# Patient Record
Sex: FEMALE | Race: BLACK OR AFRICAN AMERICAN | NOT HISPANIC OR LATINO | URBAN - METROPOLITAN AREA
[De-identification: names, ages, dates, MRNs, and addresses within clinical notes are randomized per-mention and may not be internally consistent; named-entity substitution may affect disease eponyms.]

---

## 2017-06-15 ENCOUNTER — OUTPATIENT (OUTPATIENT)
Dept: OUTPATIENT SERVICES | Facility: HOSPITAL | Age: 52
LOS: 1 days | Discharge: HOME | End: 2017-06-15

## 2017-06-28 DIAGNOSIS — K80.20 CALCULUS OF GALLBLADDER WITHOUT CHOLECYSTITIS WITHOUT OBSTRUCTION: ICD-10-CM

## 2017-11-27 ENCOUNTER — OUTPATIENT (OUTPATIENT)
Dept: OUTPATIENT SERVICES | Facility: HOSPITAL | Age: 52
LOS: 1 days | Discharge: HOME | End: 2017-11-27

## 2017-11-27 ENCOUNTER — APPOINTMENT (OUTPATIENT)
Dept: OBGYN | Facility: CLINIC | Age: 52
End: 2017-11-27

## 2017-11-27 VITALS
DIASTOLIC BLOOD PRESSURE: 60 MMHG | HEIGHT: 62 IN | SYSTOLIC BLOOD PRESSURE: 150 MMHG | WEIGHT: 164 LBS | BODY MASS INDEX: 30.18 KG/M2

## 2017-12-07 ENCOUNTER — OUTPATIENT (OUTPATIENT)
Dept: OUTPATIENT SERVICES | Facility: HOSPITAL | Age: 52
LOS: 1 days | Discharge: HOME | End: 2017-12-07

## 2017-12-07 DIAGNOSIS — Z12.31 ENCOUNTER FOR SCREENING MAMMOGRAM FOR MALIGNANT NEOPLASM OF BREAST: ICD-10-CM

## 2018-01-09 ENCOUNTER — OUTPATIENT (OUTPATIENT)
Dept: OUTPATIENT SERVICES | Facility: HOSPITAL | Age: 53
LOS: 1 days | Discharge: HOME | End: 2018-01-09

## 2018-01-09 DIAGNOSIS — I73.9 PERIPHERAL VASCULAR DISEASE, UNSPECIFIED: ICD-10-CM

## 2019-02-11 ENCOUNTER — OUTPATIENT (OUTPATIENT)
Dept: OUTPATIENT SERVICES | Facility: HOSPITAL | Age: 54
LOS: 1 days | Discharge: HOME | End: 2019-02-11

## 2019-02-11 DIAGNOSIS — R10.10 UPPER ABDOMINAL PAIN, UNSPECIFIED: ICD-10-CM

## 2019-12-05 ENCOUNTER — EMERGENCY (EMERGENCY)
Facility: HOSPITAL | Age: 54
LOS: 0 days | Discharge: HOME | End: 2019-12-05
Attending: EMERGENCY MEDICINE | Admitting: EMERGENCY MEDICINE
Payer: COMMERCIAL

## 2019-12-05 VITALS
OXYGEN SATURATION: 98 % | HEART RATE: 71 BPM | RESPIRATION RATE: 16 BRPM | SYSTOLIC BLOOD PRESSURE: 200 MMHG | DIASTOLIC BLOOD PRESSURE: 88 MMHG | TEMPERATURE: 98 F

## 2019-12-05 VITALS — DIASTOLIC BLOOD PRESSURE: 82 MMHG | SYSTOLIC BLOOD PRESSURE: 185 MMHG | HEART RATE: 69 BPM

## 2019-12-05 DIAGNOSIS — I10 ESSENTIAL (PRIMARY) HYPERTENSION: ICD-10-CM

## 2019-12-05 DIAGNOSIS — Z88.8 ALLERGY STATUS TO OTHER DRUGS, MEDICAMENTS AND BIOLOGICAL SUBSTANCES: ICD-10-CM

## 2019-12-05 PROCEDURE — 99284 EMERGENCY DEPT VISIT MOD MDM: CPT

## 2019-12-05 RX ORDER — METOCLOPRAMIDE HCL 10 MG
10 TABLET ORAL ONCE
Refills: 0 | Status: COMPLETED | OUTPATIENT
Start: 2019-12-05 | End: 2019-12-05

## 2019-12-05 RX ORDER — ACETAMINOPHEN 500 MG
975 TABLET ORAL ONCE
Refills: 0 | Status: COMPLETED | OUTPATIENT
Start: 2019-12-05 | End: 2019-12-05

## 2019-12-05 RX ADMIN — Medication 10 MILLIGRAM(S): at 11:48

## 2019-12-05 RX ADMIN — Medication 975 MILLIGRAM(S): at 11:48

## 2019-12-05 NOTE — ED ADULT NURSE NOTE - OBJECTIVE STATEMENT
Patient c/o headache, fatigue and high blood pressure. Patient states she normally has high blood pressure and takes blood pressure medications (enalapril and metoprolol). Patient recently started on metoprolol. On assessment no signs of distress noted. Denies n/v/f/c.

## 2019-12-05 NOTE — ED PROVIDER NOTE - NS ED ROS FT
Eyes:  No visual changes, eye pain or discharge.  ENMT:  No hearing changes, pain, discharge or infections. No neck pain or stiffness.  Cardiac:  No chest pain, SOB or edema. No chest pain with exertion.  Respiratory:  No cough or respiratory distress. No hemoptysis. No history of asthma or RAD.  GI:  No nausea, vomiting, diarrhea or abdominal pain.  :  No dysuria, frequency or burning.  MS:  No myalgia, muscle weakness, joint pain or back pain.  Neuro:  +headache. no weakness.  No LOC.  Skin:  No skin rash.   Endocrine: No history of thyroid disease or diabetes.

## 2019-12-05 NOTE — ED PROVIDER NOTE - ATTENDING CONTRIBUTION TO CARE
53 y/o female h/o HTN, DM, non-compliant with her medications secondary to side effects, now presents with elevated blood pressure today, sx are persistent, denies modifying factors, associated gradual onset of nonexertional frontal h/a which is now resolved, denies paresthesias, focal weakness, fevers, chills, neck stiffness, visual disturbances or pain, facial swelling, dental pain or other associated complaints at present.     No old chart available for review.  I have reviewed and agree with the nursing note, except as documented in my note.    VSS, awake, alert, non-toxic appearing, lying comfortably in stretcher, in NAD, no scalp tenderness or pulsatile vasculature, PERRL / EOMI, no conjunctival injection, ears clear, oropharynx clear and w/o signs dental space infection, no JVD or bruit, no skin rash or lesions, chest CTAB, no w/r/r, +S1/S2, RRR, no m/r/g, abdomen soft, NT, ND, +BS, no peripheral edema, AO x 3, clear speech.    Pt remained asymptomatic in ED, encouraged to take her medications and to f/u PMD to switch her antihypertensives if she is discouraged from taking them secondary to side effects. The patient was given detailed return precautions and advised to return to the emergency department if any new symptoms developed, symptoms worsened or for any concerns. The patient was offered the opportunity to ask questions and verbalized that they understand the diagnosis and discharge instructions.

## 2019-12-05 NOTE — ED PROVIDER NOTE - PATIENT PORTAL LINK FT
You can access the FollowMyHealth Patient Portal offered by [facility] by registering at the following website: [email or web address]. By joining [employer]’s FollowMyHealth portal, you will also be able to view your health information using other applications (apps) compatible with our system.

## 2019-12-05 NOTE — ED PROVIDER NOTE - OBJECTIVE STATEMENT
pt is a 54 yof w/ HTN, DM here for headache for 4 days, and high blood pressure. pt was evaluated at OT for shoulder pain, got BP because pt looked like she was in pain, and saw it was 185 systolic so they sent her here. pt denies dizziness, chest pain, sob, cough, urinary issues, blurry vision

## 2019-12-05 NOTE — ED PROVIDER NOTE - NSFOLLOWUPINSTRUCTIONS_ED_ALL_ED_FT
Hypertension    Hypertension, commonly called high blood pressure, is when the force of blood pumping through your arteries is too strong. Hypertension forces your heart to work harder to pump blood. Your arteries may become narrow or stiff. Having untreated or uncontrolled hypertension for a long period of time can cause heart attack, stroke, kidney disease, and other problems. If started on a medication, take exactly as prescribed by your health care professional. Maintain a healthy lifestyle and follow up with your primary care physician.    SEEK IMMEDIATE MEDICAL CARE IF YOU HAVE ANY OF THE FOLLOWING SYMPTOMS: severe headache, confusion, chest pain, abdominal pain, vomiting, or shortness of breath.    Headache    A headache is pain or discomfort felt around the head or neck area. The specific cause of a headache may not be found as there are many types including tension headaches, migraine headaches, and cluster headaches. Watch your condition for any changes. Things you can do to manage your pain include taking over the counter and prescription medications as instructed by your health care provider, lying down in a dark quiet room, limiting stress, getting regular sleep, and refraining from alcohol and tobacco products.    SEEK IMMEDIATE MEDICAL CARE IF YOU HAVE ANY OF THE FOLLOWING SYMPTOMS: fever, vomiting, stiff neck, loss of vision, problems with speech, muscle weakness, loss of balance, trouble walking, passing out, or confusion.

## 2020-01-14 PROBLEM — I10 ESSENTIAL (PRIMARY) HYPERTENSION: Chronic | Status: ACTIVE | Noted: 2019-12-05

## 2020-01-17 ENCOUNTER — OUTPATIENT (OUTPATIENT)
Dept: OUTPATIENT SERVICES | Facility: HOSPITAL | Age: 55
LOS: 1 days | Discharge: HOME | End: 2020-01-17
Payer: OTHER MISCELLANEOUS

## 2020-01-17 VITALS
TEMPERATURE: 97 F | WEIGHT: 168.65 LBS | SYSTOLIC BLOOD PRESSURE: 152 MMHG | HEIGHT: 63 IN | HEART RATE: 69 BPM | DIASTOLIC BLOOD PRESSURE: 69 MMHG | OXYGEN SATURATION: 96 % | RESPIRATION RATE: 18 BRPM

## 2020-01-17 DIAGNOSIS — Z98.890 OTHER SPECIFIED POSTPROCEDURAL STATES: Chronic | ICD-10-CM

## 2020-01-17 DIAGNOSIS — Z01.818 ENCOUNTER FOR OTHER PREPROCEDURAL EXAMINATION: ICD-10-CM

## 2020-01-17 DIAGNOSIS — M19.012 PRIMARY OSTEOARTHRITIS, LEFT SHOULDER: ICD-10-CM

## 2020-01-17 LAB
ALBUMIN SERPL ELPH-MCNC: 4.5 G/DL — SIGNIFICANT CHANGE UP (ref 3.5–5.2)
ALP SERPL-CCNC: 102 U/L — SIGNIFICANT CHANGE UP (ref 30–115)
ALT FLD-CCNC: 31 U/L — SIGNIFICANT CHANGE UP (ref 0–41)
ANION GAP SERPL CALC-SCNC: 13 MMOL/L — SIGNIFICANT CHANGE UP (ref 7–14)
APPEARANCE UR: CLEAR — SIGNIFICANT CHANGE UP
APTT BLD: 30.1 SEC — SIGNIFICANT CHANGE UP (ref 27–39.2)
AST SERPL-CCNC: 21 U/L — SIGNIFICANT CHANGE UP (ref 0–41)
BACTERIA # UR AUTO: NEGATIVE — SIGNIFICANT CHANGE UP
BASOPHILS # BLD AUTO: 0.02 K/UL — SIGNIFICANT CHANGE UP (ref 0–0.2)
BASOPHILS NFR BLD AUTO: 0.4 % — SIGNIFICANT CHANGE UP (ref 0–1)
BILIRUB SERPL-MCNC: <0.2 MG/DL — SIGNIFICANT CHANGE UP (ref 0.2–1.2)
BILIRUB UR-MCNC: NEGATIVE — SIGNIFICANT CHANGE UP
BUN SERPL-MCNC: 8 MG/DL — LOW (ref 10–20)
CALCIUM SERPL-MCNC: 9.6 MG/DL — SIGNIFICANT CHANGE UP (ref 8.5–10.1)
CHLORIDE SERPL-SCNC: 100 MMOL/L — SIGNIFICANT CHANGE UP (ref 98–110)
CO2 SERPL-SCNC: 27 MMOL/L — SIGNIFICANT CHANGE UP (ref 17–32)
COLOR SPEC: YELLOW — SIGNIFICANT CHANGE UP
CREAT SERPL-MCNC: 0.8 MG/DL — SIGNIFICANT CHANGE UP (ref 0.7–1.5)
DIFF PNL FLD: NEGATIVE — SIGNIFICANT CHANGE UP
EOSINOPHIL # BLD AUTO: 0.09 K/UL — SIGNIFICANT CHANGE UP (ref 0–0.7)
EOSINOPHIL NFR BLD AUTO: 1.8 % — SIGNIFICANT CHANGE UP (ref 0–8)
EPI CELLS # UR: 4 /HPF — SIGNIFICANT CHANGE UP (ref 0–5)
ESTIMATED AVERAGE GLUCOSE: 177 MG/DL — HIGH (ref 68–114)
GLUCOSE SERPL-MCNC: 204 MG/DL — HIGH (ref 70–99)
GLUCOSE UR QL: SIGNIFICANT CHANGE UP
HBA1C BLD-MCNC: 7.8 % — HIGH (ref 4–5.6)
HCT VFR BLD CALC: 38.6 % — SIGNIFICANT CHANGE UP (ref 37–47)
HGB BLD-MCNC: 12.5 G/DL — SIGNIFICANT CHANGE UP (ref 12–16)
HYALINE CASTS # UR AUTO: 40 /LPF — HIGH (ref 0–7)
IMM GRANULOCYTES NFR BLD AUTO: 0.2 % — SIGNIFICANT CHANGE UP (ref 0.1–0.3)
INR BLD: 0.94 RATIO — SIGNIFICANT CHANGE UP (ref 0.65–1.3)
KETONES UR-MCNC: SIGNIFICANT CHANGE UP
LEUKOCYTE ESTERASE UR-ACNC: NEGATIVE — SIGNIFICANT CHANGE UP
LYMPHOCYTES # BLD AUTO: 1.96 K/UL — SIGNIFICANT CHANGE UP (ref 1.2–3.4)
LYMPHOCYTES # BLD AUTO: 38.1 % — SIGNIFICANT CHANGE UP (ref 20.5–51.1)
MCHC RBC-ENTMCNC: 30 PG — SIGNIFICANT CHANGE UP (ref 27–31)
MCHC RBC-ENTMCNC: 32.4 G/DL — SIGNIFICANT CHANGE UP (ref 32–37)
MCV RBC AUTO: 92.6 FL — SIGNIFICANT CHANGE UP (ref 81–99)
MONOCYTES # BLD AUTO: 0.37 K/UL — SIGNIFICANT CHANGE UP (ref 0.1–0.6)
MONOCYTES NFR BLD AUTO: 7.2 % — SIGNIFICANT CHANGE UP (ref 1.7–9.3)
MRSA PCR RESULT.: NEGATIVE — SIGNIFICANT CHANGE UP
NEUTROPHILS # BLD AUTO: 2.69 K/UL — SIGNIFICANT CHANGE UP (ref 1.4–6.5)
NEUTROPHILS NFR BLD AUTO: 52.3 % — SIGNIFICANT CHANGE UP (ref 42.2–75.2)
NITRITE UR-MCNC: NEGATIVE — SIGNIFICANT CHANGE UP
NRBC # BLD: 0 /100 WBCS — SIGNIFICANT CHANGE UP (ref 0–0)
PH UR: 6 — SIGNIFICANT CHANGE UP (ref 5–8)
PLATELET # BLD AUTO: 322 K/UL — SIGNIFICANT CHANGE UP (ref 130–400)
POTASSIUM SERPL-MCNC: 4 MMOL/L — SIGNIFICANT CHANGE UP (ref 3.5–5)
POTASSIUM SERPL-SCNC: 4 MMOL/L — SIGNIFICANT CHANGE UP (ref 3.5–5)
PROT SERPL-MCNC: 7.1 G/DL — SIGNIFICANT CHANGE UP (ref 6–8)
PROT UR-MCNC: ABNORMAL
PROTHROM AB SERPL-ACNC: 10.8 SEC — SIGNIFICANT CHANGE UP (ref 9.95–12.87)
RBC # BLD: 4.17 M/UL — LOW (ref 4.2–5.4)
RBC # FLD: 13.8 % — SIGNIFICANT CHANGE UP (ref 11.5–14.5)
RBC CASTS # UR COMP ASSIST: 2 /HPF — SIGNIFICANT CHANGE UP (ref 0–4)
SODIUM SERPL-SCNC: 140 MMOL/L — SIGNIFICANT CHANGE UP (ref 135–146)
SP GR SPEC: 1.03 — HIGH (ref 1.01–1.02)
UROBILINOGEN FLD QL: ABNORMAL
WBC # BLD: 5.14 K/UL — SIGNIFICANT CHANGE UP (ref 4.8–10.8)
WBC # FLD AUTO: 5.14 K/UL — SIGNIFICANT CHANGE UP (ref 4.8–10.8)
WBC UR QL: 5 /HPF — SIGNIFICANT CHANGE UP (ref 0–5)

## 2020-01-17 PROCEDURE — 93010 ELECTROCARDIOGRAM REPORT: CPT

## 2020-01-17 PROCEDURE — 73030 X-RAY EXAM OF SHOULDER: CPT | Mod: 26,LT

## 2020-01-17 RX ORDER — PANTOPRAZOLE SODIUM 20 MG/1
1 TABLET, DELAYED RELEASE ORAL
Qty: 0 | Refills: 0 | DISCHARGE

## 2020-01-17 NOTE — H&P PST ADULT - NSICDXPASTSURGICALHX_GEN_ALL_CORE_FT
PAST SURGICAL HISTORY:  H/O arthroscopy of shoulder     H/O carpal tunnel repair     S/P trigger finger release

## 2020-01-17 NOTE — H&P PST ADULT - REASON FOR ADMISSION
patient presents for left shoulder replacement s/p injury. pt denies current cp,sob,palp,cough,dysuria, fever.   can walk 2 fos and several blocks without sob.  pt states this is complete med/surg history. history of asthma. last attack was months ago and asthma is controlled as per patient. denies use of inhaler in months.

## 2020-01-18 LAB
CULTURE RESULTS: NO GROWTH — SIGNIFICANT CHANGE UP
SPECIMEN SOURCE: SIGNIFICANT CHANGE UP

## 2020-01-31 ENCOUNTER — INPATIENT (INPATIENT)
Facility: HOSPITAL | Age: 55
LOS: 1 days | Discharge: HOME HEALTH | End: 2020-02-02
Attending: ORTHOPAEDIC SURGERY | Admitting: ORTHOPAEDIC SURGERY
Payer: OTHER MISCELLANEOUS

## 2020-01-31 ENCOUNTER — RESULT REVIEW (OUTPATIENT)
Age: 55
End: 2020-01-31

## 2020-01-31 VITALS
WEIGHT: 160.06 LBS | HEART RATE: 75 BPM | TEMPERATURE: 99 F | RESPIRATION RATE: 18 BRPM | DIASTOLIC BLOOD PRESSURE: 86 MMHG | SYSTOLIC BLOOD PRESSURE: 177 MMHG | HEIGHT: 63 IN

## 2020-01-31 DIAGNOSIS — Z98.890 OTHER SPECIFIED POSTPROCEDURAL STATES: Chronic | ICD-10-CM

## 2020-01-31 LAB
BLD GP AB SCN SERPL QL: SIGNIFICANT CHANGE UP
GLUCOSE BLDC GLUCOMTR-MCNC: 131 MG/DL — HIGH (ref 70–99)
GLUCOSE BLDC GLUCOMTR-MCNC: 167 MG/DL — HIGH (ref 70–99)
GLUCOSE BLDC GLUCOMTR-MCNC: 199 MG/DL — HIGH (ref 70–99)

## 2020-01-31 PROCEDURE — 88311 DECALCIFY TISSUE: CPT | Mod: 26

## 2020-01-31 PROCEDURE — 73030 X-RAY EXAM OF SHOULDER: CPT | Mod: 26,LT

## 2020-01-31 PROCEDURE — 88305 TISSUE EXAM BY PATHOLOGIST: CPT | Mod: 26

## 2020-01-31 RX ORDER — ATORVASTATIN CALCIUM 80 MG/1
40 TABLET, FILM COATED ORAL DAILY
Refills: 0 | Status: DISCONTINUED | OUTPATIENT
Start: 2020-01-31 | End: 2020-02-02

## 2020-01-31 RX ORDER — ALBUTEROL 90 UG/1
1 AEROSOL, METERED ORAL
Refills: 0 | Status: DISCONTINUED | OUTPATIENT
Start: 2020-01-31 | End: 2020-02-02

## 2020-01-31 RX ORDER — METFORMIN HYDROCHLORIDE 850 MG/1
1000 TABLET ORAL
Refills: 0 | Status: DISCONTINUED | OUTPATIENT
Start: 2020-02-01 | End: 2020-02-02

## 2020-01-31 RX ORDER — CEFAZOLIN SODIUM 1 G
1000 VIAL (EA) INJECTION EVERY 8 HOURS
Refills: 0 | Status: COMPLETED | OUTPATIENT
Start: 2020-01-31 | End: 2020-02-01

## 2020-01-31 RX ORDER — DOCUSATE SODIUM 100 MG
1 CAPSULE ORAL
Qty: 0 | Refills: 0 | DISCHARGE

## 2020-01-31 RX ORDER — ACETAMINOPHEN 500 MG
1000 TABLET ORAL ONCE
Refills: 0 | Status: COMPLETED | OUTPATIENT
Start: 2020-01-31 | End: 2020-01-31

## 2020-01-31 RX ORDER — METOPROLOL TARTRATE 50 MG
100 TABLET ORAL
Refills: 0 | Status: DISCONTINUED | OUTPATIENT
Start: 2020-01-31 | End: 2020-02-02

## 2020-01-31 RX ORDER — MORPHINE SULFATE 50 MG/1
4 CAPSULE, EXTENDED RELEASE ORAL
Refills: 0 | Status: DISCONTINUED | OUTPATIENT
Start: 2020-01-31 | End: 2020-02-01

## 2020-01-31 RX ORDER — HYDROMORPHONE HYDROCHLORIDE 2 MG/ML
0.5 INJECTION INTRAMUSCULAR; INTRAVENOUS; SUBCUTANEOUS
Refills: 0 | Status: DISCONTINUED | OUTPATIENT
Start: 2020-01-31 | End: 2020-01-31

## 2020-01-31 RX ORDER — ACETAMINOPHEN 500 MG
650 TABLET ORAL EVERY 6 HOURS
Refills: 0 | Status: DISCONTINUED | OUTPATIENT
Start: 2020-01-31 | End: 2020-02-02

## 2020-01-31 RX ORDER — SODIUM CHLORIDE 9 MG/ML
1000 INJECTION, SOLUTION INTRAVENOUS
Refills: 0 | Status: DISCONTINUED | OUTPATIENT
Start: 2020-01-31 | End: 2020-01-31

## 2020-01-31 RX ORDER — PIOGLITAZONE HYDROCHLORIDE 15 MG/1
30 TABLET ORAL DAILY
Refills: 0 | Status: DISCONTINUED | OUTPATIENT
Start: 2020-02-01 | End: 2020-02-02

## 2020-01-31 RX ORDER — OXYCODONE AND ACETAMINOPHEN 5; 325 MG/1; MG/1
1 TABLET ORAL EVERY 4 HOURS
Refills: 0 | Status: DISCONTINUED | OUTPATIENT
Start: 2020-01-31 | End: 2020-02-02

## 2020-01-31 RX ORDER — OXYCODONE AND ACETAMINOPHEN 5; 325 MG/1; MG/1
1 TABLET ORAL ONCE
Refills: 0 | Status: DISCONTINUED | OUTPATIENT
Start: 2020-01-31 | End: 2020-01-31

## 2020-01-31 RX ORDER — TRAMADOL HYDROCHLORIDE 50 MG/1
50 TABLET ORAL EVERY 4 HOURS
Refills: 0 | Status: DISCONTINUED | OUTPATIENT
Start: 2020-01-31 | End: 2020-02-02

## 2020-01-31 RX ORDER — CLOPIDOGREL BISULFATE 75 MG/1
75 TABLET, FILM COATED ORAL DAILY
Refills: 0 | Status: DISCONTINUED | OUTPATIENT
Start: 2020-02-01 | End: 2020-02-02

## 2020-01-31 RX ORDER — PANTOPRAZOLE SODIUM 20 MG/1
40 TABLET, DELAYED RELEASE ORAL
Refills: 0 | Status: DISCONTINUED | OUTPATIENT
Start: 2020-01-31 | End: 2020-02-02

## 2020-01-31 RX ADMIN — OXYCODONE AND ACETAMINOPHEN 1 TABLET(S): 5; 325 TABLET ORAL at 23:10

## 2020-01-31 RX ADMIN — MORPHINE SULFATE 4 MILLIGRAM(S): 50 CAPSULE, EXTENDED RELEASE ORAL at 19:50

## 2020-01-31 RX ADMIN — Medication 1000 MILLIGRAM(S): at 12:15

## 2020-01-31 RX ADMIN — TRAMADOL HYDROCHLORIDE 50 MILLIGRAM(S): 50 TABLET ORAL at 16:48

## 2020-01-31 RX ADMIN — Medication 5 MILLIGRAM(S): at 17:38

## 2020-01-31 RX ADMIN — HYDROMORPHONE HYDROCHLORIDE 0.5 MILLIGRAM(S): 2 INJECTION INTRAMUSCULAR; INTRAVENOUS; SUBCUTANEOUS at 12:56

## 2020-01-31 RX ADMIN — OXYCODONE AND ACETAMINOPHEN 1 TABLET(S): 5; 325 TABLET ORAL at 20:23

## 2020-01-31 RX ADMIN — MORPHINE SULFATE 4 MILLIGRAM(S): 50 CAPSULE, EXTENDED RELEASE ORAL at 21:26

## 2020-01-31 RX ADMIN — OXYCODONE AND ACETAMINOPHEN 1 TABLET(S): 5; 325 TABLET ORAL at 21:17

## 2020-01-31 RX ADMIN — HYDROMORPHONE HYDROCHLORIDE 0.5 MILLIGRAM(S): 2 INJECTION INTRAMUSCULAR; INTRAVENOUS; SUBCUTANEOUS at 13:26

## 2020-01-31 RX ADMIN — HYDROMORPHONE HYDROCHLORIDE 0.5 MILLIGRAM(S): 2 INJECTION INTRAMUSCULAR; INTRAVENOUS; SUBCUTANEOUS at 12:43

## 2020-01-31 RX ADMIN — HYDROMORPHONE HYDROCHLORIDE 0.5 MILLIGRAM(S): 2 INJECTION INTRAMUSCULAR; INTRAVENOUS; SUBCUTANEOUS at 14:45

## 2020-01-31 RX ADMIN — Medication 100 MILLIGRAM(S): at 21:19

## 2020-01-31 RX ADMIN — Medication 400 MILLIGRAM(S): at 12:11

## 2020-01-31 RX ADMIN — HYDROMORPHONE HYDROCHLORIDE 0.5 MILLIGRAM(S): 2 INJECTION INTRAMUSCULAR; INTRAVENOUS; SUBCUTANEOUS at 12:47

## 2020-01-31 RX ADMIN — MORPHINE SULFATE 4 MILLIGRAM(S): 50 CAPSULE, EXTENDED RELEASE ORAL at 21:45

## 2020-01-31 RX ADMIN — HYDROMORPHONE HYDROCHLORIDE 0.5 MILLIGRAM(S): 2 INJECTION INTRAMUSCULAR; INTRAVENOUS; SUBCUTANEOUS at 14:14

## 2020-01-31 RX ADMIN — HYDROMORPHONE HYDROCHLORIDE 0.5 MILLIGRAM(S): 2 INJECTION INTRAMUSCULAR; INTRAVENOUS; SUBCUTANEOUS at 12:20

## 2020-01-31 RX ADMIN — TRAMADOL HYDROCHLORIDE 50 MILLIGRAM(S): 50 TABLET ORAL at 16:18

## 2020-01-31 RX ADMIN — HYDROMORPHONE HYDROCHLORIDE 0.5 MILLIGRAM(S): 2 INJECTION INTRAMUSCULAR; INTRAVENOUS; SUBCUTANEOUS at 12:31

## 2020-01-31 RX ADMIN — Medication 100 MILLIGRAM(S): at 17:37

## 2020-01-31 RX ADMIN — OXYCODONE AND ACETAMINOPHEN 1 TABLET(S): 5; 325 TABLET ORAL at 22:37

## 2020-01-31 RX ADMIN — MORPHINE SULFATE 4 MILLIGRAM(S): 50 CAPSULE, EXTENDED RELEASE ORAL at 19:28

## 2020-01-31 NOTE — ASU DISCHARGE PLAN (ADULT/PEDIATRIC) - ASU DC SPECIAL INSTRUCTIONSFT
Follow-up in Dr. AYDE Ruiz's office as planned.  Keep Arm in sling, do not move arm.  Paper prescriptions for pain meds given, take as instructed.

## 2020-01-31 NOTE — ASU PATIENT PROFILE, ADULT - PMH
Arthritis    Asthma    Diabetes    GERD (gastroesophageal reflux disease)    High cholesterol    History of peripheral arterial disease    HTN (hypertension)    TRAVIS on CPAP

## 2020-01-31 NOTE — ASU DISCHARGE PLAN (ADULT/PEDIATRIC) - CARE PROVIDER_API CALL
Fuentes Ruiz (MD)  Orthopaedic Surgery; Sports Medicine  [address] [place] [address]  Phone: [phone]  Fax: [phone]  Follow Up Time:

## 2020-02-01 LAB
BLD GP AB SCN SERPL QL: SIGNIFICANT CHANGE UP
GLUCOSE BLDC GLUCOMTR-MCNC: 155 MG/DL — HIGH (ref 70–99)
GLUCOSE BLDC GLUCOMTR-MCNC: 177 MG/DL — HIGH (ref 70–99)
GLUCOSE BLDC GLUCOMTR-MCNC: 209 MG/DL — HIGH (ref 70–99)
GLUCOSE BLDC GLUCOMTR-MCNC: 213 MG/DL — HIGH (ref 70–99)
HCT VFR BLD CALC: 33.8 % — LOW (ref 37–47)
HGB BLD-MCNC: 11.2 G/DL — LOW (ref 12–16)
MCHC RBC-ENTMCNC: 30.4 PG — SIGNIFICANT CHANGE UP (ref 27–31)
MCHC RBC-ENTMCNC: 33.1 G/DL — SIGNIFICANT CHANGE UP (ref 32–37)
MCV RBC AUTO: 91.8 FL — SIGNIFICANT CHANGE UP (ref 81–99)
NRBC # BLD: 0 /100 WBCS — SIGNIFICANT CHANGE UP (ref 0–0)
PLATELET # BLD AUTO: 294 K/UL — SIGNIFICANT CHANGE UP (ref 130–400)
RBC # BLD: 3.68 M/UL — LOW (ref 4.2–5.4)
RBC # FLD: 13.8 % — SIGNIFICANT CHANGE UP (ref 11.5–14.5)
WBC # BLD: 10.94 K/UL — HIGH (ref 4.8–10.8)
WBC # FLD AUTO: 10.94 K/UL — HIGH (ref 4.8–10.8)

## 2020-02-01 RX ORDER — OXYCODONE HYDROCHLORIDE 5 MG/1
10 TABLET ORAL EVERY 4 HOURS
Refills: 0 | Status: DISCONTINUED | OUTPATIENT
Start: 2020-02-01 | End: 2020-02-02

## 2020-02-01 RX ORDER — DEXTROSE 50 % IN WATER 50 %
25 SYRINGE (ML) INTRAVENOUS ONCE
Refills: 0 | Status: DISCONTINUED | OUTPATIENT
Start: 2020-02-01 | End: 2020-02-02

## 2020-02-01 RX ORDER — INSULIN LISPRO 100/ML
VIAL (ML) SUBCUTANEOUS
Refills: 0 | Status: DISCONTINUED | OUTPATIENT
Start: 2020-02-01 | End: 2020-02-02

## 2020-02-01 RX ORDER — MORPHINE SULFATE 50 MG/1
4 CAPSULE, EXTENDED RELEASE ORAL ONCE
Refills: 0 | Status: DISCONTINUED | OUTPATIENT
Start: 2020-02-01 | End: 2020-02-01

## 2020-02-01 RX ORDER — DEXTROSE 50 % IN WATER 50 %
15 SYRINGE (ML) INTRAVENOUS ONCE
Refills: 0 | Status: DISCONTINUED | OUTPATIENT
Start: 2020-02-01 | End: 2020-02-02

## 2020-02-01 RX ORDER — SODIUM CHLORIDE 9 MG/ML
1000 INJECTION, SOLUTION INTRAVENOUS
Refills: 0 | Status: DISCONTINUED | OUTPATIENT
Start: 2020-02-01 | End: 2020-02-02

## 2020-02-01 RX ORDER — ENOXAPARIN SODIUM 100 MG/ML
40 INJECTION SUBCUTANEOUS AT BEDTIME
Refills: 0 | Status: DISCONTINUED | OUTPATIENT
Start: 2020-02-01 | End: 2020-02-02

## 2020-02-01 RX ORDER — GLUCAGON INJECTION, SOLUTION 0.5 MG/.1ML
1 INJECTION, SOLUTION SUBCUTANEOUS ONCE
Refills: 0 | Status: DISCONTINUED | OUTPATIENT
Start: 2020-02-01 | End: 2020-02-02

## 2020-02-01 RX ORDER — DEXTROSE 50 % IN WATER 50 %
12.5 SYRINGE (ML) INTRAVENOUS ONCE
Refills: 0 | Status: DISCONTINUED | OUTPATIENT
Start: 2020-02-01 | End: 2020-02-02

## 2020-02-01 RX ADMIN — TRAMADOL HYDROCHLORIDE 50 MILLIGRAM(S): 50 TABLET ORAL at 21:14

## 2020-02-01 RX ADMIN — TRAMADOL HYDROCHLORIDE 50 MILLIGRAM(S): 50 TABLET ORAL at 13:57

## 2020-02-01 RX ADMIN — OXYCODONE AND ACETAMINOPHEN 1 TABLET(S): 5; 325 TABLET ORAL at 23:53

## 2020-02-01 RX ADMIN — Medication 100 MILLIGRAM(S): at 07:19

## 2020-02-01 RX ADMIN — OXYCODONE AND ACETAMINOPHEN 1 TABLET(S): 5; 325 TABLET ORAL at 09:42

## 2020-02-01 RX ADMIN — OXYCODONE HYDROCHLORIDE 10 MILLIGRAM(S): 5 TABLET ORAL at 18:10

## 2020-02-01 RX ADMIN — Medication 5 MILLIGRAM(S): at 07:20

## 2020-02-01 RX ADMIN — Medication 100 MILLIGRAM(S): at 17:00

## 2020-02-01 RX ADMIN — PIOGLITAZONE HYDROCHLORIDE 30 MILLIGRAM(S): 15 TABLET ORAL at 11:56

## 2020-02-01 RX ADMIN — MORPHINE SULFATE 4 MILLIGRAM(S): 50 CAPSULE, EXTENDED RELEASE ORAL at 04:05

## 2020-02-01 RX ADMIN — MORPHINE SULFATE 4 MILLIGRAM(S): 50 CAPSULE, EXTENDED RELEASE ORAL at 04:20

## 2020-02-01 RX ADMIN — METFORMIN HYDROCHLORIDE 1000 MILLIGRAM(S): 850 TABLET ORAL at 17:00

## 2020-02-01 RX ADMIN — Medication 100 MILLIGRAM(S): at 13:48

## 2020-02-01 RX ADMIN — OXYCODONE HYDROCHLORIDE 10 MILLIGRAM(S): 5 TABLET ORAL at 17:41

## 2020-02-01 RX ADMIN — METFORMIN HYDROCHLORIDE 1000 MILLIGRAM(S): 850 TABLET ORAL at 07:20

## 2020-02-01 RX ADMIN — ATORVASTATIN CALCIUM 40 MILLIGRAM(S): 80 TABLET, FILM COATED ORAL at 11:55

## 2020-02-01 RX ADMIN — Medication 2: at 16:47

## 2020-02-01 RX ADMIN — TRAMADOL HYDROCHLORIDE 50 MILLIGRAM(S): 50 TABLET ORAL at 02:25

## 2020-02-01 RX ADMIN — ENOXAPARIN SODIUM 40 MILLIGRAM(S): 100 INJECTION SUBCUTANEOUS at 21:14

## 2020-02-01 RX ADMIN — Medication 100 MILLIGRAM(S): at 07:20

## 2020-02-01 RX ADMIN — CLOPIDOGREL BISULFATE 75 MILLIGRAM(S): 75 TABLET, FILM COATED ORAL at 11:55

## 2020-02-01 RX ADMIN — OXYCODONE AND ACETAMINOPHEN 1 TABLET(S): 5; 325 TABLET ORAL at 08:42

## 2020-02-01 RX ADMIN — PANTOPRAZOLE SODIUM 40 MILLIGRAM(S): 20 TABLET, DELAYED RELEASE ORAL at 07:20

## 2020-02-01 RX ADMIN — TRAMADOL HYDROCHLORIDE 50 MILLIGRAM(S): 50 TABLET ORAL at 02:55

## 2020-02-02 ENCOUNTER — TRANSCRIPTION ENCOUNTER (OUTPATIENT)
Age: 55
End: 2020-02-02

## 2020-02-02 VITALS
DIASTOLIC BLOOD PRESSURE: 68 MMHG | SYSTOLIC BLOOD PRESSURE: 153 MMHG | TEMPERATURE: 98 F | RESPIRATION RATE: 18 BRPM | HEART RATE: 80 BPM

## 2020-02-02 LAB
GLUCOSE BLDC GLUCOMTR-MCNC: 134 MG/DL — HIGH (ref 70–99)
GLUCOSE BLDC GLUCOMTR-MCNC: 171 MG/DL — HIGH (ref 70–99)

## 2020-02-02 RX ORDER — OXYCODONE HYDROCHLORIDE 5 MG/1
1 TABLET ORAL
Qty: 0 | Refills: 0 | DISCHARGE
Start: 2020-02-02

## 2020-02-02 RX ORDER — CLOPIDOGREL BISULFATE 75 MG/1
1 TABLET, FILM COATED ORAL
Qty: 0 | Refills: 0 | DISCHARGE
Start: 2020-02-02

## 2020-02-02 RX ORDER — METFORMIN HYDROCHLORIDE 850 MG/1
2 TABLET ORAL
Qty: 0 | Refills: 0 | DISCHARGE

## 2020-02-02 RX ORDER — METOPROLOL TARTRATE 50 MG
1 TABLET ORAL
Qty: 0 | Refills: 0 | DISCHARGE
Start: 2020-02-02

## 2020-02-02 RX ORDER — METFORMIN HYDROCHLORIDE 850 MG/1
1 TABLET ORAL
Qty: 0 | Refills: 0 | DISCHARGE
Start: 2020-02-02

## 2020-02-02 RX ORDER — TRAMADOL HYDROCHLORIDE 50 MG/1
1 TABLET ORAL
Qty: 0 | Refills: 0 | DISCHARGE
Start: 2020-02-02

## 2020-02-02 RX ORDER — ALBUTEROL 90 UG/1
2 AEROSOL, METERED ORAL
Qty: 0 | Refills: 0 | DISCHARGE

## 2020-02-02 RX ORDER — METOPROLOL TARTRATE 50 MG
1 TABLET ORAL
Qty: 0 | Refills: 0 | DISCHARGE

## 2020-02-02 RX ORDER — CLOPIDOGREL BISULFATE 75 MG/1
1 TABLET, FILM COATED ORAL
Qty: 0 | Refills: 0 | DISCHARGE

## 2020-02-02 RX ORDER — ATORVASTATIN CALCIUM 80 MG/1
1 TABLET, FILM COATED ORAL
Qty: 0 | Refills: 0 | DISCHARGE
Start: 2020-02-02

## 2020-02-02 RX ORDER — PIOGLITAZONE HYDROCHLORIDE 15 MG/1
1 TABLET ORAL
Qty: 0 | Refills: 0 | DISCHARGE
Start: 2020-02-02

## 2020-02-02 RX ORDER — ALBUTEROL 90 UG/1
1 AEROSOL, METERED ORAL
Qty: 0 | Refills: 0 | DISCHARGE
Start: 2020-02-02

## 2020-02-02 RX ORDER — ATORVASTATIN CALCIUM 80 MG/1
1 TABLET, FILM COATED ORAL
Qty: 0 | Refills: 0 | DISCHARGE

## 2020-02-02 RX ORDER — PIOGLITAZONE HYDROCHLORIDE 15 MG/1
1 TABLET ORAL
Qty: 0 | Refills: 0 | DISCHARGE

## 2020-02-02 RX ADMIN — PANTOPRAZOLE SODIUM 40 MILLIGRAM(S): 20 TABLET, DELAYED RELEASE ORAL at 05:12

## 2020-02-02 RX ADMIN — OXYCODONE AND ACETAMINOPHEN 1 TABLET(S): 5; 325 TABLET ORAL at 06:53

## 2020-02-02 RX ADMIN — METFORMIN HYDROCHLORIDE 1000 MILLIGRAM(S): 850 TABLET ORAL at 05:12

## 2020-02-02 RX ADMIN — OXYCODONE AND ACETAMINOPHEN 1 TABLET(S): 5; 325 TABLET ORAL at 09:55

## 2020-02-02 RX ADMIN — ATORVASTATIN CALCIUM 40 MILLIGRAM(S): 80 TABLET, FILM COATED ORAL at 11:40

## 2020-02-02 RX ADMIN — Medication 100 MILLIGRAM(S): at 05:12

## 2020-02-02 RX ADMIN — OXYCODONE AND ACETAMINOPHEN 1 TABLET(S): 5; 325 TABLET ORAL at 10:13

## 2020-02-02 RX ADMIN — Medication 5 MILLIGRAM(S): at 05:12

## 2020-02-02 RX ADMIN — CLOPIDOGREL BISULFATE 75 MILLIGRAM(S): 75 TABLET, FILM COATED ORAL at 11:40

## 2020-02-02 RX ADMIN — Medication 1: at 11:40

## 2020-02-02 RX ADMIN — OXYCODONE AND ACETAMINOPHEN 1 TABLET(S): 5; 325 TABLET ORAL at 05:47

## 2020-02-02 RX ADMIN — PIOGLITAZONE HYDROCHLORIDE 30 MILLIGRAM(S): 15 TABLET ORAL at 11:39

## 2020-02-02 NOTE — DISCHARGE NOTE PROVIDER - NSDCCPCAREPLAN_GEN_ALL_CORE_FT
PRINCIPAL DISCHARGE DIAGNOSIS  Diagnosis: Arthritis of left shoulder region  Assessment and Plan of Treatment:

## 2020-02-02 NOTE — PROGRESS NOTE ADULT - SUBJECTIVE AND OBJECTIVE BOX
Orthopedic Surgery Postop Check    55F POD0 s/p L TSA. Pt admitted for pain control. Denies n/t, n/v, sob/chp.    Vital Signs Last 24 Hrs  T(C): 37 (31 Jan 2020 14:45), Max: 37.2 (31 Jan 2020 06:38)  T(F): 98.6 (31 Jan 2020 14:45), Max: 99 (31 Jan 2020 06:38)  HR: 82 (31 Jan 2020 16:00) (75 - 89)  BP: 169/78 (31 Jan 2020 16:00) (161/73 - 194/87)  BP(mean): 118 (31 Jan 2020 12:15) (118 - 118)  RR: 21 (31 Jan 2020 16:00) (14 - 21)  SpO2: 97% (31 Jan 2020 16:00) (95% - 99%)    NAD, unlabored breating  LUE in sling  dressing c/d/i  ain/pin/u/r intact  SILT m/r/u  R palpable, cap ref wnl, digits wwp      -LUE WBAT, keep sling at all times  -Postop abx  -DVT ppx  -home meds  -D/c tomorrow  -Ortho following
Orthopaedics Progress Note    AMBER KU  [id number]    Patient is a 55y year old Female with left total shoulder arthroplasty  POD#2  Patient seen and examined bedside  Pain well controlled  No other complaints    acetaminophen   Tablet .. 650 milliGRAM(s) Oral every 6 hours PRN  ALBUTerol    90 MICROgram(s) HFA Inhaler 1 Puff(s) Inhalation four times a day PRN  atorvastatin Oral Tab/Cap - Peds 40 milliGRAM(s) Oral daily  clopidogrel Tablet 75 milliGRAM(s) Oral daily  dextrose 40% Gel 15 Gram(s) Oral once PRN  dextrose 5%. 1000 milliLiter(s) IV Continuous <Continuous>  dextrose 50% Injectable 12.5 Gram(s) IV Push once  dextrose 50% Injectable 25 Gram(s) IV Push once  dextrose 50% Injectable 25 Gram(s) IV Push once  enalapril 5 milliGRAM(s) Oral daily  enoxaparin Injectable 40 milliGRAM(s) SubCutaneous at bedtime  glucagon  Injectable 1 milliGRAM(s) IntraMuscular once PRN  insulin lispro (HumaLOG) corrective regimen sliding scale   SubCutaneous three times a day before meals  metFORMIN 1000 milliGRAM(s) Oral two times a day  metoprolol tartrate 100 milliGRAM(s) Oral two times a day  oxycodone    5 mG/acetaminophen 325 mG 1 Tablet(s) Oral every 4 hours PRN  oxyCODONE    IR 10 milliGRAM(s) Oral every 4 hours PRN  pantoprazole    Tablet 40 milliGRAM(s) Oral before breakfast  pioglitazone 30 milliGRAM(s) Oral daily  traMADol 50 milliGRAM(s) Oral every 4 hours PRN      T(C): 36.4 (02-02-20 @ 07:14), Max: 37.3 (02-02-20 @ 00:02)  HR: 80 (02-02-20 @ 07:14) (80 - 89)  BP: 153/68 (02-02-20 @ 07:14) (140/72 - 185/83)  RR: 18 (02-02-20 @ 07:14) (18 - 18)  SpO2: --    Physical Exam  NAD  Breathing comfortably on RA  Resting comfortably  LUE  Dressing c/d/i  Motor: AIN/PIN/Ulnar intact  Sensory: Ax/M/R/U intact  Vasc: hand WWP, 2+ radial pulse      Labs                        11.2   10.94 )-----------( 294      ( 01 Feb 2020 04:30 )             33.8     A/P: Patient is a 55y year old Female as above    Plan for home today  NWB on LUE  WBAT on BL LE, encourage ambulation  DVT ppx: SCD, LVX  Abx: completed ppx  Pain control  Home medications: resume  Trend labs
Orthopaedics Progress Note    AMBER KU  [id number]    Patient is a 55y year old Female with left total shoulder arthroplasty  POD#1  Patient seen and examined bedside  Pain present but more well controlled s/p morphine IV  No other complaints    acetaminophen   Tablet .. 650 milliGRAM(s) Oral every 6 hours PRN  ALBUTerol    90 MICROgram(s) HFA Inhaler 1 Puff(s) Inhalation four times a day PRN  atorvastatin Oral Tab/Cap - Peds 40 milliGRAM(s) Oral daily  ceFAZolin   IVPB 1000 milliGRAM(s) IV Intermittent every 8 hours  clopidogrel Tablet 75 milliGRAM(s) Oral daily  enalapril 5 milliGRAM(s) Oral daily  metFORMIN 1000 milliGRAM(s) Oral two times a day  metoprolol tartrate 100 milliGRAM(s) Oral two times a day  oxycodone    5 mG/acetaminophen 325 mG 1 Tablet(s) Oral every 4 hours PRN  pantoprazole    Tablet 40 milliGRAM(s) Oral before breakfast  pioglitazone 30 milliGRAM(s) Oral daily  traMADol 50 milliGRAM(s) Oral every 4 hours PRN      T(C): 37.4 (02-01-20 @ 05:30), Max: 37.9 (02-01-20 @ 04:45)  HR: 89 (02-01-20 @ 05:30) (79 - 95)  BP: 173/77 (02-01-20 @ 05:30) (145/71 - 194/87)  RR: 18 (02-01-20 @ 05:30) (14 - 21)  SpO2: 97% (02-01-20 @ 04:45) (92% - 99%)    Physical Exam  NAD  Breathing comfortably on RA  Resting comfortably  LUE  Sling in place  Dressing c/d/i  Motor: AIN/PIN/Ulnar intact  Sensory: Ax/M/R/U intact  Vasc: hand WWP, 2+ radial pulse      Labs                        11.2   10.94 )-----------( 294      ( 01 Feb 2020 04:30 )             33.8     A/P: Patient is a 55y year old Female as above    NWB on LUE  DVT ppx: SCD, PLVX  Abx: ancef  Pain control  Home medications: resume  Trend labs

## 2020-02-02 NOTE — DISCHARGE NOTE PROVIDER - NSDCMRMEDTOKEN_GEN_ALL_CORE_FT
albuterol 90 mcg/inh inhalation aerosol: 1 puff(s) inhaled 4 times a day, As needed, Bronchospasm  atorvastatin 40 mg oral tablet: 1 tab(s) orally once a day  clopidogrel 75 mg oral tablet: 1 tab(s) orally once a day  enalapril 5 mg oral tablet: 1 tab(s) orally once a day  metFORMIN 1000 mg oral tablet: 1 tab(s) orally 2 times a day  metoprolol tartrate 100 mg oral tablet: 1 tab(s) orally 2 times a day  omeprazole 40 mg oral delayed release capsule: 1 cap(s) orally once a day  oxyCODONE 10 mg oral tablet: 1 tab(s) orally every 4 hours, As needed, Severe Pain (7 - 10)  Percocet 5/325 oral tablet: 1 tab(s) orally once a day (at bedtime), As Needed  pioglitazone 30 mg oral tablet: 1 tab(s) orally once a day  traMADol 50 mg oral tablet: 1 tab(s) orally every 4 hours, As needed, Moderate Pain (4 - 6)

## 2020-02-02 NOTE — DISCHARGE NOTE NURSING/CASE MANAGEMENT/SOCIAL WORK - PATIENT PORTAL LINK FT
You can access the FollowMyHealth Patient Portal offered by [facility] by registering at the following website: [email or web address]. By joining [employer]’s FollowMyHealth portal, you will also be able to view your health information using other applications (apps) compatible with our system.

## 2020-02-02 NOTE — DISCHARGE NOTE PROVIDER - HOSPITAL COURSE
Presented for elective left total shoulder arthroplasty    Admitted post operatively for pain management    Pain well controlled    On POD#2 determined to be fit for discharge

## 2020-02-02 NOTE — DISCHARGE NOTE PROVIDER - CARE PROVIDER_API CALL
Fuentes Ruiz (MD)  Orthopaedic Surgery; Sports Medicine  [address] [place] [address]  Phone: [phone]  Fax: [phone]  Follow Up Time:

## 2020-02-06 DIAGNOSIS — M19.012 PRIMARY OSTEOARTHRITIS, LEFT SHOULDER: ICD-10-CM

## 2020-02-10 LAB — SURGICAL PATHOLOGY STUDY: SIGNIFICANT CHANGE UP

## 2020-02-27 ENCOUNTER — OUTPATIENT (OUTPATIENT)
Dept: OUTPATIENT SERVICES | Facility: HOSPITAL | Age: 55
LOS: 1 days | Discharge: HOME | End: 2020-02-27
Payer: COMMERCIAL

## 2020-02-27 DIAGNOSIS — R06.02 SHORTNESS OF BREATH: ICD-10-CM

## 2020-02-27 DIAGNOSIS — Z98.890 OTHER SPECIFIED POSTPROCEDURAL STATES: Chronic | ICD-10-CM

## 2020-02-27 PROBLEM — E11.9 TYPE 2 DIABETES MELLITUS WITHOUT COMPLICATIONS: Chronic | Status: ACTIVE | Noted: 2020-01-17

## 2020-02-27 PROBLEM — K21.9 GASTRO-ESOPHAGEAL REFLUX DISEASE WITHOUT ESOPHAGITIS: Chronic | Status: ACTIVE | Noted: 2020-01-17

## 2020-02-27 PROBLEM — Z86.79 PERSONAL HISTORY OF OTHER DISEASES OF THE CIRCULATORY SYSTEM: Chronic | Status: ACTIVE | Noted: 2020-01-17

## 2020-02-27 PROBLEM — G47.33 OBSTRUCTIVE SLEEP APNEA (ADULT) (PEDIATRIC): Chronic | Status: ACTIVE | Noted: 2020-01-17

## 2020-02-27 PROCEDURE — 71046 X-RAY EXAM CHEST 2 VIEWS: CPT | Mod: 26

## 2020-03-30 ENCOUNTER — OUTPATIENT (OUTPATIENT)
Dept: OUTPATIENT SERVICES | Facility: HOSPITAL | Age: 55
LOS: 1 days | Discharge: HOME | End: 2020-03-30

## 2020-03-30 DIAGNOSIS — Z98.890 OTHER SPECIFIED POSTPROCEDURAL STATES: Chronic | ICD-10-CM

## 2020-03-30 DIAGNOSIS — R07.9 CHEST PAIN, UNSPECIFIED: ICD-10-CM

## 2021-03-10 ENCOUNTER — OUTPATIENT (OUTPATIENT)
Dept: OUTPATIENT SERVICES | Facility: HOSPITAL | Age: 56
LOS: 1 days | Discharge: HOME | End: 2021-03-10
Payer: COMMERCIAL

## 2021-03-10 VITALS
HEIGHT: 62 IN | RESPIRATION RATE: 16 BRPM | WEIGHT: 164.91 LBS | SYSTOLIC BLOOD PRESSURE: 142 MMHG | OXYGEN SATURATION: 99 % | HEART RATE: 76 BPM | DIASTOLIC BLOOD PRESSURE: 78 MMHG | TEMPERATURE: 98 F

## 2021-03-10 DIAGNOSIS — Z98.890 OTHER SPECIFIED POSTPROCEDURAL STATES: Chronic | ICD-10-CM

## 2021-03-10 DIAGNOSIS — Z01.818 ENCOUNTER FOR OTHER PREPROCEDURAL EXAMINATION: ICD-10-CM

## 2021-03-10 DIAGNOSIS — M47.812 SPONDYLOSIS WITHOUT MYELOPATHY OR RADICULOPATHY, CERVICAL REGION: ICD-10-CM

## 2021-03-10 LAB
A1C WITH ESTIMATED AVERAGE GLUCOSE RESULT: 8 % — HIGH (ref 4–5.6)
ALBUMIN SERPL ELPH-MCNC: 4.3 G/DL — SIGNIFICANT CHANGE UP (ref 3.5–5.2)
ALP SERPL-CCNC: 107 U/L — SIGNIFICANT CHANGE UP (ref 30–115)
ALT FLD-CCNC: 29 U/L — SIGNIFICANT CHANGE UP (ref 0–41)
ANION GAP SERPL CALC-SCNC: 12 MMOL/L — SIGNIFICANT CHANGE UP (ref 7–14)
APPEARANCE UR: CLEAR — SIGNIFICANT CHANGE UP
APTT BLD: 33.3 SEC — SIGNIFICANT CHANGE UP (ref 27–39.2)
AST SERPL-CCNC: 22 U/L — SIGNIFICANT CHANGE UP (ref 0–41)
BACTERIA # UR AUTO: NEGATIVE — SIGNIFICANT CHANGE UP
BASOPHILS # BLD AUTO: 0.02 K/UL — SIGNIFICANT CHANGE UP (ref 0–0.2)
BASOPHILS NFR BLD AUTO: 0.4 % — SIGNIFICANT CHANGE UP (ref 0–1)
BILIRUB SERPL-MCNC: <0.2 MG/DL — SIGNIFICANT CHANGE UP (ref 0.2–1.2)
BILIRUB UR-MCNC: NEGATIVE — SIGNIFICANT CHANGE UP
BLD GP AB SCN SERPL QL: SIGNIFICANT CHANGE UP
BUN SERPL-MCNC: 10 MG/DL — SIGNIFICANT CHANGE UP (ref 10–20)
CALCIUM SERPL-MCNC: 9.1 MG/DL — SIGNIFICANT CHANGE UP (ref 8.5–10.1)
CHLORIDE SERPL-SCNC: 100 MMOL/L — SIGNIFICANT CHANGE UP (ref 98–110)
CO2 SERPL-SCNC: 25 MMOL/L — SIGNIFICANT CHANGE UP (ref 17–32)
COLOR SPEC: SIGNIFICANT CHANGE UP
CREAT SERPL-MCNC: 0.7 MG/DL — SIGNIFICANT CHANGE UP (ref 0.7–1.5)
DIFF PNL FLD: ABNORMAL
EOSINOPHIL # BLD AUTO: 0.17 K/UL — SIGNIFICANT CHANGE UP (ref 0–0.7)
EOSINOPHIL NFR BLD AUTO: 3.3 % — SIGNIFICANT CHANGE UP (ref 0–8)
EPI CELLS # UR: 1 /HPF — SIGNIFICANT CHANGE UP (ref 0–5)
ESTIMATED AVERAGE GLUCOSE: 183 MG/DL — HIGH (ref 68–114)
GLUCOSE SERPL-MCNC: 244 MG/DL — HIGH (ref 70–99)
GLUCOSE UR QL: ABNORMAL
HCT VFR BLD CALC: 36.4 % — LOW (ref 37–47)
HGB BLD-MCNC: 11.6 G/DL — LOW (ref 12–16)
HYALINE CASTS # UR AUTO: 3 /LPF — SIGNIFICANT CHANGE UP (ref 0–7)
IMM GRANULOCYTES NFR BLD AUTO: 0.2 % — SIGNIFICANT CHANGE UP (ref 0.1–0.3)
INR BLD: 1.02 RATIO — SIGNIFICANT CHANGE UP (ref 0.65–1.3)
KETONES UR-MCNC: NEGATIVE — SIGNIFICANT CHANGE UP
LEUKOCYTE ESTERASE UR-ACNC: NEGATIVE — SIGNIFICANT CHANGE UP
LYMPHOCYTES # BLD AUTO: 2.29 K/UL — SIGNIFICANT CHANGE UP (ref 1.2–3.4)
LYMPHOCYTES # BLD AUTO: 44.5 % — SIGNIFICANT CHANGE UP (ref 20.5–51.1)
MCHC RBC-ENTMCNC: 29.8 PG — SIGNIFICANT CHANGE UP (ref 27–31)
MCHC RBC-ENTMCNC: 31.9 G/DL — LOW (ref 32–37)
MCV RBC AUTO: 93.6 FL — SIGNIFICANT CHANGE UP (ref 81–99)
MONOCYTES # BLD AUTO: 0.36 K/UL — SIGNIFICANT CHANGE UP (ref 0.1–0.6)
MONOCYTES NFR BLD AUTO: 7 % — SIGNIFICANT CHANGE UP (ref 1.7–9.3)
NEUTROPHILS # BLD AUTO: 2.3 K/UL — SIGNIFICANT CHANGE UP (ref 1.4–6.5)
NEUTROPHILS NFR BLD AUTO: 44.6 % — SIGNIFICANT CHANGE UP (ref 42.2–75.2)
NITRITE UR-MCNC: NEGATIVE — SIGNIFICANT CHANGE UP
NRBC # BLD: 0 /100 WBCS — SIGNIFICANT CHANGE UP (ref 0–0)
PH UR: 6 — SIGNIFICANT CHANGE UP (ref 5–8)
PLATELET # BLD AUTO: 306 K/UL — SIGNIFICANT CHANGE UP (ref 130–400)
POTASSIUM SERPL-MCNC: 4.3 MMOL/L — SIGNIFICANT CHANGE UP (ref 3.5–5)
POTASSIUM SERPL-SCNC: 4.3 MMOL/L — SIGNIFICANT CHANGE UP (ref 3.5–5)
PROT SERPL-MCNC: 6.9 G/DL — SIGNIFICANT CHANGE UP (ref 6–8)
PROT UR-MCNC: NEGATIVE — SIGNIFICANT CHANGE UP
PROTHROM AB SERPL-ACNC: 11.7 SEC — SIGNIFICANT CHANGE UP (ref 9.95–12.87)
RBC # BLD: 3.89 M/UL — LOW (ref 4.2–5.4)
RBC # FLD: 12.5 % — SIGNIFICANT CHANGE UP (ref 11.5–14.5)
RBC CASTS # UR COMP ASSIST: 6 /HPF — HIGH (ref 0–4)
SODIUM SERPL-SCNC: 137 MMOL/L — SIGNIFICANT CHANGE UP (ref 135–146)
SP GR SPEC: 1.02 — SIGNIFICANT CHANGE UP (ref 1.01–1.03)
UROBILINOGEN FLD QL: SIGNIFICANT CHANGE UP
WBC # BLD: 5.15 K/UL — SIGNIFICANT CHANGE UP (ref 4.8–10.8)
WBC # FLD AUTO: 5.15 K/UL — SIGNIFICANT CHANGE UP (ref 4.8–10.8)
WBC UR QL: 0 /HPF — SIGNIFICANT CHANGE UP (ref 0–5)

## 2021-03-10 PROCEDURE — 93010 ELECTROCARDIOGRAM REPORT: CPT

## 2021-03-10 NOTE — H&P PST ADULT - NSICDXPASTMEDICALHX_GEN_ALL_CORE_FT
PAST MEDICAL HISTORY:  Arthritis OA    Asthma LAST EPISODE SEVERAL YRS    Diabetes     GERD (gastroesophageal reflux disease)     High cholesterol     History of peripheral arterial disease     HTN (hypertension)     TRAVIS on CPAP

## 2021-03-10 NOTE — H&P PST ADULT - REASON FOR ADMISSION
PT PRESENTS TO PAST WITH NO SOB, CP, PALPITATIONS, DYSURIA, UTI OR URI AT PRESENT.   PT ABLE TO WALK UP 2-3 FLIGHTS OF STEPS WITH NO SOB.  AS PER THE PT, THIS IS HIS/HER COMPLETE MEDICAL AND SURGICAL HX, INCLUDING MEDICATIONS PRESCRIBED AND OVER THE COUNTER  pt denies any covid s/s, -YES   tested positive in the past-3/2020 AND NEGATIVE IN 5/2020   pt advised self quarantine till day of procedure  denies travel outside the [place] in the past 30 days  Anesthesia Alert  NO--Difficult Airway  NO--History of neck surgery or radiation  NO--Limited ROM of neck  NO--History of Malignant hyperthermia  NO--Personal or family history of Pseudocholinesterase deficiency  NO--Prior Anesthesia Complication  NO--Latex Allergy  NO--Loose teeth  NO--History of Rheumatoid Arthritis  YES  --TRAVIS  NO--Other_____  PT SCHEDULED FOR CERVICAL 5- 7 ANTERIOR CERVICAL DISCECTOMY AND FUSION.     PT REPORTS- I HAVE NECK AND BACK PAIN FOR SEVERAL YEARS. RECENTLY IT HAS GOTTEN WORSE.

## 2021-03-10 NOTE — H&P PST ADULT - ADMIT DATE
Prolonged illness,  -I suspect he may have just had a couple episodes of influenza or other viral respiratory infection  -Anticipate he would improve significantly by early next week but he may cough off and on for a week or 2.  -Reassured patient his x-ray and blood work were normal  -We will give him a note for work missed this week due to illness.  
10-Mar-2021

## 2021-03-26 ENCOUNTER — OUTPATIENT (OUTPATIENT)
Dept: OUTPATIENT SERVICES | Facility: HOSPITAL | Age: 56
LOS: 1 days | Discharge: HOME | End: 2021-03-26

## 2021-03-26 DIAGNOSIS — Z98.890 OTHER SPECIFIED POSTPROCEDURAL STATES: Chronic | ICD-10-CM

## 2021-03-26 DIAGNOSIS — Z11.59 ENCOUNTER FOR SCREENING FOR OTHER VIRAL DISEASES: ICD-10-CM

## 2021-03-26 PROBLEM — M19.90 UNSPECIFIED OSTEOARTHRITIS, UNSPECIFIED SITE: Chronic | Status: ACTIVE | Noted: 2020-01-17

## 2021-03-26 PROBLEM — J45.909 UNSPECIFIED ASTHMA, UNCOMPLICATED: Chronic | Status: ACTIVE | Noted: 2020-01-17

## 2021-03-29 ENCOUNTER — INPATIENT (INPATIENT)
Facility: HOSPITAL | Age: 56
LOS: 8 days | Discharge: HOME | End: 2021-04-07
Attending: NEUROLOGICAL SURGERY | Admitting: NEUROLOGICAL SURGERY
Payer: COMMERCIAL

## 2021-03-29 ENCOUNTER — RESULT REVIEW (OUTPATIENT)
Age: 56
End: 2021-03-29

## 2021-03-29 VITALS
OXYGEN SATURATION: 98 % | TEMPERATURE: 98 F | RESPIRATION RATE: 18 BRPM | WEIGHT: 160.94 LBS | HEART RATE: 90 BPM | DIASTOLIC BLOOD PRESSURE: 88 MMHG | SYSTOLIC BLOOD PRESSURE: 176 MMHG | HEIGHT: 62 IN

## 2021-03-29 DIAGNOSIS — Z99.89 DEPENDENCE ON OTHER ENABLING MACHINES AND DEVICES: ICD-10-CM

## 2021-03-29 DIAGNOSIS — M50.122 CERVICAL DISC DISORDER AT C5-C6 LEVEL WITH RADICULOPATHY: ICD-10-CM

## 2021-03-29 DIAGNOSIS — G47.33 OBSTRUCTIVE SLEEP APNEA (ADULT) (PEDIATRIC): ICD-10-CM

## 2021-03-29 DIAGNOSIS — G56.00 CARPAL TUNNEL SYNDROME, UNSPECIFIED UPPER LIMB: ICD-10-CM

## 2021-03-29 DIAGNOSIS — F11.20 OPIOID DEPENDENCE, UNCOMPLICATED: ICD-10-CM

## 2021-03-29 DIAGNOSIS — J45.909 UNSPECIFIED ASTHMA, UNCOMPLICATED: ICD-10-CM

## 2021-03-29 DIAGNOSIS — Z98.890 OTHER SPECIFIED POSTPROCEDURAL STATES: Chronic | ICD-10-CM

## 2021-03-29 DIAGNOSIS — E78.00 PURE HYPERCHOLESTEROLEMIA, UNSPECIFIED: ICD-10-CM

## 2021-03-29 DIAGNOSIS — I10 ESSENTIAL (PRIMARY) HYPERTENSION: ICD-10-CM

## 2021-03-29 DIAGNOSIS — M19.90 UNSPECIFIED OSTEOARTHRITIS, UNSPECIFIED SITE: ICD-10-CM

## 2021-03-29 DIAGNOSIS — R13.10 DYSPHAGIA, UNSPECIFIED: ICD-10-CM

## 2021-03-29 DIAGNOSIS — K21.9 GASTRO-ESOPHAGEAL REFLUX DISEASE WITHOUT ESOPHAGITIS: ICD-10-CM

## 2021-03-29 DIAGNOSIS — E11.9 TYPE 2 DIABETES MELLITUS WITHOUT COMPLICATIONS: ICD-10-CM

## 2021-03-29 DIAGNOSIS — M47.22 OTHER SPONDYLOSIS WITH RADICULOPATHY, CERVICAL REGION: ICD-10-CM

## 2021-03-29 DIAGNOSIS — M48.02 SPINAL STENOSIS, CERVICAL REGION: ICD-10-CM

## 2021-03-29 DIAGNOSIS — I45.10 UNSPECIFIED RIGHT BUNDLE-BRANCH BLOCK: ICD-10-CM

## 2021-03-29 LAB
ANION GAP SERPL CALC-SCNC: 13 MMOL/L — SIGNIFICANT CHANGE UP (ref 7–14)
BUN SERPL-MCNC: 10 MG/DL — SIGNIFICANT CHANGE UP (ref 10–20)
CALCIUM SERPL-MCNC: 8.6 MG/DL — SIGNIFICANT CHANGE UP (ref 8.5–10.1)
CHLORIDE SERPL-SCNC: 102 MMOL/L — SIGNIFICANT CHANGE UP (ref 98–110)
CO2 SERPL-SCNC: 23 MMOL/L — SIGNIFICANT CHANGE UP (ref 17–32)
CREAT SERPL-MCNC: 0.7 MG/DL — SIGNIFICANT CHANGE UP (ref 0.7–1.5)
GLUCOSE BLDC GLUCOMTR-MCNC: 131 MG/DL — HIGH (ref 70–99)
GLUCOSE BLDC GLUCOMTR-MCNC: 199 MG/DL — HIGH (ref 70–99)
GLUCOSE BLDC GLUCOMTR-MCNC: 209 MG/DL — HIGH (ref 70–99)
GLUCOSE BLDC GLUCOMTR-MCNC: 214 MG/DL — HIGH (ref 70–99)
GLUCOSE BLDC GLUCOMTR-MCNC: 239 MG/DL — HIGH (ref 70–99)
GLUCOSE SERPL-MCNC: 240 MG/DL — HIGH (ref 70–99)
POTASSIUM SERPL-MCNC: 4.4 MMOL/L — SIGNIFICANT CHANGE UP (ref 3.5–5)
POTASSIUM SERPL-SCNC: 4.4 MMOL/L — SIGNIFICANT CHANGE UP (ref 3.5–5)
SODIUM SERPL-SCNC: 138 MMOL/L — SIGNIFICANT CHANGE UP (ref 135–146)
TROPONIN T SERPL-MCNC: <0.01 NG/ML — SIGNIFICANT CHANGE UP

## 2021-03-29 PROCEDURE — 93010 ELECTROCARDIOGRAM REPORT: CPT

## 2021-03-29 PROCEDURE — 88304 TISSUE EXAM BY PATHOLOGIST: CPT | Mod: 26

## 2021-03-29 PROCEDURE — 22551 ARTHRD ANT NTRBDY CERVICAL: CPT | Mod: AS

## 2021-03-29 PROCEDURE — 22853 INSJ BIOMECHANICAL DEVICE: CPT | Mod: AS

## 2021-03-29 PROCEDURE — 22552 ARTHRD ANT NTRBD CERVICAL EA: CPT | Mod: AS

## 2021-03-29 PROCEDURE — 88311 DECALCIFY TISSUE: CPT | Mod: 26

## 2021-03-29 RX ORDER — OMEPRAZOLE 10 MG/1
1 CAPSULE, DELAYED RELEASE ORAL
Qty: 0 | Refills: 0 | DISCHARGE

## 2021-03-29 RX ORDER — MEPERIDINE HYDROCHLORIDE 50 MG/ML
12.5 INJECTION INTRAMUSCULAR; INTRAVENOUS; SUBCUTANEOUS
Refills: 0 | Status: DISCONTINUED | OUTPATIENT
Start: 2021-03-29 | End: 2021-03-30

## 2021-03-29 RX ORDER — INSULIN LISPRO 100/ML
5 VIAL (ML) SUBCUTANEOUS
Refills: 0 | Status: DISCONTINUED | OUTPATIENT
Start: 2021-03-29 | End: 2021-04-07

## 2021-03-29 RX ORDER — METOPROLOL TARTRATE 50 MG
100 TABLET ORAL
Refills: 0 | Status: DISCONTINUED | OUTPATIENT
Start: 2021-03-29 | End: 2021-04-07

## 2021-03-29 RX ORDER — METHOCARBAMOL 500 MG/1
500 TABLET, FILM COATED ORAL
Refills: 0 | Status: DISCONTINUED | OUTPATIENT
Start: 2021-03-29 | End: 2021-04-07

## 2021-03-29 RX ORDER — OXYCODONE AND ACETAMINOPHEN 5; 325 MG/1; MG/1
1 TABLET ORAL EVERY 4 HOURS
Refills: 0 | Status: DISCONTINUED | OUTPATIENT
Start: 2021-03-29 | End: 2021-04-05

## 2021-03-29 RX ORDER — SENNA PLUS 8.6 MG/1
2 TABLET ORAL AT BEDTIME
Refills: 0 | Status: DISCONTINUED | OUTPATIENT
Start: 2021-03-29 | End: 2021-04-07

## 2021-03-29 RX ORDER — GLUCAGON INJECTION, SOLUTION 0.5 MG/.1ML
1 INJECTION, SOLUTION SUBCUTANEOUS ONCE
Refills: 0 | Status: DISCONTINUED | OUTPATIENT
Start: 2021-03-29 | End: 2021-04-07

## 2021-03-29 RX ORDER — MORPHINE SULFATE 50 MG/1
4 CAPSULE, EXTENDED RELEASE ORAL
Refills: 0 | Status: DISCONTINUED | OUTPATIENT
Start: 2021-03-29 | End: 2021-04-04

## 2021-03-29 RX ORDER — ALBUTEROL 90 UG/1
1 AEROSOL, METERED ORAL
Refills: 0 | Status: DISCONTINUED | OUTPATIENT
Start: 2021-03-29 | End: 2021-04-07

## 2021-03-29 RX ORDER — ONDANSETRON 8 MG/1
4 TABLET, FILM COATED ORAL EVERY 6 HOURS
Refills: 0 | Status: DISCONTINUED | OUTPATIENT
Start: 2021-03-29 | End: 2021-04-07

## 2021-03-29 RX ORDER — SODIUM CHLORIDE 9 MG/ML
1000 INJECTION, SOLUTION INTRAVENOUS
Refills: 0 | Status: DISCONTINUED | OUTPATIENT
Start: 2021-03-29 | End: 2021-04-07

## 2021-03-29 RX ORDER — OXYCODONE AND ACETAMINOPHEN 5; 325 MG/1; MG/1
2 TABLET ORAL EVERY 6 HOURS
Refills: 0 | Status: DISCONTINUED | OUTPATIENT
Start: 2021-03-29 | End: 2021-04-05

## 2021-03-29 RX ORDER — SODIUM CHLORIDE 9 MG/ML
1000 INJECTION, SOLUTION INTRAVENOUS
Refills: 0 | Status: DISCONTINUED | OUTPATIENT
Start: 2021-03-29 | End: 2021-03-30

## 2021-03-29 RX ORDER — SODIUM CHLORIDE 9 MG/ML
1000 INJECTION INTRAMUSCULAR; INTRAVENOUS; SUBCUTANEOUS
Refills: 0 | Status: DISCONTINUED | OUTPATIENT
Start: 2021-03-29 | End: 2021-04-07

## 2021-03-29 RX ORDER — DEXTROSE 50 % IN WATER 50 %
25 SYRINGE (ML) INTRAVENOUS ONCE
Refills: 0 | Status: DISCONTINUED | OUTPATIENT
Start: 2021-03-29 | End: 2021-04-07

## 2021-03-29 RX ORDER — DEXTROSE 50 % IN WATER 50 %
12.5 SYRINGE (ML) INTRAVENOUS ONCE
Refills: 0 | Status: DISCONTINUED | OUTPATIENT
Start: 2021-03-29 | End: 2021-04-07

## 2021-03-29 RX ORDER — HYDROMORPHONE HYDROCHLORIDE 2 MG/ML
0.5 INJECTION INTRAMUSCULAR; INTRAVENOUS; SUBCUTANEOUS
Refills: 0 | Status: DISCONTINUED | OUTPATIENT
Start: 2021-03-29 | End: 2021-03-29

## 2021-03-29 RX ORDER — LABETALOL HCL 100 MG
20 TABLET ORAL ONCE
Refills: 0 | Status: COMPLETED | OUTPATIENT
Start: 2021-03-29 | End: 2021-03-29

## 2021-03-29 RX ORDER — METFORMIN HYDROCHLORIDE 850 MG/1
1 TABLET ORAL
Qty: 0 | Refills: 0 | DISCHARGE

## 2021-03-29 RX ORDER — ONDANSETRON 8 MG/1
4 TABLET, FILM COATED ORAL
Refills: 0 | Status: DISCONTINUED | OUTPATIENT
Start: 2021-03-29 | End: 2021-03-30

## 2021-03-29 RX ORDER — ACETAMINOPHEN 500 MG
650 TABLET ORAL EVERY 6 HOURS
Refills: 0 | Status: DISCONTINUED | OUTPATIENT
Start: 2021-03-29 | End: 2021-04-07

## 2021-03-29 RX ORDER — GABAPENTIN 400 MG/1
300 CAPSULE ORAL THREE TIMES A DAY
Refills: 0 | Status: DISCONTINUED | OUTPATIENT
Start: 2021-03-29 | End: 2021-04-07

## 2021-03-29 RX ORDER — FAMOTIDINE 10 MG/ML
40 INJECTION INTRAVENOUS
Refills: 0 | Status: DISCONTINUED | OUTPATIENT
Start: 2021-03-29 | End: 2021-03-31

## 2021-03-29 RX ORDER — DEXAMETHASONE 0.5 MG/5ML
4 ELIXIR ORAL EVERY 6 HOURS
Refills: 0 | Status: DISCONTINUED | OUTPATIENT
Start: 2021-03-29 | End: 2021-03-30

## 2021-03-29 RX ORDER — ATORVASTATIN CALCIUM 80 MG/1
40 TABLET, FILM COATED ORAL AT BEDTIME
Refills: 0 | Status: DISCONTINUED | OUTPATIENT
Start: 2021-03-29 | End: 2021-04-07

## 2021-03-29 RX ORDER — CEFAZOLIN SODIUM 1 G
1000 VIAL (EA) INJECTION EVERY 8 HOURS
Refills: 0 | Status: COMPLETED | OUTPATIENT
Start: 2021-03-29 | End: 2021-03-30

## 2021-03-29 RX ORDER — INSULIN LISPRO 100/ML
VIAL (ML) SUBCUTANEOUS
Refills: 0 | Status: DISCONTINUED | OUTPATIENT
Start: 2021-03-29 | End: 2021-04-07

## 2021-03-29 RX ORDER — HYDROMORPHONE HYDROCHLORIDE 2 MG/ML
1 INJECTION INTRAMUSCULAR; INTRAVENOUS; SUBCUTANEOUS
Refills: 0 | Status: DISCONTINUED | OUTPATIENT
Start: 2021-03-29 | End: 2021-03-30

## 2021-03-29 RX ORDER — INSULIN GLARGINE 100 [IU]/ML
15 INJECTION, SOLUTION SUBCUTANEOUS AT BEDTIME
Refills: 0 | Status: DISCONTINUED | OUTPATIENT
Start: 2021-03-29 | End: 2021-04-07

## 2021-03-29 RX ORDER — DEXTROSE 50 % IN WATER 50 %
15 SYRINGE (ML) INTRAVENOUS ONCE
Refills: 0 | Status: DISCONTINUED | OUTPATIENT
Start: 2021-03-29 | End: 2021-04-07

## 2021-03-29 RX ADMIN — FAMOTIDINE 40 MILLIGRAM(S): 10 INJECTION INTRAVENOUS at 17:36

## 2021-03-29 RX ADMIN — Medication 100 MILLIGRAM(S): at 22:53

## 2021-03-29 RX ADMIN — METHOCARBAMOL 500 MILLIGRAM(S): 500 TABLET, FILM COATED ORAL at 17:36

## 2021-03-29 RX ADMIN — HYDROMORPHONE HYDROCHLORIDE 0.5 MILLIGRAM(S): 2 INJECTION INTRAMUSCULAR; INTRAVENOUS; SUBCUTANEOUS at 12:20

## 2021-03-29 RX ADMIN — SODIUM CHLORIDE 75 MILLILITER(S): 9 INJECTION INTRAMUSCULAR; INTRAVENOUS; SUBCUTANEOUS at 13:09

## 2021-03-29 RX ADMIN — HYDROMORPHONE HYDROCHLORIDE 0.5 MILLIGRAM(S): 2 INJECTION INTRAMUSCULAR; INTRAVENOUS; SUBCUTANEOUS at 14:27

## 2021-03-29 RX ADMIN — Medication 4 MILLIGRAM(S): at 17:11

## 2021-03-29 RX ADMIN — SODIUM CHLORIDE 75 MILLILITER(S): 9 INJECTION INTRAMUSCULAR; INTRAVENOUS; SUBCUTANEOUS at 20:00

## 2021-03-29 RX ADMIN — Medication 4 MILLIGRAM(S): at 23:07

## 2021-03-29 RX ADMIN — Medication 20 MILLIGRAM(S): at 12:11

## 2021-03-29 RX ADMIN — GABAPENTIN 300 MILLIGRAM(S): 400 CAPSULE ORAL at 16:04

## 2021-03-29 RX ADMIN — OXYCODONE AND ACETAMINOPHEN 2 TABLET(S): 5; 325 TABLET ORAL at 23:56

## 2021-03-29 RX ADMIN — SENNA PLUS 2 TABLET(S): 8.6 TABLET ORAL at 22:53

## 2021-03-29 RX ADMIN — INSULIN GLARGINE 15 UNIT(S): 100 INJECTION, SOLUTION SUBCUTANEOUS at 23:06

## 2021-03-29 RX ADMIN — GABAPENTIN 300 MILLIGRAM(S): 400 CAPSULE ORAL at 22:53

## 2021-03-29 RX ADMIN — Medication 4: at 16:14

## 2021-03-29 RX ADMIN — SODIUM CHLORIDE 125 MILLILITER(S): 9 INJECTION, SOLUTION INTRAVENOUS at 12:20

## 2021-03-29 RX ADMIN — Medication 5 UNIT(S): at 17:46

## 2021-03-29 RX ADMIN — HYDROMORPHONE HYDROCHLORIDE 0.5 MILLIGRAM(S): 2 INJECTION INTRAMUSCULAR; INTRAVENOUS; SUBCUTANEOUS at 17:05

## 2021-03-29 RX ADMIN — METHOCARBAMOL 500 MILLIGRAM(S): 500 TABLET, FILM COATED ORAL at 23:06

## 2021-03-29 RX ADMIN — HYDROMORPHONE HYDROCHLORIDE 1 MILLIGRAM(S): 2 INJECTION INTRAMUSCULAR; INTRAVENOUS; SUBCUTANEOUS at 19:20

## 2021-03-29 RX ADMIN — HYDROMORPHONE HYDROCHLORIDE 1 MILLIGRAM(S): 2 INJECTION INTRAMUSCULAR; INTRAVENOUS; SUBCUTANEOUS at 22:07

## 2021-03-29 RX ADMIN — HYDROMORPHONE HYDROCHLORIDE 0.5 MILLIGRAM(S): 2 INJECTION INTRAMUSCULAR; INTRAVENOUS; SUBCUTANEOUS at 12:41

## 2021-03-29 RX ADMIN — Medication 100 MILLIGRAM(S): at 17:36

## 2021-03-29 RX ADMIN — HYDROMORPHONE HYDROCHLORIDE 0.5 MILLIGRAM(S): 2 INJECTION INTRAMUSCULAR; INTRAVENOUS; SUBCUTANEOUS at 14:28

## 2021-03-29 RX ADMIN — ATORVASTATIN CALCIUM 40 MILLIGRAM(S): 80 TABLET, FILM COATED ORAL at 22:53

## 2021-03-29 RX ADMIN — HYDROMORPHONE HYDROCHLORIDE 1 MILLIGRAM(S): 2 INJECTION INTRAMUSCULAR; INTRAVENOUS; SUBCUTANEOUS at 22:37

## 2021-03-29 RX ADMIN — HYDROMORPHONE HYDROCHLORIDE 0.5 MILLIGRAM(S): 2 INJECTION INTRAMUSCULAR; INTRAVENOUS; SUBCUTANEOUS at 17:52

## 2021-03-29 RX ADMIN — HYDROMORPHONE HYDROCHLORIDE 1 MILLIGRAM(S): 2 INJECTION INTRAMUSCULAR; INTRAVENOUS; SUBCUTANEOUS at 13:24

## 2021-03-29 RX ADMIN — HYDROMORPHONE HYDROCHLORIDE 0.5 MILLIGRAM(S): 2 INJECTION INTRAMUSCULAR; INTRAVENOUS; SUBCUTANEOUS at 12:04

## 2021-03-29 RX ADMIN — Medication 100 MILLIGRAM(S): at 13:56

## 2021-03-29 NOTE — ASU PATIENT PROFILE, ADULT - PMH
Arthritis  OA  Asthma  LAST EPISODE SEVERAL YRS  Diabetes    GERD (gastroesophageal reflux disease)    High cholesterol    History of peripheral arterial disease    HTN (hypertension)    TRAVIS on CPAP

## 2021-03-29 NOTE — BRIEF OPERATIVE NOTE - NSICDXBRIEFPOSTOP_GEN_ALL_CORE_FT
POST-OP DIAGNOSIS:  Cervical spondylosis with radiculopathy 29-Mar-2021 12:04:55  Carmen Gu  Herniation of cervical intervertebral disc with radiculopathy 29-Mar-2021 12:04:39  Carmen Gu

## 2021-03-29 NOTE — PROGRESS NOTE ADULT - SUBJECTIVE AND OBJECTIVE BOX
NEUROSURGERY POST OP NOTE:    S/P C5-C7 ACDF    Patient awake, alert. Patient currently denies pain, weakness, numbness or tingling. C/o throat hoarseness but denies trouble swallowing.          Vital Signs Last 24 Hrs  T(C): 36.9 (29 Mar 2021 11:55), Max: 36.9 (29 Mar 2021 11:55)  T(F): 98.4 (29 Mar 2021 11:55), Max: 98.4 (29 Mar 2021 11:55)  HR: 103 (29 Mar 2021 15:21) (90 - 103)  BP: 140/70 (29 Mar 2021 15:21) (140/70 - 188/83)  BP(mean): --  RR: 18 (29 Mar 2021 15:21) (18 - 22)  SpO2: 96% (29 Mar 2021 15:21) (95% - 98%)      03-29-21 @ 07:01  -  03-29-21 @ 16:26  --------------------------------------------------------  IN: 350 mL / OUT: 365 mL / NET: -15 mL        acetaminophen   Tablet .. 650 milliGRAM(s) Oral every 6 hours PRN  ALBUTerol    90 MICROgram(s) HFA Inhaler 1 Puff(s) Inhalation four times a day PRN  atorvastatin 40 milliGRAM(s) Oral at bedtime  ceFAZolin   IVPB 1000 milliGRAM(s) IV Intermittent every 8 hours  dexAMETHasone  Injectable 4 milliGRAM(s) IV Push every 6 hours  dextrose 40% Gel 15 Gram(s) Oral once  dextrose 5%. 1000 milliLiter(s) IV Continuous <Continuous>  dextrose 5%. 1000 milliLiter(s) IV Continuous <Continuous>  dextrose 50% Injectable 25 Gram(s) IV Push once  dextrose 50% Injectable 12.5 Gram(s) IV Push once  dextrose 50% Injectable 25 Gram(s) IV Push once  enalapril 5 milliGRAM(s) Oral daily  famotidine    Tablet 40 milliGRAM(s) Oral two times a day  gabapentin 300 milliGRAM(s) Oral three times a day  glucagon  Injectable 1 milliGRAM(s) IntraMuscular once  HYDROmorphone  Injectable 0.5 milliGRAM(s) IV Push every 10 minutes PRN  HYDROmorphone  Injectable 1 milliGRAM(s) IV Push every 10 minutes PRN  insulin glargine Injectable (LANTUS) 15 Unit(s) SubCutaneous at bedtime  insulin lispro (ADMELOG) corrective regimen sliding scale   SubCutaneous three times a day before meals  insulin lispro Injectable (ADMELOG) 5 Unit(s) SubCutaneous before breakfast  insulin lispro Injectable (ADMELOG) 5 Unit(s) SubCutaneous before lunch  insulin lispro Injectable (ADMELOG) 5 Unit(s) SubCutaneous before dinner  lactated ringers. 1000 milliLiter(s) IV Continuous <Continuous>  meperidine     Injectable 12.5 milliGRAM(s) IV Push every 10 minutes PRN  methocarbamol 500 milliGRAM(s) Oral four times a day  metoprolol tartrate 100 milliGRAM(s) Oral two times a day  morphine  - Injectable 4 milliGRAM(s) IV Push every 3 hours PRN  ondansetron Injectable 4 milliGRAM(s) IV Push every 15 minutes PRN  ondansetron Injectable 4 milliGRAM(s) IV Push every 6 hours PRN  oxycodone    5 mG/acetaminophen 325 mG 1 Tablet(s) Oral every 4 hours PRN  oxycodone    5 mG/acetaminophen 325 mG 2 Tablet(s) Oral every 6 hours PRN  senna 2 Tablet(s) Oral at bedtime  sodium chloride 0.9%. 1000 milliLiter(s) IV Continuous <Continuous>      Exam:  AAOX3. Verbal function intact  tongue midline, facial motions symmetric  PERRLA, EOMI  Finger to Nose intact  Motor: MAEx4, 4+/5 power in b/l UE and 5/5 power in b/l LE  Sensation: intact to touch in all extremities  Incision C/D/I, dressing in place  no edema, erythema or drainage noted            Assessment:   55 y/o female POD#0 s/p C5-C7 ACDF.    Plan:  -advance diet as tolerated  -pain meds/muscle relaxants  - Ancef 1g q8 x24h  - decadron 4 q6h x4 doses  -quinn to be removed tomorrow am  - PPI  - restart home meds  - PT/OT  - Incentive spirometer 10x/hr

## 2021-03-29 NOTE — BRIEF OPERATIVE NOTE - NSICDXBRIEFPREOP_GEN_ALL_CORE_FT
PRE-OP DIAGNOSIS:  Cervical spondylosis with radiculopathy 29-Mar-2021 12:04:15  Carmen Gu  Herniation of cervical intervertebral disc 29-Mar-2021 12:03:46 C5-6, C6-7 Carmen Gu

## 2021-03-29 NOTE — ASU PATIENT PROFILE, ADULT - AS SC BRADEN FRICTION
(3) no apparent problem
Verbalized Understanding/Simple: Patient demonstrates quick and easy understanding

## 2021-03-29 NOTE — CONSULT NOTE ADULT - SUBJECTIVE AND OBJECTIVE BOX
HPI: 55 yo F admitted to [facility] for C5-7 ACDF for cervical stenosis. Prior to hospitalization she was ambulating independent. Post op rehab consulted for eval      PAST MEDICAL & SURGICAL HISTORY:  History of peripheral arterial disease    GERD (gastroesophageal reflux disease)    High cholesterol    Arthritis  OA    TRAVIS on CPAP    Asthma  LAST EPISODE SEVERAL YRS    Diabetes    HTN (hypertension)    H/O arthroscopy of shoulder    H/O carpal tunnel repair    S/P trigger finger release        Hospital Course:    TODAY'S SUBJECTIVE & REVIEW OF SYMPTOMS:     Constitutional WNL   Cardio WNL   Resp WNL   GI WNL  Heme WNL  Endo WNL  Skin WNL  MSK pain  Neuro WNL  Cognitive WNL  Psych WNL      MEDICATIONS  (STANDING):  atorvastatin 40 milliGRAM(s) Oral at bedtime  ceFAZolin   IVPB 1000 milliGRAM(s) IV Intermittent every 8 hours  dexAMETHasone  Injectable 4 milliGRAM(s) IV Push every 6 hours  dextrose 40% Gel 15 Gram(s) Oral once  dextrose 5%. 1000 milliLiter(s) (50 mL/Hr) IV Continuous <Continuous>  dextrose 5%. 1000 milliLiter(s) (100 mL/Hr) IV Continuous <Continuous>  dextrose 50% Injectable 25 Gram(s) IV Push once  dextrose 50% Injectable 12.5 Gram(s) IV Push once  dextrose 50% Injectable 25 Gram(s) IV Push once  enalapril 5 milliGRAM(s) Oral daily  famotidine    Tablet 40 milliGRAM(s) Oral two times a day  gabapentin 300 milliGRAM(s) Oral three times a day  glucagon  Injectable 1 milliGRAM(s) IntraMuscular once  insulin glargine Injectable (LANTUS) 15 Unit(s) SubCutaneous at bedtime  insulin lispro (ADMELOG) corrective regimen sliding scale   SubCutaneous three times a day before meals  insulin lispro Injectable (ADMELOG) 5 Unit(s) SubCutaneous before breakfast  insulin lispro Injectable (ADMELOG) 5 Unit(s) SubCutaneous before lunch  insulin lispro Injectable (ADMELOG) 5 Unit(s) SubCutaneous before dinner  lactated ringers. 1000 milliLiter(s) (125 mL/Hr) IV Continuous <Continuous>  methocarbamol 500 milliGRAM(s) Oral four times a day  metoprolol tartrate 100 milliGRAM(s) Oral two times a day  senna 2 Tablet(s) Oral at bedtime  sodium chloride 0.9%. 1000 milliLiter(s) (75 mL/Hr) IV Continuous <Continuous>    MEDICATIONS  (PRN):  acetaminophen   Tablet .. 650 milliGRAM(s) Oral every 6 hours PRN Temp greater or equal to 38C (100.4F), Mild Pain (1 - 3)  ALBUTerol    90 MICROgram(s) HFA Inhaler 1 Puff(s) Inhalation four times a day PRN Bronchospasm  HYDROmorphone  Injectable 0.5 milliGRAM(s) IV Push every 10 minutes PRN Moderate Pain (4 - 6)  HYDROmorphone  Injectable 1 milliGRAM(s) IV Push every 10 minutes PRN Severe Pain (7 - 10)  meperidine     Injectable 12.5 milliGRAM(s) IV Push every 10 minutes PRN Shivering  morphine  - Injectable 4 milliGRAM(s) IV Push every 3 hours PRN severe breakthrough pain  ondansetron Injectable 4 milliGRAM(s) IV Push every 15 minutes PRN Nausea and/or Vomiting  ondansetron Injectable 4 milliGRAM(s) IV Push every 6 hours PRN Nausea  oxycodone    5 mG/acetaminophen 325 mG 1 Tablet(s) Oral every 4 hours PRN Moderate Pain (4 - 6)  oxycodone    5 mG/acetaminophen 325 mG 2 Tablet(s) Oral every 6 hours PRN Severe Pain (7 - 10)      FAMILY HISTORY:  FH: aneurysm    FH: stroke    Family history of malignant neoplasm of esophagus        Allergies    ibuprofen (Urticaria)    Intolerances        SOCIAL HISTORY:    [  ] Etoh  [  ] Smoking  [  ] Substance abuse     Home Environment:  [  ] Home Alone  [ x ] Lives with Family  [  ] Home Health Aid    Dwelling:  [ x ] Apartment  [  ] Private House  [  ] Adult Home  [  ] Skilled Nursing Facility      [  ] Short Term  [  ] Long Term  [ x ] Stairs       Elevator [  ]    FUNCTIONAL STATUS PTA: (Check all that apply)  Ambulation: [ x  ]Independent    [  ] Dependent     [  ] Non-Ambulatory  Assistive Device: [  ] SA Cane  [  ]  Q Cane  [  ] Walker  [  ]  Wheelchair  ADL : [x  ] Independent  [  ]  Dependent       Vital Signs Last 24 Hrs  T(C): 36.9 (29 Mar 2021 11:55), Max: 36.9 (29 Mar 2021 11:55)  T(F): 98.4 (29 Mar 2021 11:55), Max: 98.4 (29 Mar 2021 11:55)  HR: 103 (29 Mar 2021 15:21) (90 - 103)  BP: 140/70 (29 Mar 2021 15:21) (140/70 - 188/83)  BP(mean): --  RR: 18 (29 Mar 2021 15:21) (18 - 22)  SpO2: 96% (29 Mar 2021 15:21) (95% - 98%)      PHYSICAL EXAM: Awake & Alert  GENERAL: NAD  HEAD:  Normocephalic  CHEST/LUNG: Clear   HEART: S1S2+  ABDOMEN: Soft, Nontender  EXTREMITIES:  no calf tenderness    NERVOUS SYSTEM:  Cranial Nerves 2-12 intact [  ] Abnormal  [  ]  ROM: WFL all extremities [  ]  Abnormal [  ]able to move all ext equally   Motor Strength: WFL all extremities  [  ]  Abnormal [  ]  Sensation: intact to light touch [x  ] Abnormal [  ]    FUNCTIONAL STATUS:  Bed Mobility: Independent [  ]  Supervision [  ]  Needs Assistance [ x ]  N/A [  ]  Transfers: Independent [  ]  Supervision [  ]  Needs Assistance [ x ]  N/A [  ]   Ambulation: Independent [  ]  Supervision [  ]  Needs Assistance [  ]  N/A [  ]  ADL: Independent [  ] Requires Assistance [  ] N/A [  ]      LABS:    03-29    138  |  102  |  10  ----------------------------<  240<H>  4.4   |  23  |  0.7    Ca    8.6      29 Mar 2021 13:14            RADIOLOGY & ADDITIONAL STUDIES:

## 2021-03-29 NOTE — CONSULT NOTE ADULT - ASSESSMENT
IMPRESSION: Rehab of cervical stenosis , s/p C5-7 ACDF    PRECAUTIONS: [  ] Cardiac  [  ] Respiratory  [  ] Seizures [  ] Contact Isolation  [  ] Droplet Isolation  [  ] Other    Weight Bearing Status:     RECOMMENDATION:    Out of Bed to Chair     DVT/Decubiti Prophylaxis    REHAB PLAN:     [ x  ] Bedside P/T 3-5 times a week   [   ]   Bedside O/T  2-3 times a week             [   ] No Rehab Therapy Indicated                   [   ]  Speech Therapy   Conditioning/ROM                                    ADL  Bed Mobility                                               Conditioning/ROM  Transfers                                                     Bed Mobility  Sitting /Standing Balance                         Transfers                                        Gait Training                                               Sitting/Standing Balance  Stair Training [   ]Applicable                    Home equipment Eval                                                                        Splinting  [   ] Only      GOALS:   ADL   [   ]   Independent                    Transfers  [ x  ] Independent                          Ambulation  [ x  ] Independent     [  x  ] With device                            [   ]  CG                                                         [   ]  CG                                                                  [   ] CG                            [    ] Min A                                                   [   ] Min A                                                              [   ] Min  A          DISCHARGE PLAN:   [   ]  Good candidate for Intensive Rehabilitation/Hospital based                                             Will tolerate 3hrs Intensive Rehab Daily                                       [ x   ]  Short Term Rehab in Skilled Nursing Facility                       vs                [  x  ]  Home with Outpatient or  services                                         [    ]  Possible Candidate for Intensive Hospital based Rehab

## 2021-03-29 NOTE — PRE-ANESTHESIA EVALUATION ADULT - NSANTHADDINFOFT_GEN_ALL_CORE
57 y/o woman evaluated for ACDF.  ASA 3.  Plan GA.  Plan, benefits, foreseeable risks, viable alternatives discussed with patient and all her pertinent questions answered and she understands and elects to proceed.

## 2021-03-29 NOTE — CHART NOTE - NSCHARTNOTEFT_GEN_A_CORE
Patient is a 56 year-old female s/p C5-C7 ACDF. While in PACU pt became tachycardic and EKG and troponins ordered. First troponin levels <0.01. EKG demonstrated RBBB and r/o ant infarct. Patient examined at this time with no chest pain.   Neurosurgery contacted again around 9pm after pt began complaining of chest pain. Pt was seen and examined at bedside in PACU. At this time, pt complaining of chest pain extending from surgery site down to mid chest. She denies radiation of chest pain into arms/jaw, paresthesias, dyspnea. On exam, pain reproducible with palpation to chest. Pt claims she sees cardiologist outpatient for history of HTN and stents in leg and has not had a stress test. Rpt EKG was ordered. HR at time of exam 103, /75. Cardiology fellow was consulted and recommended follow up with repeat troponin, echo cardiogram, 24hr telemetry, and f/u with outpatient cardiologist in hospital tomorrow.

## 2021-03-30 LAB
ANION GAP SERPL CALC-SCNC: 12 MMOL/L — SIGNIFICANT CHANGE UP (ref 7–14)
BASOPHILS # BLD AUTO: 0.01 K/UL — SIGNIFICANT CHANGE UP (ref 0–0.2)
BASOPHILS NFR BLD AUTO: 0.1 % — SIGNIFICANT CHANGE UP (ref 0–1)
BUN SERPL-MCNC: 11 MG/DL — SIGNIFICANT CHANGE UP (ref 10–20)
CALCIUM SERPL-MCNC: 8.8 MG/DL — SIGNIFICANT CHANGE UP (ref 8.5–10.1)
CHLORIDE SERPL-SCNC: 102 MMOL/L — SIGNIFICANT CHANGE UP (ref 98–110)
CO2 SERPL-SCNC: 25 MMOL/L — SIGNIFICANT CHANGE UP (ref 17–32)
COVID-19 SPIKE DOMAIN AB INTERP: POSITIVE
COVID-19 SPIKE DOMAIN ANTIBODY RESULT: >250 U/ML — HIGH
CREAT SERPL-MCNC: 0.7 MG/DL — SIGNIFICANT CHANGE UP (ref 0.7–1.5)
EOSINOPHIL # BLD AUTO: 0.01 K/UL — SIGNIFICANT CHANGE UP (ref 0–0.7)
EOSINOPHIL NFR BLD AUTO: 0.1 % — SIGNIFICANT CHANGE UP (ref 0–8)
GLUCOSE BLDC GLUCOMTR-MCNC: 210 MG/DL — HIGH (ref 70–99)
GLUCOSE BLDC GLUCOMTR-MCNC: 236 MG/DL — HIGH (ref 70–99)
GLUCOSE BLDC GLUCOMTR-MCNC: 262 MG/DL — HIGH (ref 70–99)
GLUCOSE BLDC GLUCOMTR-MCNC: 269 MG/DL — HIGH (ref 70–99)
GLUCOSE SERPL-MCNC: 237 MG/DL — HIGH (ref 70–99)
HCT VFR BLD CALC: 36.7 % — LOW (ref 37–47)
HGB BLD-MCNC: 11.8 G/DL — LOW (ref 12–16)
IMM GRANULOCYTES NFR BLD AUTO: 0.3 % — SIGNIFICANT CHANGE UP (ref 0.1–0.3)
LYMPHOCYTES # BLD AUTO: 1.42 K/UL — SIGNIFICANT CHANGE UP (ref 1.2–3.4)
LYMPHOCYTES # BLD AUTO: 13.3 % — LOW (ref 20.5–51.1)
MCHC RBC-ENTMCNC: 29.7 PG — SIGNIFICANT CHANGE UP (ref 27–31)
MCHC RBC-ENTMCNC: 32.2 G/DL — SIGNIFICANT CHANGE UP (ref 32–37)
MCV RBC AUTO: 92.4 FL — SIGNIFICANT CHANGE UP (ref 81–99)
MONOCYTES # BLD AUTO: 0.62 K/UL — HIGH (ref 0.1–0.6)
MONOCYTES NFR BLD AUTO: 5.8 % — SIGNIFICANT CHANGE UP (ref 1.7–9.3)
NEUTROPHILS # BLD AUTO: 8.56 K/UL — HIGH (ref 1.4–6.5)
NEUTROPHILS NFR BLD AUTO: 80.4 % — HIGH (ref 42.2–75.2)
NRBC # BLD: 0 /100 WBCS — SIGNIFICANT CHANGE UP (ref 0–0)
PLATELET # BLD AUTO: 321 K/UL — SIGNIFICANT CHANGE UP (ref 130–400)
POTASSIUM SERPL-MCNC: 4.3 MMOL/L — SIGNIFICANT CHANGE UP (ref 3.5–5)
POTASSIUM SERPL-SCNC: 4.3 MMOL/L — SIGNIFICANT CHANGE UP (ref 3.5–5)
RBC # BLD: 3.97 M/UL — LOW (ref 4.2–5.4)
RBC # FLD: 12.4 % — SIGNIFICANT CHANGE UP (ref 11.5–14.5)
SARS-COV-2 IGG+IGM SERPL QL IA: >250 U/ML — HIGH
SARS-COV-2 IGG+IGM SERPL QL IA: POSITIVE
SODIUM SERPL-SCNC: 139 MMOL/L — SIGNIFICANT CHANGE UP (ref 135–146)
SURGICAL PATHOLOGY STUDY: SIGNIFICANT CHANGE UP
WBC # BLD: 10.65 K/UL — SIGNIFICANT CHANGE UP (ref 4.8–10.8)
WBC # FLD AUTO: 10.65 K/UL — SIGNIFICANT CHANGE UP (ref 4.8–10.8)

## 2021-03-30 PROCEDURE — 93306 TTE W/DOPPLER COMPLETE: CPT | Mod: 26

## 2021-03-30 RX ORDER — HEPARIN SODIUM 5000 [USP'U]/ML
5000 INJECTION INTRAVENOUS; SUBCUTANEOUS EVERY 8 HOURS
Refills: 0 | Status: DISCONTINUED | OUTPATIENT
Start: 2021-03-30 | End: 2021-04-07

## 2021-03-30 RX ADMIN — HEPARIN SODIUM 5000 UNIT(S): 5000 INJECTION INTRAVENOUS; SUBCUTANEOUS at 21:15

## 2021-03-30 RX ADMIN — GABAPENTIN 300 MILLIGRAM(S): 400 CAPSULE ORAL at 13:03

## 2021-03-30 RX ADMIN — Medication 6: at 17:09

## 2021-03-30 RX ADMIN — MORPHINE SULFATE 4 MILLIGRAM(S): 50 CAPSULE, EXTENDED RELEASE ORAL at 17:07

## 2021-03-30 RX ADMIN — Medication 4 MILLIGRAM(S): at 06:02

## 2021-03-30 RX ADMIN — Medication 5 MILLIGRAM(S): at 06:04

## 2021-03-30 RX ADMIN — MORPHINE SULFATE 4 MILLIGRAM(S): 50 CAPSULE, EXTENDED RELEASE ORAL at 22:21

## 2021-03-30 RX ADMIN — GABAPENTIN 300 MILLIGRAM(S): 400 CAPSULE ORAL at 06:03

## 2021-03-30 RX ADMIN — OXYCODONE AND ACETAMINOPHEN 2 TABLET(S): 5; 325 TABLET ORAL at 18:54

## 2021-03-30 RX ADMIN — Medication 100 MILLIGRAM(S): at 06:04

## 2021-03-30 RX ADMIN — METHOCARBAMOL 500 MILLIGRAM(S): 500 TABLET, FILM COATED ORAL at 06:03

## 2021-03-30 RX ADMIN — Medication 100 MILLIGRAM(S): at 17:14

## 2021-03-30 RX ADMIN — Medication 5 UNIT(S): at 12:48

## 2021-03-30 RX ADMIN — OXYCODONE AND ACETAMINOPHEN 2 TABLET(S): 5; 325 TABLET ORAL at 13:15

## 2021-03-30 RX ADMIN — HEPARIN SODIUM 5000 UNIT(S): 5000 INJECTION INTRAVENOUS; SUBCUTANEOUS at 13:03

## 2021-03-30 RX ADMIN — METHOCARBAMOL 500 MILLIGRAM(S): 500 TABLET, FILM COATED ORAL at 23:40

## 2021-03-30 RX ADMIN — INSULIN GLARGINE 15 UNIT(S): 100 INJECTION, SOLUTION SUBCUTANEOUS at 21:20

## 2021-03-30 RX ADMIN — Medication 100 MILLIGRAM(S): at 06:02

## 2021-03-30 RX ADMIN — MORPHINE SULFATE 4 MILLIGRAM(S): 50 CAPSULE, EXTENDED RELEASE ORAL at 02:01

## 2021-03-30 RX ADMIN — SENNA PLUS 2 TABLET(S): 8.6 TABLET ORAL at 21:15

## 2021-03-30 RX ADMIN — OXYCODONE AND ACETAMINOPHEN 2 TABLET(S): 5; 325 TABLET ORAL at 12:45

## 2021-03-30 RX ADMIN — MORPHINE SULFATE 4 MILLIGRAM(S): 50 CAPSULE, EXTENDED RELEASE ORAL at 10:20

## 2021-03-30 RX ADMIN — METHOCARBAMOL 500 MILLIGRAM(S): 500 TABLET, FILM COATED ORAL at 17:09

## 2021-03-30 RX ADMIN — Medication 5 UNIT(S): at 17:09

## 2021-03-30 RX ADMIN — FAMOTIDINE 40 MILLIGRAM(S): 10 INJECTION INTRAVENOUS at 06:03

## 2021-03-30 RX ADMIN — GABAPENTIN 300 MILLIGRAM(S): 400 CAPSULE ORAL at 21:15

## 2021-03-30 RX ADMIN — ATORVASTATIN CALCIUM 40 MILLIGRAM(S): 80 TABLET, FILM COATED ORAL at 21:15

## 2021-03-30 RX ADMIN — MORPHINE SULFATE 4 MILLIGRAM(S): 50 CAPSULE, EXTENDED RELEASE ORAL at 21:15

## 2021-03-30 RX ADMIN — Medication 4: at 12:48

## 2021-03-30 RX ADMIN — MORPHINE SULFATE 4 MILLIGRAM(S): 50 CAPSULE, EXTENDED RELEASE ORAL at 17:37

## 2021-03-30 RX ADMIN — Medication 5 UNIT(S): at 08:41

## 2021-03-30 RX ADMIN — FAMOTIDINE 40 MILLIGRAM(S): 10 INJECTION INTRAVENOUS at 17:09

## 2021-03-30 RX ADMIN — Medication 6: at 08:41

## 2021-03-30 RX ADMIN — METHOCARBAMOL 500 MILLIGRAM(S): 500 TABLET, FILM COATED ORAL at 12:47

## 2021-03-30 RX ADMIN — MORPHINE SULFATE 4 MILLIGRAM(S): 50 CAPSULE, EXTENDED RELEASE ORAL at 09:50

## 2021-03-30 RX ADMIN — OXYCODONE AND ACETAMINOPHEN 2 TABLET(S): 5; 325 TABLET ORAL at 06:02

## 2021-03-30 NOTE — PHYSICAL THERAPY INITIAL EVALUATION ADULT - GENERAL OBSERVATIONS, REHAB EVAL
PT mariel [id number]. Chart reviewed and case discussed with STEPHAN Hawkins.Pt encountered semi-reclined in bed. In NAD. However c/o 10/10 pain anterior neck. RN made aware. + C collar, + quinn, + B sequentials, + IV disconnected by RN for ambulation, + O2 via n/c.

## 2021-03-30 NOTE — PROGRESS NOTE ADULT - SUBJECTIVE AND OBJECTIVE BOX
S/P C5/6 C6/7 ACDF  POD #1     -Pt seen and examined while sitting in bedside chair     PAST MEDICAL & SURGICAL HISTORY:  HTN (hypertension)    Diabetes    Asthma  LAST EPISODE SEVERAL YRS    TRAVIS on CPAP    Arthritis  OA    High cholesterol    GERD (gastroesophageal reflux disease)    History of peripheral arterial disease    S/P trigger finger release    H/O carpal tunnel repair    H/O arthroscopy of shoulder    FAMILY HISTORY:  Family history of malignant neoplasm of esophagus    FH: stroke    FH: aneurysm    Allergies    ibuprofen (Urticaria)    Intolerances    REVIEW OF SYSTEMS : All systems negative other than those listed in HPI  General:	-  Skin/Breast: -  Ophthalmologic: -   ENMT: -  Respiratory and Thorax: -  Cardiovascular: -	  Gastrointestinal: -  Genitourinary:-  Musculoskeletal:	-  Neurological:	-  Psychiatric:	-  Hematology/Lymphatics:	-  Endocrine:-  Allergic/Immunologic:	-    MEDICATIONS  (STANDING):  atorvastatin 40 milliGRAM(s) Oral at bedtime  dexAMETHasone  Injectable 4 milliGRAM(s) IV Push every 6 hours  dextrose 40% Gel 15 Gram(s) Oral once  dextrose 5%. 1000 milliLiter(s) (50 mL/Hr) IV Continuous <Continuous>  dextrose 5%. 1000 milliLiter(s) (100 mL/Hr) IV Continuous <Continuous>  dextrose 50% Injectable 25 Gram(s) IV Push once  dextrose 50% Injectable 12.5 Gram(s) IV Push once  dextrose 50% Injectable 25 Gram(s) IV Push once  enalapril 5 milliGRAM(s) Oral daily  famotidine    Tablet 40 milliGRAM(s) Oral two times a day  gabapentin 300 milliGRAM(s) Oral three times a day  glucagon  Injectable 1 milliGRAM(s) IntraMuscular once  heparin   Injectable 5000 Unit(s) SubCutaneous every 8 hours  insulin glargine Injectable (LANTUS) 15 Unit(s) SubCutaneous at bedtime  insulin lispro (ADMELOG) corrective regimen sliding scale   SubCutaneous three times a day before meals  insulin lispro Injectable (ADMELOG) 5 Unit(s) SubCutaneous before breakfast  insulin lispro Injectable (ADMELOG) 5 Unit(s) SubCutaneous before lunch  insulin lispro Injectable (ADMELOG) 5 Unit(s) SubCutaneous before dinner  methocarbamol 500 milliGRAM(s) Oral four times a day  metoprolol tartrate 100 milliGRAM(s) Oral two times a day  senna 2 Tablet(s) Oral at bedtime  sodium chloride 0.9%. 1000 milliLiter(s) (75 mL/Hr) IV Continuous <Continuous>    MEDICATIONS  (PRN):  acetaminophen   Tablet .. 650 milliGRAM(s) Oral every 6 hours PRN Temp greater or equal to 38C (100.4F), Mild Pain (1 - 3)  ALBUTerol    90 MICROgram(s) HFA Inhaler 1 Puff(s) Inhalation four times a day PRN Bronchospasm  morphine  - Injectable 4 milliGRAM(s) IV Push every 3 hours PRN severe breakthrough pain  ondansetron Injectable 4 milliGRAM(s) IV Push every 6 hours PRN Nausea  oxycodone    5 mG/acetaminophen 325 mG 1 Tablet(s) Oral every 4 hours PRN Moderate Pain (4 - 6)  oxycodone    5 mG/acetaminophen 325 mG 2 Tablet(s) Oral every 6 hours PRN Severe Pain (7 - 10)    Anticoagulation:  heparin   Injectable 5000 Unit(s) SubCutaneous every 8 hours    OTHER:  ALBUTerol    90 MICROgram(s) HFA Inhaler 1 Puff(s) Inhalation four times a day PRN  atorvastatin 40 milliGRAM(s) Oral at bedtime  dexAMETHasone  Injectable 4 milliGRAM(s) IV Push every 6 hours  dextrose 40% Gel 15 Gram(s) Oral once  dextrose 50% Injectable 25 Gram(s) IV Push once  dextrose 50% Injectable 12.5 Gram(s) IV Push once  dextrose 50% Injectable 25 Gram(s) IV Push once  enalapril 5 milliGRAM(s) Oral daily  famotidine    Tablet 40 milliGRAM(s) Oral two times a day  glucagon  Injectable 1 milliGRAM(s) IntraMuscular once  insulin glargine Injectable (LANTUS) 15 Unit(s) SubCutaneous at bedtime  insulin lispro (ADMELOG) corrective regimen sliding scale   SubCutaneous three times a day before meals  insulin lispro Injectable (ADMELOG) 5 Unit(s) SubCutaneous before breakfast  insulin lispro Injectable (ADMELOG) 5 Unit(s) SubCutaneous before lunch  insulin lispro Injectable (ADMELOG) 5 Unit(s) SubCutaneous before dinner  metoprolol tartrate 100 milliGRAM(s) Oral two times a day  senna 2 Tablet(s) Oral at bedtime    Vital Signs Last 24 Hrs  T(C): 36.9 (30 Mar 2021 04:35), Max: 36.9 (29 Mar 2021 11:55)  T(F): 98.4 (30 Mar 2021 04:35), Max: 98.4 (29 Mar 2021 11:55)  HR: 94 (30 Mar 2021 04:35) (94 - 103)  BP: 166/79 (30 Mar 2021 04:35) (140/70 - 188/83)  BP(mean): --  RR: 18 (30 Mar 2021 08:39) (16 - 25)  SpO2: 95% (30 Mar 2021 08:39) (90% - 98%)    Physical Exam :  General : Pt sitting in bedside chair wearing soft collar   A&O x 3   Tongue midline  Speech clear and appropriate  Pt speaking in full sentences   Follows all commands   Occular :   PERRLA, EOMI  Motor :   MAEx4 b/l  strength 5/5 shoulder abduction / adduction  Shoulder shrug intact   weak hand  b/l   weak b/l hand dexterity   Sensory :  Intact bilaterally     Hoffmans : negative     Incision : dressing clean / dry / intact changed at time of visit, slight skin bruising no hematoma formation     LABS:                        11.8   10.65 )-----------( 321      ( 30 Mar 2021 06:25 )             36.7     03-30    139  |  102  |  11  ----------------------------<  237<H>  4.3   |  25  |  0.7    Ca    8.8      30 Mar 2021 06:25    Assessment / Plan:   overnight events : While in PACU post op pt was tachy ECG showed RBBB r/o anty infarct w reproducible chest pain. Cardio fellow consulted and recc'd rpt trops, echo, 24hr tele and f/u with outpt cardiologist inpt in AM.      - Pt sitting in bedside chair wearing soft collar, pt ambulating independently. Weak hand dexterity b/l. Pt reports intermittent paresthesias to b/l anterior forearms but decreased compared to pre surgery. Pt denies any chest pain at this time.   - Pending in patient f/u with cardiologist   - TTE / rpt trops ordered ; 1st trop neg   - D/C Teresa today  - [facility] Ordered   - OT for UE dexterity / skill training   - Continue PT  - Currently on 3L NC 02 sat 95   - Will discuss with attending

## 2021-03-31 LAB
GLUCOSE BLDC GLUCOMTR-MCNC: 127 MG/DL — HIGH (ref 70–99)
GLUCOSE BLDC GLUCOMTR-MCNC: 132 MG/DL — HIGH (ref 70–99)
GLUCOSE BLDC GLUCOMTR-MCNC: 151 MG/DL — HIGH (ref 70–99)
GLUCOSE BLDC GLUCOMTR-MCNC: 185 MG/DL — HIGH (ref 70–99)

## 2021-03-31 RX ORDER — PANTOPRAZOLE SODIUM 20 MG/1
40 TABLET, DELAYED RELEASE ORAL
Refills: 0 | Status: DISCONTINUED | OUTPATIENT
Start: 2021-03-31 | End: 2021-04-07

## 2021-03-31 RX ORDER — BENZOCAINE 10 %
1 GEL (GRAM) MUCOUS MEMBRANE EVERY 6 HOURS
Refills: 0 | Status: DISCONTINUED | OUTPATIENT
Start: 2021-03-31 | End: 2021-04-07

## 2021-03-31 RX ORDER — BENZOCAINE AND MENTHOL 5; 1 G/100ML; G/100ML
1 LIQUID ORAL EVERY 4 HOURS
Refills: 0 | Status: DISCONTINUED | OUTPATIENT
Start: 2021-03-31 | End: 2021-03-31

## 2021-03-31 RX ORDER — SODIUM CHLORIDE 0.65 %
1 AEROSOL, SPRAY (ML) NASAL
Refills: 0 | Status: DISCONTINUED | OUTPATIENT
Start: 2021-03-31 | End: 2021-04-07

## 2021-03-31 RX ORDER — ACETAMINOPHEN 500 MG
325 TABLET ORAL EVERY 4 HOURS
Refills: 0 | Status: DISCONTINUED | OUTPATIENT
Start: 2021-03-31 | End: 2021-03-31

## 2021-03-31 RX ORDER — ACETAMINOPHEN 500 MG
325 TABLET ORAL ONCE
Refills: 0 | Status: COMPLETED | OUTPATIENT
Start: 2021-03-31 | End: 2021-03-31

## 2021-03-31 RX ADMIN — HEPARIN SODIUM 5000 UNIT(S): 5000 INJECTION INTRAVENOUS; SUBCUTANEOUS at 15:37

## 2021-03-31 RX ADMIN — Medication 5 UNIT(S): at 17:34

## 2021-03-31 RX ADMIN — Medication 5 UNIT(S): at 12:11

## 2021-03-31 RX ADMIN — MORPHINE SULFATE 4 MILLIGRAM(S): 50 CAPSULE, EXTENDED RELEASE ORAL at 18:44

## 2021-03-31 RX ADMIN — Medication 5 UNIT(S): at 08:41

## 2021-03-31 RX ADMIN — HEPARIN SODIUM 5000 UNIT(S): 5000 INJECTION INTRAVENOUS; SUBCUTANEOUS at 04:56

## 2021-03-31 RX ADMIN — METHOCARBAMOL 500 MILLIGRAM(S): 500 TABLET, FILM COATED ORAL at 17:34

## 2021-03-31 RX ADMIN — OXYCODONE AND ACETAMINOPHEN 2 TABLET(S): 5; 325 TABLET ORAL at 03:07

## 2021-03-31 RX ADMIN — OXYCODONE AND ACETAMINOPHEN 2 TABLET(S): 5; 325 TABLET ORAL at 08:44

## 2021-03-31 RX ADMIN — OXYCODONE AND ACETAMINOPHEN 2 TABLET(S): 5; 325 TABLET ORAL at 03:06

## 2021-03-31 RX ADMIN — OXYCODONE AND ACETAMINOPHEN 2 TABLET(S): 5; 325 TABLET ORAL at 21:55

## 2021-03-31 RX ADMIN — GABAPENTIN 300 MILLIGRAM(S): 400 CAPSULE ORAL at 04:56

## 2021-03-31 RX ADMIN — Medication 2: at 12:11

## 2021-03-31 RX ADMIN — OXYCODONE AND ACETAMINOPHEN 2 TABLET(S): 5; 325 TABLET ORAL at 22:25

## 2021-03-31 RX ADMIN — HEPARIN SODIUM 5000 UNIT(S): 5000 INJECTION INTRAVENOUS; SUBCUTANEOUS at 21:57

## 2021-03-31 RX ADMIN — SENNA PLUS 2 TABLET(S): 8.6 TABLET ORAL at 21:55

## 2021-03-31 RX ADMIN — METHOCARBAMOL 500 MILLIGRAM(S): 500 TABLET, FILM COATED ORAL at 04:56

## 2021-03-31 RX ADMIN — ATORVASTATIN CALCIUM 40 MILLIGRAM(S): 80 TABLET, FILM COATED ORAL at 21:55

## 2021-03-31 RX ADMIN — MORPHINE SULFATE 4 MILLIGRAM(S): 50 CAPSULE, EXTENDED RELEASE ORAL at 15:33

## 2021-03-31 RX ADMIN — INSULIN GLARGINE 15 UNIT(S): 100 INJECTION, SOLUTION SUBCUTANEOUS at 21:56

## 2021-03-31 RX ADMIN — MORPHINE SULFATE 4 MILLIGRAM(S): 50 CAPSULE, EXTENDED RELEASE ORAL at 15:37

## 2021-03-31 RX ADMIN — FAMOTIDINE 40 MILLIGRAM(S): 10 INJECTION INTRAVENOUS at 04:56

## 2021-03-31 RX ADMIN — Medication 1 SPRAY(S): at 17:34

## 2021-03-31 RX ADMIN — Medication 325 MILLIGRAM(S): at 04:56

## 2021-03-31 RX ADMIN — MORPHINE SULFATE 4 MILLIGRAM(S): 50 CAPSULE, EXTENDED RELEASE ORAL at 06:50

## 2021-03-31 RX ADMIN — Medication 100 MILLIGRAM(S): at 17:34

## 2021-03-31 RX ADMIN — OXYCODONE AND ACETAMINOPHEN 2 TABLET(S): 5; 325 TABLET ORAL at 08:45

## 2021-03-31 RX ADMIN — GABAPENTIN 300 MILLIGRAM(S): 400 CAPSULE ORAL at 21:55

## 2021-03-31 RX ADMIN — METHOCARBAMOL 500 MILLIGRAM(S): 500 TABLET, FILM COATED ORAL at 11:51

## 2021-03-31 RX ADMIN — Medication 2: at 08:42

## 2021-03-31 RX ADMIN — MORPHINE SULFATE 4 MILLIGRAM(S): 50 CAPSULE, EXTENDED RELEASE ORAL at 19:14

## 2021-03-31 RX ADMIN — Medication 5 MILLIGRAM(S): at 04:56

## 2021-03-31 RX ADMIN — Medication 100 MILLIGRAM(S): at 04:55

## 2021-03-31 RX ADMIN — FAMOTIDINE 40 MILLIGRAM(S): 10 INJECTION INTRAVENOUS at 17:34

## 2021-03-31 RX ADMIN — GABAPENTIN 300 MILLIGRAM(S): 400 CAPSULE ORAL at 15:37

## 2021-03-31 NOTE — PROGRESS NOTE ADULT - SUBJECTIVE AND OBJECTIVE BOX
Subjective: 56yFemale with a pmhx of M47.812/ 88184/ 49229/ 22853X2/ 57594/ 64262    ^M47.812/ 67837/ 62138/ 22853X2/ 99227/ 63111    Family history of malignant neoplasm of esophagus    FH: stroke    FH: aneurysm    Handoff    MEWS Score    HTN (hypertension)    Diabetes    Asthma    TRAVIS on CPAP    Arthritis    High cholesterol    GERD (gastroesophageal reflux disease)    History of peripheral arterial disease    Herniation of cervical intervertebral disc with radiculopathy    Cervical spondylosis with radiculopathy    Herniation of cervical intervertebral disc    Cervical spondylosis with radiculopathy    Anterior cervical discectomy with fusion    S/P trigger finger release    H/O carpal tunnel repair    H/O arthroscopy of shoulder    SysAdmin_[id number]      POD#2 s/p  C5-C6 C6-C7 ACDF  Pt seen and examined while sitting in bed. Pt c/o persistent sore throat, difficulty swallowing exacerbated by post-nasal drip. Denies steve pain at site of surgery.   C/o chest pain overnight, resolved this morning.           Allergies    ibuprofen (Urticaria)    Intolerances        Vital Signs Last 24 Hrs  T(C): 37.2 (31 Mar 2021 04:45), Max: 37.2 (31 Mar 2021 04:45)  T(F): 98.9 (31 Mar 2021 04:45), Max: 98.9 (31 Mar 2021 04:45)  HR: 78 (31 Mar 2021 06:46) (78 - 91)  BP: 151/70 (31 Mar 2021 06:46) (133/61 - 175/72)  BP(mean): --  RR: 18 (31 Mar 2021 04:45) (18 - 18)  SpO2: --      acetaminophen   Tablet .. 650 milliGRAM(s) Oral every 6 hours PRN  ALBUTerol    90 MICROgram(s) HFA Inhaler 1 Puff(s) Inhalation four times a day PRN  atorvastatin 40 milliGRAM(s) Oral at bedtime  dextrose 40% Gel 15 Gram(s) Oral once  dextrose 5%. 1000 milliLiter(s) IV Continuous <Continuous>  dextrose 5%. 1000 milliLiter(s) IV Continuous <Continuous>  dextrose 50% Injectable 25 Gram(s) IV Push once  dextrose 50% Injectable 12.5 Gram(s) IV Push once  dextrose 50% Injectable 25 Gram(s) IV Push once  enalapril 5 milliGRAM(s) Oral daily  famotidine    Tablet 40 milliGRAM(s) Oral two times a day  gabapentin 300 milliGRAM(s) Oral three times a day  glucagon  Injectable 1 milliGRAM(s) IntraMuscular once  heparin   Injectable 5000 Unit(s) SubCutaneous every 8 hours  insulin glargine Injectable (LANTUS) 15 Unit(s) SubCutaneous at bedtime  insulin lispro (ADMELOG) corrective regimen sliding scale   SubCutaneous three times a day before meals  insulin lispro Injectable (ADMELOG) 5 Unit(s) SubCutaneous before breakfast  insulin lispro Injectable (ADMELOG) 5 Unit(s) SubCutaneous before lunch  insulin lispro Injectable (ADMELOG) 5 Unit(s) SubCutaneous before dinner  methocarbamol 500 milliGRAM(s) Oral four times a day  metoprolol tartrate 100 milliGRAM(s) Oral two times a day  morphine  - Injectable 4 milliGRAM(s) IV Push every 3 hours PRN  ondansetron Injectable 4 milliGRAM(s) IV Push every 6 hours PRN  oxycodone    5 mG/acetaminophen 325 mG 1 Tablet(s) Oral every 4 hours PRN  oxycodone    5 mG/acetaminophen 325 mG 2 Tablet(s) Oral every 6 hours PRN  senna 2 Tablet(s) Oral at bedtime  sodium chloride 0.9%. 1000 milliLiter(s) IV Continuous <Continuous>        03-30-21 @ 07:01  -  03-31-21 @ 07:00  --------------------------------------------------------  IN: 2280 mL / OUT: 1575 mL / NET: 705 mL        REVIEW OF SYSTEMS    [x ] A ten-point review of systems was otherwise negative except as noted.  [ ] Due to altered mental status/intubation, subjective information were not able to be obtained from the patient. History was obtained, to the extent possible, from review of the chart and collateral sources of information.      Exam:  AAOX3.Speech clear and appropriate  Pt speaking in full sentences   tongue midline, facial motions symmetric  PERRLA, EOMI  Finger to Nose intact  Motor: MAEx4, 5/5 power in b/l UE and LE w/ exception of b/l hand  4/5   decreased b/l hand dexterity  Sensation: intact to touch in all extremities  Incision C/D/I, no edema, erythema or drainage      CBC Full  -  ( 30 Mar 2021 06:25 )  WBC Count : 10.65 K/uL  RBC Count : 3.97 M/uL  Hemoglobin : 11.8 g/dL  Hematocrit : 36.7 %  Platelet Count - Automated : 321 K/uL  Mean Cell Volume : 92.4 fL  Mean Cell Hemoglobin : 29.7 pg  Mean Cell Hemoglobin Concentration : 32.2 g/dL  Auto Neutrophil # : 8.56 K/uL  Auto Lymphocyte # : 1.42 K/uL  Auto Monocyte # : 0.62 K/uL  Auto Eosinophil # : 0.01 K/uL  Auto Basophil # : 0.01 K/uL  Auto Neutrophil % : 80.4 %  Auto Lymphocyte % : 13.3 %  Auto Monocyte % : 5.8 %  Auto Eosinophil % : 0.1 %  Auto Basophil % : 0.1 %    03-30    139  |  102  |  11  ----------------------------<  237<H>  4.3   |  25  |  0.7    Ca    8.8      30 Mar 2021 06:25            Imaging:  n/a   < from: TTE Echo Complete w/o Contrast w/ Doppler (03.30.21 @ 09:19) >    Summary:   1. Normal global left ventricular systolic function.   2. LV Ejection Fraction by Osborn's Method with a biplane EF of 63 %.   3. Increased LV wall thickness.   4. Normal left ventricular internal cavity size.   5. Spectral Doppler shows impaired relaxation pattern of left ventricular myocardial filling (Grade I diastolic dysfunction).   6. Normal left atrial size.   7. Normal right atrial size.   8. There is no evidence of pericardial effusion.  9. Mild thickening of the anterior mitral valve leaflet.  10. No evidence of mitral valve regurgitation.  11. Mild tricuspid regurgitation.  12. Mild aortic regurgitation.      < end of copied text >      Assessment/Plan:   57 y/o female POD#2 s/p C5-C7 ACDF.      - Pending inpatient f/u with cardiologist   - rpt trops ordered ; 1st trop neg   - OT for UE dexterity / skill training   - Continue PT  - f/u labs    - Will discuss with attending

## 2021-03-31 NOTE — CONSULT NOTE ADULT - SUBJECTIVE AND OBJECTIVE BOX
Date of Admission:    CHIEF COMPLAINT:    HISTORY OF PRESENT ILLNESS: 56yFemale with PMH below presented to the hospital for C-spine surgery.  She had post-op chest pain diffuse in nature and neck pain.    PAST MEDICAL & SURGICAL HISTORY:  HTN (hypertension)    Diabetes    Asthma  LAST EPISODE SEVERAL YRS    TRAVIS on CPAP    Arthritis  OA    High cholesterol    GERD (gastroesophageal reflux disease)    History of peripheral arterial disease    S/P trigger finger release    H/O carpal tunnel repair    H/O arthroscopy of shoulder      HEALTH ISSUES - PROBLEM Dx:        FAMILY HISTORY:  Family history of malignant neoplasm of esophagus    FH: stroke    FH: aneurysm      Allergies    ibuprofen (Urticaria)    Intolerances    	  Home Medications:  albuterol 90 mcg/inh inhalation aerosol: 1 puff(s) inhaled 4 times a day, As needed, Bronchospasm (29 Mar 2021 06:33)  atorvastatin 40 mg oral tablet: 1 tab(s) orally once a day (29 Mar 2021 06:33)  clopidogrel 75 mg oral tablet: 1 tab(s) orally once a day (29 Mar 2021 06:33)  enalapril 5 mg oral tablet: 1 tab(s) orally once a day (29 Mar 2021 06:33)  famotidine 40 mg oral tablet: 1 tab(s) orally once a day (at bedtime) (29 Mar 2021 06:33)  gabapentin 300 mg oral tablet: 300 milligram(s) orally once a day (29 Mar 2021 06:33)  metFORMIN 1000 mg oral tablet: 1 tab(s) orally 2 times a day (29 Mar 2021 06:33)  methocarbamol 500 mg oral tablet: 2 tab(s) orally 4 times a day (29 Mar 2021 06:33)  metoprolol tartrate 100 mg oral tablet: 1 tab(s) orally 2 times a day (29 Mar 2021 06:33)  oxyCODONE 10 mg oral tablet: 1 tab(s) orally every 4 hours, As needed, Severe Pain (7 - 10) (29 Mar 2021 06:33)  pioglitazone 30 mg oral tablet: 1 tab(s) orally once a day (29 Mar 2021 06:33)  traMADol 50 mg oral tablet: 1 tab(s) orally every 4 hours, As needed, Moderate Pain (4 - 6) (29 Mar 2021 06:33)  Xultophy 100 units-3.6 mg/mL subcutaneous solution: subcutaneous once a day (29 Mar 2021 06:33)    MEDICATIONS  (STANDING):  atorvastatin 40 milliGRAM(s) Oral at bedtime  benzocaine 20% Spray 1 Spray(s) Topical every 6 hours  dextrose 40% Gel 15 Gram(s) Oral once  dextrose 5%. 1000 milliLiter(s) (50 mL/Hr) IV Continuous <Continuous>  dextrose 5%. 1000 milliLiter(s) (100 mL/Hr) IV Continuous <Continuous>  dextrose 50% Injectable 25 Gram(s) IV Push once  dextrose 50% Injectable 12.5 Gram(s) IV Push once  dextrose 50% Injectable 25 Gram(s) IV Push once  enalapril 5 milliGRAM(s) Oral daily  gabapentin 300 milliGRAM(s) Oral three times a day  glucagon  Injectable 1 milliGRAM(s) IntraMuscular once  heparin   Injectable 5000 Unit(s) SubCutaneous every 8 hours  insulin glargine Injectable (LANTUS) 15 Unit(s) SubCutaneous at bedtime  insulin lispro (ADMELOG) corrective regimen sliding scale   SubCutaneous three times a day before meals  insulin lispro Injectable (ADMELOG) 5 Unit(s) SubCutaneous before breakfast  insulin lispro Injectable (ADMELOG) 5 Unit(s) SubCutaneous before lunch  insulin lispro Injectable (ADMELOG) 5 Unit(s) SubCutaneous before dinner  methocarbamol 500 milliGRAM(s) Oral four times a day  metoprolol tartrate 100 milliGRAM(s) Oral two times a day  pantoprazole   Suspension 40 milliGRAM(s) Oral two times a day  senna 2 Tablet(s) Oral at bedtime  sodium chloride 0.9%. 1000 milliLiter(s) (75 mL/Hr) IV Continuous <Continuous>    MEDICATIONS  (PRN):  acetaminophen   Tablet .. 650 milliGRAM(s) Oral every 6 hours PRN Temp greater or equal to 38C (100.4F), Mild Pain (1 - 3)  ALBUTerol    90 MICROgram(s) HFA Inhaler 1 Puff(s) Inhalation four times a day PRN Bronchospasm  morphine  - Injectable 4 milliGRAM(s) IV Push every 3 hours PRN severe breakthrough pain  ondansetron Injectable 4 milliGRAM(s) IV Push every 6 hours PRN Nausea  oxycodone    5 mG/acetaminophen 325 mG 1 Tablet(s) Oral every 4 hours PRN Moderate Pain (4 - 6)  oxycodone    5 mG/acetaminophen 325 mG 2 Tablet(s) Oral every 6 hours PRN Severe Pain (7 - 10)  sodium chloride 0.65% Nasal 1 Spray(s) Both Nostrils two times a day PRN Nasal Congestion        REVIEW OF SYSTEMS:  CONSTITUTIONAL: No fever, weight loss, or fatigue  CARDIOLOGY:  Patient has chest pain and neck pain ,denies shortness of breath or syncopal episodes.   RESPIRATORY: denies shortness of breath, wheezing.   NEUROLOGICAL: NO weakness, no focal deficits to report.   GI: no BRBPR, no Vomiting, no diarrhea.      PHYSICAL EXAM:  T(C): 37.6 (03-31-21 @ 13:55), Max: 37.6 (03-31-21 @ 13:55)  HR: 86 (03-31-21 @ 13:55) (78 - 86)  BP: 166/79 (03-31-21 @ 13:55) (151/70 - 175/72)  RR: 18 (03-31-21 @ 13:55) (18 - 18)  SpO2: --  Wt(kg): --  I&O's Summary    30 Mar 2021 07:01  -  31 Mar 2021 07:00  --------------------------------------------------------  IN: 2280 mL / OUT: 1575 mL / NET: 705 mL      Daily     Daily     General Appearance: Normal	  Cardiovascular: Normal S1 S2, No JVD, No murmurs, No edema  Respiratory: Lungs clear to auscultation	  Psychiatry: A & O x 3, Mood & affect appropriate  Gastrointestinal:  Soft, Non-tender  Skin: No rashes, No ecchymoses, No cyanosis	  Neurologic: Non-focal  Extremities: Normal range of motion, No clubbing, cyanosis or edema      LABS:	 	                          11.8   10.65 )-----------( 321      ( 30 Mar 2021 06:25 )             36.7     03-30    139  |  102  |  11  ----------------------------<  237<H>  4.3   |  25  |  0.7    Ca    8.8      30 Mar 2021 06:25          ASSESSMENT/PLAN: 	    1) CAD risk factors stable, chest pain is likely due to musculoskeletal.      She had a normal myocardial perfusion imaging stress test in 2018    2) HTN mildly elevated.      Restart her home medications.    3)Hyperlipidemia     Continue statin treatment.    4)Type2 BM     Continue her present treatment.

## 2021-04-01 LAB
CK MB CFR SERPL CALC: <1 NG/ML — SIGNIFICANT CHANGE UP (ref 0.6–6.3)
GLUCOSE BLDC GLUCOMTR-MCNC: 143 MG/DL — HIGH (ref 70–99)
GLUCOSE BLDC GLUCOMTR-MCNC: 159 MG/DL — HIGH (ref 70–99)
GLUCOSE BLDC GLUCOMTR-MCNC: 224 MG/DL — HIGH (ref 70–99)
TROPONIN T SERPL-MCNC: <0.01 NG/ML — SIGNIFICANT CHANGE UP

## 2021-04-01 RX ADMIN — Medication 1 SPRAY(S): at 00:09

## 2021-04-01 RX ADMIN — METHOCARBAMOL 500 MILLIGRAM(S): 500 TABLET, FILM COATED ORAL at 00:09

## 2021-04-01 RX ADMIN — MORPHINE SULFATE 4 MILLIGRAM(S): 50 CAPSULE, EXTENDED RELEASE ORAL at 16:05

## 2021-04-01 RX ADMIN — GABAPENTIN 300 MILLIGRAM(S): 400 CAPSULE ORAL at 13:00

## 2021-04-01 RX ADMIN — HEPARIN SODIUM 5000 UNIT(S): 5000 INJECTION INTRAVENOUS; SUBCUTANEOUS at 21:24

## 2021-04-01 RX ADMIN — OXYCODONE AND ACETAMINOPHEN 1 TABLET(S): 5; 325 TABLET ORAL at 09:38

## 2021-04-01 RX ADMIN — METHOCARBAMOL 500 MILLIGRAM(S): 500 TABLET, FILM COATED ORAL at 23:21

## 2021-04-01 RX ADMIN — Medication 5 UNIT(S): at 17:03

## 2021-04-01 RX ADMIN — GABAPENTIN 300 MILLIGRAM(S): 400 CAPSULE ORAL at 06:20

## 2021-04-01 RX ADMIN — OXYCODONE AND ACETAMINOPHEN 1 TABLET(S): 5; 325 TABLET ORAL at 10:51

## 2021-04-01 RX ADMIN — SENNA PLUS 2 TABLET(S): 8.6 TABLET ORAL at 21:23

## 2021-04-01 RX ADMIN — Medication 1 SPRAY(S): at 23:27

## 2021-04-01 RX ADMIN — Medication 2: at 08:00

## 2021-04-01 RX ADMIN — MORPHINE SULFATE 4 MILLIGRAM(S): 50 CAPSULE, EXTENDED RELEASE ORAL at 20:54

## 2021-04-01 RX ADMIN — OXYCODONE AND ACETAMINOPHEN 1 TABLET(S): 5; 325 TABLET ORAL at 04:15

## 2021-04-01 RX ADMIN — INSULIN GLARGINE 15 UNIT(S): 100 INJECTION, SOLUTION SUBCUTANEOUS at 21:23

## 2021-04-01 RX ADMIN — MORPHINE SULFATE 4 MILLIGRAM(S): 50 CAPSULE, EXTENDED RELEASE ORAL at 07:47

## 2021-04-01 RX ADMIN — Medication 1 SPRAY(S): at 11:44

## 2021-04-01 RX ADMIN — Medication 100 MILLIGRAM(S): at 06:19

## 2021-04-01 RX ADMIN — MORPHINE SULFATE 4 MILLIGRAM(S): 50 CAPSULE, EXTENDED RELEASE ORAL at 20:24

## 2021-04-01 RX ADMIN — Medication 2: at 11:42

## 2021-04-01 RX ADMIN — Medication 1 SPRAY(S): at 06:20

## 2021-04-01 RX ADMIN — Medication 5 UNIT(S): at 08:00

## 2021-04-01 RX ADMIN — ATORVASTATIN CALCIUM 40 MILLIGRAM(S): 80 TABLET, FILM COATED ORAL at 21:23

## 2021-04-01 RX ADMIN — Medication 5 MILLIGRAM(S): at 06:20

## 2021-04-01 RX ADMIN — OXYCODONE AND ACETAMINOPHEN 1 TABLET(S): 5; 325 TABLET ORAL at 16:07

## 2021-04-01 RX ADMIN — Medication 4: at 17:03

## 2021-04-01 RX ADMIN — OXYCODONE AND ACETAMINOPHEN 1 TABLET(S): 5; 325 TABLET ORAL at 04:45

## 2021-04-01 RX ADMIN — Medication 5 UNIT(S): at 11:42

## 2021-04-01 RX ADMIN — METHOCARBAMOL 500 MILLIGRAM(S): 500 TABLET, FILM COATED ORAL at 11:44

## 2021-04-01 RX ADMIN — PANTOPRAZOLE SODIUM 40 MILLIGRAM(S): 20 TABLET, DELAYED RELEASE ORAL at 17:05

## 2021-04-01 RX ADMIN — PANTOPRAZOLE SODIUM 40 MILLIGRAM(S): 20 TABLET, DELAYED RELEASE ORAL at 06:20

## 2021-04-01 RX ADMIN — METHOCARBAMOL 500 MILLIGRAM(S): 500 TABLET, FILM COATED ORAL at 06:20

## 2021-04-01 RX ADMIN — MORPHINE SULFATE 4 MILLIGRAM(S): 50 CAPSULE, EXTENDED RELEASE ORAL at 13:05

## 2021-04-01 RX ADMIN — HEPARIN SODIUM 5000 UNIT(S): 5000 INJECTION INTRAVENOUS; SUBCUTANEOUS at 06:21

## 2021-04-01 RX ADMIN — Medication 100 MILLIGRAM(S): at 17:04

## 2021-04-01 RX ADMIN — MORPHINE SULFATE 4 MILLIGRAM(S): 50 CAPSULE, EXTENDED RELEASE ORAL at 23:21

## 2021-04-01 RX ADMIN — GABAPENTIN 300 MILLIGRAM(S): 400 CAPSULE ORAL at 21:23

## 2021-04-01 RX ADMIN — HEPARIN SODIUM 5000 UNIT(S): 5000 INJECTION INTRAVENOUS; SUBCUTANEOUS at 13:00

## 2021-04-01 RX ADMIN — Medication 1 SPRAY(S): at 17:04

## 2021-04-01 NOTE — SWALLOW BEDSIDE ASSESSMENT ADULT - SWALLOW EVAL: DIAGNOSIS
+toleration of puree, soft solid, nectar-thick, and thin liquids with min overts (throat clear) and no other s/s of penetration/aspiration. Odynophagia with thin liquids. +overt s/s of penetration/aspiration w/ thin liquids +toleration of puree, soft solid and nectar-thick w/o overt s/s of penetration/aspiration. c/o Odynophagia

## 2021-04-01 NOTE — SWALLOW BEDSIDE ASSESSMENT ADULT - NS SPL SWALLOW CLINIC TRIAL FT
+Min overt post oral intake; throat clear and odynophagia + overt s/s of penetration/aspiration w/ thin liquids

## 2021-04-01 NOTE — SWALLOW BEDSIDE ASSESSMENT ADULT - COMMENTS
Pt received awake, alert on RA in NAD. +min dysphonia. Pt complains of difficulty swallowing when eating and drinking, and "feeling like I am choking and cough the food back up". +Min overts with oral intake; throat clear +Min overts with oral intake; throat clear w/ nectar, puree and soft solids

## 2021-04-01 NOTE — PROGRESS NOTE ADULT - SUBJECTIVE AND OBJECTIVE BOX
Subjective: I still have problems swallowing    T(C): 36.7 (04-01-21 @ 04:58), Max: 37.7 (03-31-21 @ 20:35)  HR: 89 (04-01-21 @ 04:58) (86 - 89)  BP: 175/80 (04-01-21 @ 04:58) (145/68 - 175/80)  RR: 18 (04-01-21 @ 04:58) (17 - 18)  SpO2: --  Wt(kg): --    Exam: awake, alert, NAD, slowly feeling better, seen by S+S, recommend dysphagia 2 diet, MORALES with good strength, improvement noted, wound clean dry, no swelling      04-01    142  |  104  |  9<L>  ----------------------------<  126<H>  3.8   |  26  |  0.6<L>    Ca    8.7      01 Apr 2021 06:08  Phos  3.3     04-01  Mg     1.7     04-01    TPro  6.3  /  Alb  3.9  /  TBili  0.3  /  DBili  x   /  AST  38  /  ALT  31  /  AlkPhos  95  04-01      Imaging:     Assessment/Plan: stable, slowly improving neck pain and dysphagia. Plan PT, Rehab, SNF, diet changed.

## 2021-04-01 NOTE — PROGRESS NOTE ADULT - TIME BILLING
Majority of encounter was utilized reviewing interim events and formulation of recommendations/plan.
Time spent discussing patient, reviewing pertinent results , and formulation of plan.
Time spent was discussing patient care, and examining patient.

## 2021-04-01 NOTE — SWALLOW BEDSIDE ASSESSMENT ADULT - PHARYNGEAL PHASE
Odynophagia/Throat clear post oral intake Throat clear post oral intake/Within functional limits Odynophagia, productive cough, expectoration of mucus laced w/ water trials/Wet vocal quality post oral intake/Throat clear post oral intake

## 2021-04-02 LAB
GLUCOSE BLDC GLUCOMTR-MCNC: 107 MG/DL — HIGH (ref 70–99)
GLUCOSE BLDC GLUCOMTR-MCNC: 170 MG/DL — HIGH (ref 70–99)
GLUCOSE BLDC GLUCOMTR-MCNC: 184 MG/DL — HIGH (ref 70–99)
GLUCOSE BLDC GLUCOMTR-MCNC: 257 MG/DL — HIGH (ref 70–99)

## 2021-04-02 RX ADMIN — HEPARIN SODIUM 5000 UNIT(S): 5000 INJECTION INTRAVENOUS; SUBCUTANEOUS at 14:03

## 2021-04-02 RX ADMIN — Medication 5 UNIT(S): at 11:34

## 2021-04-02 RX ADMIN — Medication 5 UNIT(S): at 08:21

## 2021-04-02 RX ADMIN — MORPHINE SULFATE 4 MILLIGRAM(S): 50 CAPSULE, EXTENDED RELEASE ORAL at 05:56

## 2021-04-02 RX ADMIN — HEPARIN SODIUM 5000 UNIT(S): 5000 INJECTION INTRAVENOUS; SUBCUTANEOUS at 21:39

## 2021-04-02 RX ADMIN — Medication 2: at 08:22

## 2021-04-02 RX ADMIN — MORPHINE SULFATE 4 MILLIGRAM(S): 50 CAPSULE, EXTENDED RELEASE ORAL at 07:21

## 2021-04-02 RX ADMIN — Medication 1 SPRAY(S): at 23:26

## 2021-04-02 RX ADMIN — PANTOPRAZOLE SODIUM 40 MILLIGRAM(S): 20 TABLET, DELAYED RELEASE ORAL at 17:50

## 2021-04-02 RX ADMIN — Medication 1 SPRAY(S): at 11:35

## 2021-04-02 RX ADMIN — METHOCARBAMOL 500 MILLIGRAM(S): 500 TABLET, FILM COATED ORAL at 17:50

## 2021-04-02 RX ADMIN — HEPARIN SODIUM 5000 UNIT(S): 5000 INJECTION INTRAVENOUS; SUBCUTANEOUS at 05:26

## 2021-04-02 RX ADMIN — GABAPENTIN 300 MILLIGRAM(S): 400 CAPSULE ORAL at 21:39

## 2021-04-02 RX ADMIN — MORPHINE SULFATE 4 MILLIGRAM(S): 50 CAPSULE, EXTENDED RELEASE ORAL at 18:06

## 2021-04-02 RX ADMIN — METHOCARBAMOL 500 MILLIGRAM(S): 500 TABLET, FILM COATED ORAL at 11:35

## 2021-04-02 RX ADMIN — SENNA PLUS 2 TABLET(S): 8.6 TABLET ORAL at 21:39

## 2021-04-02 RX ADMIN — METHOCARBAMOL 500 MILLIGRAM(S): 500 TABLET, FILM COATED ORAL at 23:26

## 2021-04-02 RX ADMIN — MORPHINE SULFATE 4 MILLIGRAM(S): 50 CAPSULE, EXTENDED RELEASE ORAL at 16:38

## 2021-04-02 RX ADMIN — Medication 1 SPRAY(S): at 17:50

## 2021-04-02 RX ADMIN — Medication 5 MILLIGRAM(S): at 05:26

## 2021-04-02 RX ADMIN — GABAPENTIN 300 MILLIGRAM(S): 400 CAPSULE ORAL at 05:26

## 2021-04-02 RX ADMIN — OXYCODONE AND ACETAMINOPHEN 2 TABLET(S): 5; 325 TABLET ORAL at 03:42

## 2021-04-02 RX ADMIN — ATORVASTATIN CALCIUM 40 MILLIGRAM(S): 80 TABLET, FILM COATED ORAL at 21:39

## 2021-04-02 RX ADMIN — PANTOPRAZOLE SODIUM 40 MILLIGRAM(S): 20 TABLET, DELAYED RELEASE ORAL at 05:27

## 2021-04-02 RX ADMIN — MORPHINE SULFATE 4 MILLIGRAM(S): 50 CAPSULE, EXTENDED RELEASE ORAL at 21:50

## 2021-04-02 RX ADMIN — Medication 5 UNIT(S): at 16:38

## 2021-04-02 RX ADMIN — INSULIN GLARGINE 15 UNIT(S): 100 INJECTION, SOLUTION SUBCUTANEOUS at 21:38

## 2021-04-02 RX ADMIN — GABAPENTIN 300 MILLIGRAM(S): 400 CAPSULE ORAL at 14:03

## 2021-04-02 RX ADMIN — OXYCODONE AND ACETAMINOPHEN 1 TABLET(S): 5; 325 TABLET ORAL at 09:22

## 2021-04-02 RX ADMIN — Medication 6: at 11:34

## 2021-04-02 RX ADMIN — METHOCARBAMOL 500 MILLIGRAM(S): 500 TABLET, FILM COATED ORAL at 05:26

## 2021-04-02 RX ADMIN — OXYCODONE AND ACETAMINOPHEN 2 TABLET(S): 5; 325 TABLET ORAL at 18:02

## 2021-04-02 RX ADMIN — Medication 100 MILLIGRAM(S): at 05:26

## 2021-04-02 RX ADMIN — OXYCODONE AND ACETAMINOPHEN 1 TABLET(S): 5; 325 TABLET ORAL at 08:52

## 2021-04-02 NOTE — SWALLOW BEDSIDE ASSESSMENT ADULT - SLP GENERAL OBSERVATIONS
Pt received in bed awake and alert on room air, c/o neck/shoulder pain, +reported toleration of breakfast today

## 2021-04-02 NOTE — PROGRESS NOTE ADULT - SUBJECTIVE AND OBJECTIVE BOX
Subjective: 56yFemale with a pmhx of M47.812/ 04458/ 91829/ 22853X2/ 32257/ 78355    ^M47.812/ 31308/ 31943/ 22853X2/ 78267/ 80868    Family history of malignant neoplasm of esophagus    FH: stroke    FH: aneurysm    Handoff    MEWS Score    HTN (hypertension)    Diabetes    Asthma    TRAVIS on CPAP    Arthritis    High cholesterol    GERD (gastroesophageal reflux disease)    History of peripheral arterial disease    Herniation of cervical intervertebral disc with radiculopathy    Cervical spondylosis with radiculopathy    Herniation of cervical intervertebral disc    Cervical spondylosis with radiculopathy    Anterior cervical discectomy with fusion    S/P trigger finger release    H/O carpal tunnel repair    H/O arthroscopy of shoulder    [id number]      Patient is a 56 year-old female POD #4 s/p C5-C7 ACDF. Patient was seen and examined at bedside on 4C. No acute over night events. She is sitting upright and following all commands. Patient admits to some continuous difficulty swallowing but admits to improvement in pain and denies numbness, tingling, weakness, headache at this time.      Allergies    ibuprofen (Urticaria)    Intolerances        Vital Signs Last 24 Hrs  T(C): 37.1 (02 Apr 2021 08:58), Max: 37.3 (01 Apr 2021 20:15)  T(F): 98.8 (02 Apr 2021 08:58), Max: 99.2 (01 Apr 2021 20:15)  HR: 85 (02 Apr 2021 08:58) (82 - 86)  BP: 129/58 (02 Apr 2021 08:58) (129/58 - 159/72)  BP(mean): --  RR: 18 (02 Apr 2021 08:58) (18 - 19)  SpO2: 99% (01 Apr 2021 20:15) (99% - 99%)      acetaminophen   Tablet .. 650 milliGRAM(s) Oral every 6 hours PRN  ALBUTerol    90 MICROgram(s) HFA Inhaler 1 Puff(s) Inhalation four times a day PRN  atorvastatin 40 milliGRAM(s) Oral at bedtime  benzocaine 20% Spray 1 Spray(s) Topical every 6 hours  dextrose 40% Gel 15 Gram(s) Oral once  dextrose 5%. 1000 milliLiter(s) IV Continuous <Continuous>  dextrose 5%. 1000 milliLiter(s) IV Continuous <Continuous>  dextrose 50% Injectable 25 Gram(s) IV Push once  dextrose 50% Injectable 12.5 Gram(s) IV Push once  dextrose 50% Injectable 25 Gram(s) IV Push once  enalapril 5 milliGRAM(s) Oral daily  gabapentin 300 milliGRAM(s) Oral three times a day  glucagon  Injectable 1 milliGRAM(s) IntraMuscular once  heparin   Injectable 5000 Unit(s) SubCutaneous every 8 hours  insulin glargine Injectable (LANTUS) 15 Unit(s) SubCutaneous at bedtime  insulin lispro (ADMELOG) corrective regimen sliding scale   SubCutaneous three times a day before meals  insulin lispro Injectable (ADMELOG) 5 Unit(s) SubCutaneous before breakfast  insulin lispro Injectable (ADMELOG) 5 Unit(s) SubCutaneous before lunch  insulin lispro Injectable (ADMELOG) 5 Unit(s) SubCutaneous before dinner  methocarbamol 500 milliGRAM(s) Oral four times a day  metoprolol tartrate 100 milliGRAM(s) Oral two times a day  morphine  - Injectable 4 milliGRAM(s) IV Push every 3 hours PRN  ondansetron Injectable 4 milliGRAM(s) IV Push every 6 hours PRN  oxycodone    5 mG/acetaminophen 325 mG 1 Tablet(s) Oral every 4 hours PRN  oxycodone    5 mG/acetaminophen 325 mG 2 Tablet(s) Oral every 6 hours PRN  pantoprazole   Suspension 40 milliGRAM(s) Oral two times a day  senna 2 Tablet(s) Oral at bedtime  sodium chloride 0.65% Nasal 1 Spray(s) Both Nostrils two times a day PRN  sodium chloride 0.9%. 1000 milliLiter(s) IV Continuous <Continuous>        04-01-21 @ 07:01  -  04-02-21 @ 07:00  --------------------------------------------------------  IN: 0 mL / OUT: 320 mL / NET: -320 mL        REVIEW OF SYSTEMS    [X] A ten-point review of systems was otherwise negative except as noted.  [ ] Due to altered mental status/intubation, subjective information were not able to be obtained from the patient. History was obtained, to the extent possible, from review of the chart and collateral sources of information.      Physical Exam:  General: Sitting upright, following all commands, NAD  AAOX3. Verbal function intact  Facial motions symmetric  EOMI  Motor: MAEx4, good bulk and tone  5/5 power in b/l UE's and LE's  Yeung's: Negative  Sensation: intact to touch in all extremities  Wound: Incision clean, dry, and intact without drainage          04-01    142  |  104  |  9<L>  ----------------------------<  126<H>  3.8   |  26  |  0.6<L>    Ca    8.7      01 Apr 2021 06:08  Phos  3.3     04-01  Mg     1.7     04-01    TPro  6.3  /  Alb  3.9  /  TBili  0.3  /  DBili  x   /  AST  38  /  ALT  31  /  AlkPhos  95  04-01            Assessment/Plan:   56 year-old female POD #4 s/p C5-C7 ACDF.   -Pain Management  -Soft collar  -PT/Rehab  -DVT ppx, SQH  -Speech and swallow eval f/u  -Patient going to SNF upon discharge, F/U with SW  -discuss with attending

## 2021-04-02 NOTE — SWALLOW BEDSIDE ASSESSMENT ADULT - SWALLOW EVAL: DIAGNOSIS
+improved toleration of thin liquids, however pt prefers to continue nectar thick liquids at this time, +toleration of nectar and mech soft solids w/o overt s/s of penetration/aspiration

## 2021-04-03 LAB
GLUCOSE BLDC GLUCOMTR-MCNC: 132 MG/DL — HIGH (ref 70–99)
GLUCOSE BLDC GLUCOMTR-MCNC: 182 MG/DL — HIGH (ref 70–99)
GLUCOSE BLDC GLUCOMTR-MCNC: 238 MG/DL — HIGH (ref 70–99)
GLUCOSE BLDC GLUCOMTR-MCNC: 300 MG/DL — HIGH (ref 70–99)

## 2021-04-03 RX ADMIN — MORPHINE SULFATE 4 MILLIGRAM(S): 50 CAPSULE, EXTENDED RELEASE ORAL at 05:30

## 2021-04-03 RX ADMIN — Medication 5 MILLIGRAM(S): at 05:08

## 2021-04-03 RX ADMIN — OXYCODONE AND ACETAMINOPHEN 2 TABLET(S): 5; 325 TABLET ORAL at 01:00

## 2021-04-03 RX ADMIN — OXYCODONE AND ACETAMINOPHEN 1 TABLET(S): 5; 325 TABLET ORAL at 12:36

## 2021-04-03 RX ADMIN — Medication 5 UNIT(S): at 12:27

## 2021-04-03 RX ADMIN — GABAPENTIN 300 MILLIGRAM(S): 400 CAPSULE ORAL at 21:14

## 2021-04-03 RX ADMIN — Medication 2: at 08:01

## 2021-04-03 RX ADMIN — HEPARIN SODIUM 5000 UNIT(S): 5000 INJECTION INTRAVENOUS; SUBCUTANEOUS at 15:46

## 2021-04-03 RX ADMIN — GABAPENTIN 300 MILLIGRAM(S): 400 CAPSULE ORAL at 05:08

## 2021-04-03 RX ADMIN — OXYCODONE AND ACETAMINOPHEN 2 TABLET(S): 5; 325 TABLET ORAL at 08:02

## 2021-04-03 RX ADMIN — METHOCARBAMOL 500 MILLIGRAM(S): 500 TABLET, FILM COATED ORAL at 23:14

## 2021-04-03 RX ADMIN — OXYCODONE AND ACETAMINOPHEN 2 TABLET(S): 5; 325 TABLET ORAL at 15:42

## 2021-04-03 RX ADMIN — METHOCARBAMOL 500 MILLIGRAM(S): 500 TABLET, FILM COATED ORAL at 05:08

## 2021-04-03 RX ADMIN — OXYCODONE AND ACETAMINOPHEN 1 TABLET(S): 5; 325 TABLET ORAL at 18:21

## 2021-04-03 RX ADMIN — ATORVASTATIN CALCIUM 40 MILLIGRAM(S): 80 TABLET, FILM COATED ORAL at 21:14

## 2021-04-03 RX ADMIN — Medication 1 SPRAY(S): at 05:07

## 2021-04-03 RX ADMIN — Medication 100 MILLIGRAM(S): at 05:08

## 2021-04-03 RX ADMIN — Medication 100 MILLIGRAM(S): at 17:16

## 2021-04-03 RX ADMIN — METHOCARBAMOL 500 MILLIGRAM(S): 500 TABLET, FILM COATED ORAL at 17:16

## 2021-04-03 RX ADMIN — PANTOPRAZOLE SODIUM 40 MILLIGRAM(S): 20 TABLET, DELAYED RELEASE ORAL at 05:08

## 2021-04-03 RX ADMIN — METHOCARBAMOL 500 MILLIGRAM(S): 500 TABLET, FILM COATED ORAL at 12:29

## 2021-04-03 RX ADMIN — Medication 5 UNIT(S): at 08:01

## 2021-04-03 RX ADMIN — HEPARIN SODIUM 5000 UNIT(S): 5000 INJECTION INTRAVENOUS; SUBCUTANEOUS at 21:14

## 2021-04-03 RX ADMIN — Medication 6: at 12:27

## 2021-04-03 RX ADMIN — MORPHINE SULFATE 4 MILLIGRAM(S): 50 CAPSULE, EXTENDED RELEASE ORAL at 22:00

## 2021-04-03 RX ADMIN — PANTOPRAZOLE SODIUM 40 MILLIGRAM(S): 20 TABLET, DELAYED RELEASE ORAL at 17:16

## 2021-04-03 RX ADMIN — OXYCODONE AND ACETAMINOPHEN 2 TABLET(S): 5; 325 TABLET ORAL at 23:14

## 2021-04-03 RX ADMIN — MORPHINE SULFATE 4 MILLIGRAM(S): 50 CAPSULE, EXTENDED RELEASE ORAL at 21:16

## 2021-04-03 RX ADMIN — Medication 5 UNIT(S): at 17:14

## 2021-04-03 RX ADMIN — HEPARIN SODIUM 5000 UNIT(S): 5000 INJECTION INTRAVENOUS; SUBCUTANEOUS at 05:07

## 2021-04-03 RX ADMIN — GABAPENTIN 300 MILLIGRAM(S): 400 CAPSULE ORAL at 15:45

## 2021-04-03 RX ADMIN — SENNA PLUS 2 TABLET(S): 8.6 TABLET ORAL at 21:14

## 2021-04-03 RX ADMIN — MORPHINE SULFATE 4 MILLIGRAM(S): 50 CAPSULE, EXTENDED RELEASE ORAL at 05:06

## 2021-04-03 RX ADMIN — INSULIN GLARGINE 15 UNIT(S): 100 INJECTION, SOLUTION SUBCUTANEOUS at 21:14

## 2021-04-03 RX ADMIN — OXYCODONE AND ACETAMINOPHEN 2 TABLET(S): 5; 325 TABLET ORAL at 00:27

## 2021-04-03 NOTE — PROGRESS NOTE ADULT - SUBJECTIVE AND OBJECTIVE BOX
HPI:      PAST MEDICAL & SURGICAL HISTORY:  HTN (hypertension)    Diabetes    Asthma  LAST EPISODE SEVERAL YRS    TRAVIS on CPAP    Arthritis  OA    High cholesterol    GERD (gastroesophageal reflux disease)    History of peripheral arterial disease    S/P trigger finger release    H/O carpal tunnel repair    H/O arthroscopy of shoulder        FAMILY HISTORY:  Family history of malignant neoplasm of esophagus    FH: stroke    FH: aneurysm        Allergies    ibuprofen (Urticaria)    Intolerances        REVIEW OF SYSTEMS : All systems negative other than those listed in HPI  General:	-  Skin/Breast: -  Ophthalmologic: -   ENMT: -  Respiratory and Thorax: -  Cardiovascular: -	  Gastrointestinal: -  Genitourinary:-  Musculoskeletal:	-  Neurological:	-  Psychiatric:	-  Hematology/Lymphatics:	-  Endocrine:-  Allergic/Immunologic:	-    MEDICATIONS  (STANDING):  atorvastatin 40 milliGRAM(s) Oral at bedtime  benzocaine 20% Spray 1 Spray(s) Topical every 6 hours  dextrose 40% Gel 15 Gram(s) Oral once  dextrose 5%. 1000 milliLiter(s) (50 mL/Hr) IV Continuous <Continuous>  dextrose 5%. 1000 milliLiter(s) (100 mL/Hr) IV Continuous <Continuous>  dextrose 50% Injectable 25 Gram(s) IV Push once  dextrose 50% Injectable 12.5 Gram(s) IV Push once  dextrose 50% Injectable 25 Gram(s) IV Push once  enalapril 5 milliGRAM(s) Oral daily  gabapentin 300 milliGRAM(s) Oral three times a day  glucagon  Injectable 1 milliGRAM(s) IntraMuscular once  heparin   Injectable 5000 Unit(s) SubCutaneous every 8 hours  insulin glargine Injectable (LANTUS) 15 Unit(s) SubCutaneous at bedtime  insulin lispro (ADMELOG) corrective regimen sliding scale   SubCutaneous three times a day before meals  insulin lispro Injectable (ADMELOG) 5 Unit(s) SubCutaneous before dinner  insulin lispro Injectable (ADMELOG) 5 Unit(s) SubCutaneous before breakfast  insulin lispro Injectable (ADMELOG) 5 Unit(s) SubCutaneous before lunch  methocarbamol 500 milliGRAM(s) Oral four times a day  metoprolol tartrate 100 milliGRAM(s) Oral two times a day  pantoprazole   Suspension 40 milliGRAM(s) Oral two times a day  senna 2 Tablet(s) Oral at bedtime  sodium chloride 0.9%. 1000 milliLiter(s) (75 mL/Hr) IV Continuous <Continuous>    MEDICATIONS  (PRN):  acetaminophen   Tablet .. 650 milliGRAM(s) Oral every 6 hours PRN Temp greater or equal to 38C (100.4F), Mild Pain (1 - 3)  ALBUTerol    90 MICROgram(s) HFA Inhaler 1 Puff(s) Inhalation four times a day PRN Bronchospasm  morphine  - Injectable 4 milliGRAM(s) IV Push every 3 hours PRN severe breakthrough pain  ondansetron Injectable 4 milliGRAM(s) IV Push every 6 hours PRN Nausea  oxycodone    5 mG/acetaminophen 325 mG 2 Tablet(s) Oral every 6 hours PRN Severe Pain (7 - 10)  oxycodone    5 mG/acetaminophen 325 mG 1 Tablet(s) Oral every 4 hours PRN Moderate Pain (4 - 6)  sodium chloride 0.65% Nasal 1 Spray(s) Both Nostrils two times a day PRN Nasal Congestion      Antibiotics:      Anticoagulation:  heparin   Injectable 5000 Unit(s) SubCutaneous every 8 hours      OTHER:  ALBUTerol    90 MICROgram(s) HFA Inhaler 1 Puff(s) Inhalation four times a day PRN  atorvastatin 40 milliGRAM(s) Oral at bedtime  benzocaine 20% Spray 1 Spray(s) Topical every 6 hours  dextrose 40% Gel 15 Gram(s) Oral once  dextrose 50% Injectable 25 Gram(s) IV Push once  dextrose 50% Injectable 12.5 Gram(s) IV Push once  dextrose 50% Injectable 25 Gram(s) IV Push once  enalapril 5 milliGRAM(s) Oral daily  glucagon  Injectable 1 milliGRAM(s) IntraMuscular once  insulin glargine Injectable (LANTUS) 15 Unit(s) SubCutaneous at bedtime  insulin lispro (ADMELOG) corrective regimen sliding scale   SubCutaneous three times a day before meals  insulin lispro Injectable (ADMELOG) 5 Unit(s) SubCutaneous before dinner  insulin lispro Injectable (ADMELOG) 5 Unit(s) SubCutaneous before breakfast  insulin lispro Injectable (ADMELOG) 5 Unit(s) SubCutaneous before lunch  metoprolol tartrate 100 milliGRAM(s) Oral two times a day  pantoprazole   Suspension 40 milliGRAM(s) Oral two times a day  senna 2 Tablet(s) Oral at bedtime  sodium chloride 0.65% Nasal 1 Spray(s) Both Nostrils two times a day PRN      IVF:      Vital Signs Last 24 Hrs  T(C): 36.3 (03 Apr 2021 04:59), Max: 37.4 (02 Apr 2021 17:31)  T(F): 97.3 (03 Apr 2021 04:59), Max: 99.3 (02 Apr 2021 17:31)  HR: 85 (03 Apr 2021 06:00) (82 - 99)  BP: 168/72 (03 Apr 2021 06:00) (135/59 - 168/72)  BP(mean): --  RR: 18 (02 Apr 2021 21:29) (18 - 18)  SpO2: --    Physical Exam :  General :   A&O x 3   Tongue midline  Speech clear and appropriate, no slur   Pt speaking in full sentences   Follows all commands   Occular :   PERRLA, EOMI  Motor :   MAEx4 b/l  strength 5/5 to b/l UE and LE  5/5 shoulder abduction / adduction bilaterally   5/5 b/l hand    Sensory :  Intact bilaterally to UE and LE     Assessment / Plan: Pt in good spirits wearing soft collar for comfort, pt states swallowing has improved and has had +BM.   - Pt seen by S&S diet advanced   - F/u  SNF authorization  - Will need rpt Covid swab prior to d/c   - Continue PT   - Will discuss with attending

## 2021-04-04 LAB
GLUCOSE BLDC GLUCOMTR-MCNC: 114 MG/DL — HIGH (ref 70–99)
GLUCOSE BLDC GLUCOMTR-MCNC: 214 MG/DL — HIGH (ref 70–99)
GLUCOSE BLDC GLUCOMTR-MCNC: 255 MG/DL — HIGH (ref 70–99)
GLUCOSE BLDC GLUCOMTR-MCNC: 273 MG/DL — HIGH (ref 70–99)
HCT VFR BLD CALC: 34.7 % — LOW (ref 37–47)
HGB BLD-MCNC: 11.6 G/DL — LOW (ref 12–16)
MCHC RBC-ENTMCNC: 30.2 PG — SIGNIFICANT CHANGE UP (ref 27–31)
MCHC RBC-ENTMCNC: 33.4 G/DL — SIGNIFICANT CHANGE UP (ref 32–37)
MCV RBC AUTO: 90.4 FL — SIGNIFICANT CHANGE UP (ref 81–99)
NRBC # BLD: 0 /100 WBCS — SIGNIFICANT CHANGE UP (ref 0–0)
PLATELET # BLD AUTO: 356 K/UL — SIGNIFICANT CHANGE UP (ref 130–400)
RBC # BLD: 3.84 M/UL — LOW (ref 4.2–5.4)
RBC # FLD: 12 % — SIGNIFICANT CHANGE UP (ref 11.5–14.5)
SARS-COV-2 RNA SPEC QL NAA+PROBE: SIGNIFICANT CHANGE UP
WBC # BLD: 6.78 K/UL — SIGNIFICANT CHANGE UP (ref 4.8–10.8)
WBC # FLD AUTO: 6.78 K/UL — SIGNIFICANT CHANGE UP (ref 4.8–10.8)

## 2021-04-04 RX ADMIN — Medication 6: at 11:49

## 2021-04-04 RX ADMIN — HEPARIN SODIUM 5000 UNIT(S): 5000 INJECTION INTRAVENOUS; SUBCUTANEOUS at 15:19

## 2021-04-04 RX ADMIN — OXYCODONE AND ACETAMINOPHEN 1 TABLET(S): 5; 325 TABLET ORAL at 09:00

## 2021-04-04 RX ADMIN — OXYCODONE AND ACETAMINOPHEN 2 TABLET(S): 5; 325 TABLET ORAL at 22:00

## 2021-04-04 RX ADMIN — MORPHINE SULFATE 4 MILLIGRAM(S): 50 CAPSULE, EXTENDED RELEASE ORAL at 15:21

## 2021-04-04 RX ADMIN — OXYCODONE AND ACETAMINOPHEN 2 TABLET(S): 5; 325 TABLET ORAL at 21:22

## 2021-04-04 RX ADMIN — Medication 100 MILLIGRAM(S): at 05:20

## 2021-04-04 RX ADMIN — INSULIN GLARGINE 15 UNIT(S): 100 INJECTION, SOLUTION SUBCUTANEOUS at 21:21

## 2021-04-04 RX ADMIN — GABAPENTIN 300 MILLIGRAM(S): 400 CAPSULE ORAL at 05:20

## 2021-04-04 RX ADMIN — OXYCODONE AND ACETAMINOPHEN 2 TABLET(S): 5; 325 TABLET ORAL at 11:42

## 2021-04-04 RX ADMIN — HEPARIN SODIUM 5000 UNIT(S): 5000 INJECTION INTRAVENOUS; SUBCUTANEOUS at 21:21

## 2021-04-04 RX ADMIN — Medication 5 MILLIGRAM(S): at 05:20

## 2021-04-04 RX ADMIN — METHOCARBAMOL 500 MILLIGRAM(S): 500 TABLET, FILM COATED ORAL at 17:03

## 2021-04-04 RX ADMIN — OXYCODONE AND ACETAMINOPHEN 2 TABLET(S): 5; 325 TABLET ORAL at 06:00

## 2021-04-04 RX ADMIN — OXYCODONE AND ACETAMINOPHEN 1 TABLET(S): 5; 325 TABLET ORAL at 08:26

## 2021-04-04 RX ADMIN — Medication 4: at 08:29

## 2021-04-04 RX ADMIN — PANTOPRAZOLE SODIUM 40 MILLIGRAM(S): 20 TABLET, DELAYED RELEASE ORAL at 05:20

## 2021-04-04 RX ADMIN — Medication 100 MILLIGRAM(S): at 17:03

## 2021-04-04 RX ADMIN — METHOCARBAMOL 500 MILLIGRAM(S): 500 TABLET, FILM COATED ORAL at 11:49

## 2021-04-04 RX ADMIN — OXYCODONE AND ACETAMINOPHEN 2 TABLET(S): 5; 325 TABLET ORAL at 05:22

## 2021-04-04 RX ADMIN — OXYCODONE AND ACETAMINOPHEN 1 TABLET(S): 5; 325 TABLET ORAL at 03:30

## 2021-04-04 RX ADMIN — Medication 5 UNIT(S): at 08:28

## 2021-04-04 RX ADMIN — Medication 5 UNIT(S): at 11:48

## 2021-04-04 RX ADMIN — OXYCODONE AND ACETAMINOPHEN 2 TABLET(S): 5; 325 TABLET ORAL at 11:59

## 2021-04-04 RX ADMIN — ATORVASTATIN CALCIUM 40 MILLIGRAM(S): 80 TABLET, FILM COATED ORAL at 21:21

## 2021-04-04 RX ADMIN — OXYCODONE AND ACETAMINOPHEN 2 TABLET(S): 5; 325 TABLET ORAL at 00:00

## 2021-04-04 RX ADMIN — OXYCODONE AND ACETAMINOPHEN 1 TABLET(S): 5; 325 TABLET ORAL at 04:00

## 2021-04-04 RX ADMIN — PANTOPRAZOLE SODIUM 40 MILLIGRAM(S): 20 TABLET, DELAYED RELEASE ORAL at 17:03

## 2021-04-04 RX ADMIN — Medication 1 SPRAY(S): at 17:03

## 2021-04-04 RX ADMIN — GABAPENTIN 300 MILLIGRAM(S): 400 CAPSULE ORAL at 15:20

## 2021-04-04 RX ADMIN — OXYCODONE AND ACETAMINOPHEN 1 TABLET(S): 5; 325 TABLET ORAL at 17:05

## 2021-04-04 RX ADMIN — Medication 5 UNIT(S): at 17:02

## 2021-04-04 RX ADMIN — METHOCARBAMOL 500 MILLIGRAM(S): 500 TABLET, FILM COATED ORAL at 23:07

## 2021-04-04 RX ADMIN — HEPARIN SODIUM 5000 UNIT(S): 5000 INJECTION INTRAVENOUS; SUBCUTANEOUS at 05:20

## 2021-04-04 RX ADMIN — GABAPENTIN 300 MILLIGRAM(S): 400 CAPSULE ORAL at 21:21

## 2021-04-04 RX ADMIN — METHOCARBAMOL 500 MILLIGRAM(S): 500 TABLET, FILM COATED ORAL at 05:20

## 2021-04-04 NOTE — PROGRESS NOTE ADULT - SUBJECTIVE AND OBJECTIVE BOX
S/p:  C5/6 C6/7 ACDF   POD #6  -pt seen and examined at bedside     PAST MEDICAL & SURGICAL HISTORY:  HTN (hypertension)    Diabetes    Asthma  LAST EPISODE SEVERAL YRS    TRAVIS on CPAP    Arthritis  OA    High cholesterol    GERD (gastroesophageal reflux disease)    History of peripheral arterial disease    S/P trigger finger release    H/O carpal tunnel repair    H/O arthroscopy of shoulder    FAMILY HISTORY:  Family history of malignant neoplasm of esophagus    FH: stroke    FH: aneurysm    Allergies :   ibuprofen (Urticaria)    REVIEW OF SYSTEMS : All systems negative other than those listed in HPI  General:	-  Skin/Breast: -  Ophthalmologic: -   ENMT: -  Respiratory and Thorax: -  Cardiovascular: -	  Gastrointestinal: -  Genitourinary:-  Musculoskeletal:	-  Neurological:	-  Psychiatric:	-  Hematology/Lymphatics:	-  Endocrine:-  Allergic/Immunologic:	-    MEDICATIONS  (STANDING):  atorvastatin 40 milliGRAM(s) Oral at bedtime  benzocaine 20% Spray 1 Spray(s) Topical every 6 hours  dextrose 40% Gel 15 Gram(s) Oral once  dextrose 5%. 1000 milliLiter(s) (50 mL/Hr) IV Continuous <Continuous>  dextrose 5%. 1000 milliLiter(s) (100 mL/Hr) IV Continuous <Continuous>  dextrose 50% Injectable 25 Gram(s) IV Push once  dextrose 50% Injectable 12.5 Gram(s) IV Push once  dextrose 50% Injectable 25 Gram(s) IV Push once  enalapril 5 milliGRAM(s) Oral daily  gabapentin 300 milliGRAM(s) Oral three times a day  glucagon  Injectable 1 milliGRAM(s) IntraMuscular once  heparin   Injectable 5000 Unit(s) SubCutaneous every 8 hours  insulin glargine Injectable (LANTUS) 15 Unit(s) SubCutaneous at bedtime  insulin lispro (ADMELOG) corrective regimen sliding scale   SubCutaneous three times a day before meals  insulin lispro Injectable (ADMELOG) 5 Unit(s) SubCutaneous before breakfast  insulin lispro Injectable (ADMELOG) 5 Unit(s) SubCutaneous before lunch  insulin lispro Injectable (ADMELOG) 5 Unit(s) SubCutaneous before dinner  methocarbamol 500 milliGRAM(s) Oral four times a day  metoprolol tartrate 100 milliGRAM(s) Oral two times a day  pantoprazole   Suspension 40 milliGRAM(s) Oral two times a day  senna 2 Tablet(s) Oral at bedtime  sodium chloride 0.9%. 1000 milliLiter(s) (75 mL/Hr) IV Continuous <Continuous>    MEDICATIONS  (PRN):  acetaminophen   Tablet .. 650 milliGRAM(s) Oral every 6 hours PRN Temp greater or equal to 38C (100.4F), Mild Pain (1 - 3)  ALBUTerol    90 MICROgram(s) HFA Inhaler 1 Puff(s) Inhalation four times a day PRN Bronchospasm  morphine  - Injectable 4 milliGRAM(s) IV Push every 3 hours PRN severe breakthrough pain  ondansetron Injectable 4 milliGRAM(s) IV Push every 6 hours PRN Nausea  oxycodone    5 mG/acetaminophen 325 mG 1 Tablet(s) Oral every 4 hours PRN Moderate Pain (4 - 6)  oxycodone    5 mG/acetaminophen 325 mG 2 Tablet(s) Oral every 6 hours PRN Severe Pain (7 - 10)  sodium chloride 0.65% Nasal 1 Spray(s) Both Nostrils two times a day PRN Nasal Congestion    Anticoagulation:  heparin   Injectable 5000 Unit(s) SubCutaneous every 8 hours    OTHER:  ALBUTerol    90 MICROgram(s) HFA Inhaler 1 Puff(s) Inhalation four times a day PRN  atorvastatin 40 milliGRAM(s) Oral at bedtime  benzocaine 20% Spray 1 Spray(s) Topical every 6 hours  dextrose 40% Gel 15 Gram(s) Oral once  dextrose 50% Injectable 25 Gram(s) IV Push once  dextrose 50% Injectable 12.5 Gram(s) IV Push once  dextrose 50% Injectable 25 Gram(s) IV Push once  enalapril 5 milliGRAM(s) Oral daily  glucagon  Injectable 1 milliGRAM(s) IntraMuscular once  insulin glargine Injectable (LANTUS) 15 Unit(s) SubCutaneous at bedtime  insulin lispro (ADMELOG) corrective regimen sliding scale   SubCutaneous three times a day before meals  insulin lispro Injectable (ADMELOG) 5 Unit(s) SubCutaneous before breakfast  insulin lispro Injectable (ADMELOG) 5 Unit(s) SubCutaneous before lunch  insulin lispro Injectable (ADMELOG) 5 Unit(s) SubCutaneous before dinner  metoprolol tartrate 100 milliGRAM(s) Oral two times a day  pantoprazole   Suspension 40 milliGRAM(s) Oral two times a day  senna 2 Tablet(s) Oral at bedtime  sodium chloride 0.65% Nasal 1 Spray(s) Both Nostrils two times a day PRN    Vital Signs Last 24 Hrs  T(C): 36.8 (04 Apr 2021 05:20), Max: 37.2 (03 Apr 2021 21:00)  T(F): 98.2 (04 Apr 2021 05:20), Max: 98.9 (03 Apr 2021 21:00)  HR: 66 (04 Apr 2021 06:30) (66 - 86)  BP: 148/65 (04 Apr 2021 06:30) (148/65 - 166/79)  BP(mean): --  RR: 18 (03 Apr 2021 21:00) (18 - 20)  SpO2: --    Physical Exam :  General :   A&O x 3   Tongue midline  Facial features symmetric, No droop  Speech clear and appropriate, no slur   Follows all commands   Occular :   PERRLA, EOMI  Motor :   MAEx4 b/l  strength 5/5  Shoulder shrug intact   Full ROM of neck   No spinal point tenderness   Withdraws / Extends to painful stimuli  Sensory :  Intact bilaterally     Assessment / Plan:   D/C planning, has authorization and bed at [facility]   Will send covid swab today   D/C when covid swab resulted   Discussed with attending    S/p:  C5/6 C6/7 ACDF   POD #6  -pt seen and examined at bedside     PAST MEDICAL & SURGICAL HISTORY:  HTN (hypertension)    Diabetes    Asthma  LAST EPISODE SEVERAL YRS    TRAVIS on CPAP    Arthritis  OA    High cholesterol    GERD (gastroesophageal reflux disease)    History of peripheral arterial disease    S/P trigger finger release    H/O carpal tunnel repair    H/O arthroscopy of shoulder    FAMILY HISTORY:  Family history of malignant neoplasm of esophagus    FH: stroke    FH: aneurysm    Allergies :   ibuprofen (Urticaria)    REVIEW OF SYSTEMS : All systems negative other than those listed in HPI  General:	-  Skin/Breast: -  Ophthalmologic: -   ENMT: -  Respiratory and Thorax: -  Cardiovascular: -	  Gastrointestinal: -  Genitourinary:-  Musculoskeletal:	-  Neurological:	-  Psychiatric:	-  Hematology/Lymphatics:	-  Endocrine:-  Allergic/Immunologic:	-    MEDICATIONS  (STANDING):  atorvastatin 40 milliGRAM(s) Oral at bedtime  benzocaine 20% Spray 1 Spray(s) Topical every 6 hours  dextrose 40% Gel 15 Gram(s) Oral once  dextrose 5%. 1000 milliLiter(s) (50 mL/Hr) IV Continuous <Continuous>  dextrose 5%. 1000 milliLiter(s) (100 mL/Hr) IV Continuous <Continuous>  dextrose 50% Injectable 25 Gram(s) IV Push once  dextrose 50% Injectable 12.5 Gram(s) IV Push once  dextrose 50% Injectable 25 Gram(s) IV Push once  enalapril 5 milliGRAM(s) Oral daily  gabapentin 300 milliGRAM(s) Oral three times a day  glucagon  Injectable 1 milliGRAM(s) IntraMuscular once  heparin   Injectable 5000 Unit(s) SubCutaneous every 8 hours  insulin glargine Injectable (LANTUS) 15 Unit(s) SubCutaneous at bedtime  insulin lispro (ADMELOG) corrective regimen sliding scale   SubCutaneous three times a day before meals  insulin lispro Injectable (ADMELOG) 5 Unit(s) SubCutaneous before breakfast  insulin lispro Injectable (ADMELOG) 5 Unit(s) SubCutaneous before lunch  insulin lispro Injectable (ADMELOG) 5 Unit(s) SubCutaneous before dinner  methocarbamol 500 milliGRAM(s) Oral four times a day  metoprolol tartrate 100 milliGRAM(s) Oral two times a day  pantoprazole   Suspension 40 milliGRAM(s) Oral two times a day  senna 2 Tablet(s) Oral at bedtime  sodium chloride 0.9%. 1000 milliLiter(s) (75 mL/Hr) IV Continuous <Continuous>    MEDICATIONS  (PRN):  acetaminophen   Tablet .. 650 milliGRAM(s) Oral every 6 hours PRN Temp greater or equal to 38C (100.4F), Mild Pain (1 - 3)  ALBUTerol    90 MICROgram(s) HFA Inhaler 1 Puff(s) Inhalation four times a day PRN Bronchospasm  morphine  - Injectable 4 milliGRAM(s) IV Push every 3 hours PRN severe breakthrough pain  ondansetron Injectable 4 milliGRAM(s) IV Push every 6 hours PRN Nausea  oxycodone    5 mG/acetaminophen 325 mG 1 Tablet(s) Oral every 4 hours PRN Moderate Pain (4 - 6)  oxycodone    5 mG/acetaminophen 325 mG 2 Tablet(s) Oral every 6 hours PRN Severe Pain (7 - 10)  sodium chloride 0.65% Nasal 1 Spray(s) Both Nostrils two times a day PRN Nasal Congestion    Anticoagulation:  heparin   Injectable 5000 Unit(s) SubCutaneous every 8 hours    OTHER:  ALBUTerol    90 MICROgram(s) HFA Inhaler 1 Puff(s) Inhalation four times a day PRN  atorvastatin 40 milliGRAM(s) Oral at bedtime  benzocaine 20% Spray 1 Spray(s) Topical every 6 hours  dextrose 40% Gel 15 Gram(s) Oral once  dextrose 50% Injectable 25 Gram(s) IV Push once  dextrose 50% Injectable 12.5 Gram(s) IV Push once  dextrose 50% Injectable 25 Gram(s) IV Push once  enalapril 5 milliGRAM(s) Oral daily  glucagon  Injectable 1 milliGRAM(s) IntraMuscular once  insulin glargine Injectable (LANTUS) 15 Unit(s) SubCutaneous at bedtime  insulin lispro (ADMELOG) corrective regimen sliding scale   SubCutaneous three times a day before meals  insulin lispro Injectable (ADMELOG) 5 Unit(s) SubCutaneous before breakfast  insulin lispro Injectable (ADMELOG) 5 Unit(s) SubCutaneous before lunch  insulin lispro Injectable (ADMELOG) 5 Unit(s) SubCutaneous before dinner  metoprolol tartrate 100 milliGRAM(s) Oral two times a day  pantoprazole   Suspension 40 milliGRAM(s) Oral two times a day  senna 2 Tablet(s) Oral at bedtime  sodium chloride 0.65% Nasal 1 Spray(s) Both Nostrils two times a day PRN    Vital Signs Last 24 Hrs  T(C): 36.8 (04 Apr 2021 05:20), Max: 37.2 (03 Apr 2021 21:00)  T(F): 98.2 (04 Apr 2021 05:20), Max: 98.9 (03 Apr 2021 21:00)  HR: 66 (04 Apr 2021 06:30) (66 - 86)  BP: 148/65 (04 Apr 2021 06:30) (148/65 - 166/79)  BP(mean): --  RR: 18 (03 Apr 2021 21:00) (18 - 20)  SpO2: --    Physical Exam :  General :   A&O x 3   Tongue midline  Facial features symmetric, No droop  Speech clear and appropriate, no slur   Follows all commands   Occular :   PERRLA, EOMI  Motor :   MAEx4 b/l  strength 5/5 to b/l UE and LE  strong shoulder abduction / adduction   Hand  occasionally weak d/t carpal tunnel   Sensory :  Intact to b/l UE and LE    Assessment / Plan:   D/C planning, has authorization and bed at [facility]   Covid swab received by lab, results pending   D/C when covid swab resulted   Soft collar for comfort   Discussed with attending

## 2021-04-05 LAB
GLUCOSE BLDC GLUCOMTR-MCNC: 205 MG/DL — HIGH (ref 70–99)
GLUCOSE BLDC GLUCOMTR-MCNC: 268 MG/DL — HIGH (ref 70–99)
GLUCOSE BLDC GLUCOMTR-MCNC: 290 MG/DL — HIGH (ref 70–99)

## 2021-04-05 RX ADMIN — GABAPENTIN 300 MILLIGRAM(S): 400 CAPSULE ORAL at 21:53

## 2021-04-05 RX ADMIN — HEPARIN SODIUM 5000 UNIT(S): 5000 INJECTION INTRAVENOUS; SUBCUTANEOUS at 13:24

## 2021-04-05 RX ADMIN — PANTOPRAZOLE SODIUM 40 MILLIGRAM(S): 20 TABLET, DELAYED RELEASE ORAL at 05:04

## 2021-04-05 RX ADMIN — Medication 4: at 17:06

## 2021-04-05 RX ADMIN — OXYCODONE AND ACETAMINOPHEN 2 TABLET(S): 5; 325 TABLET ORAL at 22:56

## 2021-04-05 RX ADMIN — OXYCODONE AND ACETAMINOPHEN 2 TABLET(S): 5; 325 TABLET ORAL at 15:12

## 2021-04-05 RX ADMIN — METHOCARBAMOL 500 MILLIGRAM(S): 500 TABLET, FILM COATED ORAL at 17:31

## 2021-04-05 RX ADMIN — HEPARIN SODIUM 5000 UNIT(S): 5000 INJECTION INTRAVENOUS; SUBCUTANEOUS at 21:53

## 2021-04-05 RX ADMIN — OXYCODONE AND ACETAMINOPHEN 1 TABLET(S): 5; 325 TABLET ORAL at 17:48

## 2021-04-05 RX ADMIN — METHOCARBAMOL 500 MILLIGRAM(S): 500 TABLET, FILM COATED ORAL at 05:04

## 2021-04-05 RX ADMIN — Medication 5 UNIT(S): at 12:05

## 2021-04-05 RX ADMIN — OXYCODONE AND ACETAMINOPHEN 2 TABLET(S): 5; 325 TABLET ORAL at 05:05

## 2021-04-05 RX ADMIN — Medication 100 MILLIGRAM(S): at 05:04

## 2021-04-05 RX ADMIN — OXYCODONE AND ACETAMINOPHEN 1 TABLET(S): 5; 325 TABLET ORAL at 17:27

## 2021-04-05 RX ADMIN — Medication 5 UNIT(S): at 08:03

## 2021-04-05 RX ADMIN — PANTOPRAZOLE SODIUM 40 MILLIGRAM(S): 20 TABLET, DELAYED RELEASE ORAL at 17:31

## 2021-04-05 RX ADMIN — METHOCARBAMOL 500 MILLIGRAM(S): 500 TABLET, FILM COATED ORAL at 12:09

## 2021-04-05 RX ADMIN — OXYCODONE AND ACETAMINOPHEN 1 TABLET(S): 5; 325 TABLET ORAL at 02:00

## 2021-04-05 RX ADMIN — Medication 2: at 08:03

## 2021-04-05 RX ADMIN — Medication 100 MILLIGRAM(S): at 17:55

## 2021-04-05 RX ADMIN — Medication 5 UNIT(S): at 17:05

## 2021-04-05 RX ADMIN — ATORVASTATIN CALCIUM 40 MILLIGRAM(S): 80 TABLET, FILM COATED ORAL at 21:53

## 2021-04-05 RX ADMIN — INSULIN GLARGINE 15 UNIT(S): 100 INJECTION, SOLUTION SUBCUTANEOUS at 21:55

## 2021-04-05 RX ADMIN — HEPARIN SODIUM 5000 UNIT(S): 5000 INJECTION INTRAVENOUS; SUBCUTANEOUS at 05:04

## 2021-04-05 RX ADMIN — GABAPENTIN 300 MILLIGRAM(S): 400 CAPSULE ORAL at 13:25

## 2021-04-05 RX ADMIN — Medication 5 MILLIGRAM(S): at 05:04

## 2021-04-05 RX ADMIN — Medication 6: at 12:05

## 2021-04-05 RX ADMIN — OXYCODONE AND ACETAMINOPHEN 1 TABLET(S): 5; 325 TABLET ORAL at 01:29

## 2021-04-05 RX ADMIN — GABAPENTIN 300 MILLIGRAM(S): 400 CAPSULE ORAL at 05:04

## 2021-04-05 RX ADMIN — OXYCODONE AND ACETAMINOPHEN 2 TABLET(S): 5; 325 TABLET ORAL at 13:30

## 2021-04-05 RX ADMIN — SENNA PLUS 2 TABLET(S): 8.6 TABLET ORAL at 21:53

## 2021-04-05 RX ADMIN — OXYCODONE AND ACETAMINOPHEN 2 TABLET(S): 5; 325 TABLET ORAL at 05:45

## 2021-04-05 NOTE — SWALLOW BEDSIDE ASSESSMENT ADULT - SWALLOW EVAL: RECOMMENDED DIET
regular consistency diet w/ thin liquids
dys 2 w/ nectar, upgrade to thins as tolerated
Dysphagia II diet w/ nectar-thick liquids.

## 2021-04-05 NOTE — DIETITIAN INITIAL EVALUATION ADULT. - DIET TYPE
DASH/TLC (sodium and cholesterol restricted diet)/dysphagia 2, mechanical soft, nectar consistency fld/consistent carbohydrate (evening snack)

## 2021-04-05 NOTE — DIETITIAN INITIAL EVALUATION ADULT. - FEEDING SKILL
Pt states that she is tolerating current diet consistency/texture well, and has been consuming most of her meals w/o any issues./independent

## 2021-04-05 NOTE — SWALLOW BEDSIDE ASSESSMENT ADULT - SLP PERTINENT HISTORY OF CURRENT PROBLEM
55 y/o with PMHx of asthma, diabetes, GERD, HTN, and carpal tunnel repair admitted for C 5-7 anterior cervical discectomy and fusion (3/29). Post-op chest pain, sore throat, post nasal drip, and difficulty swallowing.
57 y/o with PMHx of asthma, diabetes, GERD, HTN, and carpal tunnel repair admitted for C 5-7 anterior cervical discectomy and fusion (3/29). Post-op chest pain, sore throat, post nasal drip, and difficulty swallowing.
55 y/o with PMHx of asthma, diabetes, GERD, HTN, and carpal tunnel repair admitted for C 5-7 anterior cervical discectomy and fusion (3/29). Post-op chest pain, sore throat, post nasal drip, and difficulty swallowing.

## 2021-04-05 NOTE — SWALLOW BEDSIDE ASSESSMENT ADULT - SWALLOW EVAL: DIAGNOSIS
+improved toleration of thin, puree and soft solids w/o overt s/s of penetration/aspiration, denied globus sensation

## 2021-04-05 NOTE — DIETITIAN INITIAL EVALUATION ADULT. - OTHER CALCULATIONS
Dosing wt: 161#/73kg-->Calories: 7100-8879 kcal/day (MSJ x 1.2-1.3 AF); Protein: 73-88 gm/day (1-1.2 gm/kg CBW); Fluids: 1 ml/kcal

## 2021-04-05 NOTE — CDI QUERY NOTE - NSCDIOTHERTXTBX_GEN_ALL_CORE_HH
DOCUMENTATION CLARIFICATION FORM     Encounter #: [id number]                                                   Patient’s Name: Keeley Ash  Medical Record #: [id number]                                         Admit Date: 3-  CDI Specialist/: Ariel                                         Contact #: [phone]    Dear Dr. Gu,                        Date: 4-5-2021     The Physician’s or Provider’s documentation of the patient’s presentation, evaluation and  medical management, as identified below, may support a diagnosis that is not documented in the medical record.  In order to accurately capture all diagnoses to the greatest degree of specificity reflecting the patient’s actual severity of illness, the documentation in this patient’s medical record requires additional clarification.  Please include more specific documentation, either known or suspected, of a corresponding diagnosis associated with the clinical information described below in your Progress Note and/or Discharge Summary.    •	3-10 H&P states, “SCHEDULED FOR CERVICAL 5- 7 ANTERIOR CERVICAL DISCECTOMY AND FUSION.     PT REPORTS- I HAVE NECK AND BACK PAIN FOR SEVERAL YEARS. RECENTLY IT HAS GOTTEN WORSE.”.    •	H&P:  Chronic Pain:  · Chronic Pain	yes  · Acceptable Comfort Level	5  · Pain Rating at Rest	4  · Pain Rating with Activity	8  · Pain Body Location	back  · Description of Pain (frequency/quality)	soreness  · Factors that Aggravate Pain	activity  · Factors that Relieve Pain	relaxation; rest    •	Home medications continued: traMADol 50 mg oral tablet 1 tab(s) orally every 4 hours, As needed, Moderate Pain (4 - 6), oxyCODONE 10 mg oral tablet 1 tab(s) orally every 4 hours, As needed, Severe Pain (7 - 10) Percocet 5/325 oral tablet 1 tab(s) orally once a day (at bedtime), As Needed    Based on your clinical judgment, can you provide a diagnosis that represents the above clinical indicators?  ( ) Opioid dependence for chronic pain management  ( ) Other please specify____________________  ( ) Unable to clinically determine        Documentation clarification is required for compliance, accuracy in coding and billing, and reporting severity of illness, quality data   and risk of mortality.  --------------------------------------------------------------------------------------------------------------------------------------------  DO NOT REMOVE THIS RECORD WITHOUT FIRST NOTIFYING THE CDI SPECIALIST  This form is NOT a part of the permanent Medical Record.

## 2021-04-05 NOTE — DIETITIAN INITIAL EVALUATION ADULT. - REASON FOR ADMISSION
POD# 7 s/p C5-6, C6-7 anterior cervical discectomy and fusion, with post op dysphagia. Covid neg, dispo-SNF.

## 2021-04-05 NOTE — PROGRESS NOTE ADULT - SUBJECTIVE AND OBJECTIVE BOX
Hospital Course:   POD# 7 s/p C5-6, C6-7 ACDF with post op dysphagia      Vital Signs Last 24 Hrs  T(C): 36.1 (05 Apr 2021 04:59), Max: 37.2 (04 Apr 2021 21:00)  T(F): 97 (05 Apr 2021 04:59), Max: 98.9 (04 Apr 2021 21:00)  HR: 62 (05 Apr 2021 06:30) (62 - 78)  BP: 129/67 (05 Apr 2021 06:30) (129/67 - 180/71)  BP(mean): --  RR: 18 (05 Apr 2021 04:59) (18 - 18)  SpO2: --    I&O's Detail    04 Apr 2021 07:01  -  05 Apr 2021 07:00  --------------------------------------------------------  IN:  Total IN: 0 mL    OUT:    Voided (mL): 300 mL  Total OUT: 300 mL    Total NET: -300 mL        I&O's Summary    04 Apr 2021 07:01  -  05 Apr 2021 07:00  --------------------------------------------------------  IN: 0 mL / OUT: 300 mL / NET: -300 mL        PHYSICAL EXAM:  Neurological: improving swallowing and pain, less edema of neck, MORALES well with good strength and coordination, voice improving, stronger with less hoarseness.      Incision/Wound: less swelling, steri strips intact      LABS:                        11.6   6.78  )-----------( 356      ( 04 Apr 2021 12:26 )             34.7       CAPILLARY BLOOD GLUCOSE      POCT Blood Glucose.: 186 mg/dL (05 Apr 2021 07:37)  POCT Blood Glucose.: 273 mg/dL (04 Apr 2021 20:55)  POCT Blood Glucose.: 114 mg/dL (04 Apr 2021 16:49)      Drug Levels: [] N/A    CSF Analysis: [] N/A      Allergies    ibuprofen (Urticaria)    Intolerances      MEDICATIONS:  Antibiotics:    Neuro:  acetaminophen   Tablet .. 650 milliGRAM(s) Oral every 6 hours PRN  gabapentin 300 milliGRAM(s) Oral three times a day  methocarbamol 500 milliGRAM(s) Oral four times a day  morphine  - Injectable 4 milliGRAM(s) IV Push every 3 hours PRN  ondansetron Injectable 4 milliGRAM(s) IV Push every 6 hours PRN  oxycodone    5 mG/acetaminophen 325 mG 1 Tablet(s) Oral every 4 hours PRN  oxycodone    5 mG/acetaminophen 325 mG 2 Tablet(s) Oral every 6 hours PRN    Anticoagulation:  heparin   Injectable 5000 Unit(s) SubCutaneous every 8 hours    OTHER:  ALBUTerol    90 MICROgram(s) HFA Inhaler 1 Puff(s) Inhalation four times a day PRN  atorvastatin 40 milliGRAM(s) Oral at bedtime  benzocaine 20% Spray 1 Spray(s) Topical every 6 hours  dextrose 40% Gel 15 Gram(s) Oral once  dextrose 50% Injectable 25 Gram(s) IV Push once  dextrose 50% Injectable 12.5 Gram(s) IV Push once  dextrose 50% Injectable 25 Gram(s) IV Push once  enalapril 5 milliGRAM(s) Oral daily  glucagon  Injectable 1 milliGRAM(s) IntraMuscular once  insulin glargine Injectable (LANTUS) 15 Unit(s) SubCutaneous at bedtime  insulin lispro (ADMELOG) corrective regimen sliding scale   SubCutaneous three times a day before meals  insulin lispro Injectable (ADMELOG) 5 Unit(s) SubCutaneous before breakfast  insulin lispro Injectable (ADMELOG) 5 Unit(s) SubCutaneous before lunch  insulin lispro Injectable (ADMELOG) 5 Unit(s) SubCutaneous before dinner  metoprolol tartrate 100 milliGRAM(s) Oral two times a day  pantoprazole   Suspension 40 milliGRAM(s) Oral two times a day  senna 2 Tablet(s) Oral at bedtime  sodium chloride 0.65% Nasal 1 Spray(s) Both Nostrils two times a day PRN    IVF:  dextrose 5%. 1000 milliLiter(s) IV Continuous <Continuous>  dextrose 5%. 1000 milliLiter(s) IV Continuous <Continuous>  sodium chloride 0.9%. 1000 milliLiter(s) IV Continuous <Continuous>    CULTURES:    RADIOLOGY & ADDITIONAL TESTS:      ASSESSMENT:  56y Female s/p C6-7, C5-6 ACDF with post op edema    M47.812/ 83427/ 45062/ 22853X2/ 74484/ 57211    ^M47.812/ 82932/ 48649/ 22853X2/ 22845/ 20930    Family history of malignant neoplasm of esophagus    FH: stroke    FH: aneurysm    Handoff    MEWS Score    HTN (hypertension)    Diabetes    Asthma    TRAVIS on CPAP    Arthritis    High cholesterol    GERD (gastroesophageal reflux disease)    History of peripheral arterial disease    Herniation of cervical intervertebral disc with radiculopathy    Cervical spondylosis with radiculopathy    Herniation of cervical intervertebral disc    Cervical spondylosis with radiculopathy    Anterior cervical discectomy with fusion    S/P trigger finger release    H/O carpal tunnel repair    H/O arthroscopy of shoulder    [id number]        PLAN: continue present management, covid neg, dispo-SNF

## 2021-04-05 NOTE — DIETITIAN INITIAL EVALUATION ADULT. - ORAL INTAKE PTA/DIET HISTORY
Pt reports great appetite/po intake, typically consumes 3 meals daily w/ occasional snacks and denies use of oral nutrition supplements. No cultural/[place] restrictions noted and has NKFA. UBW ~160#, denies any recent wt changes.

## 2021-04-05 NOTE — DIETITIAN INITIAL EVALUATION ADULT. - ADD RECOMMEND
Continue current diet order consistent w/ SLP recs; add CHO Consistent/HS + DASH/TLC diet modifiers--all recs discussed with LIP ([id number])

## 2021-04-06 LAB
GLUCOSE BLDC GLUCOMTR-MCNC: 173 MG/DL — HIGH (ref 70–99)
GLUCOSE BLDC GLUCOMTR-MCNC: 204 MG/DL — HIGH (ref 70–99)
GLUCOSE BLDC GLUCOMTR-MCNC: 231 MG/DL — HIGH (ref 70–99)
GLUCOSE BLDC GLUCOMTR-MCNC: 236 MG/DL — HIGH (ref 70–99)

## 2021-04-06 RX ORDER — OXYCODONE AND ACETAMINOPHEN 5; 325 MG/1; MG/1
1 TABLET ORAL EVERY 4 HOURS
Refills: 0 | Status: DISCONTINUED | OUTPATIENT
Start: 2021-04-06 | End: 2021-04-07

## 2021-04-06 RX ADMIN — Medication 5 UNIT(S): at 08:12

## 2021-04-06 RX ADMIN — HEPARIN SODIUM 5000 UNIT(S): 5000 INJECTION INTRAVENOUS; SUBCUTANEOUS at 21:49

## 2021-04-06 RX ADMIN — ATORVASTATIN CALCIUM 40 MILLIGRAM(S): 80 TABLET, FILM COATED ORAL at 21:50

## 2021-04-06 RX ADMIN — OXYCODONE AND ACETAMINOPHEN 1 TABLET(S): 5; 325 TABLET ORAL at 22:30

## 2021-04-06 RX ADMIN — GABAPENTIN 300 MILLIGRAM(S): 400 CAPSULE ORAL at 21:50

## 2021-04-06 RX ADMIN — OXYCODONE AND ACETAMINOPHEN 1 TABLET(S): 5; 325 TABLET ORAL at 21:48

## 2021-04-06 RX ADMIN — Medication 4: at 08:12

## 2021-04-06 RX ADMIN — Medication 650 MILLIGRAM(S): at 05:51

## 2021-04-06 RX ADMIN — Medication 100 MILLIGRAM(S): at 17:30

## 2021-04-06 RX ADMIN — INSULIN GLARGINE 15 UNIT(S): 100 INJECTION, SOLUTION SUBCUTANEOUS at 21:49

## 2021-04-06 RX ADMIN — METHOCARBAMOL 500 MILLIGRAM(S): 500 TABLET, FILM COATED ORAL at 00:21

## 2021-04-06 RX ADMIN — HEPARIN SODIUM 5000 UNIT(S): 5000 INJECTION INTRAVENOUS; SUBCUTANEOUS at 05:32

## 2021-04-06 RX ADMIN — GABAPENTIN 300 MILLIGRAM(S): 400 CAPSULE ORAL at 13:33

## 2021-04-06 RX ADMIN — GABAPENTIN 300 MILLIGRAM(S): 400 CAPSULE ORAL at 05:32

## 2021-04-06 RX ADMIN — PANTOPRAZOLE SODIUM 40 MILLIGRAM(S): 20 TABLET, DELAYED RELEASE ORAL at 05:32

## 2021-04-06 RX ADMIN — Medication 650 MILLIGRAM(S): at 13:37

## 2021-04-06 RX ADMIN — Medication 100 MILLIGRAM(S): at 05:32

## 2021-04-06 RX ADMIN — METHOCARBAMOL 500 MILLIGRAM(S): 500 TABLET, FILM COATED ORAL at 11:32

## 2021-04-06 RX ADMIN — Medication 5 UNIT(S): at 16:54

## 2021-04-06 RX ADMIN — METHOCARBAMOL 500 MILLIGRAM(S): 500 TABLET, FILM COATED ORAL at 05:32

## 2021-04-06 RX ADMIN — Medication 2: at 11:31

## 2021-04-06 RX ADMIN — Medication 5 UNIT(S): at 11:31

## 2021-04-06 RX ADMIN — Medication 5 MILLIGRAM(S): at 05:32

## 2021-04-06 RX ADMIN — HEPARIN SODIUM 5000 UNIT(S): 5000 INJECTION INTRAVENOUS; SUBCUTANEOUS at 13:34

## 2021-04-06 RX ADMIN — METHOCARBAMOL 500 MILLIGRAM(S): 500 TABLET, FILM COATED ORAL at 17:31

## 2021-04-06 RX ADMIN — Medication 650 MILLIGRAM(S): at 16:51

## 2021-04-06 RX ADMIN — SENNA PLUS 2 TABLET(S): 8.6 TABLET ORAL at 21:50

## 2021-04-06 RX ADMIN — Medication 4: at 16:54

## 2021-04-06 RX ADMIN — METHOCARBAMOL 500 MILLIGRAM(S): 500 TABLET, FILM COATED ORAL at 23:48

## 2021-04-07 ENCOUNTER — TRANSCRIPTION ENCOUNTER (OUTPATIENT)
Age: 56
End: 2021-04-07

## 2021-04-07 VITALS
SYSTOLIC BLOOD PRESSURE: 133 MMHG | DIASTOLIC BLOOD PRESSURE: 58 MMHG | HEART RATE: 60 BPM | TEMPERATURE: 96 F | RESPIRATION RATE: 18 BRPM

## 2021-04-07 LAB
GLUCOSE BLDC GLUCOMTR-MCNC: 176 MG/DL — HIGH (ref 70–99)
GLUCOSE BLDC GLUCOMTR-MCNC: 240 MG/DL — HIGH (ref 70–99)
GLUCOSE BLDC GLUCOMTR-MCNC: 299 MG/DL — HIGH (ref 70–99)

## 2021-04-07 RX ADMIN — Medication 5 UNIT(S): at 12:36

## 2021-04-07 RX ADMIN — METHOCARBAMOL 500 MILLIGRAM(S): 500 TABLET, FILM COATED ORAL at 17:05

## 2021-04-07 RX ADMIN — HEPARIN SODIUM 5000 UNIT(S): 5000 INJECTION INTRAVENOUS; SUBCUTANEOUS at 13:11

## 2021-04-07 RX ADMIN — Medication 100 MILLIGRAM(S): at 17:05

## 2021-04-07 RX ADMIN — Medication 100 MILLIGRAM(S): at 05:46

## 2021-04-07 RX ADMIN — HEPARIN SODIUM 5000 UNIT(S): 5000 INJECTION INTRAVENOUS; SUBCUTANEOUS at 05:46

## 2021-04-07 RX ADMIN — OXYCODONE AND ACETAMINOPHEN 1 TABLET(S): 5; 325 TABLET ORAL at 05:47

## 2021-04-07 RX ADMIN — Medication 5 UNIT(S): at 17:04

## 2021-04-07 RX ADMIN — Medication 2: at 08:10

## 2021-04-07 RX ADMIN — OXYCODONE AND ACETAMINOPHEN 1 TABLET(S): 5; 325 TABLET ORAL at 06:30

## 2021-04-07 RX ADMIN — GABAPENTIN 300 MILLIGRAM(S): 400 CAPSULE ORAL at 13:11

## 2021-04-07 RX ADMIN — Medication 6: at 12:37

## 2021-04-07 RX ADMIN — GABAPENTIN 300 MILLIGRAM(S): 400 CAPSULE ORAL at 05:46

## 2021-04-07 RX ADMIN — OXYCODONE AND ACETAMINOPHEN 1 TABLET(S): 5; 325 TABLET ORAL at 10:37

## 2021-04-07 RX ADMIN — METHOCARBAMOL 500 MILLIGRAM(S): 500 TABLET, FILM COATED ORAL at 05:46

## 2021-04-07 RX ADMIN — OXYCODONE AND ACETAMINOPHEN 1 TABLET(S): 5; 325 TABLET ORAL at 10:07

## 2021-04-07 RX ADMIN — PANTOPRAZOLE SODIUM 40 MILLIGRAM(S): 20 TABLET, DELAYED RELEASE ORAL at 17:05

## 2021-04-07 RX ADMIN — Medication 4: at 17:04

## 2021-04-07 RX ADMIN — Medication 5 MILLIGRAM(S): at 05:46

## 2021-04-07 RX ADMIN — PANTOPRAZOLE SODIUM 40 MILLIGRAM(S): 20 TABLET, DELAYED RELEASE ORAL at 05:47

## 2021-04-07 RX ADMIN — Medication 5 UNIT(S): at 08:10

## 2021-04-07 RX ADMIN — METHOCARBAMOL 500 MILLIGRAM(S): 500 TABLET, FILM COATED ORAL at 12:36

## 2021-04-07 NOTE — DISCHARGE NOTE PROVIDER - NSDCFUADDINST_GEN_ALL_CORE_FT
- Upon discharge,  please call to schedule a follow up with Dr. Gu in 1-2 weeks.  - Upon discharge, please call to make a follow up appointment with your primary care provider to discuss your recent hospitalization/operation.  - Keep dressings dry for 48 hours after which you may remove dressing and cleanse with soap and water in the shower (no scrubbing). Leave the steri strips (white tape) on your incisions, they will fall off on their own. Run water and soap over incision sites and pat dry (no scrubbing). No submerging your incision sites in water (i.e. no swimming or baths) for 2-3 weeks and avoid exposing the area to jets/streams of water.   - You can resume your normal activities as tolerated, but avoid heavy (>15lb.) lifting and strenuous exercise for 4-6 weeks.    - You were prescribed percocet (oxycodone-acetaminophen) for pain, take these only as needed.  Your pain should subside over the next few days.  While taking narcotic pain medications, you should not drive or operate heavy machinery. If taking with other medications containing acetaminophen (Tylenol), reduce your dose of percocet so that you  do not exceed 3000mg of acetaminophen per day.  ***You were prescribed narcotic pain medications, take these only as needed.  Your pain should subside over the next few days.  While taking narcotic pain medications, you should not drive or operate heavy machinery.   - Narcotic pain medicine tends to cause constipation, you have been prescribed colace 3 times a day to help prevent that. Hold the colace if you start to have loose stools.  - If you experience fevers, chills, increasing abdominal pain, nausea, vomiting, inability to pass stool or gas, bleeding, or any other acute symptoms, please call your doctor and report to the emergency room immediately for further management.

## 2021-04-07 NOTE — PROGRESS NOTE ADULT - SUBJECTIVE AND OBJECTIVE BOX
S/P :   PAST MEDICAL & SURGICAL HISTORY:  HTN (hypertension)    Diabetes    Asthma  LAST EPISODE SEVERAL YRS    TRAVIS on CPAP    Arthritis  OA    High cholesterol    GERD (gastroesophageal reflux disease)    History of peripheral arterial disease    S/P trigger finger release    H/O carpal tunnel repair    H/O arthroscopy of shoulder    FAMILY HISTORY:  Family history of malignant neoplasm of esophagus    FH: stroke    FH: aneurysm    Allergies :   ibuprofen (Urticaria)    REVIEW OF SYSTEMS : All systems negative other than those listed in HPI  General:	-  Skin/Breast: -  Ophthalmologic: -   ENMT: -  Respiratory and Thorax: -  Cardiovascular: -	  Gastrointestinal: -  Genitourinary:-  Musculoskeletal:	-  Neurological:	-  Psychiatric:	-  Hematology/Lymphatics:	-  Endocrine:-  Allergic/Immunologic:	-    MEDICATIONS  (STANDING):  atorvastatin 40 milliGRAM(s) Oral at bedtime  benzocaine 20% Spray 1 Spray(s) Topical every 6 hours  dextrose 40% Gel 15 Gram(s) Oral once  dextrose 5%. 1000 milliLiter(s) (50 mL/Hr) IV Continuous <Continuous>  dextrose 5%. 1000 milliLiter(s) (100 mL/Hr) IV Continuous <Continuous>  dextrose 50% Injectable 25 Gram(s) IV Push once  dextrose 50% Injectable 12.5 Gram(s) IV Push once  dextrose 50% Injectable 25 Gram(s) IV Push once  enalapril 5 milliGRAM(s) Oral daily  gabapentin 300 milliGRAM(s) Oral three times a day  glucagon  Injectable 1 milliGRAM(s) IntraMuscular once  heparin   Injectable 5000 Unit(s) SubCutaneous every 8 hours  insulin glargine Injectable (LANTUS) 15 Unit(s) SubCutaneous at bedtime  insulin lispro (ADMELOG) corrective regimen sliding scale   SubCutaneous three times a day before meals  insulin lispro Injectable (ADMELOG) 5 Unit(s) SubCutaneous before breakfast  insulin lispro Injectable (ADMELOG) 5 Unit(s) SubCutaneous before lunch  insulin lispro Injectable (ADMELOG) 5 Unit(s) SubCutaneous before dinner  methocarbamol 500 milliGRAM(s) Oral four times a day  metoprolol tartrate 100 milliGRAM(s) Oral two times a day  pantoprazole   Suspension 40 milliGRAM(s) Oral two times a day  senna 2 Tablet(s) Oral at bedtime  sodium chloride 0.9%. 1000 milliLiter(s) (75 mL/Hr) IV Continuous <Continuous>    MEDICATIONS  (PRN):  acetaminophen   Tablet .. 650 milliGRAM(s) Oral every 6 hours PRN Temp greater or equal to 38C (100.4F), Mild Pain (1 - 3)  ALBUTerol    90 MICROgram(s) HFA Inhaler 1 Puff(s) Inhalation four times a day PRN Bronchospasm  ondansetron Injectable 4 milliGRAM(s) IV Push every 6 hours PRN Nausea  oxycodone    5 mG/acetaminophen 325 mG 1 Tablet(s) Oral every 4 hours PRN Moderate Pain (4 - 6)  sodium chloride 0.65% Nasal 1 Spray(s) Both Nostrils two times a day PRN Nasal Congestion      Antibiotics:      Anticoagulation:  heparin   Injectable 5000 Unit(s) SubCutaneous every 8 hours      OTHER:  ALBUTerol    90 MICROgram(s) HFA Inhaler 1 Puff(s) Inhalation four times a day PRN  atorvastatin 40 milliGRAM(s) Oral at bedtime  benzocaine 20% Spray 1 Spray(s) Topical every 6 hours  dextrose 40% Gel 15 Gram(s) Oral once  dextrose 50% Injectable 25 Gram(s) IV Push once  dextrose 50% Injectable 12.5 Gram(s) IV Push once  dextrose 50% Injectable 25 Gram(s) IV Push once  enalapril 5 milliGRAM(s) Oral daily  glucagon  Injectable 1 milliGRAM(s) IntraMuscular once  insulin glargine Injectable (LANTUS) 15 Unit(s) SubCutaneous at bedtime  insulin lispro (ADMELOG) corrective regimen sliding scale   SubCutaneous three times a day before meals  insulin lispro Injectable (ADMELOG) 5 Unit(s) SubCutaneous before breakfast  insulin lispro Injectable (ADMELOG) 5 Unit(s) SubCutaneous before lunch  insulin lispro Injectable (ADMELOG) 5 Unit(s) SubCutaneous before dinner  metoprolol tartrate 100 milliGRAM(s) Oral two times a day  pantoprazole   Suspension 40 milliGRAM(s) Oral two times a day  senna 2 Tablet(s) Oral at bedtime  sodium chloride 0.65% Nasal 1 Spray(s) Both Nostrils two times a day PRN    Vital Signs Last 24 Hrs  T(C): 36.3 (07 Apr 2021 05:49), Max: 36.5 (06 Apr 2021 21:03)  T(F): 97.4 (07 Apr 2021 05:49), Max: 97.7 (06 Apr 2021 21:03)  HR: 62 (07 Apr 2021 07:00) (61 - 63)  BP: 162/64 (07 Apr 2021 07:00) (160/70 - 188/74)  BP(mean): --  RR: 18 (07 Apr 2021 05:49) (18 - 18)  SpO2: --    Physical Exam :  General :   A&O x  Tongue midline  Facial features symmetric, No droop  Speech clear and appropriate, no slur   Pt speaking in full sentences   Follows all commands   Occular :   PERRLA, no aniscoria or nystagmus  EOMI, no deviation or gaze preference   VA intact no diplopia   Motor :   MAEx4 b/l  strength 5/5  Shoulder shrug intact   Full ROM of neck   No spinal point tenderness   Withdraws / Extends to painful stimuli  Sensory :  Intact bilaterally   Cerbellar :  LATONIA intact  Finger to nose intact   Pronator Drift :   Hoffmans :     RADIOLOGY & ADDITIONAL STUDIES:    Assessment / Plan:    S/P : C5/^ C6/& ACDF     - Pt seen and examine noelle bedside, pt states she feels improvement in pain and strength post surgery.   PAST MEDICAL & SURGICAL HISTORY:  HTN (hypertension)    Diabetes    Asthma  LAST EPISODE SEVERAL YRS    TRAVIS on CPAP    Arthritis  OA    High cholesterol    GERD (gastroesophageal reflux disease)    History of peripheral arterial disease    S/P trigger finger release    H/O carpal tunnel repair    H/O arthroscopy of shoulder    FAMILY HISTORY:  Family history of malignant neoplasm of esophagus    FH: stroke    FH: aneurysm    Allergies :   ibuprofen (Urticaria)    REVIEW OF SYSTEMS : All systems negative other than those listed in HPI  General:	-  Skin/Breast: -  Ophthalmologic: -   ENMT: -  Respiratory and Thorax: -  Cardiovascular: -	  Gastrointestinal: -  Genitourinary:-  Musculoskeletal:	-  Neurological:	-  Psychiatric:	-  Hematology/Lymphatics:	-  Endocrine:-  Allergic/Immunologic:	-    MEDICATIONS  (STANDING):  atorvastatin 40 milliGRAM(s) Oral at bedtime  benzocaine 20% Spray 1 Spray(s) Topical every 6 hours  dextrose 40% Gel 15 Gram(s) Oral once  dextrose 5%. 1000 milliLiter(s) (50 mL/Hr) IV Continuous <Continuous>  dextrose 5%. 1000 milliLiter(s) (100 mL/Hr) IV Continuous <Continuous>  dextrose 50% Injectable 25 Gram(s) IV Push once  dextrose 50% Injectable 12.5 Gram(s) IV Push once  dextrose 50% Injectable 25 Gram(s) IV Push once  enalapril 5 milliGRAM(s) Oral daily  gabapentin 300 milliGRAM(s) Oral three times a day  glucagon  Injectable 1 milliGRAM(s) IntraMuscular once  heparin   Injectable 5000 Unit(s) SubCutaneous every 8 hours  insulin glargine Injectable (LANTUS) 15 Unit(s) SubCutaneous at bedtime  insulin lispro (ADMELOG) corrective regimen sliding scale   SubCutaneous three times a day before meals  insulin lispro Injectable (ADMELOG) 5 Unit(s) SubCutaneous before breakfast  insulin lispro Injectable (ADMELOG) 5 Unit(s) SubCutaneous before lunch  insulin lispro Injectable (ADMELOG) 5 Unit(s) SubCutaneous before dinner  methocarbamol 500 milliGRAM(s) Oral four times a day  metoprolol tartrate 100 milliGRAM(s) Oral two times a day  pantoprazole   Suspension 40 milliGRAM(s) Oral two times a day  senna 2 Tablet(s) Oral at bedtime  sodium chloride 0.9%. 1000 milliLiter(s) (75 mL/Hr) IV Continuous <Continuous>    MEDICATIONS  (PRN):  acetaminophen   Tablet .. 650 milliGRAM(s) Oral every 6 hours PRN Temp greater or equal to 38C (100.4F), Mild Pain (1 - 3)  ALBUTerol    90 MICROgram(s) HFA Inhaler 1 Puff(s) Inhalation four times a day PRN Bronchospasm  ondansetron Injectable 4 milliGRAM(s) IV Push every 6 hours PRN Nausea  oxycodone    5 mG/acetaminophen 325 mG 1 Tablet(s) Oral every 4 hours PRN Moderate Pain (4 - 6)  sodium chloride 0.65% Nasal 1 Spray(s) Both Nostrils two times a day PRN Nasal Congestion      Antibiotics:      Anticoagulation:  heparin   Injectable 5000 Unit(s) SubCutaneous every 8 hours      OTHER:  ALBUTerol    90 MICROgram(s) HFA Inhaler 1 Puff(s) Inhalation four times a day PRN  atorvastatin 40 milliGRAM(s) Oral at bedtime  benzocaine 20% Spray 1 Spray(s) Topical every 6 hours  dextrose 40% Gel 15 Gram(s) Oral once  dextrose 50% Injectable 25 Gram(s) IV Push once  dextrose 50% Injectable 12.5 Gram(s) IV Push once  dextrose 50% Injectable 25 Gram(s) IV Push once  enalapril 5 milliGRAM(s) Oral daily  glucagon  Injectable 1 milliGRAM(s) IntraMuscular once  insulin glargine Injectable (LANTUS) 15 Unit(s) SubCutaneous at bedtime  insulin lispro (ADMELOG) corrective regimen sliding scale   SubCutaneous three times a day before meals  insulin lispro Injectable (ADMELOG) 5 Unit(s) SubCutaneous before breakfast  insulin lispro Injectable (ADMELOG) 5 Unit(s) SubCutaneous before lunch  insulin lispro Injectable (ADMELOG) 5 Unit(s) SubCutaneous before dinner  metoprolol tartrate 100 milliGRAM(s) Oral two times a day  pantoprazole   Suspension 40 milliGRAM(s) Oral two times a day  senna 2 Tablet(s) Oral at bedtime  sodium chloride 0.65% Nasal 1 Spray(s) Both Nostrils two times a day PRN    Vital Signs Last 24 Hrs  T(C): 36.3 (07 Apr 2021 05:49), Max: 36.5 (06 Apr 2021 21:03)  T(F): 97.4 (07 Apr 2021 05:49), Max: 97.7 (06 Apr 2021 21:03)  HR: 62 (07 Apr 2021 07:00) (61 - 63)  BP: 162/64 (07 Apr 2021 07:00) (160/70 - 188/74)  BP(mean): --  RR: 18 (07 Apr 2021 05:49) (18 - 18)  SpO2: --    Physical Exam :  General :   A&O x 3   Speech clear and appropriate, no slur or hoarseness   Follows all commands   Motor :   MAEx4 b/l  strength 5/5 to UE and LE both proximally and distally   Shoulder shrug intact   Sensory :  Intact bilaterally to UE and LE  Gait : Pt transfers from bed to standing independently, ambulates well with rolling walker     Assessment / Plan:  Patient states she feels improvement in both pain and strength post surgery and is ready for discharge today.   - Waiting for case management for confirmation of bed at SNF, if no bed by noon, pt would like d/c home w home care and RW  - Soft collar for comfort   - Pt to f/u out patient with Dr. Gu 1-2 weeks after discharge   - Discussed with attending

## 2021-04-07 NOTE — DISCHARGE NOTE PROVIDER - NSDCMRMEDTOKEN_GEN_ALL_CORE_FT
albuterol 90 mcg/inh inhalation aerosol: 1 puff(s) inhaled 4 times a day, As needed, Bronchospasm  atorvastatin 40 mg oral tablet: 1 tab(s) orally once a day  clopidogrel 75 mg oral tablet: 1 tab(s) orally once a day  enalapril 5 mg oral tablet: 1 tab(s) orally once a day  famotidine 40 mg oral tablet: 1 tab(s) orally once a day (at bedtime)  gabapentin 300 mg oral tablet: 300 milligram(s) orally once a day  metFORMIN 1000 mg oral tablet: 1 tab(s) orally 2 times a day  methocarbamol 500 mg oral tablet: 2 tab(s) orally 4 times a day  metoprolol tartrate 100 mg oral tablet: 1 tab(s) orally 2 times a day  oxyCODONE 10 mg oral tablet: 1 tab(s) orally every 4 hours, As needed, Severe Pain (7 - 10)  pioglitazone 30 mg oral tablet: 1 tab(s) orally once a day  traMADol 50 mg oral tablet: 1 tab(s) orally every 4 hours, As needed, Moderate Pain (4 - 6)  Xultophy 100 units-3.6 mg/mL subcutaneous solution: subcutaneous once a day

## 2021-04-07 NOTE — PROVIDER CONTACT NOTE (OTHER) - ACTION/TREATMENT ORDERED:
reassess after administration of medication
patient medicated for pain and will reassess blood pressure when pain subsides.
Admin percocet as ordered and recheck BP in one hour
0600 blood pressure medications and pain medications administered and will reassess in 1 hour

## 2021-04-07 NOTE — DISCHARGE NOTE PROVIDER - NSDCCPTREATMENT_GEN_ALL_CORE_FT
PRINCIPAL PROCEDURE  Procedure: Anterior cervical discectomy with fusion  Findings and Treatment: C5-6, C6-7

## 2021-04-07 NOTE — PROVIDER CONTACT NOTE (OTHER) - SITUATION
patient's blood pressure elevated at 188/74
RN notified Md of pts /86;  however, Pt reporting pain at this time. Md stated to admin percocet as ordered and to recheck value in on hour.
patient hypertensive this morning. blood pressure is 180/71, heart rate is 64
patient's blood pressure elevated at 177/73, heart rate 63

## 2021-04-07 NOTE — PROVIDER CONTACT NOTE (OTHER) - RECOMMENDATIONS
Admin percocet as ordered and recheck BP in one hour
VERA Purdy made aware
VERA montgomery
administer 0600 dose of Enalapril and Metoprolol

## 2021-04-07 NOTE — PROGRESS NOTE ADULT - NSICDXPILOT_GEN_ALL_CORE
[place]

## 2021-04-07 NOTE — DISCHARGE NOTE NURSING/CASE MANAGEMENT/SOCIAL WORK - PATIENT PORTAL LINK FT
You can access the FollowMyHealth Patient Portal offered by [facility] by registering at the following website: [email or web address]. By joining [facility]’s FollowMyHealth portal, you will also be able to view your health information using other applications (apps) compatible with our system.

## 2021-04-07 NOTE — PROGRESS NOTE ADULT - ATTENDING COMMENTS
Discussed postop care and exam with PA.
Patient seen and examined.  Mild dysphagia, but able to tolerate PO without choking.      Neurological exam stable.     Continue with PT, will monitor chest pain symptoms.  Awaiting patient's primary cardiology input.
The patient seen and examined.  The patient is sitting, conversing with her son over telephone.  Patient has been tolerating dysphagia diet.  She reports that her swallowing is slowly improving.    On examination patient has stable neurological examination. Her voice Appears to be stronger than days prior.    Patient has been deemed appropriate for possible skilled nursing facility discharge.    Plan we'll discharge over the weekend/early next week.  Continue with physical therapy, immobilization.  Patient was encouraged to use incentive spirometry.
Agree with above.     Plan to discharge.
The patient was seen and examined.  Patient's/decision is significantly improved.  Incision is clean and dry.  Patient demonstrates stable neurological examination.    Likely discharge to skilled nursing facility tomorrow.
Agree with above plan.
Patient was seen and examined at bedside.  Patient is awake, alert, and conversing with family over telephone.    She has been tolerating diet, and reports that her dysphagia is significantly improving.  She still has, expected, posterior neck pain with bilateral shoulder pain likely muscular in nature.    Patient encouraged to continue with incentive spirometry.  Patient has been approved for skilled nursing facility.  We will plan her discharge early next week.
Patient's dysphasia is slowly improving.  Continue to manage pain, continue with physical therapy, and hopefully discharge to skilled nursing facility.
The patient was seen and examined.  Patient's incision/Steri-Strips are intact.    Patient continues to have intermittent bilateral shoulder pain, which is likely muscular in nature.    Patient states appears increasingly stronger, even compared to yesterday.  Patient is tolerating oral diet.    Awaiting the Results of patient's nasal swab for COVID, in order to plan for discharge to skilled nursing facility.
Agree with above plan.    Cardiac enzymes negative.     Continue to monitor for chest pain, PT and mobilize.

## 2021-04-07 NOTE — PROGRESS NOTE ADULT - PROVIDER SPECIALTY LIST ADULT
Neurosurgery
Anesthesia
Neurosurgery

## 2021-04-07 NOTE — DISCHARGE NOTE PROVIDER - NSDCCPCAREPLAN_GEN_ALL_CORE_FT
PRINCIPAL DISCHARGE DIAGNOSIS  Diagnosis: Cervical spondylosis  Assessment and Plan of Treatment:

## 2021-04-07 NOTE — DISCHARGE NOTE PROVIDER - HOSPITAL COURSE
Patient arrived to the hospital on March 29 2021 for a scheduled Cervical 5/6 and 6/7 ACDF with Dr. Gu. She tolerated the procedure well without any complications and was extubated in the OR. She was then brought to the PACU for post operative monitoring. Once cleared per anesthesia protocol, she was downgraded to the regular medicine floor. Once there, her vitals were monitored and remained stable, her blood work was checked routinely and she was seen and evaluated by physical therapy who cleared her for discharge home with home PT on 4.7.21. A script was written for the patient to receive a rolling walker and was delivered to the hospital prior to discharge. The patient was notified to follow up out patient with Dr. Gu as well as any other medical doctors she sees.

## 2021-04-07 NOTE — DISCHARGE NOTE PROVIDER - CARE PROVIDER_API CALL
Carmen Gu)  [facility]  [address] [place] [address]  Phone: [phone]  Fax: [phone]  Follow Up Time:

## 2021-06-21 NOTE — ASU PATIENT PROFILE, ADULT - FALL HARM RISK CONCLUSION
Office Visit - New Patient   Alexa Craig   28 y.o.  female    Date of visit: 11/21/2018  Physician: Guicho Kilgore MD     Assessment and Plan   1. Idiopathic hypersomnia  Diagnosed by multiple sleep latency test in 2016.  Controlled on a low-dose of Adderall which will be continued.  Controlled substance agreement was filled out.    2. Anxiety  Well-controlled with Effexor, continue    3. Anti-TPO antibodies present  - Thyroid Cascade    4. Iron deficiency  - HM2(CBC w/o Differential)  - Ferritin    Immunizations are up-to-date.  She is up-to-date on her Pap smear in the last on 4/3/2018.    Return in about 6 months (around 5/21/2019) for recheck.     Chief Complaint   Chief Complaint   Patient presents with     [facility]        Patient Profile   Social History     Social History Narrative    Lives with her Boy parmar.  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          Past Medical History   Patient Active Problem List   Diagnosis     Idiopathic hypersomnia       Past Surgical History  She has a past surgical history that includes Toe Surgery (Right).     History of Present Illness   This 28 y.o. old woman comes in to [facility].  Her medical history was reviewed, electronic medical record is updated to reflect in his note.  Overall she is quite healthy.  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  She has a history of idiopathic hypersomnia, diagnosed by multiple sleep latency test in 2016.  She has been on low-dose Adderall which is quite effective for her.  She does have anxiety which is well controlled with Effexor.  History of elevated TPO antibody with normal thyroid hormone levels.  She is not on thyroid replacement therapy.  She is also had a low ferritin in the past with a normal hemoglobin.  No history of heavy menstrual bleeding.  She is not vegetarian.  She has no GI symptoms to suggest celiac disease.    Review of Systems: A comprehensive review of systems was negative except as noted.  "    Medications and Allergies   Current Outpatient Medications   Medication Sig Dispense Refill     dextroamphetamine-amphetamine (ADDERALL) 5 mg Tab tablet Adderall 5 mg tablet   2 tablets in am and 1 tablet in pm       drospirenone-ethinyl estradiol (AIME) 3-0.03 mg per tablet Take 1 tablet by mouth daily.       venlafaxine (EFFEXOR-XR) 150 MG 24 hr capsule Take 1 capsule by mouth daily.  0     No current facility-administered medications for this visit.      No Known Allergies     Family and Social History   Family History   Problem Relation Age of Onset     Osteoporosis Mother      Hyperlipidemia Father      No Medical Problems Sister      No Medical Problems Sister      No Medical Problems Sister         Social History     Tobacco Use     Smoking status: Never Smoker     Smokeless tobacco: Never Used   Substance Use Topics     Alcohol use: Yes     Frequency: 2-4 times a month     Drinks per session: 3 or 4     Drug use: No        Physical Exam   General Appearance:   No acute distress    /68 (Patient Site: Left Arm, Patient Position: Sitting, Cuff Size: Adult Regular)   Pulse 87   Ht 5' 7\" (1.702 m)   Wt 128 lb (58.1 kg)   SpO2 98%   BMI 20.05 kg/m      EYES: Eyelids, conjunctiva, and sclera were normal. Pupils were normal. Cornea, iris, and lens were normal bilaterally.  HEAD, EARS, NOSE, MOUTH, AND THROAT: Head and face were normal. Hearing was normal to voice and the ears were normal to external exam. Nose appearance was normal and there was no discharge. Oropharynx was normal.  NECK: Neck appearance was normal. There were no neck masses and the thyroid was not enlarged.  RESPIRATORY: Breathing pattern was normal and the chest moved symmetrically.  Percussion/auscultatory percussion was normal.  Lung sounds were normal and there were no abnormal sounds.  CARDIOVASCULAR: Heart rate and rhythm were normal.  S1 and S2 were normal and there were no extra sounds or murmurs. Peripheral pulses in arms " and legs were normal.  Jugular venous pressure was normal.  There was no peripheral edema.  MUSCULOSKELETAL: Skeletal configuration was normal and muscle mass was normal for age. Joint appearance was overall normal.  LYMPHATIC: There were no enlarged nodes.  SKIN/HAIR/NAILS: Skin color was normal.  There were no skin lesions.  Hair and nails were normal.  NEUROLOGIC: The patient was alert and oriented to person, place, time, and circumstance. Speech was normal. Cranial nerves were normal. Motor strength was normal for age. The patient was normally coordinated.  PSYCHIATRIC:  Mood and affect were normal and the patient had normal recent and remote memory. The patient's judgment and insight were normal.       Additional Information   Review and/or order of clinical lab tests: Reviewed  Review and/or order of radiology tests:  Review and/or order of medicine tests: Reviewed  Discussion of test results with performing physician:  Decision to obtain old records and/or obtain history from someone other than the patient: Records requested  Review and summarization of old records and/or obtaining history from someone other than the patient and.or discussion of case with another health care provider:  Independent visualization of image, tracing or specimen itself:    Time:      Guicho Kilgore MD  Internal Medicine  Contact me at [phone]   Universal Safety Interventions

## 2021-06-25 ENCOUNTER — EMERGENCY (EMERGENCY)
Facility: HOSPITAL | Age: 56
LOS: 0 days | Discharge: HOME | End: 2021-06-25
Attending: EMERGENCY MEDICINE | Admitting: EMERGENCY MEDICINE
Payer: COMMERCIAL

## 2021-06-25 VITALS
WEIGHT: 160.06 LBS | HEIGHT: 62 IN | HEART RATE: 90 BPM | DIASTOLIC BLOOD PRESSURE: 86 MMHG | TEMPERATURE: 97 F | RESPIRATION RATE: 18 BRPM | SYSTOLIC BLOOD PRESSURE: 167 MMHG | OXYGEN SATURATION: 99 %

## 2021-06-25 DIAGNOSIS — H57.89 OTHER SPECIFIED DISORDERS OF EYE AND ADNEXA: ICD-10-CM

## 2021-06-25 DIAGNOSIS — Z98.890 OTHER SPECIFIED POSTPROCEDURAL STATES: Chronic | ICD-10-CM

## 2021-06-25 DIAGNOSIS — E78.00 PURE HYPERCHOLESTEROLEMIA, UNSPECIFIED: ICD-10-CM

## 2021-06-25 DIAGNOSIS — Z88.8 ALLERGY STATUS TO OTHER DRUGS, MEDICAMENTS AND BIOLOGICAL SUBSTANCES STATUS: ICD-10-CM

## 2021-06-25 DIAGNOSIS — E78.5 HYPERLIPIDEMIA, UNSPECIFIED: ICD-10-CM

## 2021-06-25 DIAGNOSIS — I73.9 PERIPHERAL VASCULAR DISEASE, UNSPECIFIED: ICD-10-CM

## 2021-06-25 DIAGNOSIS — Z99.89 DEPENDENCE ON OTHER ENABLING MACHINES AND DEVICES: ICD-10-CM

## 2021-06-25 DIAGNOSIS — G47.33 OBSTRUCTIVE SLEEP APNEA (ADULT) (PEDIATRIC): ICD-10-CM

## 2021-06-25 DIAGNOSIS — Z79.84 LONG TERM (CURRENT) USE OF ORAL HYPOGLYCEMIC DRUGS: ICD-10-CM

## 2021-06-25 DIAGNOSIS — L03.213 PERIORBITAL CELLULITIS: ICD-10-CM

## 2021-06-25 DIAGNOSIS — H57.12 OCULAR PAIN, LEFT EYE: ICD-10-CM

## 2021-06-25 DIAGNOSIS — K21.9 GASTRO-ESOPHAGEAL REFLUX DISEASE WITHOUT ESOPHAGITIS: ICD-10-CM

## 2021-06-25 DIAGNOSIS — Z79.899 OTHER LONG TERM (CURRENT) DRUG THERAPY: ICD-10-CM

## 2021-06-25 DIAGNOSIS — I10 ESSENTIAL (PRIMARY) HYPERTENSION: ICD-10-CM

## 2021-06-25 DIAGNOSIS — M19.90 UNSPECIFIED OSTEOARTHRITIS, UNSPECIFIED SITE: ICD-10-CM

## 2021-06-25 DIAGNOSIS — E11.9 TYPE 2 DIABETES MELLITUS WITHOUT COMPLICATIONS: ICD-10-CM

## 2021-06-25 LAB
ALBUMIN SERPL ELPH-MCNC: 4.8 G/DL — SIGNIFICANT CHANGE UP (ref 3.5–5.2)
ALP SERPL-CCNC: 109 U/L — SIGNIFICANT CHANGE UP (ref 30–115)
ALT FLD-CCNC: 43 U/L — HIGH (ref 0–41)
ANION GAP SERPL CALC-SCNC: 11 MMOL/L — SIGNIFICANT CHANGE UP (ref 7–14)
AST SERPL-CCNC: 25 U/L — SIGNIFICANT CHANGE UP (ref 0–41)
BASOPHILS # BLD AUTO: 0.05 K/UL — SIGNIFICANT CHANGE UP (ref 0–0.2)
BASOPHILS NFR BLD AUTO: 0.9 % — SIGNIFICANT CHANGE UP (ref 0–1)
BILIRUB SERPL-MCNC: <0.2 MG/DL — SIGNIFICANT CHANGE UP (ref 0.2–1.2)
BUN SERPL-MCNC: 13 MG/DL — SIGNIFICANT CHANGE UP (ref 10–20)
CALCIUM SERPL-MCNC: 9.6 MG/DL — SIGNIFICANT CHANGE UP (ref 8.5–10.1)
CHLORIDE SERPL-SCNC: 100 MMOL/L — SIGNIFICANT CHANGE UP (ref 98–110)
CO2 SERPL-SCNC: 27 MMOL/L — SIGNIFICANT CHANGE UP (ref 17–32)
CREAT SERPL-MCNC: 0.7 MG/DL — SIGNIFICANT CHANGE UP (ref 0.7–1.5)
EOSINOPHIL # BLD AUTO: 0.24 K/UL — SIGNIFICANT CHANGE UP (ref 0–0.7)
EOSINOPHIL NFR BLD AUTO: 4.4 % — SIGNIFICANT CHANGE UP (ref 0–8)
GIANT PLATELETS BLD QL SMEAR: PRESENT — SIGNIFICANT CHANGE UP
GLUCOSE SERPL-MCNC: 380 MG/DL — HIGH (ref 70–99)
HCT VFR BLD CALC: 37.7 % — SIGNIFICANT CHANGE UP (ref 37–47)
HGB BLD-MCNC: 12.5 G/DL — SIGNIFICANT CHANGE UP (ref 12–16)
LYMPHOCYTES # BLD AUTO: 1.4 K/UL — SIGNIFICANT CHANGE UP (ref 1.2–3.4)
LYMPHOCYTES # BLD AUTO: 25.6 % — SIGNIFICANT CHANGE UP (ref 20.5–51.1)
MANUAL SMEAR VERIFICATION: SIGNIFICANT CHANGE UP
MCHC RBC-ENTMCNC: 29.5 PG — SIGNIFICANT CHANGE UP (ref 27–31)
MCHC RBC-ENTMCNC: 33.2 G/DL — SIGNIFICANT CHANGE UP (ref 32–37)
MCV RBC AUTO: 88.9 FL — SIGNIFICANT CHANGE UP (ref 81–99)
MONOCYTES # BLD AUTO: 0.39 K/UL — SIGNIFICANT CHANGE UP (ref 0.1–0.6)
MONOCYTES NFR BLD AUTO: 7.1 % — SIGNIFICANT CHANGE UP (ref 1.7–9.3)
NEUTROPHILS # BLD AUTO: 3.28 K/UL — SIGNIFICANT CHANGE UP (ref 1.4–6.5)
NEUTROPHILS NFR BLD AUTO: 60.2 % — SIGNIFICANT CHANGE UP (ref 42.2–75.2)
NEUTS HYPERSEG # BLD: PRESENT — SIGNIFICANT CHANGE UP
PLAT MORPH BLD: NORMAL — SIGNIFICANT CHANGE UP
PLATELET # BLD AUTO: 318 K/UL — SIGNIFICANT CHANGE UP (ref 130–400)
POLYCHROMASIA BLD QL SMEAR: SLIGHT — SIGNIFICANT CHANGE UP
POTASSIUM SERPL-MCNC: 4.5 MMOL/L — SIGNIFICANT CHANGE UP (ref 3.5–5)
POTASSIUM SERPL-SCNC: 4.5 MMOL/L — SIGNIFICANT CHANGE UP (ref 3.5–5)
PROMYELOCYTES # FLD: 0.9 % — HIGH (ref 0–0)
PROT SERPL-MCNC: 7 G/DL — SIGNIFICANT CHANGE UP (ref 6–8)
RBC # BLD: 4.24 M/UL — SIGNIFICANT CHANGE UP (ref 4.2–5.4)
RBC # FLD: 12.4 % — SIGNIFICANT CHANGE UP (ref 11.5–14.5)
RBC BLD AUTO: NORMAL — SIGNIFICANT CHANGE UP
SODIUM SERPL-SCNC: 138 MMOL/L — SIGNIFICANT CHANGE UP (ref 135–146)
VARIANT LYMPHS # BLD: 0.9 % — SIGNIFICANT CHANGE UP (ref 0–5)
WBC # BLD: 5.45 K/UL — SIGNIFICANT CHANGE UP (ref 4.8–10.8)
WBC # FLD AUTO: 5.45 K/UL — SIGNIFICANT CHANGE UP (ref 4.8–10.8)

## 2021-06-25 PROCEDURE — 70481 CT ORBIT/EAR/FOSSA W/DYE: CPT | Mod: 26,MA

## 2021-06-25 PROCEDURE — 99285 EMERGENCY DEPT VISIT HI MDM: CPT

## 2021-06-25 RX ORDER — SODIUM CHLORIDE 9 MG/ML
1000 INJECTION, SOLUTION INTRAVENOUS ONCE
Refills: 0 | Status: COMPLETED | OUTPATIENT
Start: 2021-06-25 | End: 2021-06-25

## 2021-06-25 RX ORDER — ACETAMINOPHEN 500 MG
650 TABLET ORAL ONCE
Refills: 0 | Status: COMPLETED | OUTPATIENT
Start: 2021-06-25 | End: 2021-06-25

## 2021-06-25 RX ORDER — MORPHINE SULFATE 50 MG/1
4 CAPSULE, EXTENDED RELEASE ORAL ONCE
Refills: 0 | Status: DISCONTINUED | OUTPATIENT
Start: 2021-06-25 | End: 2021-06-25

## 2021-06-25 RX ORDER — METOCLOPRAMIDE HCL 10 MG
10 TABLET ORAL ONCE
Refills: 0 | Status: COMPLETED | OUTPATIENT
Start: 2021-06-25 | End: 2021-06-25

## 2021-06-25 RX ORDER — AMPICILLIN SODIUM AND SULBACTAM SODIUM 250; 125 MG/ML; MG/ML
3 INJECTION, POWDER, FOR SUSPENSION INTRAMUSCULAR; INTRAVENOUS ONCE
Refills: 0 | Status: COMPLETED | OUTPATIENT
Start: 2021-06-25 | End: 2021-06-25

## 2021-06-25 RX ADMIN — Medication 10 MILLIGRAM(S): at 10:56

## 2021-06-25 RX ADMIN — MORPHINE SULFATE 4 MILLIGRAM(S): 50 CAPSULE, EXTENDED RELEASE ORAL at 10:55

## 2021-06-25 RX ADMIN — MORPHINE SULFATE 4 MILLIGRAM(S): 50 CAPSULE, EXTENDED RELEASE ORAL at 09:30

## 2021-06-25 RX ADMIN — AMPICILLIN SODIUM AND SULBACTAM SODIUM 200 GRAM(S): 250; 125 INJECTION, POWDER, FOR SUSPENSION INTRAMUSCULAR; INTRAVENOUS at 10:25

## 2021-06-25 RX ADMIN — SODIUM CHLORIDE 1000 MILLILITER(S): 9 INJECTION, SOLUTION INTRAVENOUS at 09:00

## 2021-06-25 NOTE — ED PROVIDER NOTE - NS ED ROS FT
Eyes:  + left periorbital redness, swelling, pain. No visual changes or discharge  ENMT:  No hearing changes, pain, no sore throat or runny nose, no difficulty swallowing  Cardiac:  No chest pain, SOB or edema. No chest pain with exertion.  Respiratory:  No cough or respiratory distress. No hemoptysis.  GI:  No nausea, vomiting, diarrhea or abdominal pain.  :  No dysuria, frequency or burning.  MS:  No myalgia, muscle weakness, joint pain or back pain.  Neuro:  No headache or weakness.  No LOC.  Skin:  + redness, swelling, pain to left periorbital region and lower left forehead   Endocrine: No history of thyroid disease

## 2021-06-25 NOTE — ED ADULT NURSE NOTE - OBJECTIVE STATEMENT
Pt presented with c/o R eye pain. Pt states eye felt like there was something in it, states she kept rubbing her eye. Pt states that pain started to occur 2d ago. Denies changes in vision/headache.

## 2021-06-25 NOTE — ED PROVIDER NOTE - PHYSICAL EXAMINATION
CONSTITUTIONAL: Well-developed; well-nourished; in no acute distress.   SKIN: + erythematous, indurated skin to the left orbit, worst in superior aspect, with extension to left mid-forehead with severe tenderness to palpation. No fluctuance palpable, no purulent drainage/tract, no laceration/abrasion.  HEAD: Normocephalic; atraumatic.  EYES: + pain on extraocular movements. PERRL, EOMI, visual acuity 20/20 bilaterally, no proptosis  ENT: No oropharynx or tonsillar erythema/purulence/abscess. No auricular or mastoid TTP, edema, or erythema. No nasal discharge; airway clear.  NECK: Supple; non tender.  CARD: S1, S2 normal; no murmurs, gallops, or rubs. Regular rate and rhythm.   RESP: No wheezes, rales or rhonchi.  ABD: soft ntnd  EXT: Normal ROM.  No clubbing, cyanosis or edema.   LYMPH: No acute cervical adenopathy.  NEURO: Alert, oriented, grossly unremarkable  PSYCH: Cooperative, appropriate.

## 2021-06-25 NOTE — ED PROVIDER NOTE - NSFOLLOWUPINSTRUCTIONS_ED_ALL_ED_FT
Preseptal Cellulitis    Preseptal cellulitis—also called periorbital cellulitis—is an infection that can affect your eyelid and the soft tissues or skin that surround your eye. The infection may also affect the structures that produce and drain your tears. It does not affect your eye itself.     CAUSES  This condition may be caused by:    Bacterial infection.  Long-term (chronic) sinus infections.  An object (foreign body) that is stuck behind the eye.  An injury that:  Goes through the eyelid tissues.  Causes an infection, such as an insect sting.  Fracture of the bone around the eye.  Infections that have spread from the eyelid or other structures around the eye.  Bite wounds.  Inflammation or infection of the lining membranes of the brain (meningitis).  An infection in the blood (septicemia).  Dental infection (abscess).  Viral infection. This is rare.    RISK FACTORS  Risk factors for preseptal cellulitis include:    Participating in activities that increase your risk of trauma to the face or head, such as boxing or high-speed activities.  Having a weakened defense system (immune system).  Medical conditions, such as nasal polyps, that increase your risk for frequent or recurrent sinus infections.  Not receiving regular dental care.    SYMPTOMS  Symptoms of this condition usually come on suddenly. Symptoms may include:    Red, hot, and swollen eyelids.  Fever.  Difficulty opening your eye.  Eye pain.    DIAGNOSIS  This condition may be diagnosed by an eye exam. You may also have tests, such as:    Blood tests.  CT scan.  MRI.  Spinal tap (lumbar puncture). This is a procedure that involves removing and examining a small amount of the fluid that surrounds the brain and spinal cord. This checks for meningitis.    TREATMENT  Treatment for this condition will include antibiotic medicines. These may be given by mouth (orally), through an IV, or as a shot. Your health care provider may also recommend nasal decongestants to reduce swelling.    HOME CARE INSTRUCTIONS  Take your antibiotic medicine as directed by your health care provider. Finish all of it even if you start to feel better.  Take medicines only as directed by your health care provider.  Drink enough fluid to keep your urine clear or pale yellow.  Do not use any tobacco products, including cigarettes, chewing tobacco, or electronic cigarettes. If you need help quitting, ask your health care provider.  Keep all follow-up visits as directed by your health care provider. These include any visits with an eye specialist (ophthalmologist) or dentist.    SEEK MEDICAL CARE IF:  You have a fever.  Your eyelids become more red, warm, or swollen.  You have new symptoms.  Your symptoms do not get better with treatment.    SEEK IMMEDIATE MEDICAL CARE IF:  You develop double vision, or your vision becomes blurred or worsens in any way.  You have trouble moving your eyes.  Your eye looks like it is sticking out or bulging out (proptosis).  You develop a severe headache, severe neck pain, or neck stiffness.  You develop repeated vomiting.    ADDITIONAL NOTES AND INSTRUCTIONS    Please follow up with your Primary MD in 24-48 hr.  Seek immediate medical care for any new/worsening signs or symptoms.

## 2021-06-25 NOTE — ED PROVIDER NOTE - CLINICAL SUMMARY MEDICAL DECISION MAKING FREE TEXT BOX
CT shows no orbital cellulitis, (+) dacrocystitis.  No abscess.  IV abx given in ED.  D/C with PO Abx and f/u with Ophtho.  Strict return instructions discussed.

## 2021-06-25 NOTE — ED PROVIDER NOTE - PROGRESS NOTE DETAILS
TD: Pt reassessed, states her eye pain is worse than on arrival despite receiving tylenol and morphine 4mg 1h ago. Pt still receiving IV abx. Will give additional morphine + reglan, reassess. Pt informed of results of CT and plan for discharge including oral abx as outpt. TD: Abx finished running, pt feeling better with resolution of headache. Discussed discharge with oral abx, discussed return precautions, pt indicates understanding and agreement with plan, ready for dc.

## 2021-06-25 NOTE — ED PROVIDER NOTE - PATIENT PORTAL LINK FT
You can access the FollowMyHealth Patient Portal offered by [facility] by registering at the following website: [email or web address]. By joining [facility]’s FollowMyHealth portal, you will also be able to view your health information using other applications (apps) compatible with our system.

## 2021-06-25 NOTE — ED ADULT TRIAGE NOTE - IDEAL BODY WEIGHT(KG)
Date of Service: 05/24/2021    SUBJECTIVE:  Sharon has had a Ruiz cyst aspirated.  She has had knee pain, which is going on for some time.  She has also been on steroids for Medrol Dosepak.    Her MRI is reviewed.  This does show what looks like a tear of her medial meniscus, best seen on T2 coronal images.  She does have a Baker cyst.    PHYSICAL EXAMINATION:  She does have medial joint line tenderness.    ASSESSMENT:  A 60-year-old female that has had knee pain that has been going on since at least a couple months.  She has been on steroids.  She has had an aspiration of the Baker cyst and now has a meniscus tear.    RECOMMENDATION:  We are going to go ahead and do an arthroscopic meniscectomy and she has had this done on the other side with good results.  We did go over risks including infection, blood clots, risks from anesthesia and persistent pain.  We are going to plan on doing it in June.  She is seeing Dr. Mercedes Cao in June before this, which will count as her preoperative physical.      Dictated By: Chet Madison MD  Signing Provider: Chet Madison MD    OD/msc ([id number])  DD: 05/24/2021 13:17:42 TD: 05/25/2021 04:29:46    Copy Sent To:     cc: Mercedes Cao MD   50

## 2021-06-25 NOTE — ED PROVIDER NOTE - OBJECTIVE STATEMENT
Pt is a 56F with a pmhx of HTN, HLD, DM2, and TRAVIS presenting with left eye swelling and pain. Pt states her symptoms began 3 days ago, gradually worsening in severity, initially localized to upper eyelid now spreading to mid forehead Pt is a 56F with a pmhx of HTN, HLD, DM2, and TRAVIS presenting with left eye swelling and pain. Pt states her symptoms began 3 days ago with mild pain, redness, and swelling which have gradually worsened in severity since that time. Initially her symptoms were localized to the left upper eyelid but the affected area subsequently spread outward to the level of mid forehead and laterally to her temple. No blurry or double vision, fevers/chills/myalgias, ear pain or decreased hearing, sore throat/difficulty swallowing, cough/SOB, n/v/d, or stiff neck.

## 2021-06-25 NOTE — ED PROVIDER NOTE - ATTENDING CONTRIBUTION TO CARE
55 y/o female with hx of HTN, NIDDM, TRAVIS, dyslipidemia presents to ED with left eye swelling and pain with EOM x 3 days.  No fever or chills.  No blurry or double vision.  No HA, numbness or tingling.  PE:  agree with above.  A/P:  Preseptal vs. Orbital Cellulitis.  Labs, Meds and Imaging.

## 2021-06-28 ENCOUNTER — INPATIENT (INPATIENT)
Facility: HOSPITAL | Age: 56
LOS: 7 days | Discharge: HOME | End: 2021-07-06
Attending: HOSPITALIST | Admitting: HOSPITALIST
Payer: COMMERCIAL

## 2021-06-28 VITALS
HEIGHT: 62 IN | RESPIRATION RATE: 18 BRPM | TEMPERATURE: 97 F | DIASTOLIC BLOOD PRESSURE: 87 MMHG | OXYGEN SATURATION: 99 % | HEART RATE: 84 BPM | SYSTOLIC BLOOD PRESSURE: 211 MMHG

## 2021-06-28 DIAGNOSIS — Z98.890 OTHER SPECIFIED POSTPROCEDURAL STATES: Chronic | ICD-10-CM

## 2021-06-28 LAB
ALBUMIN SERPL ELPH-MCNC: 4.3 G/DL — SIGNIFICANT CHANGE UP (ref 3.5–5.2)
ALP SERPL-CCNC: 62 U/L — SIGNIFICANT CHANGE UP (ref 30–115)
ALT FLD-CCNC: 49 U/L — HIGH (ref 0–41)
ANION GAP SERPL CALC-SCNC: 16 MMOL/L — HIGH (ref 7–14)
AST SERPL-CCNC: 47 U/L — HIGH (ref 0–41)
BASOPHILS # BLD AUTO: 0.03 K/UL — SIGNIFICANT CHANGE UP (ref 0–0.2)
BASOPHILS NFR BLD AUTO: 0.2 % — SIGNIFICANT CHANGE UP (ref 0–1)
BILIRUB SERPL-MCNC: 0.4 MG/DL — SIGNIFICANT CHANGE UP (ref 0.2–1.2)
BUN SERPL-MCNC: 11 MG/DL — SIGNIFICANT CHANGE UP (ref 10–20)
CALCIUM SERPL-MCNC: 9.7 MG/DL — SIGNIFICANT CHANGE UP (ref 8.5–10.1)
CHLORIDE SERPL-SCNC: 91 MMOL/L — LOW (ref 98–110)
CO2 SERPL-SCNC: 24 MMOL/L — SIGNIFICANT CHANGE UP (ref 17–32)
CREAT SERPL-MCNC: 1 MG/DL — SIGNIFICANT CHANGE UP (ref 0.7–1.5)
EOSINOPHIL # BLD AUTO: 0.11 K/UL — SIGNIFICANT CHANGE UP (ref 0–0.7)
EOSINOPHIL NFR BLD AUTO: 0.7 % — SIGNIFICANT CHANGE UP (ref 0–8)
GLUCOSE SERPL-MCNC: 82 MG/DL — SIGNIFICANT CHANGE UP (ref 70–99)
HCT VFR BLD CALC: 45.8 % — SIGNIFICANT CHANGE UP (ref 37–47)
HGB BLD-MCNC: 15.5 G/DL — SIGNIFICANT CHANGE UP (ref 12–16)
IMM GRANULOCYTES NFR BLD AUTO: 1 % — HIGH (ref 0.1–0.3)
LACTATE SERPL-SCNC: 1.4 MMOL/L — SIGNIFICANT CHANGE UP (ref 0.7–2)
LYMPHOCYTES # BLD AUTO: 12.3 % — LOW (ref 20.5–51.1)
LYMPHOCYTES # BLD AUTO: 2.03 K/UL — SIGNIFICANT CHANGE UP (ref 1.2–3.4)
MCHC RBC-ENTMCNC: 31.4 PG — HIGH (ref 27–31)
MCHC RBC-ENTMCNC: 33.8 G/DL — SIGNIFICANT CHANGE UP (ref 32–37)
MCV RBC AUTO: 92.9 FL — SIGNIFICANT CHANGE UP (ref 81–99)
MONOCYTES # BLD AUTO: 1.12 K/UL — HIGH (ref 0.1–0.6)
MONOCYTES NFR BLD AUTO: 6.8 % — SIGNIFICANT CHANGE UP (ref 1.7–9.3)
NEUTROPHILS # BLD AUTO: 13.06 K/UL — HIGH (ref 1.4–6.5)
NEUTROPHILS NFR BLD AUTO: 79 % — HIGH (ref 42.2–75.2)
NRBC # BLD: 0 /100 WBCS — SIGNIFICANT CHANGE UP (ref 0–0)
PLATELET # BLD AUTO: 279 K/UL — SIGNIFICANT CHANGE UP (ref 130–400)
POTASSIUM SERPL-MCNC: 4.8 MMOL/L — SIGNIFICANT CHANGE UP (ref 3.5–5)
POTASSIUM SERPL-SCNC: 4.8 MMOL/L — SIGNIFICANT CHANGE UP (ref 3.5–5)
PROT SERPL-MCNC: 7.7 G/DL — SIGNIFICANT CHANGE UP (ref 6–8)
RBC # BLD: 4.93 M/UL — SIGNIFICANT CHANGE UP (ref 4.2–5.4)
RBC # FLD: 13.2 % — SIGNIFICANT CHANGE UP (ref 11.5–14.5)
SARS-COV-2 RNA SPEC QL NAA+PROBE: SIGNIFICANT CHANGE UP
SODIUM SERPL-SCNC: 131 MMOL/L — LOW (ref 135–146)
WBC # BLD: 16.51 K/UL — HIGH (ref 4.8–10.8)
WBC # FLD AUTO: 16.51 K/UL — HIGH (ref 4.8–10.8)

## 2021-06-28 PROCEDURE — 70481 CT ORBIT/EAR/FOSSA W/DYE: CPT | Mod: 26,MA

## 2021-06-28 PROCEDURE — 99285 EMERGENCY DEPT VISIT HI MDM: CPT

## 2021-06-28 RX ORDER — AMPICILLIN SODIUM AND SULBACTAM SODIUM 250; 125 MG/ML; MG/ML
3 INJECTION, POWDER, FOR SUSPENSION INTRAMUSCULAR; INTRAVENOUS ONCE
Refills: 0 | Status: COMPLETED | OUTPATIENT
Start: 2021-06-28 | End: 2021-06-28

## 2021-06-28 RX ORDER — MORPHINE SULFATE 50 MG/1
4 CAPSULE, EXTENDED RELEASE ORAL ONCE
Refills: 0 | Status: DISCONTINUED | OUTPATIENT
Start: 2021-06-28 | End: 2021-06-28

## 2021-06-28 RX ORDER — METRONIDAZOLE 500 MG
500 TABLET ORAL ONCE
Refills: 0 | Status: COMPLETED | OUTPATIENT
Start: 2021-06-28 | End: 2021-06-28

## 2021-06-28 RX ORDER — HYDROMORPHONE HYDROCHLORIDE 2 MG/ML
0.5 INJECTION INTRAMUSCULAR; INTRAVENOUS; SUBCUTANEOUS ONCE
Refills: 0 | Status: DISCONTINUED | OUTPATIENT
Start: 2021-06-28 | End: 2021-06-28

## 2021-06-28 RX ORDER — CEFTRIAXONE 500 MG/1
1000 INJECTION, POWDER, FOR SOLUTION INTRAMUSCULAR; INTRAVENOUS ONCE
Refills: 0 | Status: COMPLETED | OUTPATIENT
Start: 2021-06-28 | End: 2021-06-28

## 2021-06-28 RX ORDER — MORPHINE SULFATE 50 MG/1
6 CAPSULE, EXTENDED RELEASE ORAL ONCE
Refills: 0 | Status: DISCONTINUED | OUTPATIENT
Start: 2021-06-28 | End: 2021-06-28

## 2021-06-28 RX ORDER — VANCOMYCIN HCL 1 G
1250 VIAL (EA) INTRAVENOUS ONCE
Refills: 0 | Status: COMPLETED | OUTPATIENT
Start: 2021-06-28 | End: 2021-06-28

## 2021-06-28 RX ADMIN — MORPHINE SULFATE 4 MILLIGRAM(S): 50 CAPSULE, EXTENDED RELEASE ORAL at 20:21

## 2021-06-28 RX ADMIN — CEFTRIAXONE 100 MILLIGRAM(S): 500 INJECTION, POWDER, FOR SOLUTION INTRAMUSCULAR; INTRAVENOUS at 20:05

## 2021-06-28 RX ADMIN — MORPHINE SULFATE 4 MILLIGRAM(S): 50 CAPSULE, EXTENDED RELEASE ORAL at 14:31

## 2021-06-28 RX ADMIN — Medication 166.67 MILLIGRAM(S): at 16:13

## 2021-06-28 RX ADMIN — MORPHINE SULFATE 4 MILLIGRAM(S): 50 CAPSULE, EXTENDED RELEASE ORAL at 13:48

## 2021-06-28 RX ADMIN — HYDROMORPHONE HYDROCHLORIDE 0.5 MILLIGRAM(S): 2 INJECTION INTRAMUSCULAR; INTRAVENOUS; SUBCUTANEOUS at 20:36

## 2021-06-28 RX ADMIN — Medication 100 MILLIGRAM(S): at 21:18

## 2021-06-28 RX ADMIN — AMPICILLIN SODIUM AND SULBACTAM SODIUM 200 GRAM(S): 250; 125 INJECTION, POWDER, FOR SUSPENSION INTRAMUSCULAR; INTRAVENOUS at 13:54

## 2021-06-28 RX ADMIN — HYDROMORPHONE HYDROCHLORIDE 0.5 MILLIGRAM(S): 2 INJECTION INTRAMUSCULAR; INTRAVENOUS; SUBCUTANEOUS at 15:51

## 2021-06-28 RX ADMIN — HYDROMORPHONE HYDROCHLORIDE 0.5 MILLIGRAM(S): 2 INJECTION INTRAMUSCULAR; INTRAVENOUS; SUBCUTANEOUS at 20:21

## 2021-06-28 NOTE — ED PROVIDER NOTE - OBJECTIVE STATEMENT
56y F pmh arthritis, asthma, DM, GERD, HLD, PAD, HTN, TRAVIS presents for eval L eye cellulitis. Pt has severe sharp L sided face and eye pain x1wk, aggravated with eye movement, no relieving factors. Pt was seen x4 days ago given unasyn, (-) CT, sent home on Augmentin and optho fu. Pt was seen by Opthalmology Dr. Villalobos today with worsening symptoms. Denies fever, ha, cp, sob, weakness, numbness, dysuria, hematuria, n/v/d/c

## 2021-06-28 NOTE — ED PROVIDER NOTE - NS ED ROS FT
Constitutional: (-) fever  Eyes/ENT: (+) L eye pain, (+) blurry vision, (-) epistaxis  Cardiovascular: (-) chest pain, (-) syncope  Respiratory: (-) cough, (-) shortness of breath  Gastrointestinal: (-) vomiting, (-) diarrhea  : (-) dysuria, (-) hematuria  Musculoskeletal: (-) neck pain, (-) back pain, (-) joint pain  Integumentary: (+) redness, (+) swelling  Neurological: (-) headache, (-) altered mental status  Allergic/Immunologic: (-) pruritus

## 2021-06-28 NOTE — ED PROVIDER NOTE - PHYSICAL EXAMINATION
CONST: Pt appears uncomfortable  EYES: Pain with L eye EOM. L orbits erythematous and edematous. L eye chemosis   ENT: No nasal discharge. Oropharynx normal appearing, no erythema or exudates. No abscess or swelling. Uvula midline.   NECK: Non-tender, no meningeal signs. normal ROM. supple   CARD: S1 S2; No jvd  RESP: Equal BS B/L, No wheezes, rhonchi or rales. No distress  GI: Soft, non-tender, non-distended. normal BS  MS: Normal ROM in all extremities. pulses 2 +. no calf tenderness or swelling  SKIN: Warm, dry, no acute rashes. Good turgor  NEURO: A&Ox3, No focal deficits. Strength 5/5 with no sensory deficits. Steady gait.

## 2021-06-28 NOTE — ED PROVIDER NOTE - ATTENDING CONTRIBUTION TO CARE
Pt sent in by ophtamology.  pt with orbital cellulitis.  pt with history of DM.  pt was seen here a few days ago and given abx and dc home after ct scan with oral abx.  pt followed up with opthamology the following day.  however, the pain and symptoms were getting worse and her vision in the left eye was getting worse.  Pt followed up with opthalmology today and was sent to the ED for iv abx and imaging.    awake, alert.  neck supple.  Left eye has significant periorbital edema.  pt unable to open eye.  Pt is able to move the eyes but has pain with movement.  pt had a full exam and sonogram done by Opthamologist prior to arrival.  p: ct, labs, iv abx, contact ophtamology, admission

## 2021-06-28 NOTE — ED ADULT TRIAGE NOTE - CHIEF COMPLAINT QUOTE
pt sent for left periorbital cellulitis, initially on IV antibiotics with improvement now having worsening pain radiating to back of head

## 2021-06-28 NOTE — ED PROVIDER NOTE - CLINICAL SUMMARY MEDICAL DECISION MAKING FREE TEXT BOX
Pt sent in by opthalmology.  pt with orbital cellulitis.  pt with history of DM.  pt was seen here a few days ago and given abx and dc home after ct scan with oral abx for pre-septal cellulitis.  pt followed up with opthalmology the following day.  however, the pain and symptoms were getting worse and her vision in the left eye was getting worse.  Pt followed up with opthalmology today and was sent to the ED for iv abx and imaging.  Opthamology was concerned for orbital cellulitis and wanted iv abx, admission, close opthamology follow up.     Left eye has significant periorbital edema.  pt unable to open eye.  Pt is able to move the eyes but has pain with movement.  pt had a exam and sonogram done by Onomatologist prior to arrival.  DALILA Stahl called the opthamologist who sent the patient in to discuss the case.  Labs with elevated WBC.  pt given broad spectrum iv abx to cover for orbital cellulitis.  Opthamology was consulted.  CT done.  Pt was admitted to medicine.  I discussed the case with opthamology resident on call who then came to the hospital to evaluate the patient.

## 2021-06-28 NOTE — CONSULT NOTE ADULT - ASSESSMENT
1. Left eyelid swelling s/p failed outpt treatment of preseptal cellulitis   - differential dx includes infectious vs inflammatory dacryoadenitis vs IOI  - CT with subtle asymmetric enhancement of the left lacrimal gland questionable for mild left dacryoadenitis   - WBC 16.5, afebrile  - VA 20/40-2, no obvious apd, CP 11/12, EOM -2 adduction likely 2/2 chemosis   - s/p zosyn, vanc, metronidazole, unasyn in ED, admitted to medicine  - agree with broad spectrum abx   - given acute dacryoadenitis is most commonly inflammatory would send work up including: ACE, lysozyme, IgG4 panel, C anca, P anca, quant gold, T3, T4, TSH, TSI, AUDREY, SSA/SSB, SPEP and consider steroids after abx course     2. Elevated IOP OS  - IOP OS 36  - given 4 rounds of brimonidine and timolol IOP down to 24  - would continue IOP lowering drops: latanoprost qhs, combigan bid, azopt tid left eye     Please re-consult ophthalmology 6/29/21    Roxanne Ruiz, PGY2

## 2021-06-28 NOTE — CONSULT NOTE ADULT - SUBJECTIVE AND OBJECTIVE BOX
HPI: 57 yo F pmh DM, HTN, HLD, asthma poh ref error sent to ED by outside ophthalmologist for concern for orbital cellulitis. Pt states 1.5 weeks ago she felt itching around left eye. 3 days prior she presented to ED and was diagnosed with preseptal cellulitis, treated with augmentin, followed by outside ophthalmologist Dr. Villalobos 2 days prior. Yesterday pt noted worsening swelling around eyes and blurry vision, seen by Dr. Villalobos today who sent to ED for imaging. Pt states mildly blurry vision and pain of left eye, extreme tenderness to palpation. Endorses chronic floaters, denies flashes or loss of vision.     NVAcc 20/30-2 OD, 20/50-2 ph 20/40-2  T: 18/36  P: constricted, min reactive ou, no obvious APD    CP: 10/12 od, 11/12 os    EOM: full od, -2 adduction os     PLE  L/L: wnl od, edema KANDIS no masses palpated, extreme tenderness to palpation os   C/S: injection inf od, chemosis nasal, inf, temp os  K: clear ou   I: round ou   AC: formed ou   L: poor view ou    DFE deferred 2/2 elevated IOP os, no SLE to determine angle     CT Orbits 6/28/21  Findings:  Orbits:  There is a questionable subtle enhancement of the left preorbital soft tissue and the left lacrimal gland.    Globes: Normal.  The lenses are normally located.  Retrobulbar fat: Normal.  Extraocular muscles: Normal.  Optic nerve sheath complexes: Normal.    Skin and subcutaneous soft tissues: Minimal left preorbital soft tissue enhancement without swelling.    Osseous structures: No fracture, dislocation or destructive lesion.    Paranasal sinuses: Clear.  Mastoid air cells: Clear.    Partially visualized intracranial structures: Normal.      Impression:    Subtle asymmetric enhancement of the left lacrimal gland and left preorbital soft tissue questionable for mild left dacryoadenitis. No drainable fluid collection.

## 2021-06-29 LAB
A1C WITH ESTIMATED AVERAGE GLUCOSE RESULT: 10.5 % — HIGH (ref 4–5.6)
ALBUMIN SERPL ELPH-MCNC: 4.4 G/DL — SIGNIFICANT CHANGE UP (ref 3.5–5.2)
ALP SERPL-CCNC: 106 U/L — SIGNIFICANT CHANGE UP (ref 30–115)
ALT FLD-CCNC: 33 U/L — SIGNIFICANT CHANGE UP (ref 0–41)
ANION GAP SERPL CALC-SCNC: 12 MMOL/L — SIGNIFICANT CHANGE UP (ref 7–14)
AST SERPL-CCNC: 24 U/L — SIGNIFICANT CHANGE UP (ref 0–41)
BASOPHILS # BLD AUTO: 0.02 K/UL — SIGNIFICANT CHANGE UP (ref 0–0.2)
BASOPHILS NFR BLD AUTO: 0.3 % — SIGNIFICANT CHANGE UP (ref 0–1)
BILIRUB SERPL-MCNC: 0.2 MG/DL — SIGNIFICANT CHANGE UP (ref 0.2–1.2)
BUN SERPL-MCNC: 10 MG/DL — SIGNIFICANT CHANGE UP (ref 10–20)
CALCIUM SERPL-MCNC: 9.1 MG/DL — SIGNIFICANT CHANGE UP (ref 8.5–10.1)
CHLORIDE SERPL-SCNC: 96 MMOL/L — LOW (ref 98–110)
CO2 SERPL-SCNC: 27 MMOL/L — SIGNIFICANT CHANGE UP (ref 17–32)
COVID-19 SPIKE DOMAIN AB INTERP: POSITIVE
COVID-19 SPIKE DOMAIN ANTIBODY RESULT: >250 U/ML — HIGH
CREAT SERPL-MCNC: 0.6 MG/DL — LOW (ref 0.7–1.5)
DSDNA AB FLD-ACNC: <0.2 AI — SIGNIFICANT CHANGE UP
ENA SS-A AB FLD IA-ACNC: <0.2 AI — SIGNIFICANT CHANGE UP
EOSINOPHIL # BLD AUTO: 0.18 K/UL — SIGNIFICANT CHANGE UP (ref 0–0.7)
EOSINOPHIL NFR BLD AUTO: 2.8 % — SIGNIFICANT CHANGE UP (ref 0–8)
ESTIMATED AVERAGE GLUCOSE: 255 MG/DL — HIGH (ref 68–114)
GLUCOSE BLDC GLUCOMTR-MCNC: 216 MG/DL — HIGH (ref 70–99)
GLUCOSE BLDC GLUCOMTR-MCNC: 306 MG/DL — HIGH (ref 70–99)
GLUCOSE BLDC GLUCOMTR-MCNC: 359 MG/DL — HIGH (ref 70–99)
GLUCOSE BLDC GLUCOMTR-MCNC: 369 MG/DL — HIGH (ref 70–99)
GLUCOSE BLDC GLUCOMTR-MCNC: 409 MG/DL — HIGH (ref 70–99)
GLUCOSE BLDC GLUCOMTR-MCNC: 410 MG/DL — HIGH (ref 70–99)
GLUCOSE SERPL-MCNC: 324 MG/DL — HIGH (ref 70–99)
HCT VFR BLD CALC: 36.2 % — LOW (ref 37–47)
HCV AB S/CO SERPL IA: 0.04 COI — SIGNIFICANT CHANGE UP
HCV AB SERPL-IMP: SIGNIFICANT CHANGE UP
HGB BLD-MCNC: 12.1 G/DL — SIGNIFICANT CHANGE UP (ref 12–16)
IMM GRANULOCYTES NFR BLD AUTO: 0.2 % — SIGNIFICANT CHANGE UP (ref 0.1–0.3)
LYMPHOCYTES # BLD AUTO: 1.61 K/UL — SIGNIFICANT CHANGE UP (ref 1.2–3.4)
LYMPHOCYTES # BLD AUTO: 25.4 % — SIGNIFICANT CHANGE UP (ref 20.5–51.1)
MCHC RBC-ENTMCNC: 29.8 PG — SIGNIFICANT CHANGE UP (ref 27–31)
MCHC RBC-ENTMCNC: 33.4 G/DL — SIGNIFICANT CHANGE UP (ref 32–37)
MCV RBC AUTO: 89.2 FL — SIGNIFICANT CHANGE UP (ref 81–99)
MONOCYTES # BLD AUTO: 0.42 K/UL — SIGNIFICANT CHANGE UP (ref 0.1–0.6)
MONOCYTES NFR BLD AUTO: 6.6 % — SIGNIFICANT CHANGE UP (ref 1.7–9.3)
NEUTROPHILS # BLD AUTO: 4.11 K/UL — SIGNIFICANT CHANGE UP (ref 1.4–6.5)
NEUTROPHILS NFR BLD AUTO: 64.7 % — SIGNIFICANT CHANGE UP (ref 42.2–75.2)
NRBC # BLD: 0 /100 WBCS — SIGNIFICANT CHANGE UP (ref 0–0)
PLATELET # BLD AUTO: 308 K/UL — SIGNIFICANT CHANGE UP (ref 130–400)
POTASSIUM SERPL-MCNC: 4.4 MMOL/L — SIGNIFICANT CHANGE UP (ref 3.5–5)
POTASSIUM SERPL-SCNC: 4.4 MMOL/L — SIGNIFICANT CHANGE UP (ref 3.5–5)
PROT SERPL-MCNC: 6.6 G/DL — SIGNIFICANT CHANGE UP (ref 6–8)
RBC # BLD: 4.06 M/UL — LOW (ref 4.2–5.4)
RBC # FLD: 12.5 % — SIGNIFICANT CHANGE UP (ref 11.5–14.5)
SARS-COV-2 IGG+IGM SERPL QL IA: >250 U/ML — HIGH
SARS-COV-2 IGG+IGM SERPL QL IA: POSITIVE
SODIUM SERPL-SCNC: 135 MMOL/L — SIGNIFICANT CHANGE UP (ref 135–146)
T3 SERPL-MCNC: 75 NG/DL — LOW (ref 80–200)
T4 AB SER-ACNC: 5.7 UG/DL — SIGNIFICANT CHANGE UP (ref 4.6–12)
TSH SERPL-MCNC: 0.93 UIU/ML — SIGNIFICANT CHANGE UP (ref 0.27–4.2)
WBC # BLD: 6.35 K/UL — SIGNIFICANT CHANGE UP (ref 4.8–10.8)
WBC # FLD AUTO: 6.35 K/UL — SIGNIFICANT CHANGE UP (ref 4.8–10.8)

## 2021-06-29 PROCEDURE — 99223 1ST HOSP IP/OBS HIGH 75: CPT

## 2021-06-29 PROCEDURE — 99253 IP/OBS CNSLTJ NEW/EST LOW 45: CPT

## 2021-06-29 RX ORDER — CYCLOBENZAPRINE HYDROCHLORIDE 10 MG/1
10 TABLET, FILM COATED ORAL THREE TIMES A DAY
Refills: 0 | Status: DISCONTINUED | OUTPATIENT
Start: 2021-06-29 | End: 2021-07-06

## 2021-06-29 RX ORDER — MORPHINE SULFATE 50 MG/1
2 CAPSULE, EXTENDED RELEASE ORAL EVERY 6 HOURS
Refills: 0 | Status: DISCONTINUED | OUTPATIENT
Start: 2021-06-29 | End: 2021-07-06

## 2021-06-29 RX ORDER — INSULIN LISPRO 100/ML
10 VIAL (ML) SUBCUTANEOUS ONCE
Refills: 0 | Status: COMPLETED | OUTPATIENT
Start: 2021-06-29 | End: 2021-06-29

## 2021-06-29 RX ORDER — CEFEPIME 1 G/1
2000 INJECTION, POWDER, FOR SOLUTION INTRAMUSCULAR; INTRAVENOUS EVERY 12 HOURS
Refills: 0 | Status: DISCONTINUED | OUTPATIENT
Start: 2021-06-29 | End: 2021-06-29

## 2021-06-29 RX ORDER — PANTOPRAZOLE SODIUM 20 MG/1
40 TABLET, DELAYED RELEASE ORAL
Refills: 0 | Status: DISCONTINUED | OUTPATIENT
Start: 2021-06-29 | End: 2021-07-05

## 2021-06-29 RX ORDER — DORZOLAMIDE HYDROCHLORIDE 20 MG/ML
1 SOLUTION/ DROPS OPHTHALMIC THREE TIMES A DAY
Refills: 0 | Status: DISCONTINUED | OUTPATIENT
Start: 2021-06-29 | End: 2021-07-06

## 2021-06-29 RX ORDER — INSULIN LISPRO 100/ML
VIAL (ML) SUBCUTANEOUS
Refills: 0 | Status: DISCONTINUED | OUTPATIENT
Start: 2021-06-29 | End: 2021-07-06

## 2021-06-29 RX ORDER — METOPROLOL TARTRATE 50 MG
100 TABLET ORAL
Refills: 0 | Status: DISCONTINUED | OUTPATIENT
Start: 2021-06-29 | End: 2021-07-06

## 2021-06-29 RX ORDER — METHOCARBAMOL 500 MG/1
2 TABLET, FILM COATED ORAL
Qty: 0 | Refills: 0 | DISCHARGE

## 2021-06-29 RX ORDER — TIMOLOL 0.5 %
1 DROPS OPHTHALMIC (EYE)
Refills: 0 | Status: DISCONTINUED | OUTPATIENT
Start: 2021-06-29 | End: 2021-07-06

## 2021-06-29 RX ORDER — METHOCARBAMOL 500 MG/1
500 TABLET, FILM COATED ORAL EVERY 8 HOURS
Refills: 0 | Status: DISCONTINUED | OUTPATIENT
Start: 2021-06-29 | End: 2021-07-06

## 2021-06-29 RX ORDER — MORPHINE SULFATE 50 MG/1
2 CAPSULE, EXTENDED RELEASE ORAL ONCE
Refills: 0 | Status: DISCONTINUED | OUTPATIENT
Start: 2021-06-29 | End: 2021-06-29

## 2021-06-29 RX ORDER — GABAPENTIN 400 MG/1
300 CAPSULE ORAL
Refills: 0 | Status: DISCONTINUED | OUTPATIENT
Start: 2021-06-29 | End: 2021-07-06

## 2021-06-29 RX ORDER — VANCOMYCIN HCL 1 G
1000 VIAL (EA) INTRAVENOUS EVERY 12 HOURS
Refills: 0 | Status: DISCONTINUED | OUTPATIENT
Start: 2021-06-29 | End: 2021-06-30

## 2021-06-29 RX ORDER — CHLORHEXIDINE GLUCONATE 213 G/1000ML
1 SOLUTION TOPICAL
Refills: 0 | Status: DISCONTINUED | OUTPATIENT
Start: 2021-06-29 | End: 2021-07-06

## 2021-06-29 RX ORDER — GABAPENTIN 400 MG/1
300 CAPSULE ORAL
Qty: 0 | Refills: 0 | DISCHARGE

## 2021-06-29 RX ORDER — BRIMONIDINE TARTRATE 2 MG/MG
1 SOLUTION/ DROPS OPHTHALMIC
Refills: 0 | Status: DISCONTINUED | OUTPATIENT
Start: 2021-06-29 | End: 2021-07-06

## 2021-06-29 RX ORDER — FAMOTIDINE 10 MG/ML
40 INJECTION INTRAVENOUS DAILY
Refills: 0 | Status: DISCONTINUED | OUTPATIENT
Start: 2021-06-29 | End: 2021-07-06

## 2021-06-29 RX ORDER — CLOPIDOGREL BISULFATE 75 MG/1
75 TABLET, FILM COATED ORAL DAILY
Refills: 0 | Status: DISCONTINUED | OUTPATIENT
Start: 2021-06-29 | End: 2021-07-06

## 2021-06-29 RX ORDER — INSULIN LISPRO 100/ML
9 VIAL (ML) SUBCUTANEOUS ONCE
Refills: 0 | Status: DISCONTINUED | OUTPATIENT
Start: 2021-06-29 | End: 2021-06-29

## 2021-06-29 RX ORDER — OXYCODONE AND ACETAMINOPHEN 5; 325 MG/1; MG/1
1 TABLET ORAL EVERY 6 HOURS
Refills: 0 | Status: DISCONTINUED | OUTPATIENT
Start: 2021-06-29 | End: 2021-07-06

## 2021-06-29 RX ORDER — CEFTRIAXONE 500 MG/1
2000 INJECTION, POWDER, FOR SOLUTION INTRAMUSCULAR; INTRAVENOUS EVERY 24 HOURS
Refills: 0 | Status: DISCONTINUED | OUTPATIENT
Start: 2021-06-29 | End: 2021-06-30

## 2021-06-29 RX ORDER — LATANOPROST 0.05 MG/ML
1 SOLUTION/ DROPS OPHTHALMIC; TOPICAL AT BEDTIME
Refills: 0 | Status: DISCONTINUED | OUTPATIENT
Start: 2021-06-29 | End: 2021-07-06

## 2021-06-29 RX ORDER — ENOXAPARIN SODIUM 100 MG/ML
40 INJECTION SUBCUTANEOUS AT BEDTIME
Refills: 0 | Status: DISCONTINUED | OUTPATIENT
Start: 2021-06-29 | End: 2021-07-06

## 2021-06-29 RX ORDER — ATORVASTATIN CALCIUM 80 MG/1
40 TABLET, FILM COATED ORAL AT BEDTIME
Refills: 0 | Status: DISCONTINUED | OUTPATIENT
Start: 2021-06-29 | End: 2021-07-06

## 2021-06-29 RX ADMIN — ENOXAPARIN SODIUM 40 MILLIGRAM(S): 100 INJECTION SUBCUTANEOUS at 21:54

## 2021-06-29 RX ADMIN — CEFEPIME 100 MILLIGRAM(S): 1 INJECTION, POWDER, FOR SOLUTION INTRAMUSCULAR; INTRAVENOUS at 05:39

## 2021-06-29 RX ADMIN — DORZOLAMIDE HYDROCHLORIDE 1 DROP(S): 20 SOLUTION/ DROPS OPHTHALMIC at 21:55

## 2021-06-29 RX ADMIN — MORPHINE SULFATE 2 MILLIGRAM(S): 50 CAPSULE, EXTENDED RELEASE ORAL at 17:04

## 2021-06-29 RX ADMIN — Medication 250 MILLIGRAM(S): at 17:58

## 2021-06-29 RX ADMIN — MORPHINE SULFATE 2 MILLIGRAM(S): 50 CAPSULE, EXTENDED RELEASE ORAL at 22:33

## 2021-06-29 RX ADMIN — MORPHINE SULFATE 2 MILLIGRAM(S): 50 CAPSULE, EXTENDED RELEASE ORAL at 08:44

## 2021-06-29 RX ADMIN — HYDROMORPHONE HYDROCHLORIDE 0.5 MILLIGRAM(S): 2 INJECTION INTRAMUSCULAR; INTRAVENOUS; SUBCUTANEOUS at 02:45

## 2021-06-29 RX ADMIN — PANTOPRAZOLE SODIUM 40 MILLIGRAM(S): 20 TABLET, DELAYED RELEASE ORAL at 05:40

## 2021-06-29 RX ADMIN — MORPHINE SULFATE 2 MILLIGRAM(S): 50 CAPSULE, EXTENDED RELEASE ORAL at 02:45

## 2021-06-29 RX ADMIN — OXYCODONE AND ACETAMINOPHEN 1 TABLET(S): 5; 325 TABLET ORAL at 05:38

## 2021-06-29 RX ADMIN — MORPHINE SULFATE 2 MILLIGRAM(S): 50 CAPSULE, EXTENDED RELEASE ORAL at 01:59

## 2021-06-29 RX ADMIN — LATANOPROST 1 DROP(S): 0.05 SOLUTION/ DROPS OPHTHALMIC; TOPICAL at 22:25

## 2021-06-29 RX ADMIN — GABAPENTIN 300 MILLIGRAM(S): 400 CAPSULE ORAL at 17:56

## 2021-06-29 RX ADMIN — Medication 1 DROP(S): at 05:40

## 2021-06-29 RX ADMIN — Medication 250 MILLIGRAM(S): at 05:39

## 2021-06-29 RX ADMIN — DORZOLAMIDE HYDROCHLORIDE 1 DROP(S): 20 SOLUTION/ DROPS OPHTHALMIC at 05:39

## 2021-06-29 RX ADMIN — Medication 1 DROP(S): at 17:57

## 2021-06-29 RX ADMIN — METHOCARBAMOL 500 MILLIGRAM(S): 500 TABLET, FILM COATED ORAL at 05:41

## 2021-06-29 RX ADMIN — ATORVASTATIN CALCIUM 40 MILLIGRAM(S): 80 TABLET, FILM COATED ORAL at 21:54

## 2021-06-29 RX ADMIN — CEFTRIAXONE 100 MILLIGRAM(S): 500 INJECTION, POWDER, FOR SOLUTION INTRAMUSCULAR; INTRAVENOUS at 21:53

## 2021-06-29 RX ADMIN — OXYCODONE AND ACETAMINOPHEN 1 TABLET(S): 5; 325 TABLET ORAL at 12:09

## 2021-06-29 RX ADMIN — Medication 10: at 08:42

## 2021-06-29 RX ADMIN — OXYCODONE AND ACETAMINOPHEN 1 TABLET(S): 5; 325 TABLET ORAL at 12:40

## 2021-06-29 RX ADMIN — Medication 100 MILLIGRAM(S): at 17:56

## 2021-06-29 RX ADMIN — Medication 5 MILLIGRAM(S): at 05:41

## 2021-06-29 RX ADMIN — GABAPENTIN 300 MILLIGRAM(S): 400 CAPSULE ORAL at 05:40

## 2021-06-29 RX ADMIN — Medication 100 MILLIGRAM(S): at 05:44

## 2021-06-29 RX ADMIN — METHOCARBAMOL 500 MILLIGRAM(S): 500 TABLET, FILM COATED ORAL at 16:15

## 2021-06-29 RX ADMIN — MORPHINE SULFATE 2 MILLIGRAM(S): 50 CAPSULE, EXTENDED RELEASE ORAL at 09:25

## 2021-06-29 RX ADMIN — BRIMONIDINE TARTRATE 1 DROP(S): 2 SOLUTION/ DROPS OPHTHALMIC at 17:56

## 2021-06-29 RX ADMIN — OXYCODONE AND ACETAMINOPHEN 1 TABLET(S): 5; 325 TABLET ORAL at 18:20

## 2021-06-29 RX ADMIN — Medication 4: at 18:00

## 2021-06-29 RX ADMIN — MORPHINE SULFATE 2 MILLIGRAM(S): 50 CAPSULE, EXTENDED RELEASE ORAL at 16:14

## 2021-06-29 RX ADMIN — FAMOTIDINE 40 MILLIGRAM(S): 10 INJECTION INTRAVENOUS at 12:10

## 2021-06-29 RX ADMIN — Medication 10 UNIT(S): at 21:53

## 2021-06-29 RX ADMIN — Medication 8: at 12:09

## 2021-06-29 RX ADMIN — Medication 60 MILLIGRAM(S): at 18:01

## 2021-06-29 RX ADMIN — METHOCARBAMOL 500 MILLIGRAM(S): 500 TABLET, FILM COATED ORAL at 21:54

## 2021-06-29 RX ADMIN — CLOPIDOGREL BISULFATE 75 MILLIGRAM(S): 75 TABLET, FILM COATED ORAL at 12:10

## 2021-06-29 NOTE — H&P ADULT - NSHPLABSRESULTS_GEN_ALL_CORE
LABS:                          15.5   16.51 )-----------( 279      ( 28 Jun 2021 13:30 )             45.8     06-28    131<L>  |  91<L>  |  11  ----------------------------<  82  4.8   |  24  |  1.0    Ca    9.7      28 Jun 2021 13:30    TPro  7.7  /  Alb  4.3  /  TBili  0.4  /  DBili  x   /  AST  47<H>  /  ALT  49<H>  /  AlkPhos  62  06-28    CT Orbit w/ IV Cont (06.28.21 @ 18:40)     IMPRESSION:    No significant change from recent CT orbit 6/25/2021.    Again seen is asymmetric enhancement of the left lacrimal gland and left preorbital soft tissues, compatible with dacryoadenitis. No evidence of postseptal enhancement or soft tissue infiltration.

## 2021-06-29 NOTE — H&P ADULT - ATTENDING COMMENTS
55 yo F with Mhx of asthma, DM2, GERD, DLD, PAD, HTN, TRAVIS, OA presents with left eye pain for 1 week. Pain was gradually getting worse associated with lacrimation (clear discharge) and eye swelling. Pt came to ED where CT Orbit revealed mild L dacryoadenitis, pt was discharged on Augmentin with no improvement of pain, but worsening and pt went to see ophthalmologist who recommended to come back to ED.     CONSTITUTIONAL: No weakness, fevers or chills  EYES/ENT: see HPI  NECK: No pain or stiffness  RESPIRATORY: No cough, wheezing, hemoptysis; No shortness of breath  CARDIOVASCULAR: No chest pain or palpitations  GASTROINTESTINAL: No abdominal or epigastric pain. No nausea, vomiting, or hematemesis; No diarrhea or constipation. No melena or hematochezia.  GENITOURINARY: No dysuria, frequency or hematuria  NEUROLOGICAL: No numbness or weakness  SKIN: No itching, rashes     T(C): 36.2  HR: 67  BP: 163/77  RR: 19  SpO2: --    Physical exam:  NAD, not toxic looking  HEENT: swollen left eye, clear discharge from left eye, mild erythema of sclera  NECK: supple, no JVD  RESP: CTA b/l, no crackles, rhonchi  CVS: S1S2, RRR  GI: abdomen soft NT, ND  Extremities: LE no edema  NEURO: AOx3, no focal deficit  H/L: no enlarged LN noted     labs reviewed    < from: CT Orbit w/ IV Cont (06.28.21 @ 18:40) >    FINDINGS:    The globes are normal in size and CT characteristics.    There is enhancement of the left lacrimal gland and left preseptal soft tissues.    The extraocular muscles are not enlarged. The optic nerves are bilaterally symmetric. The optic chiasm is unremarkable.    The frontal sinuses are hypoplastic.    There is pneumatization of the left lateral turbinate consistent with a left suly bullosa.    IMPRESSION:    No significant change from recent CT orbit 6/25/2021.    Again seen is asymmetric enhancement of the left lacrimal gland and left preseptal soft tissues, compatible with dacryoadenitis (infectious versus inflammatory.    < end of copied text >    EKG: Sinus tachy, 1st degree AV block, RBBB    A/P: # Acute dacryoadenitis, possible orbital cellulitis   - ophthalomologist eval appreciated and followed  - follow up work up requested by ophthalmologist  - ID eval  - no signs of fungal/Mucor infection    # DMII - home meds on hold, can resume Pioglitazone, insulin lispro sliding scale, check HbA1C    # HTN - cont home meds Enalapril, Metoprolol    # Hx of PAD s/p bilateral LE stenting - cont Plavix, Atorvastatin, currently no complaints    # GERD - cont PPI, Famotidine     # Hx of Asthma, intermittent, no active issues, Albuterol PRN     #Hx of TRAVIS BiPAP at night     DVT ppx     #Progress Note Handoff  Pending clinical improvement, ophthalmology follow up

## 2021-06-29 NOTE — CONSULT NOTE ADULT - ASSESSMENT
ASSESSMENT  56y F admitted with ORBITAL CELLULITIS      HTN (hypertension)    Diabetes    Asthma    TRAVIS on CPAP    Arthritis    High cholesterol    GERD (gastroesophageal reflux disease)    History of peripheral arterial disease    IMPRESSION  57 yo F with PMhx of asthma, DM, GERD, DLD, PAD, HTN, TRAVIS, OA presents with left eye pain since 10 days prior to presentation it started with itchiness and watery discharge of the left eye, she presented to the ED the first time on 05/25 for swelling where CT was done and showed dacryoadenitis -> she was given Unasyn in ED and discharged on Augmentin 1 g PO BID. swelling increased until it became shut the next day -> she sought medical advice (an ophthalmologist who recommended going to the ED where she presented this time where CT orbit was repeated and didn't show a change from the previous one.  patient's clinical status that is worsening with symptoms of spreading to the right eye (patient unable to fully open her right eye -> started yesterday) and evidence of ophtalmoplegia on physical exam are in ocntradiction to the imaging findings that are not showing evidence of orbital cellulitis    Creatinine, Serum: 0.6 (06-29-21 @ 05:34)    Weight (kg): 72.6 (06-28-21 @ 15:58)    RECOMMENDATIONS    #orbital cellulitis   -escalation of antibiotherapy was done (on vancomycin and cefepime both started on 06/29)  -white count decreased from 16K to 6K) ASSESSMENT  56y F admitted with PMhx of asthma, DM, GERD, DLD, PAD, HTN, TRAVIS, OA presents with left eye pain since 10 days prior to presentation, admitted for ORBITAL CELLULITIS. it started with itchiness and watery discharge of the left eye, she presented to the ED the first time on 05/25 for swelling where CT was done and showed dacryoadenitis -> she was given Unasyn in ED and discharged on Augmentin 1 g PO BID. swelling increased until it became shut the next day -> she sought medical advice (an ophthalmologist who recommended going to the ED where she presented this time where CT orbit was repeated and didn't show a change from the previous one.      IMPRESSION  whether a diagnosis of orbital cellulitis is being considered (enforced with patient's physical exam but contradicted with imaging findings) or dacryoadenitis (based on the CT findings), the antibiotherapy should be tailored to cover for gram positive bacteria. an inflammatory source is possible (as suggested by opthalmology team) but an additional underlying infectious process is most likely present      RECOMMENDATIONS    #dacryoadenitis  -follow up with opthalmology for an obstructive source  -white count decreased (from 16K to 6K)  -no need for pseudomonas coverage with cefepime  -keep vancomycin but add ceftriaxone (for better streptoccocal coverage)     ASSESSMENT  56y F admitted with PMhx of asthma, DM, GERD, DLD, PAD, HTN, TRAVIS, OA presents with left eye pain since 10 days prior to presentation, admitted for ORBITAL CELLULITIS. it started with itchiness and watery discharge of the left eye, she presented to the ED the first time on 05/25 for swelling where CT was done and showed dacryoadenitis -> she was given Unasyn in ED and discharged on Augmentin 1 g PO BID. swelling increased until it became shut the next day -> she sought medical advice (an ophthalmologist who recommended going to the ED where she presented this time where CT orbit was repeated and didn't show a change from the previous one.      IMPRESSION  Dacroadenitis with associated left orbital cellulitis ( unlikely but will cover for it )      RECOMMENDATIONS  Vancomycin 1 gm iv q12h  Rocephin 2 gm iv q24h

## 2021-06-29 NOTE — CONSULT NOTE ADULT - ASSESSMENT
Assessment    1. Orbital Cellulitis/ Dacryoadenitis left side     2. Type 2 DM x 15 yrs on Insulin    Plan  Continue with ID rec's.  Based on clinical appearance CT findings choroidal folds on OCT  and presence of thickened choroid on B scan the likely diagnosis at this time is an inflammatory dacryoadenitis and would recommend addition of  IV steroids.  Of importance is the shallowing of the anterior chamber and elevated intraocular pressure, the thickened choroid has a role in shallowing the AC and deturgessing the choroid is needed to reduce the risk of peripheral anterior synechiae scarring the drainage channels in the angle of the OS     Spoke with Dr. Kiran.     IV steroids to be started today ( IV dose of methylprednisolone equivalent to 80mg of po prednisone once daily, started Methylprednisolone 60mg IV once daily)   b. Keep current eye drops  c. Will re-examine the patient tomorrow  d. For any issues or concerns call [phone] or Call Ophthalmology service     Pt examined with  and case d/w Dr. Robert Ruiz MD    Assessment    1. Orbital Cellulitis/ Dacryoadenitis left side     2. Type 2 DM x 15 yrs on Insulin    Plan  Continue with ID rec's.  Based on clinical appearance CT findings choroidal folds on OCT  and presence of thickened choroid on B scan the likely diagnosis at this time is an inflammatory dacryoadenitis and would recommend addition of  IV steroids.  Of importance is the shallowing of the anterior chamber and elevated intraocular pressure, the thickened choroid has a role in shallowing the AC and deturgescence of the choroid is needed to reduce the risk of peripheral anterior synechiae scarring the drainage channels in the angle of the OS.       Spoke with Dr. Kiran.     IV steroids to be started today ( IV dose of methylprednisolone equivalent to 80mg of po prednisone once daily, started Methylprednisolone 60mg IV once daily)   b. Keep current eye drops  c. Will re-examine the patient tomorrow  d. For any issues or concerns call [phone] or Call Ophthalmology service     Pt examined with  and case d/w Dr. Robert Ruiz MD

## 2021-06-29 NOTE — H&P ADULT - NSHPPHYSICALEXAM_GEN_ALL_CORE
VITALS:   T(C): 36.2 (06-28-21 @ 16:39), Max: 36.2 (06-28-21 @ 16:39)  HR: 87 (06-28-21 @ 16:39) (84 - 87)  BP: 190/90 (06-28-21 @ 16:40) (190/90 - 226/100)  RR: 18 (06-28-21 @ 16:39) (18 - 18)  SpO2: 95% (06-28-21 @ 16:39) (95% - 99%)    GENERAL: Mild distress, appears uncomfortable  HEAD:  Atraumatic, normocephalic  EYES: L eyelid swollen, slightly erythematous, tearful, unable to open L eye  ENT: Moist mucous membranes  NECK: Neck collar in place  HEART: Regular rate and rhythm, no murmurs, rubs, or gallops  LUNGS: Unlabored respirations; clear to auscultation bilaterally, no crackles, wheezing, or rhonchi  ABDOMEN: Soft, nontender, nondistended, +BS  EXTREMITIES: 2+ peripheral pulses bilaterally. No clubbing, cyanosis, or edema  NERVOUS SYSTEM:  A&Ox3, no focal deficits   SKIN: No rashes or lesions

## 2021-06-29 NOTE — PHARMACOTHERAPY INTERVENTION NOTE - COMMENTS
Patient One-Liner  55 yo F with PMhx of asthma, DM, GERD, DLD, PAD, HTN, TRAVIS, OA presents with left eye pain.    Allergies: ibuprofen (Urticaria)      Vancomycin Day #2  Indication: Orbital cellulitis  Goal trough: 10-15 mcg/mL  ID: Yes  Other antibiotics:  cefepime   IVPB 2000 milliGRAM(s) IV Intermittent every 12 hours      Labs: --   CrCl (mL/minute) = 83    BUN (mg/dL) =   Blood Urea Nitrogen, Serum: 10 mg/dL (06-29-21 @ 05:34)  Blood Urea Nitrogen, Serum: 11 mg/dL (06-28-21 @ 13:30)      SCr (mg/dL) =   Creatinine, Serum: 0.6 mg/dL (06-29-21 @ 05:34)  Creatinine, Serum: 1.0 mg/dL (06-28-21 @ 13:30)      WBC =   WBC Count: 6.35 K/uL (06-29-21 @ 05:34)  WBC Count: 16.51 K/uL (06-28-21 @ 13:30)      Tmax (ºF) =   T(C): 36.3 (06-29-21 @ 07:48), Max: 36.4 (06-29-21 @ 04:37)  T(F): 97.3 (06-29-21 @ 07:48), Max: 97.5 (06-29-21 @ 04:37)      Micro:   MRSA PCR = Not obtained    Culture: BCX pending    PK:  Dosing weight (kg) = 72.6  Ke = 0.10  t ½ = 7-10  Vd = 50.82    Current Regimen: 1000 mg q12h  Estimated peak: 23  Estimated trough: 8    A/P:    Patient is being treated with vancomycin for left orbital cellulitis. ID has been consult and pending recommendations. Should ID recommend to continue vancomycin, troughs should be obtained 30 mins prior to the 4th dose to achieve steady state concentrations.    VANC DOSE/SCHEDULE/TROUGH  Date          Time              Dose  06/28----->16:13--------->1250 mg  06/29----->05:39--------->1000 mg		      	  Recommendations:  1. Follow ID recommendations  2. Should vancomycin be continued, obtain trough 30 mins prior to 4th dose (scheduled 06/30 at 06:00)  3. If trough <10, consider increasing dose by 250 mg  4. Monitor BUN/SCr      		  		  Please call [facility] [phone] for any questions

## 2021-06-29 NOTE — CHART NOTE - NSCHARTNOTEFT_GEN_A_CORE
Saw and examined the patient today  Very pleasant patient  AAO*3  Reports that her pain is still persistent along with photophobia. Pain upon eye movement also present. No improvement yet.   Differential diagnosis Orbital cellulitis vs Dacroadenitis  ID recommendations"   a. Vancomycin 1g IV q12hrs, ordered Vancomycin trough level in the a.m.  b. Switch cefepime to ceftriaxone    Opthalmology team called me now and recommended:   a. IV steroids to be started today ( IV dose of methylprednisolone equivalent to 80mg of po prednisone)   b. Keep current eye drops  c. Will re-examine the patient tomorrow Saw and examined the patient today  Very pleasant patient  AAO*3  Reports that her pain is still persistent along with photophobia. Pain upon eye movement also present. No improvement yet.   Differential diagnosis Orbital cellulitis vs Dacroadenitis  ID recommendations"   a. Vancomycin 1g IV q12hrs, ordered Vancomycin trough level in the a.m.  b. Switch cefepime to ceftriaxone    Opthalmology team called me now and recommended:   a. IV steroids to be started today ( IV dose of methylprednisolone equivalent to 80mg of po prednisone once daily, started Methylprednisolone 60mg IV once daily)   b. Keep current eye drops  c. Will re-examine the patient tomorrow  d. For any issues or concerns call [phone] or Call Opthalmology service ( Dr. Robert Ruiz)

## 2021-06-29 NOTE — H&P ADULT - ASSESSMENT
55 yo F with PMhx of asthma, DM, GERD, DLD, PAD, HTN, TRAVIS, OA presents with left eye pain.    #L eye swelling, pain  #Elevated IOP  - CT Orbit revealed L sided dacryoadenitis  - Seen by Optho - etiology infections vs inflammatory dacryoadenitis vs idiopathic inflammatory syndrome  - S/p broad spectrum ABx in ED  - Will c/w Cefepime, Vanco, ID c/s  - C/w Latanoprost, Combigan, Azopt  - C/w pain control PRN  - Will obtain inflammatory work up - ACE, lysozyme, IgG4, C-ANCA, P-ANCA, quantiferon, TFTs, TSI, AUDREY, SSA/SSB, SPEP 55 yo F with PMHx of asthma, DM, GERD, DLD, PAD s/p stents to LE, HTN, TRAVIS, OA presents with left eye pain.    #L eye swelling, pain  #Elevated IOP  - CT Orbit revealed L sided dacryoadenitis  - Seen by Optho - etiology infections vs inflammatory dacryoadenitis vs idiopathic inflammatory syndrome  - S/p broad spectrum ABx in ED  - Will c/w Cefepime, Vanco, ID c/s  - C/w Latanoprost, Combigan, Azopt  - C/w pain control PRN  - Will obtain inflammatory work up - ACE, lysozyme, IgG4, C-ANCA, P-ANCA, quantiferon, TFTs, TSI, AUDREY, SSA/SSB, SPEP    #Hx of cervical discectomy with fusion in March  - Cervical neck collar in place  - C/w Cyclobenzaprine, Methocarbamol, Percocet    #DMII  - Hold home Metformin, Pioglitazone  - Initiate SS, monitor FS and adjust as needed    #HTN  - C/w Enalapril, Metoprolol    #Hx of PAD s/p bilateral LE stenting  - C/w Plavix, Atorvastatin    #GERD  - C/w PPI, Famotidine     #Hx of Asthma - stable  #Hx of TRAVIS    DVT ppx: Lovenox  GI ppx: PPI  Diet: DASH, CC  Activity: AAT  Full Code   Dispo: Acute

## 2021-06-29 NOTE — H&P ADULT - HISTORY OF PRESENT ILLNESS
57 yo F with PMhx of asthma, DM, GERD, DLD, PAD, HTN, TRAVIS, OA presents with left eye pain. Pt had developed pain of left sided face and eye about 1 week ago. She presented to the ED several days ago, CT Orbit revealed mild L dacryoadenitis. Pt was given Unasyn in ED and sent home on Augmentin. Her left eye pain and swelling continued to worsen, especially with eye movement so she saw her Opthalmologist and was recommended to come to ED.   In the ED, T 97, /87, HR 84, RR 18, SpO2 99% on RA. Lab work revealed WBC ct 16. Pt was given Metronidazole, Unasyn, Ceftriaxone and Vancomycin. CT Orbits again revealed L sided dacryoadenitis. Seen by Optho, recommend continuing with IV Abx. 55 yo F with PMhx of asthma, DM, GERD, DLD, PAD, HTN, TRAVIS, OA presents with left eye pain. Pt had developed itchiness of left eye, which then gradually progressed to pain of left sided face and eye about 1 week ago. She presented to the ED several days ago, CT Orbit revealed mild L dacryoadenitis. Pt was given Unasyn in ED and sent home on Augmentin. Her left eye pain and swelling continued to worsen, especially with eye movement, associated with photophobia and "little black dots" in her vision, so she saw her Opthalmologist and was recommended to come to ED.   In the ED, T 97, /87, HR 84, RR 18, SpO2 99% on RA. Lab work revealed WBC ct 16. Pt was given Metronidazole, Unasyn, Ceftriaxone and Vancomycin. CT Orbits again revealed L sided dacryoadenitis. Seen by Optho, recommend continuing with IV Abx.

## 2021-06-29 NOTE — CONSULT NOTE ADULT - SUBJECTIVE AND OBJECTIVE BOX
KO KUINE  MRN-[id number]  56y Female        Patient is a 56y old  Female who presents with a chief complaint of L orbital cellulitis (29 Jun 2021 10:56)    HEALTH ISSUES - R/O PROBLEM Dx:    HPI:  57 yo F with PMhx of asthma, DM, GERD, DLD, PAD, HTN, TRAVIS, OA presents with left eye pain. Pt had developed itchiness of left eye, which then gradually progressed to pain of left sided face and eye about 1 week ago. She presented to the ED several days ago, CT Orbit revealed mild L dacryoadenitis. Pt was given Unasyn in ED and sent home on Augmentin. Her left eye pain and swelling continued to worsen, especially with eye movement, associated with photophobia and "little black dots" in her vision, so she saw her Opthalmologist and was recommended to come to ED.   In the ED, T 97, /87, HR 84, RR 18, SpO2 99% on RA. Lab work revealed WBC ct 16. Pt was given Metronidazole, Unasyn, Ceftriaxone and Vancomycin. CT Orbits again revealed L sided dacryoadenitis. Seen by Optho, recommend continuing with IV Abx. (29 Jun 2021 01:57)    PAST MEDICAL & SURGICAL HISTORY:  HTN (hypertension)    Diabetes    Asthma  LAST EPISODE SEVERAL YRS    TRAVIS on CPAP    Arthritis  OA    High cholesterol    GERD (gastroesophageal reflux disease)    History of peripheral arterial disease    S/P trigger finger release    H/O carpal tunnel repair    H/O arthroscopy of shoulder      MEDICATIONS  (STANDING):  atorvastatin 40 milliGRAM(s) Oral at bedtime  brimonidine 0.2% Ophthalmic Solution 1 Drop(s) Left EYE two times a day  cefepime   IVPB 2000 milliGRAM(s) IV Intermittent every 12 hours  chlorhexidine 4% Liquid 1 Application(s) Topical <User Schedule>  clopidogrel Tablet 75 milliGRAM(s) Oral daily  dorzolamide 2% Ophthalmic Solution 1 Drop(s) Left EYE three times a day  enalapril 5 milliGRAM(s) Oral daily  enoxaparin Injectable 40 milliGRAM(s) SubCutaneous at bedtime  famotidine    Tablet 40 milliGRAM(s) Oral daily  gabapentin 300 milliGRAM(s) Oral two times a day  insulin lispro (ADMELOG) corrective regimen sliding scale   SubCutaneous three times a day before meals  latanoprost 0.005% Ophthalmic Solution 1 Drop(s) Left EYE at bedtime  methocarbamol 500 milliGRAM(s) Oral every 8 hours  methylPREDNISolone sodium succinate Injectable 60 milliGRAM(s) IV Push daily  metoprolol tartrate 100 milliGRAM(s) Oral two times a day  pantoprazole    Tablet 40 milliGRAM(s) Oral before breakfast  timolol 0.25% Solution 1 Drop(s) Left EYE two times a day  vancomycin  IVPB 1000 milliGRAM(s) IV Intermittent every 12 hours    MEDICATIONS  (PRN):  cyclobenzaprine 10 milliGRAM(s) Oral three times a day PRN Muscle Spasm  morphine  - Injectable 2 milliGRAM(s) IV Push every 6 hours PRN Severe Pain (7 - 10)  oxycodone    5 mG/acetaminophen 325 mG 1 Tablet(s) Oral every 6 hours PRN Moderate Pain (4 - 6)    Allergies    ibuprofen (Urticaria)    Intolerances      HEALTH ISSUES - PROBLEM Dx:          SUZANNA: 6/28/21  POHx:  FOHx: Non-contributory    Extended HPI: see above. Pt c/o 1.5  week onset of headache type pain over the left eye with swelling and redness that progressed over that time. pt initially present to ED on 6/25/21 and after CT orbit  was dx with preseptal cellulitis and discharged on oral antibiotics. She saw outside ophthamologist on 6/25/21 and again on 6/28/21. Upon progression of pt signs, pt was referred back to the ED on 6/28/21. Pt had repeat CT of orbit which again showed asymetric enhancement of the left lacrimal gland and preseptal sodt tissue, compatible with a dacryoadenitis. (no interval cahnge). Pt denies dipoplia, is afebrile does endorse pain on eye movement and some blur OS  ID evaluated pt and dx orbital cellulitis. Pt on IV vancomycin and cepefine. To be changed to IV Ceftriame as per Dr. Kiran.  Pt examened by     Ocular Medications: Latanoprost qhs OS  Dorzolamise 3x/day OS  Brimonidine 0.2% 2x/day OS  Timolol 0.5% 2x/day OS        EXTERNAL:  pt left  [facility] at on floor    VISUAL ACUITY:       RIGHT EYE: sc: 20/70- PH - 20/60-                        LEFT EYE: sc: 20/40-2 - PH - 20/30-2 (lid held)        NEURO TESTING  EXTRAOCULAR MOVEMENTS: full OD; decreased infraduction   PUPILS: PERRL; no APD  VFc: FTFC OD; slight  decrease IT OS    ANTERIOR SEGMENT EVALUATION: :    LIDS/ADENEXA: clear OD; + periorbital swelling OS with erythema KANDIS  CONJUNCTIVA/SCLERA: clear OD; + significant conjunctival  chemosis OS; + prominent lacrimal glands OS > OD with increased vascularity of the left lacrimal gland  CORNEA: clear OU  ANTERIOR CHAMBER: shallow OS  IRIS/PUPIL: no NVI OU  LENS/VITREOUS: NS OU    INTRAOCULAR PRESSURE:   OD: 11 mmHg  OS: 21 mmHg, on maximal topical ocular therapy for elevated IOP  @ 2:00pm    POSTERIOR SEGMENT EVALUATION  DFE: deferred to  shallow AC OS     OPTIC NERVE: deferred  MACULA:deferred  A/V: deferred  FUNDUS/PERIPHERY: deferred    SUPPLEMENTAL TESTING:  OCT" + choroidal folds OS. flat macula OD  B scan OS thickened chorioid OS.  OD - slightly greater than normal thickness to the choroid             KO UKINE  MRN-[id number]  56y Female        Patient is a 56y old  Female who presents with a chief complaint of L orbital cellulitis (29 Jun 2021 10:56)    HEALTH ISSUES - R/O PROBLEM Dx:    HPI:  57 yo F with PMhx of asthma, DM, GERD, DLD, PAD, HTN, TRAVIS, OA presents with left eye pain. Pt had developed itchiness of left eye, which then gradually progressed to pain of left sided face and eye about 1 week ago. She presented to the ED several days ago, CT Orbit revealed mild L dacryoadenitis. Pt was given Unasyn in ED and sent home on Augmentin. Her left eye pain and swelling continued to worsen, especially with eye movement, associated with photophobia and "little black dots" in her vision, so she saw her Opthalmologist and was recommended to come to ED.   In the ED, T 97, /87, HR 84, RR 18, SpO2 99% on RA. Lab work revealed WBC ct 16. Pt was given Metronidazole, Unasyn, Ceftriaxone and Vancomycin. CT Orbits again revealed L sided dacryoadenitis. Seen by Optho, recommend continuing with IV Abx. (29 Jun 2021 01:57)    PAST MEDICAL & SURGICAL HISTORY:  HTN (hypertension)    Diabetes    Asthma  LAST EPISODE SEVERAL YRS    TRAVIS on CPAP    Arthritis  OA    High cholesterol    GERD (gastroesophageal reflux disease)    History of peripheral arterial disease    S/P trigger finger release    H/O carpal tunnel repair    H/O arthroscopy of shoulder      MEDICATIONS  (STANDING):  atorvastatin 40 milliGRAM(s) Oral at bedtime  brimonidine 0.2% Ophthalmic Solution 1 Drop(s) Left EYE two times a day  cefepime   IVPB 2000 milliGRAM(s) IV Intermittent every 12 hours  chlorhexidine 4% Liquid 1 Application(s) Topical <User Schedule>  clopidogrel Tablet 75 milliGRAM(s) Oral daily  dorzolamide 2% Ophthalmic Solution 1 Drop(s) Left EYE three times a day  enalapril 5 milliGRAM(s) Oral daily  enoxaparin Injectable 40 milliGRAM(s) SubCutaneous at bedtime  famotidine    Tablet 40 milliGRAM(s) Oral daily  gabapentin 300 milliGRAM(s) Oral two times a day  insulin lispro (ADMELOG) corrective regimen sliding scale   SubCutaneous three times a day before meals  latanoprost 0.005% Ophthalmic Solution 1 Drop(s) Left EYE at bedtime  methocarbamol 500 milliGRAM(s) Oral every 8 hours  methylPREDNISolone sodium succinate Injectable 60 milliGRAM(s) IV Push daily  metoprolol tartrate 100 milliGRAM(s) Oral two times a day  pantoprazole    Tablet 40 milliGRAM(s) Oral before breakfast  timolol 0.25% Solution 1 Drop(s) Left EYE two times a day  vancomycin  IVPB 1000 milliGRAM(s) IV Intermittent every 12 hours    MEDICATIONS  (PRN):  cyclobenzaprine 10 milliGRAM(s) Oral three times a day PRN Muscle Spasm  morphine  - Injectable 2 milliGRAM(s) IV Push every 6 hours PRN Severe Pain (7 - 10)  oxycodone    5 mG/acetaminophen 325 mG 1 Tablet(s) Oral every 6 hours PRN Moderate Pain (4 - 6)    Allergies    ibuprofen (Urticaria)    Intolerances      HEALTH ISSUES - PROBLEM Dx:          SUZANNA: 6/28/21  POHx:  FOHx: Non-contributory    Extended HPI: see above. Pt c/o 1.5  week onset of headache type pain over the left eye with swelling and redness that progressed over that time. pt initially present to ED on 6/25/21 and after CT orbit  was dx with preseptal cellulitis and discharged on oral antibiotics. She saw outside ophthamologist on 6/25/21 and again on 6/28/21. Upon progression of pt signs, pt was referred back to the ED on 6/28/21. Pt had repeat CT of orbit which again showed asymetric enhancement of the left lacrimal gland and preseptal sodt tissue, compatible with a dacryoadenitis. (no interval cahnge). Pt denies dipoplia, is afebrile does endorse pain on eye movement and some blur OS  ID evaluated pt and dx orbital cellulitis. Pt on IV vancomycin and cepefine. To be changed to IV Ceftriame as per Dr. Kiran.  Pt examined by myself along with Dr. Robert Ruiz MD    Ocular Medications: Latanoprost qhs OS  Dorzolamise 3x/day OS  Brimonidine 0.2% 2x/day OS  Timolol 0.5% 2x/day OS        EXTERNAL:  pt left  [facility] at on floor    VISUAL ACUITY:       RIGHT EYE: sc: 20/70- PH - 20/60-                        LEFT EYE: sc: 20/40-2 - PH - 20/30-2 (lid held)        NEURO TESTING  EXTRAOCULAR MOVEMENTS: full OD; decreased infraduction   PUPILS: PERRL; no APD  VFc: FTFC OD; slight  decrease IT OS    ANTERIOR SEGMENT EVALUATION: :    LIDS/ADENEXA: clear OD; + periorbital swelling OS with erythema KANDIS  CONJUNCTIVA/SCLERA: clear OD; + significant conjunctival  chemosis OS; + prominent lacrimal glands OS > OD with increased vascularity of the left lacrimal gland  CORNEA: clear OU  ANTERIOR CHAMBER: shallow OS  IRIS/PUPIL: no NVI OU  LENS/VITREOUS: NS OU    INTRAOCULAR PRESSURE:   OD: 11 mmHg  OS: 21 mmHg, on maximal topical ocular therapy for elevated IOP  @ 2:00pm    POSTERIOR SEGMENT EVALUATION  DFE: deferred to  shallow AC OS     OPTIC NERVE: deferred  MACULA:deferred  A/V: deferred  FUNDUS/PERIPHERY: deferred    SUPPLEMENTAL TESTING:  OCT" + choroidal folds OS. flat macula OD  B scan OS thickened chorioid OS.  OD - slightly greater than normal thickness to the choroid             KO KUINE  MRN-[id number]  56y Female        Patient is a 56y old  Female who presents with a chief complaint of L orbital cellulitis (29 Jun 2021 10:56)    HEALTH ISSUES - R/O PROBLEM Dx:    HPI:  57 yo F with PMhx of asthma, DM, GERD, DLD, PAD, HTN, TRAVIS, OA presents with left eye pain. Pt had developed itchiness of left eye, which then gradually progressed to pain of left sided face and eye about 1 week ago. She presented to the ED several days ago, CT Orbit revealed mild L dacryoadenitis. Pt was given Unasyn in ED and sent home on Augmentin. Her left eye pain and swelling continued to worsen, especially with eye movement, associated with photophobia and "little black dots" in her vision, so she saw her Opthalmologist and was recommended to come to ED.   In the ED, T 97, /87, HR 84, RR 18, SpO2 99% on RA. Lab work revealed WBC ct 16. Pt was given Metronidazole, Unasyn, Ceftriaxone and Vancomycin. CT Orbits again revealed L sided dacryoadenitis. Seen by Optho, recommend continuing with IV Abx. (29 Jun 2021 01:57)    PAST MEDICAL & SURGICAL HISTORY:  HTN (hypertension)    Diabetes    Asthma  LAST EPISODE SEVERAL YRS    TRAVIS on CPAP    Arthritis  OA    High cholesterol    GERD (gastroesophageal reflux disease)    History of peripheral arterial disease    S/P trigger finger release    H/O carpal tunnel repair    H/O arthroscopy of shoulder      MEDICATIONS  (STANDING):  atorvastatin 40 milliGRAM(s) Oral at bedtime  brimonidine 0.2% Ophthalmic Solution 1 Drop(s) Left EYE two times a day  cefepime   IVPB 2000 milliGRAM(s) IV Intermittent every 12 hours  chlorhexidine 4% Liquid 1 Application(s) Topical <User Schedule>  clopidogrel Tablet 75 milliGRAM(s) Oral daily  dorzolamide 2% Ophthalmic Solution 1 Drop(s) Left EYE three times a day  enalapril 5 milliGRAM(s) Oral daily  enoxaparin Injectable 40 milliGRAM(s) SubCutaneous at bedtime  famotidine    Tablet 40 milliGRAM(s) Oral daily  gabapentin 300 milliGRAM(s) Oral two times a day  insulin lispro (ADMELOG) corrective regimen sliding scale   SubCutaneous three times a day before meals  latanoprost 0.005% Ophthalmic Solution 1 Drop(s) Left EYE at bedtime  methocarbamol 500 milliGRAM(s) Oral every 8 hours  methylPREDNISolone sodium succinate Injectable 60 milliGRAM(s) IV Push daily  metoprolol tartrate 100 milliGRAM(s) Oral two times a day  pantoprazole    Tablet 40 milliGRAM(s) Oral before breakfast  timolol 0.25% Solution 1 Drop(s) Left EYE two times a day  vancomycin  IVPB 1000 milliGRAM(s) IV Intermittent every 12 hours    MEDICATIONS  (PRN):  cyclobenzaprine 10 milliGRAM(s) Oral three times a day PRN Muscle Spasm  morphine  - Injectable 2 milliGRAM(s) IV Push every 6 hours PRN Severe Pain (7 - 10)  oxycodone    5 mG/acetaminophen 325 mG 1 Tablet(s) Oral every 6 hours PRN Moderate Pain (4 - 6)    Allergies    ibuprofen (Urticaria)    Intolerances      HEALTH ISSUES - PROBLEM Dx:          SUZANNA: 6/28/21  POHx:  FOHx: Non-contributory    Extended HPI: see above. Pt c/o 1.5  week onset of headache type pain over the left eye with swelling and redness that progressed over that time. pt initially present to ED on 6/25/21 and after CT orbit  was dx with preseptal cellulitis and discharged on oral antibiotics. She saw outside ophthamologist on 6/25/21 and again on 6/28/21. Upon progression of pt signs, pt was referred back to the ED on 6/28/21. Pt had repeat CT of orbit which again showed asymetric enhancement of the left lacrimal gland and preseptal sodt tissue, compatible with a dacryoadenitis. (no interval cahnge). Pt denies dipoplia, is afebrile does endorse pain on eye movement and some blur OS  ID evaluated pt and dx orbital cellulitis. Pt on IV vancomycin and cepefine. To be changed to IV Ceftriame as per Dr. Kiran.  Pt examined by myself along with Dr. Robert Ruiz MD    Ocular Medications: Latanoprost qhs OS  Dorzolamise 3x/day OS  Brimonidine 0.2% 2x/day OS  Timolol 0.5% 2x/day OS        EXTERNAL:  pt left  [facility] at on floor    VISUAL ACUITY:       RIGHT EYE: sc: 20/70- PH - 20/60-                        LEFT EYE: sc: 20/40-2 - PH - 20/30-2 (lid held)    Autorefraction  OD: machine unable to get endpoint  OS + 1.25-2.00 x 50    NEURO TESTING  EXTRAOCULAR MOVEMENTS: full OD; decreased infraduction   PUPILS: PERRL; no APD  VFc: FTFC OD; slight  decrease IT OS    ANTERIOR SEGMENT EVALUATION: :    LIDS/ADENEXA: clear OD; + periorbital swelling OS with erythema KANDIS  CONJUNCTIVA/SCLERA: clear OD; + significant conjunctival  chemosis OS; + prominent lacrimal glands OS > OD with increased vascularity of the left lacrimal gland  CORNEA: clear OU  ANTERIOR CHAMBER: shallow OS  IRIS/PUPIL: no NVI OU  LENS/VITREOUS: NS OU    INTRAOCULAR PRESSURE:   OD: 11 mmHg  OS: 21 mmHg, on maximal topical ocular therapy for elevated IOP  @ 2:00pm    POSTERIOR SEGMENT EVALUATION  DFE: deferred to  shallow AC OS     OPTIC NERVE: deferred  MACULA:deferred  A/V: deferred  FUNDUS/PERIPHERY: deferred    SUPPLEMENTAL TESTING:  OCT" + choroidal folds OS. flat macula OD  B scan OS thickened chorioid OS.  OD - slightly greater than normal thickness to the choroid

## 2021-06-29 NOTE — CONSULT NOTE ADULT - SUBJECTIVE AND OBJECTIVE BOX
AMBER KU  56y, Female  Allergy: ibuprofen (Urticaria)      CHIEF COMPLAINT:   L orbital cellulitis (29 Jun 2021 01:57)      LOS  1d    HPI  HPI:  57 yo F with PMhx of asthma, DM, GERD, DLD, PAD, HTN, TRAVIS, OA presents with left eye pain. Pt had developed itchiness of left eye, which then gradually progressed to pain of left sided face and eye about 1 week ago. She presented to the ED several days ago, CT Orbit revealed mild L dacryoadenitis. Pt was given Unasyn in ED and sent home on Augmentin. Her left eye pain and swelling continued to worsen, especially with eye movement, associated with photophobia and "little black dots" in her vision, so she saw her Opthalmologist and was recommended to come to ED.   In the ED, T 97, /87, HR 84, RR 18, SpO2 99% on RA. Lab work revealed WBC ct 16. Pt was given Metronidazole, Unasyn, Ceftriaxone and Vancomycin. CT Orbits again revealed L sided dacryoadenitis. Seen by Optho, recommend continuing with IV Abx. (29 Jun 2021 01:57)      INFECTIOUS DISEASE HISTORY:  ID consulted for orbital cellulitis vs dacryoadnitis    PMH  PAST MEDICAL & SURGICAL HISTORY:  HTN (hypertension)    Diabetes    Asthma  LAST EPISODE SEVERAL YRS    TRAVIS on CPAP    Arthritis  OA    High cholesterol    GERD (gastroesophageal reflux disease)    History of peripheral arterial disease    S/P trigger finger release    H/O carpal tunnel repair    H/O arthroscopy of shoulder        FAMILY HISTORY  Family history of malignant neoplasm of esophagus    FH: stroke    FH: aneurysm        SOCIAL HISTORY  Social History:  Lives at home.  Denies alcohol, tobacco, illicit drug use. (29 Jun 2021 01:57)        ROS  ***    VITALS:  T(F): 97.3, Max: 97.5 (06-29-21 @ 04:37)  HR: 72  BP: 138/69  RR: 17Vital Signs Last 24 Hrs  T(C): 36.3 (29 Jun 2021 07:48), Max: 36.4 (29 Jun 2021 04:37)  T(F): 97.3 (29 Jun 2021 07:48), Max: 97.5 (29 Jun 2021 04:37)  HR: 72 (29 Jun 2021 07:48) (72 - 89)  BP: 138/69 (29 Jun 2021 07:48) (138/69 - 226/100)  BP(mean): --  RR: 17 (29 Jun 2021 07:48) (16 - 18)  SpO2: 95% (28 Jun 2021 16:39) (95% - 99%)    PHYSICAL EXAM:    GENERAL: alert and oriented, in moderate distress  HEAD:  left eye swelling, difficulty to open bilateral eyes  EYES: pain upon palpation of left eye and during object pursuit + photophobia no pain upon palpation of right eye  CHEST/LUNG: Clear to auscultation bilaterally;  HEART: Regular rate and rhythm;  ABDOMEN: BSx4; Soft, nontender, nondistended      TESTS & MEASUREMENTS:                        12.1   6.35  )-----------( 308      ( 29 Jun 2021 05:34 )             36.2     06-29    135  |  96<L>  |  10  ----------------------------<  324<H>  4.4   |  27  |  0.6<L>    Ca    9.1      29 Jun 2021 05:34    TPro  6.6  /  Alb  4.4  /  TBili  0.2  /  DBili  x   /  AST  24  /  ALT  33  /  AlkPhos  106  06-29    eGFR if Non African American: 102 mL/min/1.73M2 (06-29-21 @ 05:34)  eGFR if African American: 118 mL/min/1.73M2 (06-29-21 @ 05:34)  eGFR if Non African American: 63 mL/min/1.73M2 (06-28-21 @ 13:30)  eGFR if : 73 mL/min/1.73M2 (06-28-21 @ 13:30)    LIVER FUNCTIONS - ( 29 Jun 2021 05:34 )  Alb: 4.4 g/dL / Pro: 6.6 g/dL / ALK PHOS: 106 U/L / ALT: 33 U/L / AST: 24 U/L / GGT: x                 Lactate, Blood: 1.4 mmol/L (06-28-21 @ 13:30)      INFECTIOUS DISEASES TESTING  COVID-19 PCR: NotDetec (06-28-21 @ 13:30)  COVID-19 PCR: NotDetec (04-04-21 @ 04:30)      MEDICATIONS  atorvastatin 40 Oral at bedtime  brimonidine 0.2% Ophthalmic Solution 1 Left EYE two times a day  cefepime   IVPB 2000 IV Intermittent every 12 hours  chlorhexidine 4% Liquid 1 Topical <User Schedule>  clopidogrel Tablet 75 Oral daily  dorzolamide 2% Ophthalmic Solution 1 Left EYE three times a day  enalapril 5 Oral daily  enoxaparin Injectable 40 SubCutaneous at bedtime  famotidine    Tablet 40 Oral daily  gabapentin 300 Oral two times a day  insulin lispro (ADMELOG) corrective regimen sliding scale  SubCutaneous three times a day before meals  latanoprost 0.005% Ophthalmic Solution 1 Left EYE at bedtime  methocarbamol 500 Oral every 8 hours  metoprolol tartrate 100 Oral two times a day  pantoprazole    Tablet 40 Oral before breakfast  timolol 0.25% Solution 1 Left EYE two times a day  vancomycin  IVPB 1000 IV Intermittent every 12 hours        ANTIBIOTICS:  cefepime   IVPB 2000 milliGRAM(s) IV Intermittent every 12 hours  vancomycin  IVPB 1000 milliGRAM(s) IV Intermittent every 12 hours      ALLERGIES:  ibuprofen (Urticaria)

## 2021-06-30 LAB
ACE SERPL-CCNC: 36 U/L — SIGNIFICANT CHANGE UP (ref 14–82)
ALBUMIN SERPL ELPH-MCNC: 4.5 G/DL — SIGNIFICANT CHANGE UP (ref 3.5–5.2)
ALP SERPL-CCNC: 111 U/L — SIGNIFICANT CHANGE UP (ref 30–115)
ALT FLD-CCNC: 49 U/L — HIGH (ref 0–41)
ANA TITR SER: NEGATIVE — SIGNIFICANT CHANGE UP
ANION GAP SERPL CALC-SCNC: 12 MMOL/L — SIGNIFICANT CHANGE UP (ref 7–14)
ANION GAP SERPL CALC-SCNC: 15 MMOL/L — HIGH (ref 7–14)
AST SERPL-CCNC: 49 U/L — HIGH (ref 0–41)
AUTO DIFF PNL BLD: NEGATIVE — SIGNIFICANT CHANGE UP
BILIRUB SERPL-MCNC: <0.2 MG/DL — SIGNIFICANT CHANGE UP (ref 0.2–1.2)
BUN SERPL-MCNC: 15 MG/DL — SIGNIFICANT CHANGE UP (ref 10–20)
BUN SERPL-MCNC: 16 MG/DL — SIGNIFICANT CHANGE UP (ref 10–20)
C-ANCA SER-ACNC: NEGATIVE — SIGNIFICANT CHANGE UP
CALCIUM SERPL-MCNC: 9.5 MG/DL — SIGNIFICANT CHANGE UP (ref 8.5–10.1)
CALCIUM SERPL-MCNC: 9.7 MG/DL — SIGNIFICANT CHANGE UP (ref 8.5–10.1)
CHLORIDE SERPL-SCNC: 97 MMOL/L — LOW (ref 98–110)
CHLORIDE SERPL-SCNC: 99 MMOL/L — SIGNIFICANT CHANGE UP (ref 98–110)
CO2 SERPL-SCNC: 23 MMOL/L — SIGNIFICANT CHANGE UP (ref 17–32)
CO2 SERPL-SCNC: 26 MMOL/L — SIGNIFICANT CHANGE UP (ref 17–32)
CREAT SERPL-MCNC: 0.8 MG/DL — SIGNIFICANT CHANGE UP (ref 0.7–1.5)
CREAT SERPL-MCNC: 0.8 MG/DL — SIGNIFICANT CHANGE UP (ref 0.7–1.5)
GAMMA INTERFERON BACKGROUND BLD IA-ACNC: 0.01 IU/ML — SIGNIFICANT CHANGE UP
GLUCOSE BLDC GLUCOMTR-MCNC: 245 MG/DL — HIGH (ref 70–99)
GLUCOSE BLDC GLUCOMTR-MCNC: 273 MG/DL — HIGH (ref 70–99)
GLUCOSE BLDC GLUCOMTR-MCNC: 316 MG/DL — HIGH (ref 70–99)
GLUCOSE BLDC GLUCOMTR-MCNC: 376 MG/DL — HIGH (ref 70–99)
GLUCOSE BLDC GLUCOMTR-MCNC: 394 MG/DL — HIGH (ref 70–99)
GLUCOSE BLDC GLUCOMTR-MCNC: 423 MG/DL — HIGH (ref 70–99)
GLUCOSE BLDC GLUCOMTR-MCNC: 428 MG/DL — HIGH (ref 70–99)
GLUCOSE SERPL-MCNC: 289 MG/DL — HIGH (ref 70–99)
GLUCOSE SERPL-MCNC: 424 MG/DL — HIGH (ref 70–99)
HCT VFR BLD CALC: 40 % — SIGNIFICANT CHANGE UP (ref 37–47)
HGB BLD-MCNC: 13.3 G/DL — SIGNIFICANT CHANGE UP (ref 12–16)
M TB IFN-G BLD-IMP: NEGATIVE — SIGNIFICANT CHANGE UP
M TB IFN-G CD4+ BCKGRND COR BLD-ACNC: 0.04 IU/ML — SIGNIFICANT CHANGE UP
M TB IFN-G CD4+CD8+ BCKGRND COR BLD-ACNC: 0.02 IU/ML — SIGNIFICANT CHANGE UP
MAGNESIUM SERPL-MCNC: 2 MG/DL — SIGNIFICANT CHANGE UP (ref 1.8–2.4)
MCHC RBC-ENTMCNC: 29.5 PG — SIGNIFICANT CHANGE UP (ref 27–31)
MCHC RBC-ENTMCNC: 33.3 G/DL — SIGNIFICANT CHANGE UP (ref 32–37)
MCV RBC AUTO: 88.7 FL — SIGNIFICANT CHANGE UP (ref 81–99)
MPO AB + PR3 PNL SER: SIGNIFICANT CHANGE UP
NRBC # BLD: 0 /100 WBCS — SIGNIFICANT CHANGE UP (ref 0–0)
P-ANCA SER-ACNC: NEGATIVE — SIGNIFICANT CHANGE UP
PLATELET # BLD AUTO: 355 K/UL — SIGNIFICANT CHANGE UP (ref 130–400)
POTASSIUM SERPL-MCNC: 4.5 MMOL/L — SIGNIFICANT CHANGE UP (ref 3.5–5)
POTASSIUM SERPL-MCNC: 4.5 MMOL/L — SIGNIFICANT CHANGE UP (ref 3.5–5)
POTASSIUM SERPL-SCNC: 4.5 MMOL/L — SIGNIFICANT CHANGE UP (ref 3.5–5)
POTASSIUM SERPL-SCNC: 4.5 MMOL/L — SIGNIFICANT CHANGE UP (ref 3.5–5)
PROT SERPL-MCNC: 6.5 G/DL — SIGNIFICANT CHANGE UP (ref 6–8.3)
PROT SERPL-MCNC: 6.5 G/DL — SIGNIFICANT CHANGE UP (ref 6–8.3)
PROT SERPL-MCNC: 7 G/DL — SIGNIFICANT CHANGE UP (ref 6–8)
QUANT TB PLUS MITOGEN MINUS NIL: 8.55 IU/ML — SIGNIFICANT CHANGE UP
RBC # BLD: 4.51 M/UL — SIGNIFICANT CHANGE UP (ref 4.2–5.4)
RBC # FLD: 12.3 % — SIGNIFICANT CHANGE UP (ref 11.5–14.5)
SODIUM SERPL-SCNC: 135 MMOL/L — SIGNIFICANT CHANGE UP (ref 135–146)
SODIUM SERPL-SCNC: 137 MMOL/L — SIGNIFICANT CHANGE UP (ref 135–146)
VANCOMYCIN TROUGH SERPL-MCNC: 6.2 UG/ML — SIGNIFICANT CHANGE UP (ref 5–10)
WBC # BLD: 7.36 K/UL — SIGNIFICANT CHANGE UP (ref 4.8–10.8)
WBC # FLD AUTO: 7.36 K/UL — SIGNIFICANT CHANGE UP (ref 4.8–10.8)

## 2021-06-30 PROCEDURE — 99232 SBSQ HOSP IP/OBS MODERATE 35: CPT

## 2021-06-30 RX ORDER — INSULIN LISPRO 100/ML
4 VIAL (ML) SUBCUTANEOUS ONCE
Refills: 0 | Status: COMPLETED | OUTPATIENT
Start: 2021-06-30 | End: 2021-06-30

## 2021-06-30 RX ORDER — INSULIN GLARGINE 100 [IU]/ML
20 INJECTION, SOLUTION SUBCUTANEOUS AT BEDTIME
Refills: 0 | Status: DISCONTINUED | OUTPATIENT
Start: 2021-06-30 | End: 2021-07-02

## 2021-06-30 RX ORDER — INSULIN GLARGINE 100 [IU]/ML
15 INJECTION, SOLUTION SUBCUTANEOUS AT BEDTIME
Refills: 0 | Status: DISCONTINUED | OUTPATIENT
Start: 2021-06-30 | End: 2021-06-30

## 2021-06-30 RX ORDER — INSULIN LISPRO 100/ML
5 VIAL (ML) SUBCUTANEOUS
Refills: 0 | Status: DISCONTINUED | OUTPATIENT
Start: 2021-06-30 | End: 2021-07-01

## 2021-06-30 RX ORDER — VANCOMYCIN HCL 1 G
1500 VIAL (EA) INTRAVENOUS EVERY 12 HOURS
Refills: 0 | Status: DISCONTINUED | OUTPATIENT
Start: 2021-06-30 | End: 2021-06-30

## 2021-06-30 RX ORDER — INSULIN LISPRO 100/ML
8 VIAL (ML) SUBCUTANEOUS ONCE
Refills: 0 | Status: COMPLETED | OUTPATIENT
Start: 2021-06-30 | End: 2021-06-30

## 2021-06-30 RX ORDER — SODIUM CHLORIDE 9 MG/ML
1000 INJECTION INTRAMUSCULAR; INTRAVENOUS; SUBCUTANEOUS
Refills: 0 | Status: DISCONTINUED | OUTPATIENT
Start: 2021-06-30 | End: 2021-07-02

## 2021-06-30 RX ORDER — INSULIN GLARGINE 100 [IU]/ML
15 INJECTION, SOLUTION SUBCUTANEOUS ONCE
Refills: 0 | Status: COMPLETED | OUTPATIENT
Start: 2021-06-30 | End: 2021-06-30

## 2021-06-30 RX ORDER — INSULIN HUMAN 100 [IU]/ML
8 INJECTION, SOLUTION SUBCUTANEOUS ONCE
Refills: 0 | Status: DISCONTINUED | OUTPATIENT
Start: 2021-06-30 | End: 2021-06-30

## 2021-06-30 RX ADMIN — GABAPENTIN 300 MILLIGRAM(S): 400 CAPSULE ORAL at 05:14

## 2021-06-30 RX ADMIN — Medication 60 MILLIGRAM(S): at 05:15

## 2021-06-30 RX ADMIN — INSULIN GLARGINE 20 UNIT(S): 100 INJECTION, SOLUTION SUBCUTANEOUS at 21:13

## 2021-06-30 RX ADMIN — DORZOLAMIDE HYDROCHLORIDE 1 DROP(S): 20 SOLUTION/ DROPS OPHTHALMIC at 13:57

## 2021-06-30 RX ADMIN — LATANOPROST 1 DROP(S): 0.05 SOLUTION/ DROPS OPHTHALMIC; TOPICAL at 21:15

## 2021-06-30 RX ADMIN — METHOCARBAMOL 500 MILLIGRAM(S): 500 TABLET, FILM COATED ORAL at 21:14

## 2021-06-30 RX ADMIN — Medication 250 MILLIGRAM(S): at 06:36

## 2021-06-30 RX ADMIN — CLOPIDOGREL BISULFATE 75 MILLIGRAM(S): 75 TABLET, FILM COATED ORAL at 13:56

## 2021-06-30 RX ADMIN — Medication 1 DROP(S): at 17:12

## 2021-06-30 RX ADMIN — Medication 8: at 17:12

## 2021-06-30 RX ADMIN — ATORVASTATIN CALCIUM 40 MILLIGRAM(S): 80 TABLET, FILM COATED ORAL at 21:12

## 2021-06-30 RX ADMIN — DORZOLAMIDE HYDROCHLORIDE 1 DROP(S): 20 SOLUTION/ DROPS OPHTHALMIC at 21:14

## 2021-06-30 RX ADMIN — Medication 5 UNIT(S): at 17:12

## 2021-06-30 RX ADMIN — Medication 1 DROP(S): at 05:14

## 2021-06-30 RX ADMIN — ENOXAPARIN SODIUM 40 MILLIGRAM(S): 100 INJECTION SUBCUTANEOUS at 21:14

## 2021-06-30 RX ADMIN — SODIUM CHLORIDE 75 MILLILITER(S): 9 INJECTION INTRAMUSCULAR; INTRAVENOUS; SUBCUTANEOUS at 01:51

## 2021-06-30 RX ADMIN — Medication 5 MILLIGRAM(S): at 05:14

## 2021-06-30 RX ADMIN — Medication 4 UNIT(S): at 05:22

## 2021-06-30 RX ADMIN — PANTOPRAZOLE SODIUM 40 MILLIGRAM(S): 20 TABLET, DELAYED RELEASE ORAL at 05:14

## 2021-06-30 RX ADMIN — Medication 100 MILLIGRAM(S): at 17:12

## 2021-06-30 RX ADMIN — Medication 8 UNIT(S): at 01:46

## 2021-06-30 RX ADMIN — OXYCODONE AND ACETAMINOPHEN 1 TABLET(S): 5; 325 TABLET ORAL at 20:49

## 2021-06-30 RX ADMIN — Medication 12: at 14:00

## 2021-06-30 RX ADMIN — Medication 110 MILLIGRAM(S): at 17:12

## 2021-06-30 RX ADMIN — Medication 10: at 07:46

## 2021-06-30 RX ADMIN — OXYCODONE AND ACETAMINOPHEN 1 TABLET(S): 5; 325 TABLET ORAL at 13:57

## 2021-06-30 RX ADMIN — Medication 100 MILLIGRAM(S): at 05:14

## 2021-06-30 RX ADMIN — FAMOTIDINE 40 MILLIGRAM(S): 10 INJECTION INTRAVENOUS at 13:56

## 2021-06-30 RX ADMIN — OXYCODONE AND ACETAMINOPHEN 1 TABLET(S): 5; 325 TABLET ORAL at 02:43

## 2021-06-30 RX ADMIN — GABAPENTIN 300 MILLIGRAM(S): 400 CAPSULE ORAL at 17:12

## 2021-06-30 RX ADMIN — BRIMONIDINE TARTRATE 1 DROP(S): 2 SOLUTION/ DROPS OPHTHALMIC at 17:12

## 2021-06-30 RX ADMIN — METHOCARBAMOL 500 MILLIGRAM(S): 500 TABLET, FILM COATED ORAL at 05:14

## 2021-06-30 RX ADMIN — BRIMONIDINE TARTRATE 1 DROP(S): 2 SOLUTION/ DROPS OPHTHALMIC at 05:14

## 2021-06-30 RX ADMIN — SODIUM CHLORIDE 75 MILLILITER(S): 9 INJECTION INTRAMUSCULAR; INTRAVENOUS; SUBCUTANEOUS at 18:32

## 2021-06-30 RX ADMIN — Medication 1 TABLET(S): at 17:24

## 2021-06-30 RX ADMIN — INSULIN GLARGINE 15 UNIT(S): 100 INJECTION, SOLUTION SUBCUTANEOUS at 02:08

## 2021-06-30 RX ADMIN — METHOCARBAMOL 500 MILLIGRAM(S): 500 TABLET, FILM COATED ORAL at 13:56

## 2021-06-30 RX ADMIN — DORZOLAMIDE HYDROCHLORIDE 1 DROP(S): 20 SOLUTION/ DROPS OPHTHALMIC at 05:15

## 2021-06-30 RX ADMIN — Medication 5 UNIT(S): at 14:00

## 2021-06-30 RX ADMIN — Medication 5 UNIT(S): at 07:46

## 2021-06-30 NOTE — CHART NOTE - NSCHARTNOTEFT_GEN_A_CORE
57 yo F with PMhx of asthma, DM, GERD, DLD, PAD, HTN, RTAVIS, OA presents with left eye pain. Pt had developed itchiness of left eye, which then gradually progressed to pain of left sided face and eye about 1 week ago. She presented to the ED several days ago, CT Orbit revealed mild L dacryoadenitis. Pt was given Unasyn in ED and sent home on Augmentin. Her left eye pain and swelling continued to worsen, especially with eye movement, associated with photophobia and "little black dots" in her vision, so she saw her Opthalmologist and was recommended to come to ED.     Pt is on solumedrol 60 mg Iv daily. Her blood sugars readings ranged from 273-423. She initially received 10 units of lispro while her blood sugar reading was 369, Her subsequent readings were 410 and 423. Pt then received 15 units of lantis and 8 units of lispro and her blood sugars decreased to 273. The patient then received 4 units of lispro at 5:20 am.    -Follow up serum beta Hydroxybutyrate, BMP, UA and blood sugars.  -Get endocrinology consult

## 2021-06-30 NOTE — PROGRESS NOTE ADULT - ENMT COMMENTS
L periorbital area with almost compete resolution of the edema. Erythema resolved. No pain on palpation. No purulence from lacrimal duct

## 2021-06-30 NOTE — PROGRESS NOTE ADULT - ASSESSMENT
HPI:  55 yo F with PMhx of asthma, DM, GERD, DLD, PAD, HTN, TRAVIS, OA presents with left eye pain. Pt had developed itchiness of left eye, which then gradually progressed to pain of left sided face and eye about 1 week ago. She presented to the ED several days ago, CT Orbit revealed mild L dacryoadenitis. Pt was given Unasyn in ED and sent home on Augmentin. Her left eye pain and swelling continued to worsen, especially with eye movement, associated with photophobia and "little black dots" in her vision, so she saw her Opthalmologist and was recommended to come to ED.   In the ED, T 97, /87, HR 84, RR 18, SpO2 99% on RA. Lab work revealed WBC ct 16. Pt was given Metronidazole, Unasyn, Ceftriaxone and Vancomycin. CT Orbits again revealed L sided dacryoadenitis.     #Left eye pain / photophobia secondary to l dacryoadenitis / orbital cellulitis   Po Augmentin 875 mg q12h  Doxycycline 100 mg q12  IV steroid for 3 day course (today is day 2 ) per opthalmology  rheumatology eval   autoimmune panel sent    #DM   CAPILLARY BLOOD GLUCOSE      POCT Blood Glucose.: 428 mg/dL (30 Jun 2021 13:55)  POCT Blood Glucose.: 394 mg/dL (30 Jun 2021 07:36)  POCT Blood Glucose.: 273 mg/dL (30 Jun 2021 05:10)  POCT Blood Glucose.: 376 mg/dL (30 Jun 2021 03:23)  POCT Blood Glucose.: 423 mg/dL (30 Jun 2021 01:19)  POCT Blood Glucose.: 410 mg/dL (29 Jun 2021 23:38)  POCT Blood Glucose.: 409 mg/dL (29 Jun 2021 22:35)  POCT Blood Glucose.: 369 mg/dL (29 Jun 2021 21:19)  POCT Blood Glucose.: 216 mg/dL (29 Jun 2021 16:38)  elevated , increase lantus to 20 units , if continues to be elevated , increase lispro to 7 units before meals     #Obstructive sleep apnea    #Overweight BMI 29 patient needs to see [facility] outpatient for further evaluation     #CKD 2     #DL     #peripheral artery disease    # left suly bullosa      Progress Note Handoff    Pending:  rheumatology eval . cw iv steroid , opthalmology following     Family discussion: patient verbalized understanding and agreeable to plan of care     Disposition: Home___

## 2021-06-30 NOTE — PROGRESS NOTE ADULT - SUBJECTIVE AND OBJECTIVE BOX
SUBJECTIVE:    Patient is a 56y old Female who presents with a chief complaint of L orbital cellulitis (29 Jun 2021 16:08)    Currently admitted to medicine with the primary diagnosis of Orbital cellulitis       Today is hospital day 2d. This morning she is resting comfortably in bed and reports no new issues or overnight events.     PAST MEDICAL & SURGICAL HISTORY  HTN (hypertension)    Diabetes    Asthma  LAST EPISODE SEVERAL YRS    TRAVIS on CPAP    Arthritis  OA    High cholesterol    GERD (gastroesophageal reflux disease)    History of peripheral arterial disease    S/P trigger finger release    H/O carpal tunnel repair    H/O arthroscopy of shoulder      SOCIAL HISTORY:  Negative for smoking/alcohol/drug use.     ALLERGIES:  ibuprofen (Urticaria)    MEDICATIONS:  STANDING MEDICATIONS  atorvastatin 40 milliGRAM(s) Oral at bedtime  brimonidine 0.2% Ophthalmic Solution 1 Drop(s) Left EYE two times a day  cefTRIAXone   IVPB 2000 milliGRAM(s) IV Intermittent every 24 hours  chlorhexidine 4% Liquid 1 Application(s) Topical <User Schedule>  clopidogrel Tablet 75 milliGRAM(s) Oral daily  dorzolamide 2% Ophthalmic Solution 1 Drop(s) Left EYE three times a day  enalapril 5 milliGRAM(s) Oral daily  enoxaparin Injectable 40 milliGRAM(s) SubCutaneous at bedtime  famotidine    Tablet 40 milliGRAM(s) Oral daily  gabapentin 300 milliGRAM(s) Oral two times a day  insulin glargine Injectable (LANTUS) 20 Unit(s) SubCutaneous at bedtime  insulin lispro (ADMELOG) corrective regimen sliding scale   SubCutaneous three times a day before meals  insulin lispro Injectable (ADMELOG) 5 Unit(s) SubCutaneous three times a day before meals  latanoprost 0.005% Ophthalmic Solution 1 Drop(s) Left EYE at bedtime  methocarbamol 500 milliGRAM(s) Oral every 8 hours  methylPREDNISolone sodium succinate Injectable 60 milliGRAM(s) IV Push daily  metoprolol tartrate 100 milliGRAM(s) Oral two times a day  pantoprazole    Tablet 40 milliGRAM(s) Oral before breakfast  sodium chloride 0.9%. 1000 milliLiter(s) IV Continuous <Continuous>  timolol 0.25% Solution 1 Drop(s) Left EYE two times a day  vancomycin  IVPB 1500 milliGRAM(s) IV Intermittent every 12 hours    PRN MEDICATIONS  cyclobenzaprine 10 milliGRAM(s) Oral three times a day PRN  morphine  - Injectable 2 milliGRAM(s) IV Push every 6 hours PRN  oxycodone    5 mG/acetaminophen 325 mG 1 Tablet(s) Oral every 6 hours PRN    VITALS:   T(F): 97.1  HR: 71  BP: 166/76  RR: 18  SpO2: --    LABS:                        13.3   7.36  )-----------( 355      ( 30 Jun 2021 06:33 )             40.0     06-30    137  |  99  |  15  ----------------------------<  289<H>  4.5   |  26  |  0.8    Ca    9.7      30 Jun 2021 06:33  Mg     2.0     06-30    TPro  7.0  /  Alb  4.5  /  TBili  <0.2  /  DBili  x   /  AST  49<H>  /  ALT  49<H>  /  AlkPhos  111  06-30    Culture - Blood (collected 28 Jun 2021 13:30)  Source: .Blood Blood  Preliminary Report (29 Jun 2021 23:30):    No growth to date.    Culture - Blood (collected 28 Jun 2021 13:30)  Source: .Blood Blood  Preliminary Report (29 Jun 2021 23:30):    No growth to date.    RADIOLOGY:    < from: CT Orbit w/ IV Cont (06.28.21 @ 18:40) >  IMPRESSION:    No significant change from recent CT orbit 6/25/2021.    Again seen is asymmetric enhancement of the left lacrimal gland and left preseptal soft tissues, compatible with dacryoadenitis (infectious versus inflammatory.    < end of copied text >    PHYSICAL EXAM:  GEN: No acute distress  HEENT: Left eye is much better today, redness/conjunctivitis, swelling much better, no strabismus or ophthalmoplegia  LUNGS: Clear to auscultation bilaterally   HEART: S1/S2 present. RRR.   ABD: Soft, non-tender, non-distended. Bowel sounds present  EXT: NC/NC/NE/2+PP/MORALES/Skin Intact.   NEURO: AAOX3             SUBJECTIVE:    Patient is a 56y old Female who presents with a chief complaint of Left eye pain and swelling (29 Jun 2021 16:08).Currently admitted to medicine with the primary diagnosis of Dacryoadenitis vs preorbital cellulitis.  Today is hospital day 2d. This morning she mentions feeling much better thn yesterday ( less pain, less sensitivity and the swelling significantly improved)    PAST MEDICAL & SURGICAL HISTORY  HTN (hypertension)    Diabetes    Asthma  LAST EPISODE SEVERAL YRS    TRAVIS on CPAP    Arthritis  OA    High cholesterol    GERD (gastroesophageal reflux disease)    History of peripheral arterial disease    S/P trigger finger release    H/O carpal tunnel repair    H/O arthroscopy of shoulder      SOCIAL HISTORY:  Negative for smoking/alcohol/drug use.     ALLERGIES:  ibuprofen (Urticaria)    MEDICATIONS:  STANDING MEDICATIONS  atorvastatin 40 milliGRAM(s) Oral at bedtime  brimonidine 0.2% Ophthalmic Solution 1 Drop(s) Left EYE two times a day  cefTRIAXone   IVPB 2000 milliGRAM(s) IV Intermittent every 24 hours  chlorhexidine 4% Liquid 1 Application(s) Topical <User Schedule>  clopidogrel Tablet 75 milliGRAM(s) Oral daily  dorzolamide 2% Ophthalmic Solution 1 Drop(s) Left EYE three times a day  enalapril 5 milliGRAM(s) Oral daily  enoxaparin Injectable 40 milliGRAM(s) SubCutaneous at bedtime  famotidine    Tablet 40 milliGRAM(s) Oral daily  gabapentin 300 milliGRAM(s) Oral two times a day  insulin glargine Injectable (LANTUS) 20 Unit(s) SubCutaneous at bedtime  insulin lispro (ADMELOG) corrective regimen sliding scale   SubCutaneous three times a day before meals  insulin lispro Injectable (ADMELOG) 5 Unit(s) SubCutaneous three times a day before meals  latanoprost 0.005% Ophthalmic Solution 1 Drop(s) Left EYE at bedtime  methocarbamol 500 milliGRAM(s) Oral every 8 hours  methylPREDNISolone sodium succinate Injectable 60 milliGRAM(s) IV Push daily  metoprolol tartrate 100 milliGRAM(s) Oral two times a day  pantoprazole    Tablet 40 milliGRAM(s) Oral before breakfast  sodium chloride 0.9%. 1000 milliLiter(s) IV Continuous <Continuous>  timolol 0.25% Solution 1 Drop(s) Left EYE two times a day  vancomycin  IVPB 1500 milliGRAM(s) IV Intermittent every 12 hours    PRN MEDICATIONS  cyclobenzaprine 10 milliGRAM(s) Oral three times a day PRN  morphine  - Injectable 2 milliGRAM(s) IV Push every 6 hours PRN  oxycodone    5 mG/acetaminophen 325 mG 1 Tablet(s) Oral every 6 hours PRN    VITALS:   T(F): 97.1  HR: 71  BP: 166/76  RR: 18  SpO2: --    LABS:                        13.3   7.36  )-----------( 355      ( 30 Jun 2021 06:33 )             40.0     06-30    137  |  99  |  15  ----------------------------<  289<H>  4.5   |  26  |  0.8    Ca    9.7      30 Jun 2021 06:33  Mg     2.0     06-30    TPro  7.0  /  Alb  4.5  /  TBili  <0.2  /  DBili  x   /  AST  49<H>  /  ALT  49<H>  /  AlkPhos  111  06-30    Culture - Blood (collected 28 Jun 2021 13:30)  Source: .Blood Blood  Preliminary Report (29 Jun 2021 23:30):    No growth to date.    Culture - Blood (collected 28 Jun 2021 13:30)  Source: .Blood Blood  Preliminary Report (29 Jun 2021 23:30):    No growth to date.    RADIOLOGY:    < from: CT Orbit w/ IV Cont (06.28.21 @ 18:40) >  IMPRESSION:    No significant change from recent CT orbit 6/25/2021.    Again seen is asymmetric enhancement of the left lacrimal gland and left preseptal soft tissues, compatible with dacryoadenitis (infectious versus inflammatory.    < end of copied text >    PHYSICAL EXAM:  GEN: No acute distress  HEENT: Left eye is much better today, redness/conjunctivitis, swelling much better, no strabismus or ophthalmoplegia  LUNGS: Clear to auscultation bilaterally   HEART: S1/S2 present. RRR.   ABD: Soft, non-tender, non-distended. Bowel sounds present  EXT: NC/NC/NE/2+PP/MORALES/Skin Intact.   NEURO: AAOX3

## 2021-06-30 NOTE — PROGRESS NOTE ADULT - ASSESSMENT
55 yo F with PMHx of asthma, DM, GERD, DLD, PAD s/p stents to LE, HTN, TRAVIS, OA presents with left eye pain.    #Left eye swelling and pain  #Elevated IOP  #Dacryoadenitis ( inflammatory vs infectious, likely inflammatory)  - CT orbit ( 6/28/2021):  a. Again seen is asymmetric enhancement of the left lacrimal gland and left preseptal soft tissues, compatible with dacryoadenitis (infectious versus inflammatory.  - Seen by Optho - etiology infections vs inflammatory dacryoadenitis vs idiopathic inflammatory syndrome. Given the response to IV steroids yesterday, opthalmology currently favor inflammatory dacryoadenitis  - Started on Systemic steroids as per Opthalmology recommendations: 60mg IV methylprednisolone once daily (equivalent of prednisone 80mg po once daily), start date: 6/29/2021.   - ID recommendations:   a. Increased Vancomycin to 1.5g IV q12hrs ( was on 1g iv q12hrs, Vancomycin trough level was subtherapeutic)  b. Ceftriaxone 2g IV once daily ( Start date: 6/29/2021, DC cefepime)  - Vancomycin trough level: 6.4, patient was on 1g IV q12hrs, Goal 10-15, will increase Vancomycin to 1.5g IV q12hrs ( start date: 6/29)  - Will follow up further ID recommendations  - C/w Latanoprost, Combigan, Azopt  - C/w pain control PRN  - Will obtain inflammatory work up - ACE, lysozyme, IgG4, C-ANCA, P-ANCA, quantiferon, TFTs, TSI, AUDREY, SSA/SSB, SPEP    #Hx of cervical discectomy with fusion in March  - Cervical neck collar in place  - C/w Cyclobenzaprine, Methocarbamol, Percocet    #DMII  - Hold home Metformin, Pioglitazone  - Blood sugar not controlled due to IV steroids  - Increase Lantus to 20units subcutaneous once daily at bedtime  - Continue Lispro 5 units subcutaneous three times a day 10-15 mins before meals  - Sliding Scale    #HTN  - Continue with Enalapril 5mg po once daily   - Continue with Metoprolol tartrate 100mg po q12hrs    #Hx of PAD s/p bilateral LE stenting  - Continue with Plavix 75mg po once daily  - Continue with Atorvastatin 40mg po once daily at bedtime    #GERD  - C/w PPI, Famotidine     #Hx of Asthma - stable  #Hx of TRAVIS    DVT ppx: Lovenox 40mg subcutaneous once daily  GI ppx: PPI  Diet: DASH, CC  Activity: AAT  Full Code   Dispo: Acute   55 yo F with PMHx of asthma, DM, GERD, DLD, PAD s/p stents to LE, HTN, TRAVIS, OA presents with left eye pain.    #Left eye swelling and pain  #Elevated IOP  #Dacryoadenitis ( inflammatory vs infectious, likely inflammatory)  - CT orbit ( 6/28/2021):  a. Again seen is asymmetric enhancement of the left lacrimal gland and left preseptal soft tissues, compatible with dacryoadenitis (infectious versus inflammatory.  - Seen by Optho - etiology infections vs inflammatory dacryoadenitis vs idiopathic inflammatory syndrome. Given the response to IV steroids yesterday, opthalmology currently favor inflammatory dacryoadenitis  - Started on Systemic steroids as per Opthalmology recommendations: 60mg IV methylprednisolone once daily (equivalent of prednisone 80mg po once daily), start date: 6/29/2021.   - ID recommendations:   a. DC IV vancomycin and ceftriaxone  b. Augmentin 875mg po q12hrs for 10 days (continue a total of 10 days upon discharge)   c. Doxycycline 100mg q12hrs for 10 dyas (continue a total of 10 days upon discharge)   - Continue with Latanoprost, Combigan, Azopt.  - Continue pain control PRN.   - Blood culture: 6/28: NGTD  - Will obtain inflammatory work up - ACE, lysozyme, IgG4, C-ANCA, P-ANCA, quantiferon, TFTs, TSI, AUDREY, SSA/SSB, SPEP ( all received by lab, pending results)  - Off note: The patient reports that she has a family history of Auto-immune disease ( Rheumatoid Arthritis)     #Hx of cervical discectomy with fusion in March  - Cervical neck collar in place  - C/w Cyclobenzaprine, Methocarbamol, Percocet    #DMII  - Hold home Metformin, Pioglitazone  - Blood sugar not controlled due to IV steroids  - Increase Lantus to 20units subcutaneous once daily at bedtime  - Continue Lispro 5 units subcutaneous three times a day 10-15 minutes before meals  - Sliding Scale  - HbA1c: 10.5%    #HTN  - Continue with Enalapril 5mg po once daily   - Continue with Metoprolol tartrate 100mg po q12hrs    #Hx of PAD s/p bilateral LE stenting  - Continue with Plavix 75mg po once daily  - Continue with Atorvastatin 40mg po once daily at bedtime    #GERD  - C/w PPI, Famotidine     #Hx of Asthma - stable  #Hx of TRAVIS    DVT ppx: Lovenox 40mg subcutaneous once daily  GI ppx: PPI  Diet: DASH, CC  Activity: AAT  Full Code   Dispo: Acute

## 2021-06-30 NOTE — PROGRESS NOTE ADULT - SUBJECTIVE AND OBJECTIVE BOX
KO KUINE  MRN-[id number]  56y Female        Patient is a 56y old  Female who presents with a chief complaint of L orbital cellulitis (30 Jun 2021 15:22)    HEALTH ISSUES - R/O PROBLEM Dx:    HPI:  57 yo F with PMhx of asthma, DM, GERD, DLD, PAD, HTN, TRAVIS, OA presents with left eye pain. Pt had developed itchiness of left eye, which then gradually progressed to pain of left sided face and eye about 1 week ago. She presented to the ED several days ago, CT Orbit revealed mild L dacryoadenitis. Pt was given Unasyn in ED and sent home on Augmentin. Her left eye pain and swelling continued to worsen, especially with eye movement, associated with photophobia and "little black dots" in her vision, so she saw her Opthalmologist and was recommended to come to ED.   In the ED, T 97, /87, HR 84, RR 18, SpO2 99% on RA. Lab work revealed WBC ct 16. Pt was given Metronidazole, Unasyn, Ceftriaxone and Vancomycin. CT Orbits again revealed L sided dacryoadenitis. Seen by Optho, recommend continuing with IV Abx. (29 Jun 2021 01:57)    PAST MEDICAL & SURGICAL HISTORY:  HTN (hypertension)    Diabetes    Asthma  LAST EPISODE SEVERAL YRS    TRAVIS on CPAP    Arthritis  OA    High cholesterol    GERD (gastroesophageal reflux disease)    History of peripheral arterial disease    S/P trigger finger release    H/O carpal tunnel repair    H/O arthroscopy of shoulder      MEDICATIONS  (STANDING):  amoxicillin  875 milliGRAM(s)/clavulanate 1 Tablet(s) Oral every 12 hours  atorvastatin 40 milliGRAM(s) Oral at bedtime  brimonidine 0.2% Ophthalmic Solution 1 Drop(s) Left EYE two times a day  chlorhexidine 4% Liquid 1 Application(s) Topical <User Schedule>  clopidogrel Tablet 75 milliGRAM(s) Oral daily  dorzolamide 2% Ophthalmic Solution 1 Drop(s) Left EYE three times a day  doxycycline IVPB 100 milliGRAM(s) IV Intermittent every 12 hours  enalapril 5 milliGRAM(s) Oral daily  enoxaparin Injectable 40 milliGRAM(s) SubCutaneous at bedtime  famotidine    Tablet 40 milliGRAM(s) Oral daily  gabapentin 300 milliGRAM(s) Oral two times a day  insulin glargine Injectable (LANTUS) 20 Unit(s) SubCutaneous at bedtime  insulin lispro (ADMELOG) corrective regimen sliding scale   SubCutaneous three times a day before meals  insulin lispro Injectable (ADMELOG) 5 Unit(s) SubCutaneous three times a day before meals  latanoprost 0.005% Ophthalmic Solution 1 Drop(s) Left EYE at bedtime  methocarbamol 500 milliGRAM(s) Oral every 8 hours  methylPREDNISolone sodium succinate Injectable 60 milliGRAM(s) IV Push daily  metoprolol tartrate 100 milliGRAM(s) Oral two times a day  pantoprazole    Tablet 40 milliGRAM(s) Oral before breakfast  sodium chloride 0.9%. 1000 milliLiter(s) (75 mL/Hr) IV Continuous <Continuous>  timolol 0.25% Solution 1 Drop(s) Left EYE two times a day    MEDICATIONS  (PRN):  cyclobenzaprine 10 milliGRAM(s) Oral three times a day PRN Muscle Spasm  morphine  - Injectable 2 milliGRAM(s) IV Push every 6 hours PRN Severe Pain (7 - 10)  oxycodone    5 mG/acetaminophen 325 mG 1 Tablet(s) Oral every 6 hours PRN Moderate Pain (4 - 6)    Allergies    ibuprofen (Urticaria)    Intolerances      HEALTH ISSUES - PROBLEM Dx:          SUZANNA: 1 day   POHx: negative  FOHx: Non-contributory    Extended HPI: follup, left orbital cellutis/dacryoadeniditis.   IV     Ocular Medications: none        EXTERNAL:    VISUAL ACUITY:       RIGHT EYE: sc/cc                            LEFT EYE: sc/cc     MANIFEST:    RIGHT EYE:                LEFT EYE:    NEURO TESTING  EXTRAOCULAR MOVEMENTS:  PUPILS:  VFc:    ANTERIOR SEGMENT EVALUATION: :    LIDS/ADENEXA:  CONJUNCTIVA/SCLERA:  CORNEA:  ANTERIOR CHAMBER:   IRIS/PUPIL:  LENS/VITREOUS:    INTRAOCULAR PRESSURE:   OD: mmHg  OS: mmHg  @    POSTERIOR SEGMENT EVALUATION  DFE: 1% Tropicamide, 2.5 % Phenylephrine OU    OPTIC NERVE:  MACULA:  A/V:   FUNDUS/PERIPHERY:    SUPPLEMENTAL TESTING:             KO KUINE  MRN-[id number]  56y Female        Patient is a 56y old  Female who presents with a chief complaint of L orbital cellulitis (30 Jun 2021 15:22)    HEALTH ISSUES - R/O PROBLEM Dx:    HPI:  57 yo F with PMhx of asthma, DM, GERD, DLD, PAD, HTN, TRAVIS, OA presents with left eye pain. Pt had developed itchiness of left eye, which then gradually progressed to pain of left sided face and eye about 1 week ago. She presented to the ED several days ago, CT Orbit revealed mild L dacryoadenitis. Pt was given Unasyn in ED and sent home on Augmentin. Her left eye pain and swelling continued to worsen, especially with eye movement, associated with photophobia and "little black dots" in her vision, so she saw her Opthalmologist and was recommended to come to ED.   In the ED, T 97, /87, HR 84, RR 18, SpO2 99% on RA. Lab work revealed WBC ct 16. Pt was given Metronidazole, Unasyn, Ceftriaxone and Vancomycin. CT Orbits again revealed L sided dacryoadenitis. Seen by Optho, recommend continuing with IV Abx. (29 Jun 2021 01:57)    PAST MEDICAL & SURGICAL HISTORY:  HTN (hypertension)    Diabetes    Asthma  LAST EPISODE SEVERAL YRS    TRAVIS on CPAP    Arthritis  OA    High cholesterol    GERD (gastroesophageal reflux disease)    History of peripheral arterial disease    S/P trigger finger release    H/O carpal tunnel repair    H/O arthroscopy of shoulder      MEDICATIONS  (STANDING):  amoxicillin  875 milliGRAM(s)/clavulanate 1 Tablet(s) Oral every 12 hours  atorvastatin 40 milliGRAM(s) Oral at bedtime  brimonidine 0.2% Ophthalmic Solution 1 Drop(s) Left EYE two times a day  chlorhexidine 4% Liquid 1 Application(s) Topical <User Schedule>  clopidogrel Tablet 75 milliGRAM(s) Oral daily  dorzolamide 2% Ophthalmic Solution 1 Drop(s) Left EYE three times a day  doxycycline IVPB 100 milliGRAM(s) IV Intermittent every 12 hours  enalapril 5 milliGRAM(s) Oral daily  enoxaparin Injectable 40 milliGRAM(s) SubCutaneous at bedtime  famotidine    Tablet 40 milliGRAM(s) Oral daily  gabapentin 300 milliGRAM(s) Oral two times a day  insulin glargine Injectable (LANTUS) 20 Unit(s) SubCutaneous at bedtime  insulin lispro (ADMELOG) corrective regimen sliding scale   SubCutaneous three times a day before meals  insulin lispro Injectable (ADMELOG) 5 Unit(s) SubCutaneous three times a day before meals  latanoprost 0.005% Ophthalmic Solution 1 Drop(s) Left EYE at bedtime  methocarbamol 500 milliGRAM(s) Oral every 8 hours  methylPREDNISolone sodium succinate Injectable 60 milliGRAM(s) IV Push daily  metoprolol tartrate 100 milliGRAM(s) Oral two times a day  pantoprazole    Tablet 40 milliGRAM(s) Oral before breakfast  sodium chloride 0.9%. 1000 milliLiter(s) (75 mL/Hr) IV Continuous <Continuous>  timolol 0.25% Solution 1 Drop(s) Left EYE two times a day    MEDICATIONS  (PRN):  cyclobenzaprine 10 milliGRAM(s) Oral three times a day PRN Muscle Spasm  morphine  - Injectable 2 milliGRAM(s) IV Push every 6 hours PRN Severe Pain (7 - 10)  oxycodone    5 mG/acetaminophen 325 mG 1 Tablet(s) Oral every 6 hours PRN Moderate Pain (4 - 6)    Allergies    ibuprofen (Urticaria)    Intolerances      HEALTH ISSUES - PROBLEM Dx:          SUZANNA: 1 day   POHx: negative  FOHx: Non-contributory    Extended HPI: followup , left orbital cellulitis  dacryoadenitis   Pt reports significant improvement in pain (10 to a 5) and swelling OS with improvement in vision OS > OD   IV methylprednisolone  started yesterday for 3 day course with a transition to oral prednisone with taper     Ocular Medications:    Latanoprost qhs OS  Dorzolamide 3x/day OS  Brimonidine 0.2% 2x/day OS  Timolol 0.5% 2x/day OS          EXTERNAL:    VISUAL ACUITY:       RIGHT EYE: cc: 20/25+                       LEFT EYE: sc: 20/25+2        NEURO TESTING  EXTRAOCULAR MOVEMENTS:  full OU; no restriction OU; some pain on inferior eye movement OS but significantly improved   PUPILS: PERRL; NO APD  VFc: FTFC OU (improved OS)    ANTERIOR SEGMENT EVALUATION: :    LIDS/ADENEXA:  Trichiasis RUL (lash epilated)  CONJUNCTIVA/SCLERA:  residual NELSON OD; residual chemosis temporal OS with grade 1 injection (improved)  CORNEA: clear OU  ANTERIOR CHAMBER:  no cells OU; shallow OS  IRIS/PUPIL: flat OU  LENS/VITREOUS: ild NS OU    INTRAOCULAR PRESSURE:   OD:  12mmHg  OS:  11mmHg  @ 11:00am    POSTERIOR SEGMENT EVALUATION  DFE: 0.5% T     OPTIC NERVE: 0.5/0.4; no pallor / swelling  MACULA: flat OD; + choroidal folds OS  A/V:  a/v nicking/crossing changes OU  FUNDUS/PERIPHERY: flat OU        SUPPLEMENTAL TESTING:  Gonioscopy  OD angle open  OS shallow; ~ 5% PAS superior    Fundus photos  no heme/ microaneurysms  + A/V nicking OU  + choroidal folds superior > cental/inferior    BScan  OS thickened chorioid OS. small serous detachment inferior temporal   OD - slightly greater than normal thickness to the choroid             KO KUINE  MRN-[id number]  56y Female        Patient is a 56y old  Female who presents with a chief complaint of L orbital cellulitis (30 Jun 2021 15:22)    HEALTH ISSUES - R/O PROBLEM Dx:    HPI:  57 yo F with PMhx of asthma, DM, GERD, DLD, PAD, HTN, TRAVIS, OA presents with left eye pain. Pt had developed itchiness of left eye, which then gradually progressed to pain of left sided face and eye about 1 week ago. She presented to the ED several days ago, CT Orbit revealed mild L dacryoadenitis. Pt was given Unasyn in ED and sent home on Augmentin. Her left eye pain and swelling continued to worsen, especially with eye movement, associated with photophobia and "little black dots" in her vision, so she saw her Opthalmologist and was recommended to come to ED.   In the ED, T 97, /87, HR 84, RR 18, SpO2 99% on RA. Lab work revealed WBC ct 16. Pt was given Metronidazole, Unasyn, Ceftriaxone and Vancomycin. CT Orbits again revealed L sided dacryoadenitis. Seen by Optho, recommend continuing with IV Abx. (29 Jun 2021 01:57)    PAST MEDICAL & SURGICAL HISTORY:  HTN (hypertension)    Diabetes    Asthma  LAST EPISODE SEVERAL YRS    TRAVIS on CPAP    Arthritis  OA    High cholesterol    GERD (gastroesophageal reflux disease)    History of peripheral arterial disease    S/P trigger finger release    H/O carpal tunnel repair    H/O arthroscopy of shoulder      MEDICATIONS  (STANDING):  amoxicillin  875 milliGRAM(s)/clavulanate 1 Tablet(s) Oral every 12 hours  atorvastatin 40 milliGRAM(s) Oral at bedtime  brimonidine 0.2% Ophthalmic Solution 1 Drop(s) Left EYE two times a day  chlorhexidine 4% Liquid 1 Application(s) Topical <User Schedule>  clopidogrel Tablet 75 milliGRAM(s) Oral daily  dorzolamide 2% Ophthalmic Solution 1 Drop(s) Left EYE three times a day  doxycycline IVPB 100 milliGRAM(s) IV Intermittent every 12 hours  enalapril 5 milliGRAM(s) Oral daily  enoxaparin Injectable 40 milliGRAM(s) SubCutaneous at bedtime  famotidine    Tablet 40 milliGRAM(s) Oral daily  gabapentin 300 milliGRAM(s) Oral two times a day  insulin glargine Injectable (LANTUS) 20 Unit(s) SubCutaneous at bedtime  insulin lispro (ADMELOG) corrective regimen sliding scale   SubCutaneous three times a day before meals  insulin lispro Injectable (ADMELOG) 5 Unit(s) SubCutaneous three times a day before meals  latanoprost 0.005% Ophthalmic Solution 1 Drop(s) Left EYE at bedtime  methocarbamol 500 milliGRAM(s) Oral every 8 hours  methylPREDNISolone sodium succinate Injectable 60 milliGRAM(s) IV Push daily  metoprolol tartrate 100 milliGRAM(s) Oral two times a day  pantoprazole    Tablet 40 milliGRAM(s) Oral before breakfast  sodium chloride 0.9%. 1000 milliLiter(s) (75 mL/Hr) IV Continuous <Continuous>  timolol 0.25% Solution 1 Drop(s) Left EYE two times a day    MEDICATIONS  (PRN):  cyclobenzaprine 10 milliGRAM(s) Oral three times a day PRN Muscle Spasm  morphine  - Injectable 2 milliGRAM(s) IV Push every 6 hours PRN Severe Pain (7 - 10)  oxycodone    5 mG/acetaminophen 325 mG 1 Tablet(s) Oral every 6 hours PRN Moderate Pain (4 - 6)    Allergies    ibuprofen (Urticaria)    Intolerances      HEALTH ISSUES - PROBLEM Dx:          SUZANNA: 1 day   POHx: negative  FOHx: Non-contributory    Extended HPI: followup , left orbital cellulitis  dacryoadenitis   Pt reports significant improvement in pain (10 to a 5) and swelling OS with improvement in vision OS > OD   IV methylprednisolone  started yesterday for 3 day course with a transition to oral prednisone with taper     Ocular Medications:    Latanoprost qhs OS  Dorzolamide 3x/day OS  Brimonidine 0.2% 2x/day OS  Timolol 0.5% 2x/day OS          EXTERNAL:    VISUAL ACUITY:       RIGHT EYE: cc: 20/25+                       LEFT EYE: sc: 20/25+2    Current Spec Rx (distance)  OD + 2.50-1.00x115  OS + 2.00-1.00x70    NEURO TESTING  EXTRAOCULAR MOVEMENTS:  full OU; no restriction OU; some pain on inferior eye movement OS but significantly improved   PUPILS: PERRL; NO APD  VFc: FTFC OU (improved OS)    ANTERIOR SEGMENT EVALUATION: :    LIDS/ADENEXA:  Trichiasis RUL (lash epilated)  CONJUNCTIVA/SCLERA:  residual NELSON OD; residual chemosis temporal OS with grade 1 injection (improved)  CORNEA: clear OU  ANTERIOR CHAMBER:  no cells OU; shallow OS  IRIS/PUPIL: flat OU  LENS/VITREOUS: ild NS OU    INTRAOCULAR PRESSURE:   OD:  12mmHg  OS:  11mmHg  @ 11:00am    POSTERIOR SEGMENT EVALUATION  DFE: 0.5% T     OPTIC NERVE: 0.5/0.4; no pallor / swelling  MACULA: flat OD; + choroidal folds OS  A/V:  a/v nicking/crossing changes OU  FUNDUS/PERIPHERY: flat OU        SUPPLEMENTAL TESTING:  Gonioscopy  OD angle open  OS shallow; ~ 5% PAS superior    Fundus photos  no heme/ microaneurysms  + A/V nicking OU  + choroidal folds superior > cental/inferior    BScan  OS thickened chorioid OS. small serous detachment inferior temporal   OD - slightly greater than normal thickness to the choroid             ELMIRA AMBER  MRN-[id number]  56y Female        Patient is a 56y old  Female who presents with a chief complaint of L orbital cellulitis (30 Jun 2021 15:22)    HEALTH ISSUES - R/O PROBLEM Dx:    HPI:  57 yo F with PMhx of asthma, DM, GERD, DLD, PAD, HTN, TRAVIS, OA presents with left eye pain. Pt had developed itchiness of left eye, which then gradually progressed to pain of left sided face and eye about 1 week ago. She presented to the ED several days ago, CT Orbit revealed mild L dacryoadenitis. Pt was given Unasyn in ED and sent home on Augmentin. Her left eye pain and swelling continued to worsen, especially with eye movement, associated with photophobia and "little black dots" in her vision, so she saw her Ophthalmologist and was recommended to come to ED.   In the ED, T 97, /87, HR 84, RR 18, SpO2 99% on RA. Lab work revealed WBC ct 16. Pt was given Metronidazole, Unasyn, Ceftriaxone and Vancomycin. CT Orbits again revealed L sided dacryoadenitis. Seen by Optho, recommend continuing with IV Abx. (29 Jun 2021 01:57)    PAST MEDICAL & SURGICAL HISTORY:  HTN (hypertension)    Diabetes    Asthma  LAST EPISODE SEVERAL YRS    TRAVIS on CPAP    Arthritis  OA    High cholesterol    GERD (gastroesophageal reflux disease)    History of peripheral arterial disease    S/P trigger finger release    H/O carpal tunnel repair    H/O arthroscopy of shoulder      MEDICATIONS  (STANDING):  amoxicillin  875 milliGRAM(s)/clavulanate 1 Tablet(s) Oral every 12 hours  atorvastatin 40 milliGRAM(s) Oral at bedtime  brimonidine 0.2% Ophthalmic Solution 1 Drop(s) Left EYE two times a day  chlorhexidine 4% Liquid 1 Application(s) Topical <User Schedule>  clopidogrel Tablet 75 milliGRAM(s) Oral daily  dorzolamide 2% Ophthalmic Solution 1 Drop(s) Left EYE three times a day  doxycycline IVPB 100 milliGRAM(s) IV Intermittent every 12 hours  enalapril 5 milliGRAM(s) Oral daily  enoxaparin Injectable 40 milliGRAM(s) SubCutaneous at bedtime  famotidine    Tablet 40 milliGRAM(s) Oral daily  gabapentin 300 milliGRAM(s) Oral two times a day  insulin glargine Injectable (LANTUS) 20 Unit(s) SubCutaneous at bedtime  insulin lispro (ADMELOG) corrective regimen sliding scale   SubCutaneous three times a day before meals  insulin lispro Injectable (ADMELOG) 5 Unit(s) SubCutaneous three times a day before meals  latanoprost 0.005% Ophthalmic Solution 1 Drop(s) Left EYE at bedtime  methocarbamol 500 milliGRAM(s) Oral every 8 hours  methylPREDNISolone sodium succinate Injectable 60 milliGRAM(s) IV Push daily  metoprolol tartrate 100 milliGRAM(s) Oral two times a day  pantoprazole    Tablet 40 milliGRAM(s) Oral before breakfast  sodium chloride 0.9%. 1000 milliLiter(s) (75 mL/Hr) IV Continuous <Continuous>  timolol 0.25% Solution 1 Drop(s) Left EYE two times a day    MEDICATIONS  (PRN):  cyclobenzaprine 10 milliGRAM(s) Oral three times a day PRN Muscle Spasm  morphine  - Injectable 2 milliGRAM(s) IV Push every 6 hours PRN Severe Pain (7 - 10)  oxycodone    5 mG/acetaminophen 325 mG 1 Tablet(s) Oral every 6 hours PRN Moderate Pain (4 - 6)    Allergies    ibuprofen (Urticaria)    Intolerances      HEALTH ISSUES - PROBLEM Dx:          SUZANNA: 1 day   POHx: negative  FOHx: Non-contributory    Extended HPI: followup , left orbital cellulitis  dacryoadenitis   Pt reports significant improvement in pain (10 to a 5) and swelling OS with improvement in vision OS > OD   IV methylprednisolone  started yesterday for 3 day course with a transition to oral prednisone with taper     Ocular Medications:    Latanoprost qhs OS  Dorzolamide 3x/day OS  Brimonidine 0.2% 2x/day OS  Timolol 0.5% 2x/day OS          EXTERNAL:    VISUAL ACUITY:       RIGHT EYE: cc: 20/25+                       LEFT EYE: sc: 20/25+2    Current Spec Rx (distance)  OD + 2.50-1.00x115  OS + 2.00-1.00x70    NEURO TESTING  EXTRAOCULAR MOVEMENTS:  full OU; no restriction OU; some pain on inferior eye movement OS but significantly improved   PUPILS: PERRL; NO APD  VFc: FTFC OU (improved OS)    ANTERIOR SEGMENT EVALUATION: :    LIDS/ADENEXA:  Trichiasis RUL (lash epilated)  CONJUNCTIVA/SCLERA:  residual NELSON OD; residual chemosis temporal OS with grade 1 injection (improved)  CORNEA: clear OU  ANTERIOR CHAMBER:  no cells OU; shallow OS  IRIS/PUPIL: flat OU  LENS/VITREOUS: ild NS OU    INTRAOCULAR PRESSURE:   OD:  12mmHg  OS:  11mmHg  @ 11:00am    POSTERIOR SEGMENT EVALUATION  DFE: 0.5% T     OPTIC NERVE: 0.5/0.4; no pallor / swelling  MACULA: flat OD; + choroidal folds OS  A/V:  a/v nicking/crossing changes OU  FUNDUS/PERIPHERY: flat OU        SUPPLEMENTAL TESTING:  Gonioscopy  OD angle open  OS shallow; ~ 5% PAS superior    Fundus photos  no heme/ microaneurysms  + A/V nicking OU  + choroidal folds superior > cental/inferior    BScan  OS thickened chorioid OS. small serous detachment inferior temporal   OD - slightly greater than normal thickness to the choroid

## 2021-06-30 NOTE — PROGRESS NOTE ADULT - ASSESSMENT
Assessment   - Orbital Cellulitis / Dacryadenitis left eye  Much improved    - Type 2 DM on Insulin - No Diabetic Retinopathy OU    - Hypertensive Retinopathy OU    -Shallow Anterior Chamber OS with h/o elevated IOP; IOP has improved.    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 Robert Ruiz MD     - rheumatology eval   - autoimmune panel sent   Assessment   - Orbital Cellulitis / Dacryadenitis left eye  Much improved    - Type 2 DM on Insulin - No Diabetic Retinopathy OU    - Hypertensive Retinopathy OU    -Shallow Anterior Chamber OS with h/o elevated IOP; IOP has improved.    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 Robert Ruiz MD     - rheumatology eval   - autoimmune panel sent   Assessment   - Orbital Cellulitis / Dacryadenitis left eye  Much improved with institution of IV steroids    - Type 2 DM on Insulin - No Diabetic Retinopathy OU    - Hypertensive Retinopathy OU    -Shallow Anterior Chamber OS with h/o elevated IOP; IOP has improved.    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 Robert Ruiz MD     - rheumatology eval   - autoimmune panel sent

## 2021-06-30 NOTE — PROGRESS NOTE ADULT - SUBJECTIVE AND OBJECTIVE BOX
AMBER KU  56y  [facility] [id number] 017 B      Patient is a 56y old  Female who presents with a chief complaint of L orbital cellulitis (30 Jun 2021 12:48)      INTERVAL HPI/OVERNIGHT EVENTS:  patient remarks she feels signficantly better      REVIEW OF SYSTEMS:  CONSTITUTIONAL: No fever, weight loss, or fatigue  EYES:decreased eye pain , no phtoophobia   ENMT:  No difficulty hearing, tinnitus, vertigo; No sinus or throat pain  NECK: No pain or stiffness  BREASTS: No pain, masses, or nipple discharge  RESPIRATORY: No cough, wheezing, chills or hemoptysis; No shortness of breath  CARDIOVASCULAR: No chest pain, palpitations, dizziness, or leg swelling  GASTROINTESTINAL: No abdominal or epigastric pain. No nausea, vomiting, or hematemesis; No diarrhea or constipation. No melena or hematochezia.  GENITOURINARY: No dysuria, frequency, hematuria, or incontinence  NEUROLOGICAL: No headaches, memory loss, loss of strength, numbness, or tremors  SKIN: No itching, burning, rashes, or lesions   LYMPH NODES: No enlarged glands  ENDOCRINE: No heat or cold intolerance; No hair loss  MUSCULOSKELETAL: No joint pain or swelling; No muscle, back, or extremity pain  PSYCHIATRIC: No depression, anxiety, mood swings, or difficulty sleeping  HEME/LYMPH: No easy bruising, or bleeding gums  ALLERY AND IMMUNOLOGIC: No hives or eczema  FAMILY HISTORY:  Family history of malignant neoplasm of esophagus    FH: stroke    FH: aneurysm      T(C): 36.2 (06-30-21 @ 07:50), Max: 36.2 (06-29-21 @ 16:46)  HR: 71 (06-30-21 @ 07:50) (67 - 80)  BP: 166/76 (06-30-21 @ 07:50) (140/67 - 166/76)  RR: 18 (06-30-21 @ 07:50) (18 - 19)  SpO2: --  Wt(kg): --Vital Signs Last 24 Hrs  T(C): 36.2 (30 Jun 2021 07:50), Max: 36.2 (29 Jun 2021 16:46)  T(F): 97.1 (30 Jun 2021 07:50), Max: 97.1 (29 Jun 2021 16:46)  HR: 71 (30 Jun 2021 07:50) (67 - 80)  BP: 166/76 (30 Jun 2021 07:50) (140/67 - 166/76)  BP(mean): --  RR: 18 (30 Jun 2021 07:50) (18 - 19)  SpO2: --    PHYSICAL EXAM:  GENERAL: NAD, well-groomed, well-developed  HEAD:  Atraumatic, Normocephalic  EYES: no erythema appreciated   ENMT: No tonsillar erythema, exudates, or enlargement; Moist mucous membranes, Good dentition, No lesions  NECK: Supple, No JVD, Normal thyroid  NERVOUS SYSTEM:  Alert & Oriented X3, Good concentration; Motor Strength 5/5 B/L upper and lower extremities; DTRs 2+ intact and symmetric  PULM: Clear to auscultation bilaterally  CARDIAC: Regular rate and rhythm; No murmurs, rubs, or gallops  GI: Soft, Nontender, Nondistended; Bowel sounds present  EXTREMITIES:  2+ Peripheral Pulses, No clubbing, cyanosis, or edema  LYMPH: No lymphadenopathy noted  SKIN: No rashes or lesions    Consultant(s) Notes Reviewed:  [x ] YES  [ ] NO  Care Discussed with Consultants/Other Providers [ x] YES  [ ] NO    LABS:                            13.3   7.36  )-----------( 355      ( 30 Jun 2021 06:33 )             40.0   06-30    137  |  99  |  15  ----------------------------<  289<H>  4.5   |  26  |  0.8    Ca    9.7      30 Jun 2021 06:33  Mg     2.0     06-30    TPro  7.0  /  Alb  4.5  /  TBili  <0.2  /  DBili  x   /  AST  49<H>  /  ALT  49<H>  /  AlkPhos  111  06-30            Culture - Blood (collected 29 Jun 2021 05:34)  Source: .Blood None  Preliminary Report (30 Jun 2021 14:01):    No growth to date.    Culture - Blood (collected 28 Jun 2021 13:30)  Source: .Blood Blood  Preliminary Report (29 Jun 2021 23:30):    No growth to date.    Culture - Blood (collected 28 Jun 2021 13:30)  Source: .Blood Blood  Preliminary Report (29 Jun 2021 23:30):    No growth to date.      amoxicillin  875 milliGRAM(s)/clavulanate 1 Tablet(s) Oral every 12 hours  atorvastatin 40 milliGRAM(s) Oral at bedtime  brimonidine 0.2% Ophthalmic Solution 1 Drop(s) Left EYE two times a day  chlorhexidine 4% Liquid 1 Application(s) Topical <User Schedule>  clopidogrel Tablet 75 milliGRAM(s) Oral daily  cyclobenzaprine 10 milliGRAM(s) Oral three times a day PRN  dorzolamide 2% Ophthalmic Solution 1 Drop(s) Left EYE three times a day  doxycycline IVPB 100 milliGRAM(s) IV Intermittent every 12 hours  enalapril 5 milliGRAM(s) Oral daily  enoxaparin Injectable 40 milliGRAM(s) SubCutaneous at bedtime  famotidine    Tablet 40 milliGRAM(s) Oral daily  gabapentin 300 milliGRAM(s) Oral two times a day  insulin glargine Injectable (LANTUS) 20 Unit(s) SubCutaneous at bedtime  insulin lispro (ADMELOG) corrective regimen sliding scale   SubCutaneous three times a day before meals  insulin lispro Injectable (ADMELOG) 5 Unit(s) SubCutaneous three times a day before meals  latanoprost 0.005% Ophthalmic Solution 1 Drop(s) Left EYE at bedtime  methocarbamol 500 milliGRAM(s) Oral every 8 hours  methylPREDNISolone sodium succinate Injectable 60 milliGRAM(s) IV Push daily  metoprolol tartrate 100 milliGRAM(s) Oral two times a day  morphine  - Injectable 2 milliGRAM(s) IV Push every 6 hours PRN  oxycodone    5 mG/acetaminophen 325 mG 1 Tablet(s) Oral every 6 hours PRN  pantoprazole    Tablet 40 milliGRAM(s) Oral before breakfast  sodium chloride 0.9%. 1000 milliLiter(s) IV Continuous <Continuous>  timolol 0.25% Solution 1 Drop(s) Left EYE two times a day      HEALTH ISSUES - PROBLEM Dx:          Case Discussed with House Staff   Spectra [phone]

## 2021-06-30 NOTE — PROGRESS NOTE ADULT - ASSESSMENT
ASSESSMENT  56y F admitted with PMhx of asthma, DM, GERD, DLD, PAD, HTN, TRAVIS, OA presents with left eye pain since 10 days prior to presentation, admitted for ORBITAL CELLULITIS. it started with itchiness and watery discharge of the left eye, she presented to the ED the first time on 05/25 for swelling where CT was done and showed dacryoadenitis -> she was given Unasyn in ED and discharged on Augmentin 1 g PO BID. swelling increased until it became shut the next day -> she sought medical advice (an ophthalmologist who recommended going to the ED where she presented this time where CT orbit was repeated and didn't show a change from the previous one.      IMPRESSION  Dacroadenitis with associated left orbital cellulitis with dramatic improvement overnight clinically and on PE  6/28 BCx NG      RECOMMENDATIONS  Steroids as per opthalmology  Po Augmentin 875 mg q12h  Doxycycline 100 mg q12h  Duration 10 days in all  recall prn please

## 2021-07-01 LAB
ALBUMIN SERPL ELPH-MCNC: 4 G/DL — SIGNIFICANT CHANGE UP (ref 3.5–5.2)
ALP SERPL-CCNC: 94 U/L — SIGNIFICANT CHANGE UP (ref 30–115)
ALT FLD-CCNC: 47 U/L — HIGH (ref 0–41)
ANION GAP SERPL CALC-SCNC: 12 MMOL/L — SIGNIFICANT CHANGE UP (ref 7–14)
AST SERPL-CCNC: 35 U/L — SIGNIFICANT CHANGE UP (ref 0–41)
BILIRUB SERPL-MCNC: 0.2 MG/DL — SIGNIFICANT CHANGE UP (ref 0.2–1.2)
BUN SERPL-MCNC: 15 MG/DL — SIGNIFICANT CHANGE UP (ref 10–20)
CALCIUM SERPL-MCNC: 9 MG/DL — SIGNIFICANT CHANGE UP (ref 8.5–10.1)
CHLORIDE SERPL-SCNC: 104 MMOL/L — SIGNIFICANT CHANGE UP (ref 98–110)
CO2 SERPL-SCNC: 25 MMOL/L — SIGNIFICANT CHANGE UP (ref 17–32)
CREAT SERPL-MCNC: 0.7 MG/DL — SIGNIFICANT CHANGE UP (ref 0.7–1.5)
GLUCOSE BLDC GLUCOMTR-MCNC: 209 MG/DL — HIGH (ref 70–99)
GLUCOSE BLDC GLUCOMTR-MCNC: 242 MG/DL — HIGH (ref 70–99)
GLUCOSE BLDC GLUCOMTR-MCNC: 258 MG/DL — HIGH (ref 70–99)
GLUCOSE BLDC GLUCOMTR-MCNC: 286 MG/DL — HIGH (ref 70–99)
GLUCOSE BLDC GLUCOMTR-MCNC: 303 MG/DL — HIGH (ref 70–99)
GLUCOSE SERPL-MCNC: 229 MG/DL — HIGH (ref 70–99)
HCT VFR BLD CALC: 35.5 % — LOW (ref 37–47)
HGB BLD-MCNC: 11.9 G/DL — LOW (ref 12–16)
IGG SERPL-MCNC: 803 MG/DL — SIGNIFICANT CHANGE UP (ref 586–1602)
IGG1 SER-MCNC: 465 MG/DL — SIGNIFICANT CHANGE UP (ref 248–810)
IGG2 SER-MCNC: 171 MG/DL — SIGNIFICANT CHANGE UP (ref 130–555)
IGG3 SER-MCNC: 26 MG/DL — SIGNIFICANT CHANGE UP (ref 15–102)
IGG4 SER-MCNC: 3 MG/DL — SIGNIFICANT CHANGE UP (ref 2–96)
MAGNESIUM SERPL-MCNC: 1.7 MG/DL — LOW (ref 1.8–2.4)
MCHC RBC-ENTMCNC: 30.1 PG — SIGNIFICANT CHANGE UP (ref 27–31)
MCHC RBC-ENTMCNC: 33.5 G/DL — SIGNIFICANT CHANGE UP (ref 32–37)
MCV RBC AUTO: 89.9 FL — SIGNIFICANT CHANGE UP (ref 81–99)
NRBC # BLD: 0 /100 WBCS — SIGNIFICANT CHANGE UP (ref 0–0)
PLATELET # BLD AUTO: 305 K/UL — SIGNIFICANT CHANGE UP (ref 130–400)
POTASSIUM SERPL-MCNC: 3.8 MMOL/L — SIGNIFICANT CHANGE UP (ref 3.5–5)
POTASSIUM SERPL-SCNC: 3.8 MMOL/L — SIGNIFICANT CHANGE UP (ref 3.5–5)
PROT SERPL-MCNC: 6.1 G/DL — SIGNIFICANT CHANGE UP (ref 6–8)
RBC # BLD: 3.95 M/UL — LOW (ref 4.2–5.4)
RBC # FLD: 12.5 % — SIGNIFICANT CHANGE UP (ref 11.5–14.5)
SODIUM SERPL-SCNC: 141 MMOL/L — SIGNIFICANT CHANGE UP (ref 135–146)
TSI ACT/NOR SER: <0.1 IU/L — SIGNIFICANT CHANGE UP (ref 0–0.55)
WBC # BLD: 8.44 K/UL — SIGNIFICANT CHANGE UP (ref 4.8–10.8)
WBC # FLD AUTO: 8.44 K/UL — SIGNIFICANT CHANGE UP (ref 4.8–10.8)

## 2021-07-01 PROCEDURE — 99233 SBSQ HOSP IP/OBS HIGH 50: CPT

## 2021-07-01 RX ORDER — HYDRALAZINE HCL 50 MG
10 TABLET ORAL ONCE
Refills: 0 | Status: COMPLETED | OUTPATIENT
Start: 2021-07-01 | End: 2021-07-01

## 2021-07-01 RX ORDER — INSULIN LISPRO 100/ML
7 VIAL (ML) SUBCUTANEOUS
Refills: 0 | Status: DISCONTINUED | OUTPATIENT
Start: 2021-07-01 | End: 2021-07-02

## 2021-07-01 RX ORDER — INSULIN LISPRO 100/ML
6 VIAL (ML) SUBCUTANEOUS ONCE
Refills: 0 | Status: COMPLETED | OUTPATIENT
Start: 2021-07-01 | End: 2021-07-01

## 2021-07-01 RX ORDER — MAGNESIUM SULFATE 500 MG/ML
2 VIAL (ML) INJECTION ONCE
Refills: 0 | Status: COMPLETED | OUTPATIENT
Start: 2021-07-01 | End: 2021-07-01

## 2021-07-01 RX ADMIN — Medication 10 MILLIGRAM(S): at 18:09

## 2021-07-01 RX ADMIN — ATORVASTATIN CALCIUM 40 MILLIGRAM(S): 80 TABLET, FILM COATED ORAL at 21:24

## 2021-07-01 RX ADMIN — Medication 1 DROP(S): at 05:47

## 2021-07-01 RX ADMIN — Medication 1 DROP(S): at 17:27

## 2021-07-01 RX ADMIN — DORZOLAMIDE HYDROCHLORIDE 1 DROP(S): 20 SOLUTION/ DROPS OPHTHALMIC at 21:24

## 2021-07-01 RX ADMIN — DORZOLAMIDE HYDROCHLORIDE 1 DROP(S): 20 SOLUTION/ DROPS OPHTHALMIC at 05:36

## 2021-07-01 RX ADMIN — Medication 7 UNIT(S): at 17:27

## 2021-07-01 RX ADMIN — MORPHINE SULFATE 2 MILLIGRAM(S): 50 CAPSULE, EXTENDED RELEASE ORAL at 23:30

## 2021-07-01 RX ADMIN — OXYCODONE AND ACETAMINOPHEN 1 TABLET(S): 5; 325 TABLET ORAL at 23:30

## 2021-07-01 RX ADMIN — ENOXAPARIN SODIUM 40 MILLIGRAM(S): 100 INJECTION SUBCUTANEOUS at 21:24

## 2021-07-01 RX ADMIN — MORPHINE SULFATE 2 MILLIGRAM(S): 50 CAPSULE, EXTENDED RELEASE ORAL at 16:02

## 2021-07-01 RX ADMIN — Medication 110 MILLIGRAM(S): at 05:47

## 2021-07-01 RX ADMIN — CHLORHEXIDINE GLUCONATE 1 APPLICATION(S): 213 SOLUTION TOPICAL at 05:35

## 2021-07-01 RX ADMIN — Medication 4: at 07:39

## 2021-07-01 RX ADMIN — PANTOPRAZOLE SODIUM 40 MILLIGRAM(S): 20 TABLET, DELAYED RELEASE ORAL at 05:48

## 2021-07-01 RX ADMIN — BRIMONIDINE TARTRATE 1 DROP(S): 2 SOLUTION/ DROPS OPHTHALMIC at 05:36

## 2021-07-01 RX ADMIN — METHOCARBAMOL 500 MILLIGRAM(S): 500 TABLET, FILM COATED ORAL at 13:47

## 2021-07-01 RX ADMIN — Medication 60 MILLIGRAM(S): at 05:47

## 2021-07-01 RX ADMIN — METHOCARBAMOL 500 MILLIGRAM(S): 500 TABLET, FILM COATED ORAL at 21:24

## 2021-07-01 RX ADMIN — MORPHINE SULFATE 2 MILLIGRAM(S): 50 CAPSULE, EXTENDED RELEASE ORAL at 21:49

## 2021-07-01 RX ADMIN — Medication 100 MILLIGRAM(S): at 17:27

## 2021-07-01 RX ADMIN — Medication 100 MILLIGRAM(S): at 05:34

## 2021-07-01 RX ADMIN — Medication 5 UNIT(S): at 07:38

## 2021-07-01 RX ADMIN — BRIMONIDINE TARTRATE 1 DROP(S): 2 SOLUTION/ DROPS OPHTHALMIC at 17:27

## 2021-07-01 RX ADMIN — Medication 4: at 17:27

## 2021-07-01 RX ADMIN — OXYCODONE AND ACETAMINOPHEN 1 TABLET(S): 5; 325 TABLET ORAL at 13:56

## 2021-07-01 RX ADMIN — OXYCODONE AND ACETAMINOPHEN 1 TABLET(S): 5; 325 TABLET ORAL at 02:58

## 2021-07-01 RX ADMIN — CLOPIDOGREL BISULFATE 75 MILLIGRAM(S): 75 TABLET, FILM COATED ORAL at 13:47

## 2021-07-01 RX ADMIN — INSULIN GLARGINE 20 UNIT(S): 100 INJECTION, SOLUTION SUBCUTANEOUS at 21:29

## 2021-07-01 RX ADMIN — Medication 58 MILLIGRAM(S): at 17:26

## 2021-07-01 RX ADMIN — Medication 5 MILLIGRAM(S): at 05:34

## 2021-07-01 RX ADMIN — GABAPENTIN 300 MILLIGRAM(S): 400 CAPSULE ORAL at 17:27

## 2021-07-01 RX ADMIN — GABAPENTIN 300 MILLIGRAM(S): 400 CAPSULE ORAL at 05:34

## 2021-07-01 RX ADMIN — Medication 6: at 13:56

## 2021-07-01 RX ADMIN — Medication 1 TABLET(S): at 05:34

## 2021-07-01 RX ADMIN — DORZOLAMIDE HYDROCHLORIDE 1 DROP(S): 20 SOLUTION/ DROPS OPHTHALMIC at 13:48

## 2021-07-01 RX ADMIN — OXYCODONE AND ACETAMINOPHEN 1 TABLET(S): 5; 325 TABLET ORAL at 08:40

## 2021-07-01 RX ADMIN — Medication 7 UNIT(S): at 13:57

## 2021-07-01 RX ADMIN — METHOCARBAMOL 500 MILLIGRAM(S): 500 TABLET, FILM COATED ORAL at 05:34

## 2021-07-01 RX ADMIN — Medication 1 TABLET(S): at 17:27

## 2021-07-01 RX ADMIN — FAMOTIDINE 40 MILLIGRAM(S): 10 INJECTION INTRAVENOUS at 13:47

## 2021-07-01 RX ADMIN — Medication 110 MILLIGRAM(S): at 17:26

## 2021-07-01 RX ADMIN — LATANOPROST 1 DROP(S): 0.05 SOLUTION/ DROPS OPHTHALMIC; TOPICAL at 21:24

## 2021-07-01 RX ADMIN — OXYCODONE AND ACETAMINOPHEN 1 TABLET(S): 5; 325 TABLET ORAL at 21:49

## 2021-07-01 RX ADMIN — Medication 6 UNIT(S): at 03:53

## 2021-07-01 RX ADMIN — Medication 25 GRAM(S): at 16:19

## 2021-07-01 NOTE — CONSULT NOTE ADULT - ASSESSMENT
56y old  Female who presents with a chief complaint of L orbital cellulitis     # Left Dacryoadenitis  # Left orbital cellulitis  - ACE levels: 36, TSH: 0.93, IgG levels (total and subset) normal, Atypical ANCA: negative. Anti-nuclear factor: Negative, C-ANCA and P-ANCA negative, Anti-SSA and Anti-SSB: negative  - CT imaging consistent with left lacrimal inflammation and left preseptal soft tissue inflammation  - On Solumedrol 60 daily (day 3) and Augmentin and Doxycycline  -   56y old  Female who presents with a chief complaint of L orbital cellulitis and left dacryoadenitis.    Discussed the findings with ophthalmology regarding swelling of choroid and choroidal folding on the eye exam. Would recommend pulse steroids for 3 days and then transition to Prednisone 60 daily tapering 10mg every week.    # Left Dacryoadenitis 2/2 Possible inflammatory darcyadenitis  # Left orbital cellulitis  - ACE levels: 36, TSH: 0.93, IgG levels (total and subset) normal, Atypical ANCA: negative. Anti-nuclear factor: Negative, C-ANCA and P-ANCA negative, Anti-SSA and Anti-SSB: negative  - CT imaging consistent with left lacrimal inflammation and left preseptal soft tissue inflammation  - On Solumedrol 60 daily (day 3) and Augmentin and Doxycycline  - Would recommend Solumedrol 1g for 3 days and then prednisone 60 daily with taper of 10mg every week  - If no improvement or worsening would consider anti TNF agents  - Please get anti-ccp and RF factor, Lupus profile (less likely)  - Monitor for blood sugars and tighter glycemic control

## 2021-07-01 NOTE — PROGRESS NOTE ADULT - SUBJECTIVE AND OBJECTIVE BOX
ELMIRA AMBER  56y  [facility] [id number] 017 B      Patient is a 56y old  Female who presents with a chief complaint of L orbital cellulitis / Dacryoadenitis (01 Jul 2021 10:20)      INTERVAL HPI/OVERNIGHT EVENTS:  patient remarks that eye pain resumed      REVIEW OF SYSTEMS:  CONSTITUTIONAL: No fever, weight loss, or fatigue  EYES: +Eye pain and subjective feeling of sand in eyes   ENMT:  No difficulty hearing, tinnitus, vertigo; No sinus or throat pain  NECK: No pain or stiffness  BREASTS: No pain, masses, or nipple discharge  RESPIRATORY: No cough, wheezing, chills or hemoptysis; No shortness of breath  CARDIOVASCULAR: No chest pain, palpitations, dizziness, or leg swelling  GASTROINTESTINAL: No abdominal or epigastric pain. No nausea, vomiting, or hematemesis; No diarrhea or constipation. No melena or hematochezia.  GENITOURINARY: No dysuria, frequency, hematuria, or incontinence  NEUROLOGICAL: No headaches, memory loss, loss of strength, numbness, or tremors  SKIN: No itching, burning, rashes, or lesions   LYMPH NODES: No enlarged glands  ENDOCRINE: No heat or cold intolerance; No hair loss  MUSCULOSKELETAL: No joint pain or swelling; No muscle, back, or extremity pain  PSYCHIATRIC: No depression, anxiety, mood swings, or difficulty sleeping  HEME/LYMPH: No easy bruising, or bleeding gums  ALLERY AND IMMUNOLOGIC: No hives or eczema  FAMILY HISTORY:  Family history of malignant neoplasm of esophagus    FH: stroke    FH: aneurysm      T(C): 35.7 (07-01-21 @ 07:41), Max: 36.5 (07-01-21 @ 00:04)  HR: 62 (07-01-21 @ 07:41) (62 - 72)  BP: 159/72 (07-01-21 @ 07:41) (154/66 - 159/72)  RR: 18 (07-01-21 @ 07:41) (18 - 20)  SpO2: --  Wt(kg): --Vital Signs Last 24 Hrs  T(C): 35.7 (01 Jul 2021 07:41), Max: 36.5 (01 Jul 2021 00:04)  T(F): 96.2 (01 Jul 2021 07:41), Max: 97.7 (01 Jul 2021 00:04)  HR: 62 (01 Jul 2021 07:41) (62 - 72)  BP: 159/72 (01 Jul 2021 07:41) (154/66 - 159/72)  BP(mean): --  RR: 18 (01 Jul 2021 07:41) (18 - 20)  SpO2: --    PHYSICAL EXAM:  GENERAL: NAD, well-groomed, well-developed  HEAD:  Atraumatic, Normocephalic  EYES: Conjunctiva injection , and erythema of eyelid  ENMT: No tonsillar erythema, exudates, or enlargement; Moist mucous membranes, Good dentition, No lesions  NECK: Supple, No JVD, Normal thyroid  NERVOUS SYSTEM:  Alert & Oriented X3, Good concentration; Motor Strength 5/5 B/L upper and lower extremities; DTRs 2+ intact and symmetric  PULM: Clear to auscultation bilaterally  CARDIAC: Regular rate and rhythm; No murmurs, rubs, or gallops  GI: Soft, Nontender, Nondistended; Bowel sounds present  EXTREMITIES:  2+ Peripheral Pulses, No clubbing, cyanosis, or edema  LYMPH: No lymphadenopathy noted  SKIN: No rashes or lesions    Consultant(s) Notes Reviewed:  [x ] YES  [ ] NO  Care Discussed with Consultants/Other Providers [ x] YES  [ ] NO    LABS:                            11.9   8.44  )-----------( 305      ( 01 Jul 2021 06:49 )             35.5   07-01    141  |  104  |  15  ----------------------------<  229<H>  3.8   |  25  |  0.7    Ca    9.0      01 Jul 2021 06:49  Mg     1.7     07-01    TPro  6.1  /  Alb  4.0  /  TBili  0.2  /  DBili  x   /  AST  35  /  ALT  47<H>  /  AlkPhos  94  07-01            Culture - Blood (collected 29 Jun 2021 05:34)  Source: .Blood None  Preliminary Report (30 Jun 2021 14:01):    No growth to date.    Culture - Blood (collected 28 Jun 2021 13:30)  Source: .Blood Blood  Preliminary Report (29 Jun 2021 23:30):    No growth to date.    Culture - Blood (collected 28 Jun 2021 13:30)  Source: .Blood Blood  Preliminary Report (29 Jun 2021 23:30):    No growth to date.      amoxicillin  875 milliGRAM(s)/clavulanate 1 Tablet(s) Oral every 12 hours  artificial tears (preservative free) Ophthalmic Solution 2 Drop(s) Left EYE once  atorvastatin 40 milliGRAM(s) Oral at bedtime  brimonidine 0.2% Ophthalmic Solution 1 Drop(s) Left EYE two times a day  chlorhexidine 4% Liquid 1 Application(s) Topical <User Schedule>  clopidogrel Tablet 75 milliGRAM(s) Oral daily  cyclobenzaprine 10 milliGRAM(s) Oral three times a day PRN  dorzolamide 2% Ophthalmic Solution 1 Drop(s) Left EYE three times a day  doxycycline IVPB 100 milliGRAM(s) IV Intermittent every 12 hours  enalapril 5 milliGRAM(s) Oral daily  enoxaparin Injectable 40 milliGRAM(s) SubCutaneous at bedtime  famotidine    Tablet 40 milliGRAM(s) Oral daily  gabapentin 300 milliGRAM(s) Oral two times a day  insulin glargine Injectable (LANTUS) 20 Unit(s) SubCutaneous at bedtime  insulin lispro (ADMELOG) corrective regimen sliding scale   SubCutaneous three times a day before meals  insulin lispro Injectable (ADMELOG) 7 Unit(s) SubCutaneous three times a day before meals  latanoprost 0.005% Ophthalmic Solution 1 Drop(s) Left EYE at bedtime  methocarbamol 500 milliGRAM(s) Oral every 8 hours  methylPREDNISolone sodium succinate Injectable 60 milliGRAM(s) IV Push daily  metoprolol tartrate 100 milliGRAM(s) Oral two times a day  morphine  - Injectable 2 milliGRAM(s) IV Push every 6 hours PRN  oxycodone    5 mG/acetaminophen 325 mG 1 Tablet(s) Oral every 6 hours PRN  pantoprazole    Tablet 40 milliGRAM(s) Oral before breakfast  sodium chloride 0.9%. 1000 milliLiter(s) IV Continuous <Continuous>  timolol 0.25% Solution 1 Drop(s) Left EYE two times a day      HEALTH ISSUES - PROBLEM Dx:          Case Discussed with House Staff   Spectra [phone]

## 2021-07-01 NOTE — PROGRESS NOTE ADULT - ASSESSMENT
55 yo F with PMHx of asthma, DM, GERD, DLD, PAD s/p stents to LE, HTN, TRAVIS, OA presents with left eye pain.    #Left eye swelling and pain  #Elevated IOP  #Dacryoadenitis ( inflammatory vs infectious, likely inflammatory)  - CT orbit ( 6/28/2021):  a. Again seen is asymmetric enhancement of the left lacrimal gland and left preseptal soft tissues, compatible with dacryoadenitis (infectious versus inflammatory.  - Seen by Optho - etiology infections vs inflammatory dacryoadenitis vs idiopathic inflammatory syndrome. Given the response to IV steroids yesterday, opthalmology currently favor inflammatory dacryoadenitis  - Started on Systemic steroids as per Opthalmology recommendations: 60mg IV methylprednisolone once daily (equivalent of prednisone 80mg po once daily), start date: 6/29/2021, to complete 3 days.   - ID recommendations:   a. DC IV vancomycin and ceftriaxone  b. Augmentin 875mg po q12hrs for 10 days (continue a total of 10 days upon discharge)   c. Doxycycline 100mg q12hrs for 10 dyas (continue a total of 10 days upon discharge)   - Continue with Latanoprost, Combigan, Azopt.  - Continue pain control PRN.   - Blood culture: 6/28: NGTD  - Patient symptoms significantly on 6/30, worsened on 7/1  - Spoke to Opthalmology service on 7/1 at [phone], requested a follow up again today, patient set for transport to [facility] clinic to be [place]ow now.   - Opthalmology recommending Rheumatology consult: To help determine etiology and taper steroids.  - Patient reports family history of Rheumatoid Arthritis in her mother  - Rheumatology work up so far:   a. ACE levels: 36  b. TSH: 0.93  c. IgG levels ( 1,2,3,4 and total subset):   normal  d. Atypical ANCA: negative  e. Anti-Neuclear factor: Negative  f. C-ANCA and P-ANCA: negative  g. Anti-SSA and Anti-SSB: negative    #Hx of cervical discectomy with fusion in March  - Cervical neck collar in place  - C/w Cyclobenzaprine, Methocarbamol, Percocet    #DMII  - Hold home Metformin, Pioglitazone  - Blood sugar not controlled due to IV steroids  - Increase Lantus to 20units subcutaneous once daily at bedtime  - Continue Lispro 5 units subcutaneous three times a day 10-15 minutes before meals  - Sliding Scale  - HbA1c: 10.5%    #HTN  - Continue with Enalapril 5mg po once daily   - Continue with Metoprolol tartrate 100mg po q12hrs    #Hx of PAD s/p bilateral LE stenting  - Continue with Plavix 75mg po once daily  - Continue with Atorvastatin 40mg po once daily at bedtime    #GERD  - C/w PPI, Famotidine     #Hx of Asthma - stable  #Hx of TRAVIS    DVT ppx: Lovenox 40mg subcutaneous once daily  GI ppx: PPI  Diet: DASH, CC  Activity: AAT  Full Code   Dispo: Acute   57 yo F with PMHx of asthma, DM, GERD, DLD, PAD s/p stents to LE, HTN, TRAVIS, OA presents with left eye pain.    #Left eye swelling and pain  #Elevated IOP  #Dacryoadenitis ( inflammatory vs infectious, likely inflammatory)  - CT orbit ( 6/28/2021):  a. Again seen is asymmetric enhancement of the left lacrimal gland and left preseptal soft tissues, compatible with dacryoadenitis (infectious versus inflammatory.  - Seen by Optho - etiology infections vs inflammatory dacryoadenitis vs idiopathic inflammatory syndrome. Given the response to IV steroids yesterday, opthalmology currently favor inflammatory dacryoadenitis  - Started on Systemic steroids as per Opthalmology recommendations: 60mg IV methylprednisolone once daily (equivalent of prednisone 80mg po once daily), start date: 6/29/2021, to complete 3 days.   - ID recommendations:   a. DC IV vancomycin and ceftriaxone  b. Augmentin 875mg po q12hrs for 10 days (continue a total of 10 days upon discharge)   c. Doxycycline 100mg q12hrs for 10 dyas (continue a total of 10 days upon discharge)   - Continue with Latanoprost, Combigan, Azopt.  - Continue pain control PRN.   - Blood culture: 6/28: NGTD  - Patient symptoms significantly on 6/30, worsened on 7/1  - Spoke to Opthalmology attending on 7/1, requesting higher doses of steroids ( At least 250mg IV q12hrs).   - Opthalmology recommending Rheumatology consult: To help determine etiology and taper steroids.  - Pulse steroids started on 7/1/2021  - Rheumatology recommendations: Pulse steroids for 3 days ( 1g IV once daily for 3 days) followed by taper.   - Patient reports family history of Rheumatoid Arthritis in her mother  - Rheumatology work up so far:   a. ACE levels: 36  b. TSH: 0.93  c. IgG levels ( 1,2,3,4 and total subset):   normal  d. Atypical ANCA: negative  e. Anti-Neuclear factor: Negative  f. C-ANCA and P-ANCA: negative  g. Anti-SSA and Anti-SSB: negative    #Hx of cervical discectomy with fusion in March  - Cervical neck collar in place  - C/w Cyclobenzaprine, Methocarbamol, Percocet    #DMII  - Hold home Metformin, Pioglitazone  - Blood sugar not controlled due to IV steroids  - Increase Lantus to 20units subcutaneous once daily at bedtime  - Continue Lispro 5 units subcutaneous three times a day 10-15 minutes before meals  - Sliding Scale  - HbA1c: 10.5%    #HTN  - Continue with Enalapril 5mg po once daily   - Continue with Metoprolol tartrate 100mg po q12hrs    #Hx of PAD s/p bilateral LE stenting  - Continue with Plavix 75mg po once daily  - Continue with Atorvastatin 40mg po once daily at bedtime    #GERD  - C/w PPI, Famotidine     #Hx of Asthma - stable  #Hx of TRAVIS    DVT ppx: Lovenox 40mg subcutaneous once daily  GI ppx: PPI  Diet: DASH, CC  Activity: AAT  Full Code   Dispo: Acute

## 2021-07-01 NOTE — PROGRESS NOTE ADULT - SUBJECTIVE AND OBJECTIVE BOX
KO KUINE  MRN-[id number]  56y Female        Patient is a 56y old  Female who presents with a chief complaint of L orbital cellulitis (01 Jul 2021 12:52)    HEALTH ISSUES - R/O PROBLEM Dx:    HPI:  55 yo F with PMhx of asthma, DM, GERD, DLD, PAD, HTN, TRAVIS, OA presents with left eye pain. Pt had developed itchiness of left eye, which then gradually progressed to pain of left sided face and eye about 1 week ago. She presented to the ED several days ago, CT Orbit revealed mild L dacryoadenitis. Pt was given Unasyn in ED and sent home on Augmentin. Her left eye pain and swelling continued to worsen, especially with eye movement, associated with photophobia and "little black dots" in her vision, so she saw her Opthalmologist and was recommended to come to ED.   In the ED, T 97, /87, HR 84, RR 18, SpO2 99% on RA. Lab work revealed WBC ct 16. Pt was given Metronidazole, Unasyn, Ceftriaxone and Vancomycin. CT Orbits again revealed L sided dacryoadenitis. Seen by Optho, recommend continuing with IV Abx. (29 Jun 2021 01:57)    PAST MEDICAL & SURGICAL HISTORY:  HTN (hypertension)    Diabetes    Asthma  LAST EPISODE SEVERAL YRS    TRAVIS on CPAP    Arthritis  OA    High cholesterol    GERD (gastroesophageal reflux disease)    History of peripheral arterial disease    S/P trigger finger release    H/O carpal tunnel repair    H/O arthroscopy of shoulder      MEDICATIONS  (STANDING):  amoxicillin  875 milliGRAM(s)/clavulanate 1 Tablet(s) Oral every 12 hours  artificial tears (preservative free) Ophthalmic Solution 2 Drop(s) Left EYE once  atorvastatin 40 milliGRAM(s) Oral at bedtime  brimonidine 0.2% Ophthalmic Solution 1 Drop(s) Left EYE two times a day  chlorhexidine 4% Liquid 1 Application(s) Topical <User Schedule>  clopidogrel Tablet 75 milliGRAM(s) Oral daily  dorzolamide 2% Ophthalmic Solution 1 Drop(s) Left EYE three times a day  doxycycline IVPB 100 milliGRAM(s) IV Intermittent every 12 hours  enalapril 5 milliGRAM(s) Oral daily  enoxaparin Injectable 40 milliGRAM(s) SubCutaneous at bedtime  famotidine    Tablet 40 milliGRAM(s) Oral daily  gabapentin 300 milliGRAM(s) Oral two times a day  insulin glargine Injectable (LANTUS) 20 Unit(s) SubCutaneous at bedtime  insulin lispro (ADMELOG) corrective regimen sliding scale   SubCutaneous three times a day before meals  insulin lispro Injectable (ADMELOG) 7 Unit(s) SubCutaneous three times a day before meals  latanoprost 0.005% Ophthalmic Solution 1 Drop(s) Left EYE at bedtime  methocarbamol 500 milliGRAM(s) Oral every 8 hours  methylPREDNISolone sodium succinate IVPB 1000 milliGRAM(s) IV Intermittent daily  metoprolol tartrate 100 milliGRAM(s) Oral two times a day  pantoprazole    Tablet 40 milliGRAM(s) Oral before breakfast  sodium chloride 0.9%. 1000 milliLiter(s) (75 mL/Hr) IV Continuous <Continuous>  timolol 0.25% Solution 1 Drop(s) Left EYE two times a day    MEDICATIONS  (PRN):  cyclobenzaprine 10 milliGRAM(s) Oral three times a day PRN Muscle Spasm  morphine  - Injectable 2 milliGRAM(s) IV Push every 6 hours PRN Severe Pain (7 - 10)  oxycodone    5 mG/acetaminophen 325 mG 1 Tablet(s) Oral every 6 hours PRN Moderate Pain (4 - 6)    Allergies    ibuprofen (Urticaria)    Intolerances      HEALTH ISSUES - PROBLEM Dx:          SUZANNA:  POHx:  FOHx: Non-contributory    Extended HPI: Ms Ku was improved until this morning when pain recurred radiating to the left side of face    Ocular Medications: none        EXTERNAL:    VISUAL ACUITY:       RIGHT EYE: cc 20/25-3                           LEFT EYE: sc 20/30-        NEURO TESTING  EXTRAOCULAR MOVEMENTS: slight reduction in infraduction wit slight pain  PUPILS: no APD  VFc: Full to FC    ANTERIOR SEGMENT EVALUATION: :    LIDS/ADENEXA: chemosis slight increase, not as on day 1  CONJUNCTIVA/SCLERA: grade II  CORNEA: clear  ANTERIOR CHAMBER: no cells, butshallow  IRIS/PUPIL: no nodules  LENS/VITREOUS: poor view    INTRAOCULAR PRESSURE:   OD: mmHg 11  OS: mmHg  15  @    POSTERIOR SEGMENT EVALUATION      SUPPLEMENTAL TESTING:    B- Scan - OS choroid of equal thickness to the scan of day 1, OD has slight thickening of the choroid compared to day 1    OCT - increased choroidal folds OS

## 2021-07-01 NOTE — CONSULT NOTE ADULT - SUBJECTIVE AND OBJECTIVE BOX
Patient is a 56y old  Female who presents with a chief complaint of L orbital cellulitis (01 Jul 2021 11:39)    Interim History: Patient started on steroids and antibiotics and since then has been getting better until today when she reports being worse. Reevaluated by ophthalmology again today.    MEDICATIONS  (STANDING):  amoxicillin  875 milliGRAM(s)/clavulanate 1 Tablet(s) Oral every 12 hours  artificial tears (preservative free) Ophthalmic Solution 2 Drop(s) Left EYE once  atorvastatin 40 milliGRAM(s) Oral at bedtime  brimonidine 0.2% Ophthalmic Solution 1 Drop(s) Left EYE two times a day  chlorhexidine 4% Liquid 1 Application(s) Topical <User Schedule>  clopidogrel Tablet 75 milliGRAM(s) Oral daily  dorzolamide 2% Ophthalmic Solution 1 Drop(s) Left EYE three times a day  doxycycline IVPB 100 milliGRAM(s) IV Intermittent every 12 hours  enalapril 5 milliGRAM(s) Oral daily  enoxaparin Injectable 40 milliGRAM(s) SubCutaneous at bedtime  famotidine    Tablet 40 milliGRAM(s) Oral daily  gabapentin 300 milliGRAM(s) Oral two times a day  insulin glargine Injectable (LANTUS) 20 Unit(s) SubCutaneous at bedtime  insulin lispro (ADMELOG) corrective regimen sliding scale   SubCutaneous three times a day before meals  insulin lispro Injectable (ADMELOG) 7 Unit(s) SubCutaneous three times a day before meals  latanoprost 0.005% Ophthalmic Solution 1 Drop(s) Left EYE at bedtime  methocarbamol 500 milliGRAM(s) Oral every 8 hours  methylPREDNISolone sodium succinate Injectable 60 milliGRAM(s) IV Push daily  metoprolol tartrate 100 milliGRAM(s) Oral two times a day  pantoprazole    Tablet 40 milliGRAM(s) Oral before breakfast  sodium chloride 0.9%. 1000 milliLiter(s) (75 mL/Hr) IV Continuous <Continuous>  timolol 0.25% Solution 1 Drop(s) Left EYE two times a day    MEDICATIONS  (PRN):  cyclobenzaprine 10 milliGRAM(s) Oral three times a day PRN Muscle Spasm  morphine  - Injectable 2 milliGRAM(s) IV Push every 6 hours PRN Severe Pain (7 - 10)  oxycodone    5 mG/acetaminophen 325 mG 1 Tablet(s) Oral every 6 hours PRN Moderate Pain (4 - 6)    Allergies    ibuprofen (Urticaria)    Intolerances        Vital Signs Last 24 Hrs  T(C): 35.7 (01 Jul 2021 07:41), Max: 36.5 (01 Jul 2021 00:04)  T(F): 96.2 (01 Jul 2021 07:41), Max: 97.7 (01 Jul 2021 00:04)  HR: 62 (01 Jul 2021 07:41) (62 - 72)  BP: 159/72 (01 Jul 2021 07:41) (154/66 - 159/72)  BP(mean): --  RR: 18 (01 Jul 2021 07:41) (18 - 20)  SpO2: --  Daily     Daily     PHYSICAL EXAM:  All physical exam findings normal, except for those marked:  General Appearance:  Skin 		WNL: no rash, lesion, ulcers, indurations, nodules or tightening, normal nail bed   .		capillaries  .		[] Abnormal:  Eyes		WNL: normal conjunctiva and lids, normal pupils and iris  .		[] Abnormal:  ENT		WNL: normal appearance of ears, nose lips, teeth, gums, oropharynx, oral   .		mucosal and palate  .		[] Abnormal:  Neck: 		WNL: no masses, normal thyroid  .		[] Abnormal:  Cardiovascular: WNL: normal auscultation, normal peripheral pulses, no peripheral edema  .		[] Abnormal:  Respiratory: 	WNL: normal respiratory effort  .		[] Abnormal:  GI:		WNL: no masses or tenderness, normal liver and spleen  .		[] Abnormal:  Lymphatic: 	WNL: normal cervical, axillary and inguinal nodes  .		[] Abnormal:  Neurologic: 	WNL: normal DTR’s, normal sensation  .		[] Abnormal:  Psychiatric: 	WNL: normal judgment and insight, normal memory, normal mood and affect  .		[] Abnormal:  Genitalia: 	WNL: normal breasts, genitals and pubic hair  .		[] Abnormal:  Musculoskeletal:	WNL: normal digits, normal muscle strength, full ROM, normal gait  .			[] Abnormal/see Joint exam below  .			[] Leg Lengths:  .			[] Muscle Atrophy:  .			[] Global Assessment of Disease Activity (1-10):    Lab Results:                        11.9   8.44  )-----------( 305      ( 01 Jul 2021 06:49 )             35.5     07-01    141  |  104  |  15  ----------------------------<  229<H>  3.8   |  25  |  0.7    Ca    9.0      01 Jul 2021 06:49  Mg     1.7     07-01    TPro  6.1  /  Alb  4.0  /  TBili  0.2  /  DBili  x   /  AST  35  /  ALT  47<H>  /  AlkPhos  94  07-01            [] The patient is clinically improving but still requires continued monitoring due to risk for:  [] Minutes were spent on the total encounter; more than 50% of the visit was spent counseling and/or coordinating care by the attending physician.  [] Total Critical Care time spent by the attending physician: [] minutes, excluding procedure time. Patient is a 56y old  Female who presents with a chief complaint of L orbital cellulitis (01 Jul 2021 11:39)    Interim History: Patient started on steroids and antibiotics and since then has been getting better until today when she reports being worse. Reevaluated by ophthalmology again today.     MEDICATIONS  (STANDING):  amoxicillin  875 milliGRAM(s)/clavulanate 1 Tablet(s) Oral every 12 hours  artificial tears (preservative free) Ophthalmic Solution 2 Drop(s) Left EYE once  atorvastatin 40 milliGRAM(s) Oral at bedtime  brimonidine 0.2% Ophthalmic Solution 1 Drop(s) Left EYE two times a day  chlorhexidine 4% Liquid 1 Application(s) Topical <User Schedule>  clopidogrel Tablet 75 milliGRAM(s) Oral daily  dorzolamide 2% Ophthalmic Solution 1 Drop(s) Left EYE three times a day  doxycycline IVPB 100 milliGRAM(s) IV Intermittent every 12 hours  enalapril 5 milliGRAM(s) Oral daily  enoxaparin Injectable 40 milliGRAM(s) SubCutaneous at bedtime  famotidine    Tablet 40 milliGRAM(s) Oral daily  gabapentin 300 milliGRAM(s) Oral two times a day  insulin glargine Injectable (LANTUS) 20 Unit(s) SubCutaneous at bedtime  insulin lispro (ADMELOG) corrective regimen sliding scale   SubCutaneous three times a day before meals  insulin lispro Injectable (ADMELOG) 7 Unit(s) SubCutaneous three times a day before meals  latanoprost 0.005% Ophthalmic Solution 1 Drop(s) Left EYE at bedtime  methocarbamol 500 milliGRAM(s) Oral every 8 hours  methylPREDNISolone sodium succinate Injectable 60 milliGRAM(s) IV Push daily  metoprolol tartrate 100 milliGRAM(s) Oral two times a day  pantoprazole    Tablet 40 milliGRAM(s) Oral before breakfast  sodium chloride 0.9%. 1000 milliLiter(s) (75 mL/Hr) IV Continuous <Continuous>  timolol 0.25% Solution 1 Drop(s) Left EYE two times a day    MEDICATIONS  (PRN):  cyclobenzaprine 10 milliGRAM(s) Oral three times a day PRN Muscle Spasm  morphine  - Injectable 2 milliGRAM(s) IV Push every 6 hours PRN Severe Pain (7 - 10)  oxycodone    5 mG/acetaminophen 325 mG 1 Tablet(s) Oral every 6 hours PRN Moderate Pain (4 - 6)    Allergies    ibuprofen (Urticaria)    Intolerances        Vital Signs Last 24 Hrs  T(C): 35.7 (01 Jul 2021 07:41), Max: 36.5 (01 Jul 2021 00:04)  T(F): 96.2 (01 Jul 2021 07:41), Max: 97.7 (01 Jul 2021 00:04)  HR: 62 (01 Jul 2021 07:41) (62 - 72)  BP: 159/72 (01 Jul 2021 07:41) (154/66 - 159/72)  BP(mean): --  RR: 18 (01 Jul 2021 07:41) (18 - 20)  SpO2: --  Daily     Daily     PHYSICAL EXAM:  All physical exam findings normal, except for those marked:  General Appearance:  Skin 		WNL: no rash, lesion, ulcers, indurations, nodules or tightening, normal nail bed   .		capillaries  .		[] Abnormal:  Eyes		Bilateral redness with erythema in the left eye  ENT		WNL: normal appearance of ears, nose lips, teeth, gums, oropharynx, oral   .		mucosal and palate  .		[] Abnormal:  Neck: 		WNL: no masses, normal thyroid  .		[] Abnormal:  Cardiovascular: WNL: normal auscultation, normal peripheral pulses, no peripheral edema  .		[] Abnormal:  Respiratory: 	WNL: normal respiratory effort  .		[] Abnormal:  GI:		WNL: no masses or tenderness, normal liver and spleen  .		[] Abnormal:  Lymphatic: 	WNL: normal cervical, axillary and inguinal nodes  .		[] Abnormal:  Neurologic: 	WNL: normal DTR’s, normal sensation  .		[] Abnormal:  Psychiatric: 	WNL: normal judgment and insight, normal memory, normal mood and affect  .		[] Abnormal:  Genitalia: 	WNL: normal breasts, genitals and pubic hair  .		[] Abnormal:  Musculoskeletal:	WNL: normal digits, normal muscle strength, full ROM, normal gait  .			[] Abnormal/see Joint exam below  .			[] Leg Lengths:  .			[] Muscle Atrophy:  .			[] Global Assessment of Disease Activity (1-10):    Lab Results:                        11.9   8.44  )-----------( 305      ( 01 Jul 2021 06:49 )             35.5     07-01    141  |  104  |  15  ----------------------------<  229<H>  3.8   |  25  |  0.7    Ca    9.0      01 Jul 2021 06:49  Mg     1.7     07-01    TPro  6.1  /  Alb  4.0  /  TBili  0.2  /  DBili  x   /  AST  35  /  ALT  47<H>  /  AlkPhos  94  07-01            [] The patient is clinically improving but still requires continued monitoring due to risk for:  [] Minutes were spent on the total encounter; more than 50% of the visit was spent counseling and/or coordinating care by the attending physician.  [] Total Critical Care time spent by the attending physician: [] minutes, excluding procedure time.

## 2021-07-01 NOTE — PROGRESS NOTE ADULT - SUBJECTIVE AND OBJECTIVE BOX
SUBJECTIVE:    Patient is a 56y old Female who presents with a chief complaint of Left eye pain and swelling (29 Jun 2021 16:08).Currently admitted to medicine with the primary diagnosis of Dacryoadenitis vs preorbital cellulitis.  Today is hospital day 3D. This morning she mentions feeling bad again ( was doing better on 6/30),she reports that the itchiness and pain worsened again.     PAST MEDICAL & SURGICAL HISTORY  HTN (hypertension)    Diabetes    Asthma  LAST EPISODE SEVERAL YRS    TRAVIS on CPAP    Arthritis  OA    High cholesterol    GERD (gastroesophageal reflux disease)    History of peripheral arterial disease    S/P trigger finger release    H/O carpal tunnel repair    H/O arthroscopy of shoulder      SOCIAL HISTORY:  Negative for smoking/alcohol/drug use.     ALLERGIES:  ibuprofen (Urticaria)    MEDICATIONS:  STANDING MEDICATIONS  amoxicillin  875 milliGRAM(s)/clavulanate 1 Tablet(s) Oral every 12 hours  artificial tears (preservative free) Ophthalmic Solution 2 Drop(s) Left EYE once  atorvastatin 40 milliGRAM(s) Oral at bedtime  brimonidine 0.2% Ophthalmic Solution 1 Drop(s) Left EYE two times a day  chlorhexidine 4% Liquid 1 Application(s) Topical <User Schedule>  clopidogrel Tablet 75 milliGRAM(s) Oral daily  dorzolamide 2% Ophthalmic Solution 1 Drop(s) Left EYE three times a day  doxycycline IVPB 100 milliGRAM(s) IV Intermittent every 12 hours  enalapril 5 milliGRAM(s) Oral daily  enoxaparin Injectable 40 milliGRAM(s) SubCutaneous at bedtime  famotidine    Tablet 40 milliGRAM(s) Oral daily  gabapentin 300 milliGRAM(s) Oral two times a day  insulin glargine Injectable (LANTUS) 20 Unit(s) SubCutaneous at bedtime  insulin lispro (ADMELOG) corrective regimen sliding scale   SubCutaneous three times a day before meals  insulin lispro Injectable (ADMELOG) 7 Unit(s) SubCutaneous three times a day before meals  latanoprost 0.005% Ophthalmic Solution 1 Drop(s) Left EYE at bedtime  methocarbamol 500 milliGRAM(s) Oral every 8 hours  methylPREDNISolone sodium succinate Injectable 60 milliGRAM(s) IV Push daily  metoprolol tartrate 100 milliGRAM(s) Oral two times a day  pantoprazole    Tablet 40 milliGRAM(s) Oral before breakfast  sodium chloride 0.9%. 1000 milliLiter(s) IV Continuous <Continuous>  timolol 0.25% Solution 1 Drop(s) Left EYE two times a day    PRN MEDICATIONS  cyclobenzaprine 10 milliGRAM(s) Oral three times a day PRN  morphine  - Injectable 2 milliGRAM(s) IV Push every 6 hours PRN  oxycodone    5 mG/acetaminophen 325 mG 1 Tablet(s) Oral every 6 hours PRN    VITALS:   T(F): 96.2  HR: 62  BP: 159/72  RR: 18  SpO2: --    LABS:                        11.9   8.44  )-----------( 305      ( 01 Jul 2021 06:49 )             35.5     07-01    141  |  104  |  15  ----------------------------<  229<H>  3.8   |  25  |  0.7    Ca    9.0      01 Jul 2021 06:49  Mg     1.7     07-01    TPro  6.1  /  Alb  4.0  /  TBili  0.2  /  DBili  x   /  AST  35  /  ALT  47<H>  /  AlkPhos  94  07-01    Culture - Blood (collected 29 Jun 2021 05:34)  Source: .Blood None  Preliminary Report (30 Jun 2021 14:01):    No growth to date.    Culture - Blood (collected 28 Jun 2021 13:30)  Source: .Blood Blood  Preliminary Report (29 Jun 2021 23:30):    No growth to date.    Culture - Blood (collected 28 Jun 2021 13:30)  Source: .Blood Blood  Preliminary Report (29 Jun 2021 23:30):    No growth to date.    RADIOLOGY:    < from: CT Orbit w/ IV Cont (06.28.21 @ 18:40) >  IMPRESSION:    No significant change from recent CT orbit 6/25/2021.    Again seen is asymmetric enhancement of the left lacrimal gland and left preseptal soft tissues, compatible with dacryoadenitis (infectious versus inflammatory.    < end of copied text >    PHYSICAL EXAM:  GEN: No acute distress  HEENT: Left eye is much better today, redness/conjunctivitis, swelling much better, no strabismus or ophthalmoplegia  LUNGS: Clear to auscultation bilaterally   HEART: S1/S2 present. RRR.   ABD: Soft, non-tender, non-distended. Bowel sounds present  EXT: NC/NC/NE/2+PP/MORALES/Skin Intact.   NEURO: AAOX3

## 2021-07-01 NOTE — PROGRESS NOTE ADULT - ASSESSMENT
HPI:  55 yo F with PMhx of asthma, DM, GERD, DLD, PAD, HTN, TRAVIS, OA presents with left eye pain. Pt had developed itchiness of left eye, which then gradually progressed to pain of left sided face and eye about 1 week ago. She presented to the ED several days ago, CT Orbit revealed mild L dacryoadenitis. Pt was given Unasyn in ED and sent home on Augmentin. Her left eye pain and swelling continued to worsen, especially with eye movement, associated with photophobia and "little black dots" in her vision, so she saw her Opthalmologist and was recommended to come to ED.   In the ED, T 97, /87, HR 84, RR 18, SpO2 99% on RA. Lab work revealed WBC ct 16. Pt was given Metronidazole, Unasyn, Ceftriaxone and Vancomycin. CT Orbits again revealed L sided dacryoadenitis.     #Left eye pain / photophobia secondary to l dacryoadenitis / orbital cellulitis   worsened today   Po Augmentin 875 mg q12h  Doxycycline 100 mg q12  continue with iv steroids  rheumatology eval   autoimmune panel sent  follow up from opthalmology     #DM   CAPILLARY BLOOD GLUCOSE      POCT Blood Glucose.: 209 mg/dL (01 Jul 2021 07:28)  POCT Blood Glucose.: 303 mg/dL (01 Jul 2021 03:27)  POCT Blood Glucose.: 245 mg/dL (30 Jun 2021 21:10)  POCT Blood Glucose.: 316 mg/dL (30 Jun 2021 16:47)  POCT Blood Glucose.: 428 mg/dL (30 Jun 2021 13:55)  better controlled today , will increase lispro to 7 Units    #Obstructive sleep apnea    #Overweight BMI 29 patient needs to see die[facility] outpatient for further evaluation     #CKD 2     #DL     #peripheral artery disease    # left suly bullosa      Progress Note Handoff    Pending:  rheumatology eval .follow up from opthalmology    Family discussion: patient verbalized understanding and agreeable to plan of care     Disposition: Home___

## 2021-07-01 NOTE — CONSULT NOTE ADULT - CONSULT REASON
concern for orbital cellulitis
left orbital cellulitis
dacryoadenitis
orbital cellulitis vs dacryoadenitis

## 2021-07-01 NOTE — CONSULT NOTE ADULT - TIME BILLING
Patient was advised to make an outpatient appointment with Rheumatology to discuss the results of labwork done in the hospital. Phone number [phone].     Time was divided in review of labs and imaging studies, obtaining history, personally examining patient, counselling patient/ family, ordering medications/tests/ imaging tests, communicating with other health care providers, documentation in electronic health records, independent interpretation of labs, imaging results and procedure results and communicating to the patient/family and care co ordination.

## 2021-07-01 NOTE — CONSULT NOTE ADULT - ATTENDING COMMENTS
Patient admitted with dacryoadenitis. Has had recurrent pain and tearing on solumedrol. discussed with ophthalmology. suspected inflammatory etiology of dacryoadenitis, as ophthalmologic exam is not consistent with infectious. will recommend pulse steroid therapy for 3 days. Solumedrol 1 g daily for 3 days. then taper prednisone down as mentioned above.   please send for labs as above
Counseled patient/ and her son about diagnostic testing and treatment plan. All questions answered.

## 2021-07-01 NOTE — PROGRESS NOTE ADULT - ASSESSMENT
The increase in symptoms is likely due to breakthrough the current prescribed IV steroids which is likely insufficient. Spoke with Rheumatology and it was decided to increase to Solumedrol 250 mg IV q 12 H and follow tomorrow.. Continue antibiotics as prescribed.

## 2021-07-02 LAB
ALBUMIN SERPL ELPH-MCNC: 4.7 G/DL — SIGNIFICANT CHANGE UP (ref 3.5–5.2)
ALP SERPL-CCNC: 115 U/L — SIGNIFICANT CHANGE UP (ref 30–115)
ALT FLD-CCNC: 56 U/L — HIGH (ref 0–41)
ANION GAP SERPL CALC-SCNC: 17 MMOL/L — HIGH (ref 7–14)
AST SERPL-CCNC: 30 U/L — SIGNIFICANT CHANGE UP (ref 0–41)
BILIRUB SERPL-MCNC: 0.2 MG/DL — SIGNIFICANT CHANGE UP (ref 0.2–1.2)
BUN SERPL-MCNC: 15 MG/DL — SIGNIFICANT CHANGE UP (ref 10–20)
CALCIUM SERPL-MCNC: 9.8 MG/DL — SIGNIFICANT CHANGE UP (ref 8.5–10.1)
CCP IGG SERPL-ACNC: <8 UNITS — SIGNIFICANT CHANGE UP
CHLORIDE SERPL-SCNC: 100 MMOL/L — SIGNIFICANT CHANGE UP (ref 98–110)
CO2 SERPL-SCNC: 24 MMOL/L — SIGNIFICANT CHANGE UP (ref 17–32)
CREAT SERPL-MCNC: 0.7 MG/DL — SIGNIFICANT CHANGE UP (ref 0.7–1.5)
GLUCOSE BLDC GLUCOMTR-MCNC: 286 MG/DL — HIGH (ref 70–99)
GLUCOSE BLDC GLUCOMTR-MCNC: 339 MG/DL — HIGH (ref 70–99)
GLUCOSE BLDC GLUCOMTR-MCNC: 353 MG/DL — HIGH (ref 70–99)
GLUCOSE BLDC GLUCOMTR-MCNC: 365 MG/DL — HIGH (ref 70–99)
GLUCOSE BLDC GLUCOMTR-MCNC: 404 MG/DL — HIGH (ref 70–99)
GLUCOSE BLDC GLUCOMTR-MCNC: 422 MG/DL — HIGH (ref 70–99)
GLUCOSE BLDC GLUCOMTR-MCNC: 427 MG/DL — HIGH (ref 70–99)
GLUCOSE BLDC GLUCOMTR-MCNC: 458 MG/DL — CRITICAL HIGH (ref 70–99)
GLUCOSE BLDC GLUCOMTR-MCNC: 545 MG/DL — CRITICAL HIGH (ref 70–99)
GLUCOSE SERPL-MCNC: 295 MG/DL — HIGH (ref 70–99)
HCT VFR BLD CALC: 39.5 % — SIGNIFICANT CHANGE UP (ref 37–47)
HGB BLD-MCNC: 13.2 G/DL — SIGNIFICANT CHANGE UP (ref 12–16)
LYSOZYME SERPL-MCNC: 5.6 UG/ML — SIGNIFICANT CHANGE UP (ref 2.5–12.9)
MAGNESIUM SERPL-MCNC: 1.9 MG/DL — SIGNIFICANT CHANGE UP (ref 1.8–2.4)
MCHC RBC-ENTMCNC: 29.9 PG — SIGNIFICANT CHANGE UP (ref 27–31)
MCHC RBC-ENTMCNC: 33.4 G/DL — SIGNIFICANT CHANGE UP (ref 32–37)
MCV RBC AUTO: 89.6 FL — SIGNIFICANT CHANGE UP (ref 81–99)
NRBC # BLD: 0 /100 WBCS — SIGNIFICANT CHANGE UP (ref 0–0)
PLATELET # BLD AUTO: 384 K/UL — SIGNIFICANT CHANGE UP (ref 130–400)
POTASSIUM SERPL-MCNC: 4.3 MMOL/L — SIGNIFICANT CHANGE UP (ref 3.5–5)
POTASSIUM SERPL-SCNC: 4.3 MMOL/L — SIGNIFICANT CHANGE UP (ref 3.5–5)
PROT SERPL-MCNC: 7.2 G/DL — SIGNIFICANT CHANGE UP (ref 6–8)
RBC # BLD: 4.41 M/UL — SIGNIFICANT CHANGE UP (ref 4.2–5.4)
RBC # FLD: 12.6 % — SIGNIFICANT CHANGE UP (ref 11.5–14.5)
RF+CCP IGG SER-IMP: NEGATIVE — SIGNIFICANT CHANGE UP
RHEUMATOID FACT SERPL-ACNC: <10 IU/ML — SIGNIFICANT CHANGE UP (ref 0–13)
SODIUM SERPL-SCNC: 141 MMOL/L — SIGNIFICANT CHANGE UP (ref 135–146)
WBC # BLD: 9.08 K/UL — SIGNIFICANT CHANGE UP (ref 4.8–10.8)
WBC # FLD AUTO: 9.08 K/UL — SIGNIFICANT CHANGE UP (ref 4.8–10.8)

## 2021-07-02 PROCEDURE — 99255 IP/OBS CONSLTJ NEW/EST HI 80: CPT

## 2021-07-02 PROCEDURE — 99233 SBSQ HOSP IP/OBS HIGH 50: CPT

## 2021-07-02 RX ORDER — SODIUM CHLORIDE 9 MG/ML
1000 INJECTION, SOLUTION INTRAVENOUS
Refills: 0 | Status: DISCONTINUED | OUTPATIENT
Start: 2021-07-02 | End: 2021-07-03

## 2021-07-02 RX ORDER — INSULIN GLARGINE 100 [IU]/ML
30 INJECTION, SOLUTION SUBCUTANEOUS AT BEDTIME
Refills: 0 | Status: DISCONTINUED | OUTPATIENT
Start: 2021-07-02 | End: 2021-07-03

## 2021-07-02 RX ORDER — SODIUM CHLORIDE 9 MG/ML
1000 INJECTION INTRAMUSCULAR; INTRAVENOUS; SUBCUTANEOUS
Refills: 0 | Status: DISCONTINUED | OUTPATIENT
Start: 2021-07-02 | End: 2021-07-02

## 2021-07-02 RX ORDER — INSULIN HUMAN 100 [IU]/ML
10 INJECTION, SOLUTION SUBCUTANEOUS ONCE
Refills: 0 | Status: COMPLETED | OUTPATIENT
Start: 2021-07-02 | End: 2021-07-02

## 2021-07-02 RX ORDER — INSULIN LISPRO 100/ML
8 VIAL (ML) SUBCUTANEOUS ONCE
Refills: 0 | Status: COMPLETED | OUTPATIENT
Start: 2021-07-02 | End: 2021-07-02

## 2021-07-02 RX ORDER — INSULIN LISPRO 100/ML
7 VIAL (ML) SUBCUTANEOUS ONCE
Refills: 0 | Status: COMPLETED | OUTPATIENT
Start: 2021-07-02 | End: 2021-07-02

## 2021-07-02 RX ORDER — INSULIN LISPRO 100/ML
10 VIAL (ML) SUBCUTANEOUS
Refills: 0 | Status: DISCONTINUED | OUTPATIENT
Start: 2021-07-02 | End: 2021-07-03

## 2021-07-02 RX ADMIN — PANTOPRAZOLE SODIUM 40 MILLIGRAM(S): 20 TABLET, DELAYED RELEASE ORAL at 05:12

## 2021-07-02 RX ADMIN — BRIMONIDINE TARTRATE 1 DROP(S): 2 SOLUTION/ DROPS OPHTHALMIC at 05:13

## 2021-07-02 RX ADMIN — Medication 100 MILLIGRAM(S): at 05:13

## 2021-07-02 RX ADMIN — CLOPIDOGREL BISULFATE 75 MILLIGRAM(S): 75 TABLET, FILM COATED ORAL at 11:22

## 2021-07-02 RX ADMIN — CHLORHEXIDINE GLUCONATE 1 APPLICATION(S): 213 SOLUTION TOPICAL at 05:13

## 2021-07-02 RX ADMIN — Medication 1 DROP(S): at 16:57

## 2021-07-02 RX ADMIN — Medication 58 MILLIGRAM(S): at 05:14

## 2021-07-02 RX ADMIN — MORPHINE SULFATE 2 MILLIGRAM(S): 50 CAPSULE, EXTENDED RELEASE ORAL at 06:55

## 2021-07-02 RX ADMIN — INSULIN GLARGINE 30 UNIT(S): 100 INJECTION, SOLUTION SUBCUTANEOUS at 21:31

## 2021-07-02 RX ADMIN — OXYCODONE AND ACETAMINOPHEN 1 TABLET(S): 5; 325 TABLET ORAL at 22:24

## 2021-07-02 RX ADMIN — OXYCODONE AND ACETAMINOPHEN 1 TABLET(S): 5; 325 TABLET ORAL at 06:56

## 2021-07-02 RX ADMIN — FAMOTIDINE 40 MILLIGRAM(S): 10 INJECTION INTRAVENOUS at 11:22

## 2021-07-02 RX ADMIN — BRIMONIDINE TARTRATE 1 DROP(S): 2 SOLUTION/ DROPS OPHTHALMIC at 16:56

## 2021-07-02 RX ADMIN — ENOXAPARIN SODIUM 40 MILLIGRAM(S): 100 INJECTION SUBCUTANEOUS at 21:31

## 2021-07-02 RX ADMIN — Medication 12: at 11:22

## 2021-07-02 RX ADMIN — DORZOLAMIDE HYDROCHLORIDE 1 DROP(S): 20 SOLUTION/ DROPS OPHTHALMIC at 05:13

## 2021-07-02 RX ADMIN — LATANOPROST 1 DROP(S): 0.05 SOLUTION/ DROPS OPHTHALMIC; TOPICAL at 21:32

## 2021-07-02 RX ADMIN — Medication 1 TABLET(S): at 16:56

## 2021-07-02 RX ADMIN — INSULIN HUMAN 10 UNIT(S): 100 INJECTION, SOLUTION SUBCUTANEOUS at 21:30

## 2021-07-02 RX ADMIN — Medication 7 UNIT(S): at 22:58

## 2021-07-02 RX ADMIN — METHOCARBAMOL 500 MILLIGRAM(S): 500 TABLET, FILM COATED ORAL at 15:00

## 2021-07-02 RX ADMIN — Medication 1 DROP(S): at 05:14

## 2021-07-02 RX ADMIN — Medication 110 MILLIGRAM(S): at 05:13

## 2021-07-02 RX ADMIN — Medication 8 UNIT(S): at 03:30

## 2021-07-02 RX ADMIN — Medication 10 UNIT(S): at 16:55

## 2021-07-02 RX ADMIN — ATORVASTATIN CALCIUM 40 MILLIGRAM(S): 80 TABLET, FILM COATED ORAL at 21:31

## 2021-07-02 RX ADMIN — OXYCODONE AND ACETAMINOPHEN 1 TABLET(S): 5; 325 TABLET ORAL at 15:03

## 2021-07-02 RX ADMIN — METHOCARBAMOL 500 MILLIGRAM(S): 500 TABLET, FILM COATED ORAL at 05:12

## 2021-07-02 RX ADMIN — Medication 10: at 16:55

## 2021-07-02 RX ADMIN — Medication 110 MILLIGRAM(S): at 16:57

## 2021-07-02 RX ADMIN — DORZOLAMIDE HYDROCHLORIDE 1 DROP(S): 20 SOLUTION/ DROPS OPHTHALMIC at 15:00

## 2021-07-02 RX ADMIN — INSULIN HUMAN 10 UNIT(S): 100 INJECTION, SOLUTION SUBCUTANEOUS at 00:33

## 2021-07-02 RX ADMIN — Medication 1 TABLET(S): at 05:12

## 2021-07-02 RX ADMIN — GABAPENTIN 300 MILLIGRAM(S): 400 CAPSULE ORAL at 16:57

## 2021-07-02 RX ADMIN — METHOCARBAMOL 500 MILLIGRAM(S): 500 TABLET, FILM COATED ORAL at 22:22

## 2021-07-02 RX ADMIN — Medication 5 MILLIGRAM(S): at 05:12

## 2021-07-02 RX ADMIN — Medication 10 UNIT(S): at 11:22

## 2021-07-02 RX ADMIN — Medication 100 MILLIGRAM(S): at 16:57

## 2021-07-02 RX ADMIN — DORZOLAMIDE HYDROCHLORIDE 1 DROP(S): 20 SOLUTION/ DROPS OPHTHALMIC at 21:35

## 2021-07-02 RX ADMIN — GABAPENTIN 300 MILLIGRAM(S): 400 CAPSULE ORAL at 05:12

## 2021-07-02 RX ADMIN — OXYCODONE AND ACETAMINOPHEN 1 TABLET(S): 5; 325 TABLET ORAL at 23:00

## 2021-07-02 NOTE — CHART NOTE - NSCHARTNOTEFT_GEN_A_CORE
Summary of Note from [facility] EMR  from Dr. Per Ruiz MD    Pt examined today by Dr. Per Ruiz    cc:   pt much improved. Pain level went from 10 to 4. No diplopia. Sometimes has pain with eye movement. Prior it was constant.     Ocular Meds     Latanoprost qhs OS   Dorzolamide 3x/day OS   Brimonidine 0.2% 2x/day OS   Timolol 0.5% 2x/day OS OS.    Assessment   1. Orbital Cellulitis OS.   2. Retinal Vascular Attenuation OU.   3. Choroidal Folds OS.   4. Nuclear Sclerosis OU.   5. Vitreous Syneresis OU.    DISCUSSION:   7/2/21 56 W orbital cellulitis Left side though dacryoadenitis, started on abx and no improvement, with mild improvement on low dose steroids, rebounded likely with low dose and started on high dose 250mg po qid methylpred. - Patient with significant improvement in pain, inflammation and eye muscle limitation. - Should have high dose steroids 3-4 days, then will likely need a long po steroid taper 60-55-50 weekly ongoing. Will need to see rheum for continued autoimmune work up for inflammatory or other infectious etiology.    Continue all drops OS    Will follow up on 7/6/21 either as inpatient or outpatient    Consult ophthalmology service if needed prior

## 2021-07-02 NOTE — PROGRESS NOTE ADULT - SUBJECTIVE AND OBJECTIVE BOX
AMBER KU  56y  [facility] [id number]      Patient is a 56y old  Female who presents with a chief complaint of Left orbital cellulitis and Dacryoadenitis (02 Jul 2021 10:16)      INTERVAL HPI/OVERNIGHT EVENTS:  significant improvement in eye pain       REVIEW OF SYSTEMS:  CONSTITUTIONAL: No fever, weight loss, or fatigue  EYES: minimal eye pain L   ENMT:  No difficulty hearing, tinnitus, vertigo; No sinus or throat pain  NECK: No pain or stiffness  BREASTS: No pain, masses, or nipple discharge  RESPIRATORY: No cough, wheezing, chills or hemoptysis; No shortness of breath  CARDIOVASCULAR: No chest pain, palpitations, dizziness, or leg swelling  GASTROINTESTINAL: No abdominal or epigastric pain. No nausea, vomiting, or hematemesis; No diarrhea or constipation. No melena or hematochezia.  GENITOURINARY: No dysuria, frequency, hematuria, or incontinence  NEUROLOGICAL: No headaches, memory loss, loss of strength, numbness, or tremors  SKIN: No itching, burning, rashes, or lesions   LYMPH NODES: No enlarged glands  ENDOCRINE: No heat or cold intolerance; No hair loss  MUSCULOSKELETAL: No joint pain or swelling; No muscle, back, or extremity pain  PSYCHIATRIC: No depression, anxiety, mood swings, or difficulty sleeping  HEME/LYMPH: No easy bruising, or bleeding gums  ALLERY AND IMMUNOLOGIC: No hives or eczema  FAMILY HISTORY:  Family history of malignant neoplasm of esophagus    FH: stroke    FH: aneurysm      T(C): 36 (07-02-21 @ 07:46), Max: 36.9 (07-02-21 @ 00:30)  HR: 64 (07-02-21 @ 07:46) (64 - 75)  BP: 178/76 (07-02-21 @ 07:46) (151/68 - 208/84)  RR: 18 (07-02-21 @ 07:46) (18 - 18)  SpO2: --  Wt(kg): --Vital Signs Last 24 Hrs  T(C): 36 (02 Jul 2021 07:46), Max: 36.9 (02 Jul 2021 00:30)  T(F): 96.8 (02 Jul 2021 07:46), Max: 98.5 (02 Jul 2021 00:30)  HR: 64 (02 Jul 2021 07:46) (64 - 75)  BP: 178/76 (02 Jul 2021 07:46) (151/68 - 208/84)  BP(mean): --  RR: 18 (02 Jul 2021 07:46) (18 - 18)  SpO2: --    PHYSICAL EXAM:  GENERAL: NAD, well-groomed, well-developed  HEAD:  Atraumatic, Normocephalic  EYES: EOMI, PERRLA, conjunctiva and sclera clear  ENMT: No tonsillar erythema, exudates, or enlargement; Moist mucous membranes, Good dentition, No lesions  NECK: Supple, No JVD, Normal thyroid  NERVOUS SYSTEM:  Alert & Oriented X3, Good concentration; Motor Strength 5/5 B/L upper and lower extremities; DTRs 2+ intact and symmetric  PULM: Clear to auscultation bilaterally  CARDIAC: Regular rate and rhythm; No murmurs, rubs, or gallops  GI: Soft, Nontender, Nondistended; Bowel sounds present  EXTREMITIES:  2+ Peripheral Pulses, No clubbing, cyanosis, or edema  LYMPH: No lymphadenopathy noted  SKIN: No rashes or lesions    Consultant(s) Notes Reviewed:  [x ] YES  [ ] NO  Care Discussed with Consultants/Other Providers [ x] YES  [ ] NO    LABS:                            13.2   9.08  )-----------( 384      ( 02 Jul 2021 06:58 )             39.5   07-02    141  |  100  |  15  ----------------------------<  295<H>  4.3   |  24  |  0.7    Ca    9.8      02 Jul 2021 06:58  Mg     1.9     07-02    TPro  7.2  /  Alb  4.7  /  TBili  0.2  /  DBili  x   /  AST  30  /  ALT  56<H>  /  AlkPhos  115  07-02            amoxicillin  875 milliGRAM(s)/clavulanate 1 Tablet(s) Oral every 12 hours  artificial tears (preservative free) Ophthalmic Solution 2 Drop(s) Left EYE once  atorvastatin 40 milliGRAM(s) Oral at bedtime  brimonidine 0.2% Ophthalmic Solution 1 Drop(s) Left EYE two times a day  chlorhexidine 4% Liquid 1 Application(s) Topical <User Schedule>  clopidogrel Tablet 75 milliGRAM(s) Oral daily  cyclobenzaprine 10 milliGRAM(s) Oral three times a day PRN  dorzolamide 2% Ophthalmic Solution 1 Drop(s) Left EYE three times a day  doxycycline IVPB 100 milliGRAM(s) IV Intermittent every 12 hours  enalapril 5 milliGRAM(s) Oral daily  enoxaparin Injectable 40 milliGRAM(s) SubCutaneous at bedtime  famotidine    Tablet 40 milliGRAM(s) Oral daily  gabapentin 300 milliGRAM(s) Oral two times a day  insulin glargine Injectable (LANTUS) 30 Unit(s) SubCutaneous at bedtime  insulin lispro (ADMELOG) corrective regimen sliding scale   SubCutaneous three times a day before meals  insulin lispro Injectable (ADMELOG) 10 Unit(s) SubCutaneous three times a day before meals  latanoprost 0.005% Ophthalmic Solution 1 Drop(s) Left EYE at bedtime  methocarbamol 500 milliGRAM(s) Oral every 8 hours  methylPREDNISolone sodium succinate IVPB 1000 milliGRAM(s) IV Intermittent daily  metoprolol tartrate 100 milliGRAM(s) Oral two times a day  morphine  - Injectable 2 milliGRAM(s) IV Push every 6 hours PRN  oxycodone    5 mG/acetaminophen 325 mG 1 Tablet(s) Oral every 6 hours PRN  pantoprazole    Tablet 40 milliGRAM(s) Oral before breakfast  sodium chloride 0.9%. 1000 milliLiter(s) IV Continuous <Continuous>  timolol 0.25% Solution 1 Drop(s) Left EYE two times a day      HEALTH ISSUES - PROBLEM Dx:          Case Discussed with House Staff   47 minutes spent on total encounter; more than 50% of the visit was spent counseling and/or coordinating care by the attending physician including adjusting insulin , reviewing labs   Spectra [phone]

## 2021-07-02 NOTE — PROGRESS NOTE ADULT - ASSESSMENT
HPI:  55 yo F with PMhx of asthma, DM, GERD, DLD, PAD, HTN, TRAVIS, OA presents with left eye pain. Pt had developed itchiness of left eye, which then gradually progressed to pain of left sided face and eye about 1 week ago. She presented to the ED several days ago, CT Orbit revealed mild L dacryoadenitis. Pt was given Unasyn in ED and sent home on Augmentin. Her left eye pain and swelling continued to worsen, especially with eye movement, associated with photophobia and "little black dots" in her vision, so she saw her Opthalmologist and was recommended to come to ED.   In the ED, T 97, /87, HR 84, RR 18, SpO2 99% on RA. Lab work revealed WBC ct 16. Pt was given Metronidazole, Unasyn, Ceftriaxone and Vancomycin. CT Orbits again revealed L sided dacryoadenitis.     #Left eye pain / photophobia secondary to l dacryoadenitis / orbital cellulitis   improved  today   Po Augmentin 875 mg q12h  Doxycycline 100 mg q12  continue with iv steroids  rheum workup pending   opthalmology following     #DM     CAPILLARY BLOOD GLUCOSE      POCT Blood Glucose.: 286 mg/dL (02 Jul 2021 07:14)  POCT Blood Glucose.: 339 mg/dL (02 Jul 2021 05:05)  POCT Blood Glucose.: 422 mg/dL (02 Jul 2021 02:46)  POCT Blood Glucose.: 458 mg/dL (02 Jul 2021 01:28)  POCT Blood Glucose.: 545 mg/dL (02 Jul 2021 00:20)  POCT Blood Glucose.: 286 mg/dL (01 Jul 2021 21:26)  POCT Blood Glucose.: 242 mg/dL (01 Jul 2021 16:34)  POCT Blood Glucose.: 258 mg/dL (01 Jul 2021 13:52)  uncontrolled , increased lantus to 30 units, and 10 units lispro pre meal   will consult endo prior to dc   #Obstructive sleep apnea    #Overweight BMI 29 patient needs to see dieitian outpatient for further evaluation     #CKD 2     #DL     #peripheral artery disease    # left suly bullosa      Progress Note Handoff    Pending:  rheum workup , opthalmology follow up , finger stick monitorin    Family discussion: patient verbalized understanding and agreeable to plan of care     Disposition: Home___

## 2021-07-02 NOTE — PROGRESS NOTE ADULT - ASSESSMENT
55 yo F with PMHx of asthma, DM, GERD, DLD, PAD s/p stents to LE, HTN, TRAVIS, OA presents with left eye pain.    #Left eye swelling and pain  #Elevated IOP  #Dacryoadenitis ( inflammatory vs infectious, likely inflammatory)  - CT orbit ( 6/28/2021):  a. Again seen is asymmetric enhancement of the left lacrimal gland and left preseptal soft tissues, compatible with dacryoadenitis (infectious versus inflammatory.  - Seen by Optho - etiology infections vs inflammatory dacryoadenitis vs idiopathic inflammatory syndrome. Given the response to IV steroids yesterday, opthalmology currently favor inflammatory dacryoadenitis  - Started on Systemic steroids as per Opthalmology recommendations: 60mg IV methylprednisolone once daily (equivalent of prednisone 80mg po once daily), start date: 6/29/2021, to complete 3 days.   - ID recommendations:   a. DC IV vancomycin and ceftriaxone  b. Augmentin 875mg po q12hrs for 10 days (continue a total of 10 days upon discharge)   c. Doxycycline 100mg q12hrs for 10 dyas (continue a total of 10 days upon discharge)   - Continue with Latanoprost, Combigan, Azopt.  - Continue pain control PRN.   - Blood culture: 6/28: NGTD  - Patient symptoms significantly on 6/30, worsened on 7/1  - Spoke to Opthalmology attending on 7/1, requesting higher doses of steroids ( At least 250mg IV q12hrs).   - Opthalmology recommending Rheumatology consult: To help determine etiology and taper steroids.  - Pulse steroids 1g IV once daily started on 7/1/2021 for 3 days  ( end date 7/3/2021 inclusive)  - Rheumatology recommendations: Pulse steroids for 3 days ( 1g IV once daily for 3 days) followed by taper.   - Taper: Prednisone 60mg po once daily to be started on 7/4/2021 and decrease by 10mg each week.   - Patient reports family history of Rheumatoid Arthritis in her mother  - Rheumatology work up so far:   a. ACE levels: 36  b. TSH: 0.93  c. IgG levels ( 1,2,3,4 and total subset):   normal  d. Atypical ANCA: negative  e. Anti-Neuclear factor: Negative  f. C-ANCA and P-ANCA: negative  g. Anti-SSA and Anti-SSB: negative  h.  rosalio.  zion romo.  #Hx of cervical discectomy with fusion in March  - Cervical neck collar in place  - C/w Cyclobenzaprine, Methocarbamol, Percocet    #DMII  - Hold home Metformin, Pioglitazone  - Blood sugar not controlled due to IV steroids  - Increase Lantus to 20units subcutaneous once daily at bedtime  - Continue Lispro 5 units subcutaneous three times a day 10-15 minutes before meals  - Sliding Scale  - HbA1c: 10.5%    #HTN  - Continue with Enalapril 5mg po once daily   - Continue with Metoprolol tartrate 100mg po q12hrs    #Hx of PAD s/p bilateral LE stenting  - Continue with Plavix 75mg po once daily  - Continue with Atorvastatin 40mg po once daily at bedtime    #GERD  - C/w PPI, Famotidine     #Hx of Asthma - stable  #Hx of TRAVIS    DVT ppx: Lovenox 40mg subcutaneous once daily  GI ppx: PPI  Diet: DASH, CC  Activity: AAT  Full Code   Dispo: Acute   55 yo F with PMHx of asthma, DM, GERD, DLD, PAD s/p stents to LE, HTN, TRAVIS, OA presents with left eye pain.    #Left eye swelling and pain  #Elevated IOP  #Dacryoadenitis ( inflammatory vs infectious, likely inflammatory)  - CT orbit ( 6/28/2021):  a. Again seen is asymmetric enhancement of the left lacrimal gland and left preseptal soft tissues, compatible with dacryoadenitis (infectious versus inflammatory.  - Seen by Optho - etiology infections vs inflammatory dacryoadenitis vs idiopathic inflammatory syndrome. Given the response to IV steroids yesterday, opthalmology currently favor inflammatory dacryoadenitis  - Started on Systemic steroids as per Opthalmology recommendations: 60mg IV methylprednisolone once daily (equivalent of prednisone 80mg po once daily), start date: 6/29/2021, to complete 3 days.   - ID recommendations:   a. DC IV vancomycin and ceftriaxone  b. Augmentin 875mg po q12hrs for 10 days (continue a total of 10 days upon discharge)   c. Doxycycline 100mg q12hrs for 10 dyas (continue a total of 10 days upon discharge)   - Continue with Latanoprost, Combigan, Azopt.  - Continue pain control PRN.   - Blood culture: 6/28: NGTD  - Patient symptoms significantly on 6/30, worsened on 7/1  - Spoke to Opthalmology attending on 7/1, requesting higher doses of steroids ( At least 250mg IV q12hrs).   - Opthalmology recommending Rheumatology consult: To help determine etiology and taper steroids.  - Pulse steroids 1g IV once daily started on 7/1/2021 for 3 days  ( end date 7/3/2021 inclusive)  - Rheumatology recommendations: Pulse steroids for 3 days ( 1g IV once daily for 3 days) followed by taper.   - Taper: Prednisone 60mg po once daily to be started on 7/4/2021 and decrease by 10mg each week.   - Patient reports family history of Rheumatoid Arthritis in her mother  - Rheumatology work up so far:   a. ACE levels: 36  b. TSH: 0.93  c. IgG levels ( 1,2,3,4 and total subset):   normal  d. Atypical ANCA: negative  e. Anti-Neuclear factor: Negative  f. C-ANCA and P-ANCA: negative  g. Anti-SSA and Anti-SSB: negative  h. RF: Negative    #Hx of cervical discectomy with fusion in March  - Cervical neck collar in place  - C/w Cyclobenzaprine, Methocarbamol, Percocet    #DMII  - Hold home Metformin, Pioglitazone  - Blood sugar not controlled due to IV steroids  - Further Increase Lantus to 30units subcutaneous once daily at bedtime  - Increase Lispro to 10 units subcutaneous three times a day 10-15 minutes before meals  - Sliding Scale  - HbA1c: 10.5% July 2021, will need Endocrinology follow up, and possibly DC on Insulin    #HTN  - Continue with Enalapril 5mg po once daily   - Continue with Metoprolol tartrate 100mg po q12hrs    #Hx of PAD s/p bilateral LE stenting  - Continue with Plavix 75mg po once daily  - Continue with Atorvastatin 40mg po once daily at bedtime    #GERD  - C/w PPI, Famotidine     #Hx of Asthma - stable  #Hx of TRAVIS    DVT ppx: Lovenox 40mg subcutaneous once daily  GI ppx: PPI  Diet: DASH, CC  Activity: AAT  Full Code   Dispo: Acute   57 yo F with PMHx of asthma, DM, GERD, DLD, PAD s/p stents to LE, HTN, TRAVIS, OA presents with left eye pain.    #Left eye swelling and pain  #Elevated IOP  #Dacryoadenitis ( inflammatory vs infectious, likely inflammatory)  - CT orbit ( 6/28/2021):  a. Again seen is asymmetric enhancement of the left lacrimal gland and left preseptal soft tissues, compatible with dacryoadenitis (infectious versus inflammatory.  - Seen by Optho - etiology infections vs inflammatory dacryoadenitis vs idiopathic inflammatory syndrome. Given the response to IV steroids yesterday, opthalmology currently favor inflammatory dacryoadenitis. Started On pulse steroids.   - ID recommendations:   a. DC IV vancomycin and ceftriaxone  b. Augmentin 875mg po q12hrs for 10 days (continue a total of 10 days upon discharge)   c. Doxycycline 100mg q12hrs for 10 dyas (continue a total of 10 days upon discharge)   - Continue with Latanoprost, Combigan, Azopt.  - Continue pain control PRN.   - Blood culture: 6/28: NGTD  - Patient symptoms significantly on 6/30, worsened on 7/1  - Spoke to Opthalmology attending on 7/1, requesting higher doses of steroids ( At least 250mg IV q12hrs).   - Opthalmology recommending Rheumatology consult: To help determine etiology and taper steroids.  - Pulse steroids 1g IV once daily started on 7/1/2021 for 3 days (end date 7/3/2021 inclusive)  - Rheumatology recommendations: Pulse steroids for 3 days ( 1g IV once daily for 3 days) followed by taper.   - Taper: Prednisone 60mg po once daily to be started on 7/4/2021 and decrease by 10mg each week.   - Patient reports family history of Rheumatoid Arthritis in her mother  - Rheumatology work up so far:   a. ACE levels: 36  b. TSH: 0.93  c. IgG levels ( 1,2,3,4 and total subset):   normal  d. Atypical ANCA: negative  e. Anti-Neuclear factor: Negative  f. C-ANCA and P-ANCA: negative  g. Anti-SSA and Anti-SSB: negative  h. RF: Negative    #Hx of cervical discectomy with fusion in March  - Cervical neck collar in place  - C/w Cyclobenzaprine, Methocarbamol, Percocet    #DMII  - Hold home Metformin, Pioglitazone  - Blood sugar not controlled due to IV steroids  - Further Increase Lantus to 30units subcutaneous once daily at bedtime  - Increase Lispro to 10 units subcutaneous three times a day 10-15 minutes before meals  - Sliding Scale  - HbA1c: 10.5% July 2021, will need Endocrinology follow up, and possibly DC on Insulin    #HTN  - Continue with Enalapril 5mg po once daily   - Continue with Metoprolol tartrate 100mg po q12hrs    #Hx of PAD s/p bilateral LE stenting  - Continue with Plavix 75mg po once daily  - Continue with Atorvastatin 40mg po once daily at bedtime    #GERD  - C/w PPI, Famotidine     #Hx of Asthma - stable  #Hx of TRAVIS    DVT ppx: Lovenox 40mg subcutaneous once daily  GI ppx: PPI  Diet: DASH, CC  Activity: AAT  Full Code   Dispo: Acute

## 2021-07-02 NOTE — PROGRESS NOTE ADULT - SUBJECTIVE AND OBJECTIVE BOX
SUBJECTIVE:    Patient is a 56y old Female who presents with a chief complaint of Left eye pain and swelling (29 Jun 2021 16:08).Currently admitted to medicine with the primary diagnosis of Dacryoadenitis vs preorbital cellulitis.  Today is hospital day 4D. This morning she mentions feeling bad again ( was doing better on 6/30),she reports that the itchiness and pain worsened again.     PAST MEDICAL & SURGICAL HISTORY  HTN (hypertension)    Diabetes    Asthma  LAST EPISODE SEVERAL YRS    TRAVIS on CPAP    Arthritis  OA    High cholesterol    GERD (gastroesophageal reflux disease)    History of peripheral arterial disease    S/P trigger finger release    H/O carpal tunnel repair    H/O arthroscopy of shoulder      SOCIAL HISTORY:  Negative for smoking/alcohol/drug use.     ALLERGIES:  ibuprofen (Urticaria)    MEDICATIONS:  STANDING MEDICATIONS  amoxicillin  875 milliGRAM(s)/clavulanate 1 Tablet(s) Oral every 12 hours  artificial tears (preservative free) Ophthalmic Solution 2 Drop(s) Left EYE once  atorvastatin 40 milliGRAM(s) Oral at bedtime  brimonidine 0.2% Ophthalmic Solution 1 Drop(s) Left EYE two times a day  chlorhexidine 4% Liquid 1 Application(s) Topical <User Schedule>  clopidogrel Tablet 75 milliGRAM(s) Oral daily  dorzolamide 2% Ophthalmic Solution 1 Drop(s) Left EYE three times a day  doxycycline IVPB 100 milliGRAM(s) IV Intermittent every 12 hours  enalapril 5 milliGRAM(s) Oral daily  enoxaparin Injectable 40 milliGRAM(s) SubCutaneous at bedtime  famotidine    Tablet 40 milliGRAM(s) Oral daily  gabapentin 300 milliGRAM(s) Oral two times a day  insulin glargine Injectable (LANTUS) 30 Unit(s) SubCutaneous at bedtime  insulin lispro (ADMELOG) corrective regimen sliding scale   SubCutaneous three times a day before meals  insulin lispro Injectable (ADMELOG) 10 Unit(s) SubCutaneous three times a day before meals  latanoprost 0.005% Ophthalmic Solution 1 Drop(s) Left EYE at bedtime  methocarbamol 500 milliGRAM(s) Oral every 8 hours  methylPREDNISolone sodium succinate IVPB 1000 milliGRAM(s) IV Intermittent daily  metoprolol tartrate 100 milliGRAM(s) Oral two times a day  pantoprazole    Tablet 40 milliGRAM(s) Oral before breakfast  sodium chloride 0.9%. 1000 milliLiter(s) IV Continuous <Continuous>  timolol 0.25% Solution 1 Drop(s) Left EYE two times a day    PRN MEDICATIONS  cyclobenzaprine 10 milliGRAM(s) Oral three times a day PRN  morphine  - Injectable 2 milliGRAM(s) IV Push every 6 hours PRN  oxycodone    5 mG/acetaminophen 325 mG 1 Tablet(s) Oral every 6 hours PRN    VITALS:   T(F): 96.8  HR: 64  BP: 178/76  RR: 18  SpO2: --    LABS:                        13.2   9.08  )-----------( 384      ( 02 Jul 2021 06:58 )             39.5     07-02    141  |  100  |  15  ----------------------------<  295<H>  4.3   |  24  |  0.7    Ca    9.8      02 Jul 2021 06:58  Mg     1.9     07-02    TPro  7.2  /  Alb  4.7  /  TBili  0.2  /  DBili  x   /  AST  30  /  ALT  56<H>  /  AlkPhos  115  07-02    RADIOLOGY:  < from: CT Orbit w/ IV Cont (06.28.21 @ 18:40) >  IMPRESSION:    No significant change from recent CT orbit 6/25/2021.    Again seen is asymmetric enhancement of the left lacrimal gland and left preseptal soft tissues, compatible with dacryoadenitis (infectious versus inflammatory.    < end of copied text >    PHYSICAL EXAM:  GEN: No acute distress  HEENT: Left eye is much better today, redness/conjunctivitis, swelling much better, no strabismus or ophthalmoplegia, chemosis slightly better.   LUNGS: Clear to auscultation bilaterally   HEART: S1/S2 present. RRR.   ABD: Soft, non-tender, non-distended. Bowel sounds present  EXT: NC/NC/NE/2+PP/MORALES/Skin Intact.   NEURO: AAOX3

## 2021-07-03 LAB
ALBUMIN SERPL ELPH-MCNC: 4.1 G/DL — SIGNIFICANT CHANGE UP (ref 3.5–5.2)
ALBUMIN SERPL ELPH-MCNC: 4.1 G/DL — SIGNIFICANT CHANGE UP (ref 3.5–5.2)
ALP SERPL-CCNC: 92 U/L — SIGNIFICANT CHANGE UP (ref 30–115)
ALP SERPL-CCNC: 93 U/L — SIGNIFICANT CHANGE UP (ref 30–115)
ALT FLD-CCNC: 40 U/L — SIGNIFICANT CHANGE UP (ref 0–41)
ALT FLD-CCNC: 42 U/L — HIGH (ref 0–41)
ANION GAP SERPL CALC-SCNC: 13 MMOL/L — SIGNIFICANT CHANGE UP (ref 7–14)
ANION GAP SERPL CALC-SCNC: 15 MMOL/L — HIGH (ref 7–14)
AST SERPL-CCNC: 18 U/L — SIGNIFICANT CHANGE UP (ref 0–41)
AST SERPL-CCNC: 20 U/L — SIGNIFICANT CHANGE UP (ref 0–41)
BILIRUB SERPL-MCNC: <0.2 MG/DL — SIGNIFICANT CHANGE UP (ref 0.2–1.2)
BILIRUB SERPL-MCNC: <0.2 MG/DL — SIGNIFICANT CHANGE UP (ref 0.2–1.2)
BUN SERPL-MCNC: 17 MG/DL — SIGNIFICANT CHANGE UP (ref 10–20)
BUN SERPL-MCNC: 20 MG/DL — SIGNIFICANT CHANGE UP (ref 10–20)
CALCIUM SERPL-MCNC: 9.1 MG/DL — SIGNIFICANT CHANGE UP (ref 8.5–10.1)
CALCIUM SERPL-MCNC: 9.3 MG/DL — SIGNIFICANT CHANGE UP (ref 8.5–10.1)
CHLORIDE SERPL-SCNC: 103 MMOL/L — SIGNIFICANT CHANGE UP (ref 98–110)
CHLORIDE SERPL-SCNC: 105 MMOL/L — SIGNIFICANT CHANGE UP (ref 98–110)
CO2 SERPL-SCNC: 21 MMOL/L — SIGNIFICANT CHANGE UP (ref 17–32)
CO2 SERPL-SCNC: 23 MMOL/L — SIGNIFICANT CHANGE UP (ref 17–32)
CREAT SERPL-MCNC: 0.8 MG/DL — SIGNIFICANT CHANGE UP (ref 0.7–1.5)
CREAT SERPL-MCNC: 0.8 MG/DL — SIGNIFICANT CHANGE UP (ref 0.7–1.5)
CULTURE RESULTS: SIGNIFICANT CHANGE UP
CULTURE RESULTS: SIGNIFICANT CHANGE UP
GLUCOSE BLDC GLUCOMTR-MCNC: 239 MG/DL — HIGH (ref 70–99)
GLUCOSE BLDC GLUCOMTR-MCNC: 271 MG/DL — HIGH (ref 70–99)
GLUCOSE BLDC GLUCOMTR-MCNC: 325 MG/DL — HIGH (ref 70–99)
GLUCOSE BLDC GLUCOMTR-MCNC: 385 MG/DL — HIGH (ref 70–99)
GLUCOSE BLDC GLUCOMTR-MCNC: 400 MG/DL — HIGH (ref 70–99)
GLUCOSE BLDC GLUCOMTR-MCNC: 497 MG/DL — CRITICAL HIGH (ref 70–99)
GLUCOSE SERPL-MCNC: 257 MG/DL — HIGH (ref 70–99)
GLUCOSE SERPL-MCNC: 296 MG/DL — HIGH (ref 70–99)
HCT VFR BLD CALC: 33.7 % — LOW (ref 37–47)
HGB BLD-MCNC: 11.3 G/DL — LOW (ref 12–16)
MAGNESIUM SERPL-MCNC: 1.8 MG/DL — SIGNIFICANT CHANGE UP (ref 1.8–2.4)
MCHC RBC-ENTMCNC: 30 PG — SIGNIFICANT CHANGE UP (ref 27–31)
MCHC RBC-ENTMCNC: 33.5 G/DL — SIGNIFICANT CHANGE UP (ref 32–37)
MCV RBC AUTO: 89.4 FL — SIGNIFICANT CHANGE UP (ref 81–99)
NRBC # BLD: 0 /100 WBCS — SIGNIFICANT CHANGE UP (ref 0–0)
PLATELET # BLD AUTO: 331 K/UL — SIGNIFICANT CHANGE UP (ref 130–400)
POTASSIUM SERPL-MCNC: 4.2 MMOL/L — SIGNIFICANT CHANGE UP (ref 3.5–5)
POTASSIUM SERPL-MCNC: 4.3 MMOL/L — SIGNIFICANT CHANGE UP (ref 3.5–5)
POTASSIUM SERPL-SCNC: 4.2 MMOL/L — SIGNIFICANT CHANGE UP (ref 3.5–5)
POTASSIUM SERPL-SCNC: 4.3 MMOL/L — SIGNIFICANT CHANGE UP (ref 3.5–5)
PROT SERPL-MCNC: 6.1 G/DL — SIGNIFICANT CHANGE UP (ref 6–8)
PROT SERPL-MCNC: 6.2 G/DL — SIGNIFICANT CHANGE UP (ref 6–8)
RBC # BLD: 3.77 M/UL — LOW (ref 4.2–5.4)
RBC # FLD: 12.6 % — SIGNIFICANT CHANGE UP (ref 11.5–14.5)
SODIUM SERPL-SCNC: 139 MMOL/L — SIGNIFICANT CHANGE UP (ref 135–146)
SODIUM SERPL-SCNC: 141 MMOL/L — SIGNIFICANT CHANGE UP (ref 135–146)
SPECIMEN SOURCE: SIGNIFICANT CHANGE UP
SPECIMEN SOURCE: SIGNIFICANT CHANGE UP
WBC # BLD: 11.81 K/UL — HIGH (ref 4.8–10.8)
WBC # FLD AUTO: 11.81 K/UL — HIGH (ref 4.8–10.8)

## 2021-07-03 PROCEDURE — 99233 SBSQ HOSP IP/OBS HIGH 50: CPT

## 2021-07-03 RX ORDER — INSULIN GLARGINE 100 [IU]/ML
33 INJECTION, SOLUTION SUBCUTANEOUS AT BEDTIME
Refills: 0 | Status: DISCONTINUED | OUTPATIENT
Start: 2021-07-03 | End: 2021-07-06

## 2021-07-03 RX ORDER — INSULIN HUMAN 100 [IU]/ML
10 INJECTION, SOLUTION SUBCUTANEOUS ONCE
Refills: 0 | Status: COMPLETED | OUTPATIENT
Start: 2021-07-03 | End: 2021-07-03

## 2021-07-03 RX ORDER — INSULIN LISPRO 100/ML
4 VIAL (ML) SUBCUTANEOUS ONCE
Refills: 0 | Status: COMPLETED | OUTPATIENT
Start: 2021-07-03 | End: 2021-07-03

## 2021-07-03 RX ORDER — ACETAMINOPHEN 500 MG
650 TABLET ORAL EVERY 6 HOURS
Refills: 0 | Status: DISCONTINUED | OUTPATIENT
Start: 2021-07-03 | End: 2021-07-06

## 2021-07-03 RX ORDER — NIFEDIPINE 30 MG
30 TABLET, EXTENDED RELEASE 24 HR ORAL DAILY
Refills: 0 | Status: DISCONTINUED | OUTPATIENT
Start: 2021-07-03 | End: 2021-07-03

## 2021-07-03 RX ORDER — INSULIN LISPRO 100/ML
13 VIAL (ML) SUBCUTANEOUS
Refills: 0 | Status: DISCONTINUED | OUTPATIENT
Start: 2021-07-03 | End: 2021-07-05

## 2021-07-03 RX ADMIN — OXYCODONE AND ACETAMINOPHEN 1 TABLET(S): 5; 325 TABLET ORAL at 17:40

## 2021-07-03 RX ADMIN — CLOPIDOGREL BISULFATE 75 MILLIGRAM(S): 75 TABLET, FILM COATED ORAL at 11:39

## 2021-07-03 RX ADMIN — OXYCODONE AND ACETAMINOPHEN 1 TABLET(S): 5; 325 TABLET ORAL at 06:46

## 2021-07-03 RX ADMIN — INSULIN HUMAN 10 UNIT(S): 100 INJECTION, SOLUTION SUBCUTANEOUS at 23:59

## 2021-07-03 RX ADMIN — GABAPENTIN 300 MILLIGRAM(S): 400 CAPSULE ORAL at 06:28

## 2021-07-03 RX ADMIN — Medication 1 TABLET(S): at 17:03

## 2021-07-03 RX ADMIN — Medication 30 MILLIGRAM(S): at 08:26

## 2021-07-03 RX ADMIN — METHOCARBAMOL 500 MILLIGRAM(S): 500 TABLET, FILM COATED ORAL at 17:08

## 2021-07-03 RX ADMIN — Medication 10 MILLIGRAM(S): at 22:01

## 2021-07-03 RX ADMIN — Medication 110 MILLIGRAM(S): at 17:05

## 2021-07-03 RX ADMIN — CHLORHEXIDINE GLUCONATE 1 APPLICATION(S): 213 SOLUTION TOPICAL at 06:30

## 2021-07-03 RX ADMIN — Medication 4 UNIT(S): at 04:17

## 2021-07-03 RX ADMIN — Medication 4: at 08:16

## 2021-07-03 RX ADMIN — OXYCODONE AND ACETAMINOPHEN 1 TABLET(S): 5; 325 TABLET ORAL at 23:58

## 2021-07-03 RX ADMIN — Medication 10: at 17:01

## 2021-07-03 RX ADMIN — Medication 1 TABLET(S): at 06:28

## 2021-07-03 RX ADMIN — GABAPENTIN 300 MILLIGRAM(S): 400 CAPSULE ORAL at 17:03

## 2021-07-03 RX ADMIN — Medication 10 UNIT(S): at 11:42

## 2021-07-03 RX ADMIN — Medication 100 MILLIGRAM(S): at 06:28

## 2021-07-03 RX ADMIN — ATORVASTATIN CALCIUM 40 MILLIGRAM(S): 80 TABLET, FILM COATED ORAL at 22:00

## 2021-07-03 RX ADMIN — DORZOLAMIDE HYDROCHLORIDE 1 DROP(S): 20 SOLUTION/ DROPS OPHTHALMIC at 14:08

## 2021-07-03 RX ADMIN — FAMOTIDINE 40 MILLIGRAM(S): 10 INJECTION INTRAVENOUS at 11:42

## 2021-07-03 RX ADMIN — Medication 8: at 11:41

## 2021-07-03 RX ADMIN — METHOCARBAMOL 500 MILLIGRAM(S): 500 TABLET, FILM COATED ORAL at 22:00

## 2021-07-03 RX ADMIN — Medication 100 MILLIGRAM(S): at 17:03

## 2021-07-03 RX ADMIN — DORZOLAMIDE HYDROCHLORIDE 1 DROP(S): 20 SOLUTION/ DROPS OPHTHALMIC at 22:01

## 2021-07-03 RX ADMIN — ENOXAPARIN SODIUM 40 MILLIGRAM(S): 100 INJECTION SUBCUTANEOUS at 22:00

## 2021-07-03 RX ADMIN — Medication 10 UNIT(S): at 08:17

## 2021-07-03 RX ADMIN — Medication 1 DROP(S): at 17:04

## 2021-07-03 RX ADMIN — Medication 110 MILLIGRAM(S): at 06:27

## 2021-07-03 RX ADMIN — Medication 650 MILLIGRAM(S): at 11:39

## 2021-07-03 RX ADMIN — Medication 5 MILLIGRAM(S): at 06:28

## 2021-07-03 RX ADMIN — BRIMONIDINE TARTRATE 1 DROP(S): 2 SOLUTION/ DROPS OPHTHALMIC at 06:29

## 2021-07-03 RX ADMIN — INSULIN GLARGINE 33 UNIT(S): 100 INJECTION, SOLUTION SUBCUTANEOUS at 21:59

## 2021-07-03 RX ADMIN — OXYCODONE AND ACETAMINOPHEN 1 TABLET(S): 5; 325 TABLET ORAL at 17:05

## 2021-07-03 RX ADMIN — Medication 1 DROP(S): at 06:29

## 2021-07-03 RX ADMIN — SODIUM CHLORIDE 75 MILLILITER(S): 9 INJECTION, SOLUTION INTRAVENOUS at 06:28

## 2021-07-03 RX ADMIN — Medication 650 MILLIGRAM(S): at 18:19

## 2021-07-03 RX ADMIN — LATANOPROST 1 DROP(S): 0.05 SOLUTION/ DROPS OPHTHALMIC; TOPICAL at 22:01

## 2021-07-03 RX ADMIN — Medication 58 MILLIGRAM(S): at 06:27

## 2021-07-03 RX ADMIN — METHOCARBAMOL 500 MILLIGRAM(S): 500 TABLET, FILM COATED ORAL at 06:28

## 2021-07-03 RX ADMIN — Medication 13 UNIT(S): at 17:01

## 2021-07-03 RX ADMIN — BRIMONIDINE TARTRATE 1 DROP(S): 2 SOLUTION/ DROPS OPHTHALMIC at 17:03

## 2021-07-03 RX ADMIN — DORZOLAMIDE HYDROCHLORIDE 1 DROP(S): 20 SOLUTION/ DROPS OPHTHALMIC at 06:30

## 2021-07-03 RX ADMIN — PANTOPRAZOLE SODIUM 40 MILLIGRAM(S): 20 TABLET, DELAYED RELEASE ORAL at 06:28

## 2021-07-03 NOTE — PROGRESS NOTE ADULT - ASSESSMENT
HPI:  55 yo F with PMhx of asthma, DM, GERD, DLD, PAD, HTN, TRAVIS, OA presents with left eye pain. Pt had developed itchiness of left eye, which then gradually progressed to pain of left sided face and eye about 1 week ago. She presented to the ED several days ago, CT Orbit revealed mild L dacryoadenitis. Pt was given Unasyn in ED and sent home on Augmentin. Her left eye pain and swelling continued to worsen, especially with eye movement, associated with photophobia and "little black dots" in her vision, so she saw her Opthalmologist and was recommended to come to ED.   In the ED, T 97, /87, HR 84, RR 18, SpO2 99% on RA. Lab work revealed WBC ct 16. Pt was given Metronidazole, Unasyn, Ceftriaxone and Vancomycin. CT Orbits again revealed L sided dacryoadenitis.     #Left eye pain / photophobia secondary to l dacryoadenitis / orbital cellulitis   controlled on high dose steroids  Po Augmentin 875 mg q12h  Doxycycline 100 mg q12  continue with iv steroids  appreciate opthalmology follow up   awaiting rheumatology follow up   plan for deescalation?    #DM   CAPILLARY BLOOD GLUCOSE      POCT Blood Glucose.: 325 mg/dL (03 Jul 2021 11:31)  POCT Blood Glucose.: 239 mg/dL (03 Jul 2021 07:38)  POCT Blood Glucose.: 271 mg/dL (03 Jul 2021 04:06)  POCT Blood Glucose.: 365 mg/dL (02 Jul 2021 22:38)  POCT Blood Glucose.: 404 mg/dL (02 Jul 2021 21:15)  POCT Blood Glucose.: 353 mg/dL (02 Jul 2021 16:41)  mild improvement in control , will increase lantus     #Obstructive sleep apnea    #Overweight BMI 29 patient needs to see dieitian outpatient for further evaluation     #CKD 2     #DL     #peripheral artery disease    # left suly bullosa      Progress Note Handoff    Pending:  finger stick monitoring , plan for deescalation , workup /rheumatology follow up     Family discussion: patient verbalized understanding and agreeable to plan of care     Disposition: Home___

## 2021-07-03 NOTE — PROGRESS NOTE ADULT - SUBJECTIVE AND OBJECTIVE BOX
AMBER KU  56y  [facility] [id number]      Patient is a 56y old  Female who presents with a chief complaint of L orbital cellulitis (02 Jul 2021 10:29)      INTERVAL HPI/OVERNIGHT EVENTS:    had episode of eye pain overnight     REVIEW OF SYSTEMS:  CONSTITUTIONAL: No fever, weight loss, or fatigue  EYES: episode of eye with resolved  ENMT:  No difficulty hearing, tinnitus, vertigo; No sinus or throat pain  NECK: No pain or stiffness  BREASTS: No pain, masses, or nipple discharge  RESPIRATORY: No cough, wheezing, chills or hemoptysis; No shortness of breath  CARDIOVASCULAR: No chest pain, palpitations, dizziness, or leg swelling  GASTROINTESTINAL: No abdominal or epigastric pain. No nausea, vomiting, or hematemesis; No diarrhea or constipation. No melena or hematochezia.  GENITOURINARY: No dysuria, frequency, hematuria, or incontinence  NEUROLOGICAL: No headaches, memory loss, loss of strength, numbness, or tremors  SKIN: No itching, burning, rashes, or lesions   LYMPH NODES: No enlarged glands  ENDOCRINE: No heat or cold intolerance; No hair loss  MUSCULOSKELETAL: No joint pain or swelling; No muscle, back, or extremity pain  PSYCHIATRIC: No depression, anxiety, mood swings, or difficulty sleeping  HEME/LYMPH: No easy bruising, or bleeding gums  ALLERY AND IMMUNOLOGIC: No hives or eczema  FAMILY HISTORY:  Family history of malignant neoplasm of esophagus    FH: stroke    FH: aneurysm      T(C): 35.9 (07-03-21 @ 08:00), Max: 36.9 (07-03-21 @ 06:45)  HR: 57 (07-03-21 @ 08:00) (57 - 75)  BP: 157/71 (07-03-21 @ 08:00) (157/71 - 193/84)  RR: 18 (07-03-21 @ 08:00) (18 - 18)  SpO2: --  Wt(kg): --Vital Signs Last 24 Hrs  T(C): 35.9 (03 Jul 2021 08:00), Max: 36.9 (03 Jul 2021 06:45)  T(F): 96.7 (03 Jul 2021 08:00), Max: 98.4 (03 Jul 2021 06:45)  HR: 57 (03 Jul 2021 08:00) (57 - 75)  BP: 157/71 (03 Jul 2021 08:00) (157/71 - 193/84)  BP(mean): --  RR: 18 (03 Jul 2021 08:00) (18 - 18)  SpO2: --    PHYSICAL EXAM:  GENERAL: NAD, well-groomed, well-developed  HEAD:  Atraumatic, Normocephalic  EYES: mild erythema around eyelid  ENMT: No tonsillar erythema, exudates, or enlargement; Moist mucous membranes, Good dentition, No lesions  NECK: Supple, No JVD, Normal thyroid  NERVOUS SYSTEM:  Alert & Oriented X3, Good concentration; Motor Strength 5/5 B/L upper and lower extremities; DTRs 2+ intact and symmetric  PULM: Clear to auscultation bilaterally  CARDIAC: Regular rate and rhythm; No murmurs, rubs, or gallops  GI: Soft, Nontender, Nondistended; Bowel sounds present  EXTREMITIES:  2+ Peripheral Pulses, No clubbing, cyanosis, or edema  LYMPH: No lymphadenopathy noted  SKIN: No rashes or lesions      LABS:                            11.3   11.81 )-----------( 331      ( 03 Jul 2021 05:42 )             33.7   07-03    141  |  105  |  17  ----------------------------<  257<H>  4.3   |  21  |  0.8    Ca    9.3      03 Jul 2021 05:42  Mg     1.8     07-03    TPro  6.2  /  Alb  4.1  /  TBili  <0.2  /  DBili  x   /  AST  18  /  ALT  40  /  AlkPhos  93  07-03            acetaminophen   Tablet .. 650 milliGRAM(s) Oral every 6 hours PRN  amoxicillin  875 milliGRAM(s)/clavulanate 1 Tablet(s) Oral every 12 hours  artificial tears (preservative free) Ophthalmic Solution 2 Drop(s) Left EYE once  atorvastatin 40 milliGRAM(s) Oral at bedtime  brimonidine 0.2% Ophthalmic Solution 1 Drop(s) Left EYE two times a day  chlorhexidine 4% Liquid 1 Application(s) Topical <User Schedule>  clopidogrel Tablet 75 milliGRAM(s) Oral daily  cyclobenzaprine 10 milliGRAM(s) Oral three times a day PRN  dorzolamide 2% Ophthalmic Solution 1 Drop(s) Left EYE three times a day  doxycycline IVPB 100 milliGRAM(s) IV Intermittent every 12 hours  enalapril 10 milliGRAM(s) Oral daily  enoxaparin Injectable 40 milliGRAM(s) SubCutaneous at bedtime  famotidine    Tablet 40 milliGRAM(s) Oral daily  gabapentin 300 milliGRAM(s) Oral two times a day  insulin glargine Injectable (LANTUS) 30 Unit(s) SubCutaneous at bedtime  insulin lispro (ADMELOG) corrective regimen sliding scale   SubCutaneous three times a day before meals  insulin lispro Injectable (ADMELOG) 10 Unit(s) SubCutaneous three times a day before meals  latanoprost 0.005% Ophthalmic Solution 1 Drop(s) Left EYE at bedtime  methocarbamol 500 milliGRAM(s) Oral every 8 hours  methylPREDNISolone sodium succinate IVPB 1000 milliGRAM(s) IV Intermittent daily  metoprolol tartrate 100 milliGRAM(s) Oral two times a day  morphine  - Injectable 2 milliGRAM(s) IV Push every 6 hours PRN  oxycodone    5 mG/acetaminophen 325 mG 1 Tablet(s) Oral every 6 hours PRN  pantoprazole    Tablet 40 milliGRAM(s) Oral before breakfast  timolol 0.25% Solution 1 Drop(s) Left EYE two times a day      HEALTH ISSUES - PROBLEM Dx:          Case Discussed with House Staff   41 minutes spent on total encounter; more than 50% of the visit was spent counseling and/or coordinating care by the attending physician. reviewing finger sticks , coordinating further plan with housestaff  Spectra [phone]

## 2021-07-04 LAB
ALBUMIN SERPL ELPH-MCNC: 3.9 G/DL — SIGNIFICANT CHANGE UP (ref 3.5–5.2)
ALP SERPL-CCNC: 85 U/L — SIGNIFICANT CHANGE UP (ref 30–115)
ALT FLD-CCNC: 42 U/L — HIGH (ref 0–41)
ANION GAP SERPL CALC-SCNC: 12 MMOL/L — SIGNIFICANT CHANGE UP (ref 7–14)
AST SERPL-CCNC: 22 U/L — SIGNIFICANT CHANGE UP (ref 0–41)
BILIRUB SERPL-MCNC: <0.2 MG/DL — SIGNIFICANT CHANGE UP (ref 0.2–1.2)
BUN SERPL-MCNC: 16 MG/DL — SIGNIFICANT CHANGE UP (ref 10–20)
CALCIUM SERPL-MCNC: 8.7 MG/DL — SIGNIFICANT CHANGE UP (ref 8.5–10.1)
CHLORIDE SERPL-SCNC: 100 MMOL/L — SIGNIFICANT CHANGE UP (ref 98–110)
CO2 SERPL-SCNC: 24 MMOL/L — SIGNIFICANT CHANGE UP (ref 17–32)
CREAT SERPL-MCNC: 0.7 MG/DL — SIGNIFICANT CHANGE UP (ref 0.7–1.5)
CULTURE RESULTS: SIGNIFICANT CHANGE UP
GLUCOSE BLDC GLUCOMTR-MCNC: 204 MG/DL — HIGH (ref 70–99)
GLUCOSE BLDC GLUCOMTR-MCNC: 311 MG/DL — HIGH (ref 70–99)
GLUCOSE BLDC GLUCOMTR-MCNC: 315 MG/DL — HIGH (ref 70–99)
GLUCOSE BLDC GLUCOMTR-MCNC: 322 MG/DL — HIGH (ref 70–99)
GLUCOSE BLDC GLUCOMTR-MCNC: 351 MG/DL — HIGH (ref 70–99)
GLUCOSE BLDC GLUCOMTR-MCNC: 413 MG/DL — HIGH (ref 70–99)
GLUCOSE BLDC GLUCOMTR-MCNC: 430 MG/DL — HIGH (ref 70–99)
GLUCOSE BLDC GLUCOMTR-MCNC: 440 MG/DL — HIGH (ref 70–99)
GLUCOSE SERPL-MCNC: 322 MG/DL — HIGH (ref 70–99)
HCT VFR BLD CALC: 32.8 % — LOW (ref 37–47)
HGB BLD-MCNC: 11.1 G/DL — LOW (ref 12–16)
MAGNESIUM SERPL-MCNC: 1.7 MG/DL — LOW (ref 1.8–2.4)
MCHC RBC-ENTMCNC: 30.1 PG — SIGNIFICANT CHANGE UP (ref 27–31)
MCHC RBC-ENTMCNC: 33.8 G/DL — SIGNIFICANT CHANGE UP (ref 32–37)
MCV RBC AUTO: 88.9 FL — SIGNIFICANT CHANGE UP (ref 81–99)
NRBC # BLD: 0 /100 WBCS — SIGNIFICANT CHANGE UP (ref 0–0)
PLATELET # BLD AUTO: 321 K/UL — SIGNIFICANT CHANGE UP (ref 130–400)
POTASSIUM SERPL-MCNC: 3.8 MMOL/L — SIGNIFICANT CHANGE UP (ref 3.5–5)
POTASSIUM SERPL-SCNC: 3.8 MMOL/L — SIGNIFICANT CHANGE UP (ref 3.5–5)
PROT SERPL-MCNC: 5.8 G/DL — LOW (ref 6–8)
RBC # BLD: 3.69 M/UL — LOW (ref 4.2–5.4)
RBC # FLD: 12.8 % — SIGNIFICANT CHANGE UP (ref 11.5–14.5)
SODIUM SERPL-SCNC: 136 MMOL/L — SIGNIFICANT CHANGE UP (ref 135–146)
SPECIMEN SOURCE: SIGNIFICANT CHANGE UP
WBC # BLD: 10 K/UL — SIGNIFICANT CHANGE UP (ref 4.8–10.8)
WBC # FLD AUTO: 10 K/UL — SIGNIFICANT CHANGE UP (ref 4.8–10.8)

## 2021-07-04 PROCEDURE — 99233 SBSQ HOSP IP/OBS HIGH 50: CPT

## 2021-07-04 RX ORDER — INSULIN HUMAN 100 [IU]/ML
10 INJECTION, SOLUTION SUBCUTANEOUS ONCE
Refills: 0 | Status: COMPLETED | OUTPATIENT
Start: 2021-07-04 | End: 2021-07-04

## 2021-07-04 RX ORDER — INSULIN LISPRO 100/ML
8 VIAL (ML) SUBCUTANEOUS ONCE
Refills: 0 | Status: COMPLETED | OUTPATIENT
Start: 2021-07-04 | End: 2021-07-04

## 2021-07-04 RX ORDER — MAGNESIUM SULFATE 500 MG/ML
2 VIAL (ML) INJECTION ONCE
Refills: 0 | Status: DISCONTINUED | OUTPATIENT
Start: 2021-07-04 | End: 2021-07-06

## 2021-07-04 RX ADMIN — METHOCARBAMOL 500 MILLIGRAM(S): 500 TABLET, FILM COATED ORAL at 05:38

## 2021-07-04 RX ADMIN — Medication 650 MILLIGRAM(S): at 22:05

## 2021-07-04 RX ADMIN — INSULIN HUMAN 10 UNIT(S): 100 INJECTION, SOLUTION SUBCUTANEOUS at 21:58

## 2021-07-04 RX ADMIN — INSULIN GLARGINE 33 UNIT(S): 100 INJECTION, SOLUTION SUBCUTANEOUS at 21:58

## 2021-07-04 RX ADMIN — Medication 1 DROP(S): at 05:37

## 2021-07-04 RX ADMIN — BRIMONIDINE TARTRATE 1 DROP(S): 2 SOLUTION/ DROPS OPHTHALMIC at 05:37

## 2021-07-04 RX ADMIN — Medication 1 TABLET(S): at 18:00

## 2021-07-04 RX ADMIN — Medication 8 UNIT(S): at 23:39

## 2021-07-04 RX ADMIN — GABAPENTIN 300 MILLIGRAM(S): 400 CAPSULE ORAL at 18:00

## 2021-07-04 RX ADMIN — ATORVASTATIN CALCIUM 40 MILLIGRAM(S): 80 TABLET, FILM COATED ORAL at 21:59

## 2021-07-04 RX ADMIN — Medication 110 MILLIGRAM(S): at 18:01

## 2021-07-04 RX ADMIN — Medication 10 MILLIGRAM(S): at 05:38

## 2021-07-04 RX ADMIN — MORPHINE SULFATE 2 MILLIGRAM(S): 50 CAPSULE, EXTENDED RELEASE ORAL at 20:24

## 2021-07-04 RX ADMIN — Medication 13 UNIT(S): at 18:03

## 2021-07-04 RX ADMIN — OXYCODONE AND ACETAMINOPHEN 1 TABLET(S): 5; 325 TABLET ORAL at 16:03

## 2021-07-04 RX ADMIN — GABAPENTIN 300 MILLIGRAM(S): 400 CAPSULE ORAL at 05:38

## 2021-07-04 RX ADMIN — Medication 110 MILLIGRAM(S): at 05:38

## 2021-07-04 RX ADMIN — Medication 8: at 08:17

## 2021-07-04 RX ADMIN — CLOPIDOGREL BISULFATE 75 MILLIGRAM(S): 75 TABLET, FILM COATED ORAL at 11:52

## 2021-07-04 RX ADMIN — METHOCARBAMOL 500 MILLIGRAM(S): 500 TABLET, FILM COATED ORAL at 21:59

## 2021-07-04 RX ADMIN — BRIMONIDINE TARTRATE 1 DROP(S): 2 SOLUTION/ DROPS OPHTHALMIC at 18:01

## 2021-07-04 RX ADMIN — DORZOLAMIDE HYDROCHLORIDE 1 DROP(S): 20 SOLUTION/ DROPS OPHTHALMIC at 05:38

## 2021-07-04 RX ADMIN — Medication 13 UNIT(S): at 08:17

## 2021-07-04 RX ADMIN — DORZOLAMIDE HYDROCHLORIDE 1 DROP(S): 20 SOLUTION/ DROPS OPHTHALMIC at 21:59

## 2021-07-04 RX ADMIN — Medication 13 UNIT(S): at 11:52

## 2021-07-04 RX ADMIN — LATANOPROST 1 DROP(S): 0.05 SOLUTION/ DROPS OPHTHALMIC; TOPICAL at 21:59

## 2021-07-04 RX ADMIN — Medication 100 MILLIGRAM(S): at 18:00

## 2021-07-04 RX ADMIN — Medication 8: at 11:53

## 2021-07-04 RX ADMIN — ENOXAPARIN SODIUM 40 MILLIGRAM(S): 100 INJECTION SUBCUTANEOUS at 21:59

## 2021-07-04 RX ADMIN — PANTOPRAZOLE SODIUM 40 MILLIGRAM(S): 20 TABLET, DELAYED RELEASE ORAL at 05:38

## 2021-07-04 RX ADMIN — INSULIN HUMAN 10 UNIT(S): 100 INJECTION, SOLUTION SUBCUTANEOUS at 01:23

## 2021-07-04 RX ADMIN — METHOCARBAMOL 500 MILLIGRAM(S): 500 TABLET, FILM COATED ORAL at 14:17

## 2021-07-04 RX ADMIN — MORPHINE SULFATE 2 MILLIGRAM(S): 50 CAPSULE, EXTENDED RELEASE ORAL at 09:37

## 2021-07-04 RX ADMIN — FAMOTIDINE 40 MILLIGRAM(S): 10 INJECTION INTRAVENOUS at 11:52

## 2021-07-04 RX ADMIN — OXYCODONE AND ACETAMINOPHEN 1 TABLET(S): 5; 325 TABLET ORAL at 08:24

## 2021-07-04 RX ADMIN — Medication 1 DROP(S): at 18:01

## 2021-07-04 RX ADMIN — DORZOLAMIDE HYDROCHLORIDE 1 DROP(S): 20 SOLUTION/ DROPS OPHTHALMIC at 14:16

## 2021-07-04 RX ADMIN — Medication 1 TABLET(S): at 05:38

## 2021-07-04 RX ADMIN — Medication 4: at 18:02

## 2021-07-04 NOTE — PROGRESS NOTE ADULT - ASSESSMENT
HPI:  57 yo F with PMhx of asthma, DM, GERD, DLD, PAD, HTN, TRAVIS, OA presents with left eye pain. Pt had developed itchiness of left eye, which then gradually progressed to pain of left sided face and eye about 1 week ago. She presented to the ED several days ago, CT Orbit revealed mild L dacryoadenitis. Pt was given Unasyn in ED and sent home on Augmentin. Her left eye pain and swelling continued to worsen, especially with eye movement, associated with photophobia and "little black dots" in her vision, so she saw her Opthalmologist and was recommended to come to ED.   In the ED, T 97, /87, HR 84, RR 18, SpO2 99% on RA. Lab work revealed WBC ct 16. Pt was given Metronidazole, Unasyn, Ceftriaxone and Vancomycin. CT Orbits again revealed L sided dacryoadenitis.     #Left eye pain / photophobia secondary to l dacryoadenitis / orbital cellulitis   controlled on high dose steroids  Po Augmentin 875 mg q12h  Doxycycline 100 mg q12  transition to po steroids today  opthalmology to follow up     #DM   CAPILLARY BLOOD GLUCOSE      POCT Blood Glucose.: 322 mg/dL (04 Jul 2021 07:56)  POCT Blood Glucose.: 315 mg/dL (04 Jul 2021 04:03)  POCT Blood Glucose.: 413 mg/dL (04 Jul 2021 02:33)  POCT Blood Glucose.: 440 mg/dL (04 Jul 2021 00:52)  POCT Blood Glucose.: 497 mg/dL (03 Jul 2021 23:26)  POCT Blood Glucose.: 385 mg/dL (03 Jul 2021 21:23)  POCT Blood Glucose.: 400 mg/dL (03 Jul 2021 16:48)  POCT Blood Glucose.: 325 mg/dL (03 Jul 2021 11:31)  elevated at night , given the transition to po steroids , will observe and correct as needed      #Obstructive sleep apnea    #Overweight BMI 29 patient needs to see dieitian outpatient for further evaluation     #CKD 2     #DL     #peripheral artery disease    # left suly bullosa    #Hypomagnesemia replete and check in am     Progress Note Handoff    Pending:  finger stick monitoring , transitioning to po steroids today , opthalmology follow up and rheumatology follow up     Family discussion: patient verbalized understanding and agreeable to plan of care     Disposition: Home___

## 2021-07-04 NOTE — PROGRESS NOTE ADULT - SUBJECTIVE AND OBJECTIVE BOX
AMBER KU  56y  [facility] [id number]      Patient is a 56y old  Female who presents with a chief complaint of L orbital cellulitis (03 Jul 2021 12:34)      INTERVAL HPI/OVERNIGHT EVENTS:    no acute events overnight , patient remarks slight episode of left eye pain    REVIEW OF SYSTEMS:  CONSTITUTIONAL: No fever, weight loss, or fatigue  EYES: No eye pain, visual disturbances, or discharge  ENMT:  No difficulty hearing, tinnitus, vertigo; No sinus or throat pain  NECK: No pain or stiffness  BREASTS: No pain, masses, or nipple discharge  RESPIRATORY: No cough, wheezing, chills or hemoptysis; No shortness of breath  CARDIOVASCULAR: No chest pain, palpitations, dizziness, or leg swelling  GASTROINTESTINAL: No abdominal or epigastric pain. No nausea, vomiting, or hematemesis; No diarrhea or constipation. No melena or hematochezia.  GENITOURINARY: No dysuria, frequency, hematuria, or incontinence  NEUROLOGICAL: No headaches, memory loss, loss of strength, numbness, or tremors  SKIN: No itching, burning, rashes, or lesions   LYMPH NODES: No enlarged glands  ENDOCRINE: No heat or cold intolerance; No hair loss  MUSCULOSKELETAL: No joint pain or swelling; No muscle, back, or extremity pain  PSYCHIATRIC: No depression, anxiety, mood swings, or difficulty sleeping  HEME/LYMPH: No easy bruising, or bleeding gums  ALLERY AND IMMUNOLOGIC: No hives or eczema  FAMILY HISTORY:  Family history of malignant neoplasm of esophagus    FH: stroke    FH: aneurysm      T(C): 36.2 (07-04-21 @ 08:00), Max: 36.7 (07-03-21 @ 23:53)  HR: 60 (07-04-21 @ 08:00) (55 - 72)  BP: 164/71 (07-04-21 @ 08:00) (152/70 - 164/71)  RR: 18 (07-04-21 @ 08:00) (17 - 18)  SpO2: --  Wt(kg): --Vital Signs Last 24 Hrs  T(C): 36.2 (04 Jul 2021 08:00), Max: 36.7 (03 Jul 2021 23:53)  T(F): 97.2 (04 Jul 2021 08:00), Max: 98.1 (03 Jul 2021 23:53)  HR: 60 (04 Jul 2021 08:00) (55 - 72)  BP: 164/71 (04 Jul 2021 08:00) (152/70 - 164/71)  BP(mean): --  RR: 18 (04 Jul 2021 08:00) (17 - 18)  SpO2: --    PHYSICAL EXAM:  GENERAL: NAD, well-groomed, well-developed  HEAD:  Atraumatic, Normocephalic  EYES: appreciate slight erythema on left upper eyelid   ENMT: No tonsillar erythema, exudates, or enlargement; Moist mucous membranes, Good dentition, No lesions  NECK: Supple, No JVD, Normal thyroid  NERVOUS SYSTEM:  Alert & Oriented X3, Good concentration; Motor Strength 5/5 B/L upper and lower extremities; DTRs 2+ intact and symmetric  PULM: Clear to auscultation bilaterally  CARDIAC: Regular rate and rhythm; No murmurs, rubs, or gallops  GI: Soft, Nontender, Nondistended; Bowel sounds present  EXTREMITIES:  2+ Peripheral Pulses, No clubbing, cyanosis, or edema  LYMPH: No lymphadenopathy noted  SKIN: No rashes or lesions    Consultant(s) Notes Reviewed:  [x ] YES  [ ] NO  Care Discussed with Consultants/Other Providers [ x] YES  [ ] NO    LABS:                            11.1   10.00 )-----------( 321      ( 04 Jul 2021 07:00 )             32.8   07-04    136  |  100  |  16  ----------------------------<  322<H>  3.8   |  24  |  0.7    Ca    8.7      04 Jul 2021 07:00  Mg     1.7     07-04    TPro  5.8<L>  /  Alb  3.9  /  TBili  <0.2  /  DBili  x   /  AST  22  /  ALT  42<H>  /  AlkPhos  85  07-04            acetaminophen   Tablet .. 650 milliGRAM(s) Oral every 6 hours PRN  amoxicillin  875 milliGRAM(s)/clavulanate 1 Tablet(s) Oral every 12 hours  artificial tears (preservative free) Ophthalmic Solution 2 Drop(s) Left EYE once  atorvastatin 40 milliGRAM(s) Oral at bedtime  brimonidine 0.2% Ophthalmic Solution 1 Drop(s) Left EYE two times a day  chlorhexidine 4% Liquid 1 Application(s) Topical <User Schedule>  clopidogrel Tablet 75 milliGRAM(s) Oral daily  cyclobenzaprine 10 milliGRAM(s) Oral three times a day PRN  dorzolamide 2% Ophthalmic Solution 1 Drop(s) Left EYE three times a day  doxycycline IVPB 100 milliGRAM(s) IV Intermittent every 12 hours  enalapril 10 milliGRAM(s) Oral daily  enoxaparin Injectable 40 milliGRAM(s) SubCutaneous at bedtime  famotidine    Tablet 40 milliGRAM(s) Oral daily  gabapentin 300 milliGRAM(s) Oral two times a day  insulin glargine Injectable (LANTUS) 33 Unit(s) SubCutaneous at bedtime  insulin lispro (ADMELOG) corrective regimen sliding scale   SubCutaneous three times a day before meals  insulin lispro Injectable (ADMELOG) 13 Unit(s) SubCutaneous three times a day before meals  latanoprost 0.005% Ophthalmic Solution 1 Drop(s) Left EYE at bedtime  methocarbamol 500 milliGRAM(s) Oral every 8 hours  metoprolol tartrate 100 milliGRAM(s) Oral two times a day  morphine  - Injectable 2 milliGRAM(s) IV Push every 6 hours PRN  oxycodone    5 mG/acetaminophen 325 mG 1 Tablet(s) Oral every 6 hours PRN  pantoprazole    Tablet 40 milliGRAM(s) Oral before breakfast  predniSONE   Tablet 60 milliGRAM(s) Oral daily  timolol 0.25% Solution 1 Drop(s) Left EYE two times a day      HEALTH ISSUES - PROBLEM Dx:          Case Discussed with House Staff   46 minutes spent on total encounter; more than 50% of the visit was spent counseling and/or coordinating care by the attending physician reviewing finger sticks , counseling patient   Spectra [phone]

## 2021-07-05 LAB
% ALBUMIN: 62.2 % — SIGNIFICANT CHANGE UP
% ALPHA 1: 5.1 % — SIGNIFICANT CHANGE UP
% ALPHA 2: 10.9 % — SIGNIFICANT CHANGE UP
% BETA: 11.5 % — SIGNIFICANT CHANGE UP
% GAMMA: 10.3 % — SIGNIFICANT CHANGE UP
ALBUMIN SERPL ELPH-MCNC: 3.7 G/DL — SIGNIFICANT CHANGE UP (ref 3.5–5.2)
ALBUMIN SERPL ELPH-MCNC: 4 G/DL — SIGNIFICANT CHANGE UP (ref 3.6–5.5)
ALBUMIN/GLOB SERPL ELPH: 1.6 RATIO — SIGNIFICANT CHANGE UP
ALP SERPL-CCNC: 79 U/L — SIGNIFICANT CHANGE UP (ref 30–115)
ALPHA1 GLOB SERPL ELPH-MCNC: 0.3 G/DL — SIGNIFICANT CHANGE UP (ref 0.1–0.4)
ALPHA2 GLOB SERPL ELPH-MCNC: 0.7 G/DL — SIGNIFICANT CHANGE UP (ref 0.5–1)
ALT FLD-CCNC: 56 U/L — HIGH (ref 0–41)
ANION GAP SERPL CALC-SCNC: 11 MMOL/L — SIGNIFICANT CHANGE UP (ref 7–14)
AST SERPL-CCNC: 36 U/L — SIGNIFICANT CHANGE UP (ref 0–41)
B-GLOBULIN SERPL ELPH-MCNC: 0.7 G/DL — SIGNIFICANT CHANGE UP (ref 0.5–1)
BILIRUB SERPL-MCNC: <0.2 MG/DL — SIGNIFICANT CHANGE UP (ref 0.2–1.2)
BUN SERPL-MCNC: 14 MG/DL — SIGNIFICANT CHANGE UP (ref 10–20)
CALCIUM SERPL-MCNC: 8.6 MG/DL — SIGNIFICANT CHANGE UP (ref 8.5–10.1)
CHLORIDE SERPL-SCNC: 105 MMOL/L — SIGNIFICANT CHANGE UP (ref 98–110)
CO2 SERPL-SCNC: 25 MMOL/L — SIGNIFICANT CHANGE UP (ref 17–32)
CREAT SERPL-MCNC: 0.7 MG/DL — SIGNIFICANT CHANGE UP (ref 0.7–1.5)
GAMMA GLOBULIN: 0.7 G/DL — SIGNIFICANT CHANGE UP (ref 0.6–1.6)
GLUCOSE BLDC GLUCOMTR-MCNC: 164 MG/DL — HIGH (ref 70–99)
GLUCOSE BLDC GLUCOMTR-MCNC: 212 MG/DL — HIGH (ref 70–99)
GLUCOSE BLDC GLUCOMTR-MCNC: 241 MG/DL — HIGH (ref 70–99)
GLUCOSE BLDC GLUCOMTR-MCNC: 259 MG/DL — HIGH (ref 70–99)
GLUCOSE BLDC GLUCOMTR-MCNC: 376 MG/DL — HIGH (ref 70–99)
GLUCOSE SERPL-MCNC: 191 MG/DL — HIGH (ref 70–99)
HCT VFR BLD CALC: 34.8 % — LOW (ref 37–47)
HGB BLD-MCNC: 11.6 G/DL — LOW (ref 12–16)
MAGNESIUM SERPL-MCNC: 1.6 MG/DL — LOW (ref 1.8–2.4)
MCHC RBC-ENTMCNC: 29.5 PG — SIGNIFICANT CHANGE UP (ref 27–31)
MCHC RBC-ENTMCNC: 33.3 G/DL — SIGNIFICANT CHANGE UP (ref 32–37)
MCV RBC AUTO: 88.5 FL — SIGNIFICANT CHANGE UP (ref 81–99)
NRBC # BLD: 0 /100 WBCS — SIGNIFICANT CHANGE UP (ref 0–0)
PLATELET # BLD AUTO: 326 K/UL — SIGNIFICANT CHANGE UP (ref 130–400)
POTASSIUM SERPL-MCNC: 3.5 MMOL/L — SIGNIFICANT CHANGE UP (ref 3.5–5)
POTASSIUM SERPL-SCNC: 3.5 MMOL/L — SIGNIFICANT CHANGE UP (ref 3.5–5)
PROT PATTERN SERPL ELPH-IMP: SIGNIFICANT CHANGE UP
PROT SERPL-MCNC: 5.6 G/DL — LOW (ref 6–8)
RBC # BLD: 3.93 M/UL — LOW (ref 4.2–5.4)
RBC # FLD: 12.7 % — SIGNIFICANT CHANGE UP (ref 11.5–14.5)
SODIUM SERPL-SCNC: 141 MMOL/L — SIGNIFICANT CHANGE UP (ref 135–146)
WBC # BLD: 9.39 K/UL — SIGNIFICANT CHANGE UP (ref 4.8–10.8)
WBC # FLD AUTO: 9.39 K/UL — SIGNIFICANT CHANGE UP (ref 4.8–10.8)

## 2021-07-05 PROCEDURE — 99233 SBSQ HOSP IP/OBS HIGH 50: CPT

## 2021-07-05 RX ORDER — INSULIN LISPRO 100/ML
15 VIAL (ML) SUBCUTANEOUS
Refills: 0 | Status: DISCONTINUED | OUTPATIENT
Start: 2021-07-05 | End: 2021-07-06

## 2021-07-05 RX ORDER — PANTOPRAZOLE SODIUM 20 MG/1
40 TABLET, DELAYED RELEASE ORAL
Refills: 0 | Status: DISCONTINUED | OUTPATIENT
Start: 2021-07-05 | End: 2021-07-06

## 2021-07-05 RX ADMIN — BRIMONIDINE TARTRATE 1 DROP(S): 2 SOLUTION/ DROPS OPHTHALMIC at 05:46

## 2021-07-05 RX ADMIN — OXYCODONE AND ACETAMINOPHEN 1 TABLET(S): 5; 325 TABLET ORAL at 05:52

## 2021-07-05 RX ADMIN — ENOXAPARIN SODIUM 40 MILLIGRAM(S): 100 INJECTION SUBCUTANEOUS at 21:55

## 2021-07-05 RX ADMIN — FAMOTIDINE 40 MILLIGRAM(S): 10 INJECTION INTRAVENOUS at 11:19

## 2021-07-05 RX ADMIN — INSULIN GLARGINE 33 UNIT(S): 100 INJECTION, SOLUTION SUBCUTANEOUS at 21:54

## 2021-07-05 RX ADMIN — DORZOLAMIDE HYDROCHLORIDE 1 DROP(S): 20 SOLUTION/ DROPS OPHTHALMIC at 21:55

## 2021-07-05 RX ADMIN — Medication 10 MILLIGRAM(S): at 05:44

## 2021-07-05 RX ADMIN — Medication 110 MILLIGRAM(S): at 05:45

## 2021-07-05 RX ADMIN — METHOCARBAMOL 500 MILLIGRAM(S): 500 TABLET, FILM COATED ORAL at 05:44

## 2021-07-05 RX ADMIN — Medication 1 TABLET(S): at 17:00

## 2021-07-05 RX ADMIN — Medication 15 UNIT(S): at 17:01

## 2021-07-05 RX ADMIN — CLOPIDOGREL BISULFATE 75 MILLIGRAM(S): 75 TABLET, FILM COATED ORAL at 11:18

## 2021-07-05 RX ADMIN — DORZOLAMIDE HYDROCHLORIDE 1 DROP(S): 20 SOLUTION/ DROPS OPHTHALMIC at 13:07

## 2021-07-05 RX ADMIN — MORPHINE SULFATE 2 MILLIGRAM(S): 50 CAPSULE, EXTENDED RELEASE ORAL at 09:56

## 2021-07-05 RX ADMIN — OXYCODONE AND ACETAMINOPHEN 1 TABLET(S): 5; 325 TABLET ORAL at 20:31

## 2021-07-05 RX ADMIN — MORPHINE SULFATE 2 MILLIGRAM(S): 50 CAPSULE, EXTENDED RELEASE ORAL at 17:01

## 2021-07-05 RX ADMIN — LATANOPROST 1 DROP(S): 0.05 SOLUTION/ DROPS OPHTHALMIC; TOPICAL at 21:55

## 2021-07-05 RX ADMIN — Medication 13 UNIT(S): at 07:52

## 2021-07-05 RX ADMIN — ATORVASTATIN CALCIUM 40 MILLIGRAM(S): 80 TABLET, FILM COATED ORAL at 21:54

## 2021-07-05 RX ADMIN — GABAPENTIN 300 MILLIGRAM(S): 400 CAPSULE ORAL at 17:00

## 2021-07-05 RX ADMIN — Medication 1 DROP(S): at 05:46

## 2021-07-05 RX ADMIN — METHOCARBAMOL 500 MILLIGRAM(S): 500 TABLET, FILM COATED ORAL at 21:54

## 2021-07-05 RX ADMIN — Medication 6: at 17:00

## 2021-07-05 RX ADMIN — Medication 100 MILLIGRAM(S): at 05:44

## 2021-07-05 RX ADMIN — GABAPENTIN 300 MILLIGRAM(S): 400 CAPSULE ORAL at 05:45

## 2021-07-05 RX ADMIN — PANTOPRAZOLE SODIUM 40 MILLIGRAM(S): 20 TABLET, DELAYED RELEASE ORAL at 17:00

## 2021-07-05 RX ADMIN — DORZOLAMIDE HYDROCHLORIDE 1 DROP(S): 20 SOLUTION/ DROPS OPHTHALMIC at 05:46

## 2021-07-05 RX ADMIN — Medication 4: at 07:52

## 2021-07-05 RX ADMIN — Medication 60 MILLIGRAM(S): at 05:44

## 2021-07-05 RX ADMIN — METHOCARBAMOL 500 MILLIGRAM(S): 500 TABLET, FILM COATED ORAL at 13:08

## 2021-07-05 RX ADMIN — Medication 10: at 11:18

## 2021-07-05 RX ADMIN — Medication 15 UNIT(S): at 11:18

## 2021-07-05 RX ADMIN — Medication 110 MILLIGRAM(S): at 17:00

## 2021-07-05 RX ADMIN — PANTOPRAZOLE SODIUM 40 MILLIGRAM(S): 20 TABLET, DELAYED RELEASE ORAL at 05:47

## 2021-07-05 RX ADMIN — Medication 1 TABLET(S): at 05:44

## 2021-07-05 RX ADMIN — Medication 100 MILLIGRAM(S): at 17:00

## 2021-07-05 NOTE — PROGRESS NOTE ADULT - SUBJECTIVE AND OBJECTIVE BOX
SUBJECTIVE:    Patient is a 56y old Female who presents with a chief complaint of L orbital cellulitis (04 Jul 2021 09:32)    Currently admitted to medicine with the primary diagnosis of Orbital cellulitis       Today is hospital day 7d. This morning she is resting comfortably in bed and reports no new issues or overnight events.     PAST MEDICAL & SURGICAL HISTORY  HTN (hypertension)    Diabetes    Asthma  LAST EPISODE SEVERAL YRS    TRAVIS on CPAP    Arthritis  OA    High cholesterol    GERD (gastroesophageal reflux disease)    History of peripheral arterial disease    S/P trigger finger release    H/O carpal tunnel repair    H/O arthroscopy of shoulder      SOCIAL HISTORY:  Negative for smoking/alcohol/drug use.     ALLERGIES:  ibuprofen (Urticaria)    MEDICATIONS:  STANDING MEDICATIONS  amoxicillin  875 milliGRAM(s)/clavulanate 1 Tablet(s) Oral every 12 hours  artificial tears (preservative free) Ophthalmic Solution 2 Drop(s) Left EYE once  atorvastatin 40 milliGRAM(s) Oral at bedtime  brimonidine 0.2% Ophthalmic Solution 1 Drop(s) Left EYE two times a day  chlorhexidine 4% Liquid 1 Application(s) Topical <User Schedule>  clopidogrel Tablet 75 milliGRAM(s) Oral daily  dorzolamide 2% Ophthalmic Solution 1 Drop(s) Left EYE three times a day  doxycycline IVPB 100 milliGRAM(s) IV Intermittent every 12 hours  enalapril 10 milliGRAM(s) Oral daily  enoxaparin Injectable 40 milliGRAM(s) SubCutaneous at bedtime  famotidine    Tablet 40 milliGRAM(s) Oral daily  gabapentin 300 milliGRAM(s) Oral two times a day  insulin glargine Injectable (LANTUS) 33 Unit(s) SubCutaneous at bedtime  insulin lispro (ADMELOG) corrective regimen sliding scale   SubCutaneous three times a day before meals  insulin lispro Injectable (ADMELOG) 15 Unit(s) SubCutaneous three times a day before meals  latanoprost 0.005% Ophthalmic Solution 1 Drop(s) Left EYE at bedtime  magnesium sulfate  IVPB 2 Gram(s) IV Intermittent once  methocarbamol 500 milliGRAM(s) Oral every 8 hours  metoprolol tartrate 100 milliGRAM(s) Oral two times a day  pantoprazole    Tablet 40 milliGRAM(s) Oral two times a day  predniSONE   Tablet 60 milliGRAM(s) Oral daily  timolol 0.25% Solution 1 Drop(s) Left EYE two times a day    PRN MEDICATIONS  acetaminophen   Tablet .. 650 milliGRAM(s) Oral every 6 hours PRN  cyclobenzaprine 10 milliGRAM(s) Oral three times a day PRN  morphine  - Injectable 2 milliGRAM(s) IV Push every 6 hours PRN  oxycodone    5 mG/acetaminophen 325 mG 1 Tablet(s) Oral every 6 hours PRN    VITALS:   T(F): 97.5  HR: 55  BP: 147/95  RR: 19  SpO2: --    LABS:                        11.6   9.39  )-----------( 326      ( 05 Jul 2021 06:34 )             34.8     07-05    141  |  105  |  14  ----------------------------<  191<H>  3.5   |  25  |  0.7    Ca    8.6      05 Jul 2021 06:34  Mg     1.6     07-05    TPro  5.6<L>  /  Alb  3.7  /  TBili  <0.2  /  DBili  x   /  AST  36  /  ALT  56<H>  /  AlkPhos  79  07-05                  RADIOLOGY:< from: CT Orbit w/ IV Cont (06.25.21 @ 09:13) >  Impression:    Subtle asymmetric enhancement of the left lacrimal gland and left preorbital soft tissue questionable for mild left dacryoadenitis. No drainable fluid collection.    < end of copied text >      PHYSICAL EXAM:  GEN: No acute distress  LUNGS: Clear to auscultation bilaterally   HEART: S1/S2 present. RRR.   ABD: Soft, non-tender, non-distended. Bowel sounds present  EXT: NC/NC/NE/2+PP/MORALES/Skin Intact.   NEURO: AAOX3    Intravenous access:   NG tube:   Moore Catheter:          SUBJECTIVE:    Patient is a 56y old Female who presents with a chief complaint of L orbital cellulitis (04 Jul 2021 09:32)    Currently admitted to medicine with the primary diagnosis of Orbital cellulitis       Today is hospital day 7d. This morning she is resting comfortably in bed and reports no new issues or overnight events.   No improvement in eye pain, feels pressure like sensation in the eye area.    PAST MEDICAL & SURGICAL HISTORY  HTN (hypertension)    Diabetes    Asthma  LAST EPISODE SEVERAL YRS    TRAVIS on CPAP    Arthritis  OA    High cholesterol    GERD (gastroesophageal reflux disease)    History of peripheral arterial disease    S/P trigger finger release    H/O carpal tunnel repair    H/O arthroscopy of shoulder      SOCIAL HISTORY:  Negative for smoking/alcohol/drug use.     ALLERGIES:  ibuprofen (Urticaria)    MEDICATIONS:  STANDING MEDICATIONS  amoxicillin  875 milliGRAM(s)/clavulanate 1 Tablet(s) Oral every 12 hours  artificial tears (preservative free) Ophthalmic Solution 2 Drop(s) Left EYE once  atorvastatin 40 milliGRAM(s) Oral at bedtime  brimonidine 0.2% Ophthalmic Solution 1 Drop(s) Left EYE two times a day  chlorhexidine 4% Liquid 1 Application(s) Topical <User Schedule>  clopidogrel Tablet 75 milliGRAM(s) Oral daily  dorzolamide 2% Ophthalmic Solution 1 Drop(s) Left EYE three times a day  doxycycline IVPB 100 milliGRAM(s) IV Intermittent every 12 hours  enalapril 10 milliGRAM(s) Oral daily  enoxaparin Injectable 40 milliGRAM(s) SubCutaneous at bedtime  famotidine    Tablet 40 milliGRAM(s) Oral daily  gabapentin 300 milliGRAM(s) Oral two times a day  insulin glargine Injectable (LANTUS) 33 Unit(s) SubCutaneous at bedtime  insulin lispro (ADMELOG) corrective regimen sliding scale   SubCutaneous three times a day before meals  insulin lispro Injectable (ADMELOG) 15 Unit(s) SubCutaneous three times a day before meals  latanoprost 0.005% Ophthalmic Solution 1 Drop(s) Left EYE at bedtime  magnesium sulfate  IVPB 2 Gram(s) IV Intermittent once  methocarbamol 500 milliGRAM(s) Oral every 8 hours  metoprolol tartrate 100 milliGRAM(s) Oral two times a day  pantoprazole    Tablet 40 milliGRAM(s) Oral two times a day  predniSONE   Tablet 60 milliGRAM(s) Oral daily  timolol 0.25% Solution 1 Drop(s) Left EYE two times a day    PRN MEDICATIONS  acetaminophen   Tablet .. 650 milliGRAM(s) Oral every 6 hours PRN  cyclobenzaprine 10 milliGRAM(s) Oral three times a day PRN  morphine  - Injectable 2 milliGRAM(s) IV Push every 6 hours PRN  oxycodone    5 mG/acetaminophen 325 mG 1 Tablet(s) Oral every 6 hours PRN    VITALS:   T(F): 97.5  HR: 55  BP: 147/95  RR: 19  SpO2: --    LABS:                        11.6   9.39  )-----------( 326      ( 05 Jul 2021 06:34 )             34.8     07-05    141  |  105  |  14  ----------------------------<  191<H>  3.5   |  25  |  0.7    Ca    8.6      05 Jul 2021 06:34  Mg     1.6     07-05    TPro  5.6<L>  /  Alb  3.7  /  TBili  <0.2  /  DBili  x   /  AST  36  /  ALT  56<H>  /  AlkPhos  79  07-05                  RADIOLOGY:< from: CT Orbit w/ IV Cont (06.25.21 @ 09:13) >  Impression:    Subtle asymmetric enhancement of the left lacrimal gland and left preorbital soft tissue questionable for mild left dacryoadenitis. No drainable fluid collection.    < end of copied text >      PHYSICAL EXAM:  GEN: No acute distress  LUNGS: Clear to auscultation bilaterally   HEART: S1/S2 present. RRR.   ABD: Soft, non-tender, non-distended. Bowel sounds present  EXT: NC/NC/NE/2+PP/MORALES/Skin Intact.   NEURO: AAOX3    Intravenous access:   NG tube:   Moore Catheter:          SUBJECTIVE:    Patient is a 56y old Female who presents with a chief complaint of L orbital cellulitis (04 Jul 2021 09:32)    Currently admitted to medicine with the primary diagnosis of Orbital cellulitis       Today is hospital day 7d. This morning she is resting comfortably in bed and reports no new issues or overnight events.   No improvement in eye pain, feels pressure like sensation in the eye area.    PAST MEDICAL & SURGICAL HISTORY  HTN (hypertension)    Diabetes    Asthma  LAST EPISODE SEVERAL YRS    TRAVIS on CPAP    Arthritis  OA    High cholesterol    GERD (gastroesophageal reflux disease)    History of peripheral arterial disease    S/P trigger finger release    H/O carpal tunnel repair    H/O arthroscopy of shoulder      SOCIAL HISTORY:  Negative for smoking/alcohol/drug use.     ALLERGIES:  ibuprofen (Urticaria)    MEDICATIONS:  STANDING MEDICATIONS  amoxicillin  875 milliGRAM(s)/clavulanate 1 Tablet(s) Oral every 12 hours  artificial tears (preservative free) Ophthalmic Solution 2 Drop(s) Left EYE once  atorvastatin 40 milliGRAM(s) Oral at bedtime  brimonidine 0.2% Ophthalmic Solution 1 Drop(s) Left EYE two times a day  chlorhexidine 4% Liquid 1 Application(s) Topical <User Schedule>  clopidogrel Tablet 75 milliGRAM(s) Oral daily  dorzolamide 2% Ophthalmic Solution 1 Drop(s) Left EYE three times a day  doxycycline IVPB 100 milliGRAM(s) IV Intermittent every 12 hours  enalapril 10 milliGRAM(s) Oral daily  enoxaparin Injectable 40 milliGRAM(s) SubCutaneous at bedtime  famotidine    Tablet 40 milliGRAM(s) Oral daily  gabapentin 300 milliGRAM(s) Oral two times a day  insulin glargine Injectable (LANTUS) 33 Unit(s) SubCutaneous at bedtime  insulin lispro (ADMELOG) corrective regimen sliding scale   SubCutaneous three times a day before meals  insulin lispro Injectable (ADMELOG) 15 Unit(s) SubCutaneous three times a day before meals  latanoprost 0.005% Ophthalmic Solution 1 Drop(s) Left EYE at bedtime  magnesium sulfate  IVPB 2 Gram(s) IV Intermittent once  methocarbamol 500 milliGRAM(s) Oral every 8 hours  metoprolol tartrate 100 milliGRAM(s) Oral two times a day  pantoprazole    Tablet 40 milliGRAM(s) Oral two times a day  predniSONE   Tablet 60 milliGRAM(s) Oral daily  timolol 0.25% Solution 1 Drop(s) Left EYE two times a day    PRN MEDICATIONS  acetaminophen   Tablet .. 650 milliGRAM(s) Oral every 6 hours PRN  cyclobenzaprine 10 milliGRAM(s) Oral three times a day PRN  morphine  - Injectable 2 milliGRAM(s) IV Push every 6 hours PRN  oxycodone    5 mG/acetaminophen 325 mG 1 Tablet(s) Oral every 6 hours PRN    VITALS:   T(F): 97.5  HR: 55  BP: 147/95  RR: 19  SpO2: --    LABS:                        11.6   9.39  )-----------( 326      ( 05 Jul 2021 06:34 )             34.8     07-05    141  |  105  |  14  ----------------------------<  191<H>  3.5   |  25  |  0.7    Ca    8.6      05 Jul 2021 06:34  Mg     1.6     07-05    TPro  5.6<L>  /  Alb  3.7  /  TBili  <0.2  /  DBili  x   /  AST  36  /  ALT  56<H>  /  AlkPhos  79  07-05                  RADIOLOGY:< from: CT Orbit w/ IV Cont (06.25.21 @ 09:13) >  Impression:    Subtle asymmetric enhancement of the left lacrimal gland and left preorbital soft tissue questionable for mild left dacryoadenitis. No drainable fluid collection.    < end of copied text >      PHYSICAL EXAM:  GEN: No acute distress  LUNGS: Clear to auscultation bilaterally   HEART: S1/S2 present. RRR.   ABD: Soft, non-tender, non-distended.   NEURO: AAOX3    Intravenous access:   NG tube:   Moore Catheter:

## 2021-07-05 NOTE — PROGRESS NOTE ADULT - ASSESSMENT
HPI:  55 yo F with PMhx of asthma, DM, GERD, DLD, PAD, HTN, TRAVIS, OA presents with left eye pain. Pt had developed itchiness of left eye, which then gradually progressed to pain of left sided face and eye about 1 week ago. She presented to the ED several days ago, CT Orbit revealed mild L dacryoadenitis. Pt was given Unasyn in ED and sent home on Augmentin. Her left eye pain and swelling continued to worsen, especially with eye movement, associated with photophobia and "little black dots" in her vision, so she saw her Opthalmologist and was recommended to come to ED.   In the ED, T 97, /87, HR 84, RR 18, SpO2 99% on RA. Lab work revealed WBC ct 16. Pt was given Metronidazole, Unasyn, Ceftriaxone and Vancomycin. CT Orbits again revealed L sided dacryoadenitis.     #Left eye pain / photophobia secondary to l dacryoadenitis / orbital cellulitis   Po Augmentin 875 mg q12h  Doxycycline 100 mg q12  on po steroids  opthalmology to follow up   will monitor response to oral steroid  rheumatology follow up    #DM   CAPILLARY BLOOD GLUCOSE      POCT Blood Glucose.: 376 mg/dL (05 Jul 2021 11:15)  POCT Blood Glucose.: 241 mg/dL (05 Jul 2021 07:38)  POCT Blood Glucose.: 212 mg/dL (05 Jul 2021 02:08)  POCT Blood Glucose.: 351 mg/dL (04 Jul 2021 23:17)  POCT Blood Glucose.: 430 mg/dL (04 Jul 2021 21:11)  POCT Blood Glucose.: 204 mg/dL (04 Jul 2021 16:35)    mild improvement , will hold further adjustments as transitioning to po steroids     #Obstructive sleep apnea    #Overweight BMI 29 patient needs to see dieitian outpatient for further evaluation     #CKD 2     #DL     #peripheral artery disease    # left suly bullosa    #Hypomagnesemia  recurrent , mg rider    Progress Note Handoff    Pending:  monitor response on po steroids     Family discussion: patient verbalized understanding and agreeable to plan of care     Disposition: Home___

## 2021-07-05 NOTE — PROGRESS NOTE ADULT - ASSESSMENT
57 yo F with PMhx of asthma, DM, GERD, DLD, PAD, HTN, TRAVIS, OA presents with left eye pain. Pt had developed itchiness of left eye, which then gradually progressed to pain of left sided face and eye about 1 week ago. She presented to the ED several days ago, CT Orbit revealed mild L dacryoadenitis. Pt was given Unasyn in ED and sent home on Augmentin. Her left eye pain and swelling continued to worsen, especially with eye movement, associated with photophobia and "little black dots" in her vision, so she saw her Opthalmologist and was recommended to come to ED.   In the ED, T 97, /87, HR 84, RR 18, SpO2 99% on RA. Lab work revealed WBC ct 16. Pt was given Metronidazole, Unasyn, Ceftriaxone and Vancomycin. CT Orbits again revealed L sided dacryoadenitis.     #Left eye pain / photophobia secondary to l dacryoadenitis / orbital cellulitis   controlled on high dose steroids  Po Augmentin 875 mg q12h  Doxycycline 100 mg q12  transition to po steroids today  opthalmology to follow up     #DM   CAPILLARY BLOOD GLUCOSE      POCT Blood Glucose.: 322 mg/dL (04 Jul 2021 07:56)  POCT Blood Glucose.: 315 mg/dL (04 Jul 2021 04:03)  POCT Blood Glucose.: 413 mg/dL (04 Jul 2021 02:33)  POCT Blood Glucose.: 440 mg/dL (04 Jul 2021 00:52)  POCT Blood Glucose.: 497 mg/dL (03 Jul 2021 23:26)  POCT Blood Glucose.: 385 mg/dL (03 Jul 2021 21:23)  POCT Blood Glucose.: 400 mg/dL (03 Jul 2021 16:48)  POCT Blood Glucose.: 325 mg/dL (03 Jul 2021 11:31)  elevated at night , given the transition to po steroids , will observe and correct as needed      #Obstructive sleep apnea    #Overweight BMI 29 patient needs to see dieitian outpatient for further evaluation     #CKD 2     #DL     #peripheral artery disease    # left suly bullosa    #Hypomagnesemia replete and check in am     Progress Note Handoff    Pending:  finger stick monitoring , transitioning to po steroids today , opthalmology follow up and rheumatology follow up     Family discussion: patient verbalized understanding and agreeable to plan of care     Disposition: Home___   55 yo F with . Pt had developed itchiness of left eye, which then gradually progressed to pain of left sided face and eye about 1 week ago. She presented to the ED several days ago, CT Orbit revealed mild L dacryoadenitis. Pt was given Unasyn in ED and sent home on Augmentin. Her left eye pain and swelling continued to worsen, especially with eye movement, associated with photophobia and "little black dots" in her vision.    In the ED, T 97, /87, HR 84, RR 18, SpO2 99% on RA. Lab work revealed WBC ct 16. Pt was given Metronidazole, Unasyn, Ceftriaxone and Vancomycin. CT Orbits again revealed L sided dacryoadenitis.     #Left eye pain / photophobia secondary to l dacryoadenitis / orbital cellulitis   controlled on high dose steroids  Po Augmentin 875 mg q12h  Doxycycline 100 mg q12  opthalmology to follow up     #DM   CAPILLARY BLOOD GLUCOSE      #Obstructive sleep apnea    #Overweight BMI 29 patient needs to see dieitian outpatient for further evaluation     #CKD 2     #DL     #peripheral artery disease    # left suly bullosa    #Hypomagnesemia replete and check in am     Progress Note Handoff    Pending:  finger stick monitoring , opthalmology follow up and rheumatology follow up     Disposition: Home

## 2021-07-05 NOTE — PROGRESS NOTE ADULT - SUBJECTIVE AND OBJECTIVE BOX
AMBER KU  56y  [facility] [id number]      Patient is a 56y old  Female who presents with a chief complaint of L orbital cellulitis (05 Jul 2021 10:51)      INTERVAL HPI/OVERNIGHT EVENTS:    no acute events overnight    REVIEW OF SYSTEMS:  CONSTITUTIONAL: No fever, weight loss, or fatigue  EYES: Eye pain episode  ENMT:  No difficulty hearing, tinnitus, vertigo; No sinus or throat pain  NECK: No pain or stiffness  BREASTS: No pain, masses, or nipple discharge  RESPIRATORY: No cough, wheezing, chills or hemoptysis; No shortness of breath  CARDIOVASCULAR: No chest pain, palpitations, dizziness, or leg swelling  GASTROINTESTINAL: Mild odynophagia   GENITOURINARY: No dysuria, frequency, hematuria, or incontinence  NEUROLOGICAL: No headaches, memory loss, loss of strength, numbness, or tremors  SKIN: No itching, burning, rashes, or lesions   LYMPH NODES: No enlarged glands  ENDOCRINE: No heat or cold intolerance; No hair loss  MUSCULOSKELETAL: No joint pain or swelling; No muscle, back, or extremity pain  PSYCHIATRIC: No depression, anxiety, mood swings, or difficulty sleeping  HEME/LYMPH: No easy bruising, or bleeding gums  ALLERY AND IMMUNOLOGIC: No hives or eczema  FAMILY HISTORY:  Family history of malignant neoplasm of esophagus    FH: stroke    FH: aneurysm      T(C): 36.4 (07-05-21 @ 08:00), Max: 36.7 (07-04-21 @ 23:47)  HR: 55 (07-05-21 @ 08:00) (55 - 92)  BP: 147/95 (07-05-21 @ 08:00) (125/60 - 186/73)  RR: 19 (07-05-21 @ 08:00) (18 - 19)  SpO2: --  Wt(kg): --Vital Signs Last 24 Hrs  T(C): 36.4 (05 Jul 2021 08:00), Max: 36.7 (04 Jul 2021 23:47)  T(F): 97.5 (05 Jul 2021 08:00), Max: 98.1 (04 Jul 2021 23:47)  HR: 55 (05 Jul 2021 08:00) (55 - 92)  BP: 147/95 (05 Jul 2021 08:00) (125/60 - 186/73)  BP(mean): --  RR: 19 (05 Jul 2021 08:00) (18 - 19)  SpO2: --    PHYSICAL EXAM:  GENERAL: NAD, well-groomed, well-developed  HEAD:  Atraumatic, Normocephalic  EYES: EOMI, PERRLA, conjunctiva and sclera clear  ENMT: No tonsillar erythema, exudates, or enlargement; Moist mucous membranes, Good dentition, No lesions  NECK: Supple, No JVD, Normal thyroid  NERVOUS SYSTEM:  Alert & Oriented X3, Good concentration; Motor Strength 5/5 B/L upper and lower extremities; DTRs 2+ intact and symmetric  PULM: Clear to auscultation bilaterally  CARDIAC: Regular rate and rhythm; No murmurs, rubs, or gallops  GI: Soft, Nontender, Nondistended; Bowel sounds present  EXTREMITIES:  2+ Peripheral Pulses, No clubbing, cyanosis, or edema  LYMPH: No lymphadenopathy noted  SKIN: No rashes or lesions    Consultant(s) Notes Reviewed:  [x ] YES  [ ] NO  Care Discussed with Consultants/Other Providers [ x] YES  [ ] NO    LABS:                            11.6   9.39  )-----------( 326      ( 05 Jul 2021 06:34 )             34.8   07-05    141  |  105  |  14  ----------------------------<  191<H>  3.5   |  25  |  0.7    Ca    8.6      05 Jul 2021 06:34  Mg     1.6     07-05    TPro  5.6<L>  /  Alb  3.7  /  TBili  <0.2  /  DBili  x   /  AST  36  /  ALT  56<H>  /  AlkPhos  79  07-05            acetaminophen   Tablet .. 650 milliGRAM(s) Oral every 6 hours PRN  amoxicillin  875 milliGRAM(s)/clavulanate 1 Tablet(s) Oral every 12 hours  artificial tears (preservative free) Ophthalmic Solution 2 Drop(s) Left EYE once  atorvastatin 40 milliGRAM(s) Oral at bedtime  brimonidine 0.2% Ophthalmic Solution 1 Drop(s) Left EYE two times a day  chlorhexidine 4% Liquid 1 Application(s) Topical <User Schedule>  clopidogrel Tablet 75 milliGRAM(s) Oral daily  cyclobenzaprine 10 milliGRAM(s) Oral three times a day PRN  dorzolamide 2% Ophthalmic Solution 1 Drop(s) Left EYE three times a day  doxycycline IVPB 100 milliGRAM(s) IV Intermittent every 12 hours  enalapril 10 milliGRAM(s) Oral daily  enoxaparin Injectable 40 milliGRAM(s) SubCutaneous at bedtime  famotidine    Tablet 40 milliGRAM(s) Oral daily  gabapentin 300 milliGRAM(s) Oral two times a day  insulin glargine Injectable (LANTUS) 33 Unit(s) SubCutaneous at bedtime  insulin lispro (ADMELOG) corrective regimen sliding scale   SubCutaneous three times a day before meals  insulin lispro Injectable (ADMELOG) 15 Unit(s) SubCutaneous three times a day before meals  latanoprost 0.005% Ophthalmic Solution 1 Drop(s) Left EYE at bedtime  magnesium sulfate  IVPB 2 Gram(s) IV Intermittent once  methocarbamol 500 milliGRAM(s) Oral every 8 hours  metoprolol tartrate 100 milliGRAM(s) Oral two times a day  morphine  - Injectable 2 milliGRAM(s) IV Push every 6 hours PRN  oxycodone    5 mG/acetaminophen 325 mG 1 Tablet(s) Oral every 6 hours PRN  pantoprazole    Tablet 40 milliGRAM(s) Oral two times a day  predniSONE   Tablet 60 milliGRAM(s) Oral daily  timolol 0.25% Solution 1 Drop(s) Left EYE two times a day      HEALTH ISSUES - PROBLEM Dx:          Case Discussed with House Staff   46 minutes spent on total encounter; more than 50% of the visit was spent counseling and/or coordinating care by the attending physician.  adjusting insulin and discussing with patient  Spectra [phone]

## 2021-07-06 ENCOUNTER — TRANSCRIPTION ENCOUNTER (OUTPATIENT)
Age: 56
End: 2021-07-06

## 2021-07-06 VITALS
DIASTOLIC BLOOD PRESSURE: 70 MMHG | TEMPERATURE: 97 F | HEART RATE: 62 BPM | SYSTOLIC BLOOD PRESSURE: 169 MMHG | RESPIRATION RATE: 19 BRPM

## 2021-07-06 LAB
ALBUMIN SERPL ELPH-MCNC: 4 G/DL — SIGNIFICANT CHANGE UP (ref 3.5–5.2)
ALP SERPL-CCNC: 81 U/L — SIGNIFICANT CHANGE UP (ref 30–115)
ALT FLD-CCNC: 51 U/L — HIGH (ref 0–41)
ANION GAP SERPL CALC-SCNC: 8 MMOL/L — SIGNIFICANT CHANGE UP (ref 7–14)
AST SERPL-CCNC: 24 U/L — SIGNIFICANT CHANGE UP (ref 0–41)
BASOPHILS # BLD AUTO: 0.02 K/UL — SIGNIFICANT CHANGE UP (ref 0–0.2)
BASOPHILS NFR BLD AUTO: 0.2 % — SIGNIFICANT CHANGE UP (ref 0–1)
BILIRUB SERPL-MCNC: 0.2 MG/DL — SIGNIFICANT CHANGE UP (ref 0.2–1.2)
BUN SERPL-MCNC: 12 MG/DL — SIGNIFICANT CHANGE UP (ref 10–20)
CALCIUM SERPL-MCNC: 8.8 MG/DL — SIGNIFICANT CHANGE UP (ref 8.5–10.1)
CHLORIDE SERPL-SCNC: 103 MMOL/L — SIGNIFICANT CHANGE UP (ref 98–110)
CO2 SERPL-SCNC: 31 MMOL/L — SIGNIFICANT CHANGE UP (ref 17–32)
CREAT SERPL-MCNC: 0.7 MG/DL — SIGNIFICANT CHANGE UP (ref 0.7–1.5)
EOSINOPHIL # BLD AUTO: 0.21 K/UL — SIGNIFICANT CHANGE UP (ref 0–0.7)
EOSINOPHIL NFR BLD AUTO: 1.9 % — SIGNIFICANT CHANGE UP (ref 0–8)
GLUCOSE BLDC GLUCOMTR-MCNC: 222 MG/DL — HIGH (ref 70–99)
GLUCOSE BLDC GLUCOMTR-MCNC: 294 MG/DL — HIGH (ref 70–99)
GLUCOSE BLDC GLUCOMTR-MCNC: 306 MG/DL — HIGH (ref 70–99)
GLUCOSE SERPL-MCNC: 150 MG/DL — HIGH (ref 70–99)
HCT VFR BLD CALC: 37.3 % — SIGNIFICANT CHANGE UP (ref 37–47)
HGB BLD-MCNC: 12.5 G/DL — SIGNIFICANT CHANGE UP (ref 12–16)
IMM GRANULOCYTES NFR BLD AUTO: 0.6 % — HIGH (ref 0.1–0.3)
LYMPHOCYTES # BLD AUTO: 4.93 K/UL — HIGH (ref 1.2–3.4)
LYMPHOCYTES # BLD AUTO: 45.4 % — SIGNIFICANT CHANGE UP (ref 20.5–51.1)
MCHC RBC-ENTMCNC: 30.2 PG — SIGNIFICANT CHANGE UP (ref 27–31)
MCHC RBC-ENTMCNC: 33.5 G/DL — SIGNIFICANT CHANGE UP (ref 32–37)
MCV RBC AUTO: 90.1 FL — SIGNIFICANT CHANGE UP (ref 81–99)
MONOCYTES # BLD AUTO: 0.61 K/UL — HIGH (ref 0.1–0.6)
MONOCYTES NFR BLD AUTO: 5.6 % — SIGNIFICANT CHANGE UP (ref 1.7–9.3)
NEUTROPHILS # BLD AUTO: 5.03 K/UL — SIGNIFICANT CHANGE UP (ref 1.4–6.5)
NEUTROPHILS NFR BLD AUTO: 46.3 % — SIGNIFICANT CHANGE UP (ref 42.2–75.2)
NRBC # BLD: 0 /100 WBCS — SIGNIFICANT CHANGE UP (ref 0–0)
PLATELET # BLD AUTO: 370 K/UL — SIGNIFICANT CHANGE UP (ref 130–400)
POTASSIUM SERPL-MCNC: 4.3 MMOL/L — SIGNIFICANT CHANGE UP (ref 3.5–5)
POTASSIUM SERPL-SCNC: 4.3 MMOL/L — SIGNIFICANT CHANGE UP (ref 3.5–5)
PROT SERPL-MCNC: 5.9 G/DL — LOW (ref 6–8)
RBC # BLD: 4.14 M/UL — LOW (ref 4.2–5.4)
RBC # FLD: 13 % — SIGNIFICANT CHANGE UP (ref 11.5–14.5)
SODIUM SERPL-SCNC: 142 MMOL/L — SIGNIFICANT CHANGE UP (ref 135–146)
WBC # BLD: 10.87 K/UL — HIGH (ref 4.8–10.8)
WBC # FLD AUTO: 10.87 K/UL — HIGH (ref 4.8–10.8)

## 2021-07-06 PROCEDURE — 99232 SBSQ HOSP IP/OBS MODERATE 35: CPT

## 2021-07-06 RX ORDER — TIMOLOL 0.5 %
1 DROPS OPHTHALMIC (EYE)
Qty: 0 | Refills: 0 | DISCHARGE
Start: 2021-07-06

## 2021-07-06 RX ORDER — BRIMONIDINE TARTRATE 2 MG/MG
1 SOLUTION/ DROPS OPHTHALMIC
Qty: 0 | Refills: 0 | DISCHARGE
Start: 2021-07-06

## 2021-07-06 RX ORDER — LATANOPROST 0.05 MG/ML
1 SOLUTION/ DROPS OPHTHALMIC; TOPICAL
Qty: 0 | Refills: 0 | DISCHARGE
Start: 2021-07-06

## 2021-07-06 RX ORDER — DORZOLAMIDE HYDROCHLORIDE 20 MG/ML
1 SOLUTION/ DROPS OPHTHALMIC
Qty: 0 | Refills: 0 | DISCHARGE
Start: 2021-07-06

## 2021-07-06 RX ADMIN — METHOCARBAMOL 500 MILLIGRAM(S): 500 TABLET, FILM COATED ORAL at 05:26

## 2021-07-06 RX ADMIN — GABAPENTIN 300 MILLIGRAM(S): 400 CAPSULE ORAL at 16:53

## 2021-07-06 RX ADMIN — Medication 15 UNIT(S): at 08:12

## 2021-07-06 RX ADMIN — Medication 1 DROP(S): at 16:54

## 2021-07-06 RX ADMIN — BRIMONIDINE TARTRATE 1 DROP(S): 2 SOLUTION/ DROPS OPHTHALMIC at 05:28

## 2021-07-06 RX ADMIN — METHOCARBAMOL 500 MILLIGRAM(S): 500 TABLET, FILM COATED ORAL at 14:22

## 2021-07-06 RX ADMIN — MORPHINE SULFATE 2 MILLIGRAM(S): 50 CAPSULE, EXTENDED RELEASE ORAL at 07:06

## 2021-07-06 RX ADMIN — DORZOLAMIDE HYDROCHLORIDE 1 DROP(S): 20 SOLUTION/ DROPS OPHTHALMIC at 14:23

## 2021-07-06 RX ADMIN — Medication 110 MILLIGRAM(S): at 16:51

## 2021-07-06 RX ADMIN — Medication 110 MILLIGRAM(S): at 05:26

## 2021-07-06 RX ADMIN — CLOPIDOGREL BISULFATE 75 MILLIGRAM(S): 75 TABLET, FILM COATED ORAL at 11:15

## 2021-07-06 RX ADMIN — GABAPENTIN 300 MILLIGRAM(S): 400 CAPSULE ORAL at 05:27

## 2021-07-06 RX ADMIN — Medication 8: at 11:34

## 2021-07-06 RX ADMIN — MORPHINE SULFATE 2 MILLIGRAM(S): 50 CAPSULE, EXTENDED RELEASE ORAL at 14:25

## 2021-07-06 RX ADMIN — Medication 10 MILLIGRAM(S): at 05:27

## 2021-07-06 RX ADMIN — Medication 1 TABLET(S): at 05:26

## 2021-07-06 RX ADMIN — Medication 100 MILLIGRAM(S): at 16:53

## 2021-07-06 RX ADMIN — Medication 100 MILLIGRAM(S): at 05:27

## 2021-07-06 RX ADMIN — Medication 6: at 16:56

## 2021-07-06 RX ADMIN — PANTOPRAZOLE SODIUM 40 MILLIGRAM(S): 20 TABLET, DELAYED RELEASE ORAL at 16:52

## 2021-07-06 RX ADMIN — PANTOPRAZOLE SODIUM 40 MILLIGRAM(S): 20 TABLET, DELAYED RELEASE ORAL at 05:26

## 2021-07-06 RX ADMIN — Medication 15 UNIT(S): at 16:56

## 2021-07-06 RX ADMIN — Medication 1 TABLET(S): at 16:52

## 2021-07-06 RX ADMIN — OXYCODONE AND ACETAMINOPHEN 1 TABLET(S): 5; 325 TABLET ORAL at 11:35

## 2021-07-06 RX ADMIN — Medication 60 MILLIGRAM(S): at 05:27

## 2021-07-06 RX ADMIN — FAMOTIDINE 40 MILLIGRAM(S): 10 INJECTION INTRAVENOUS at 11:14

## 2021-07-06 RX ADMIN — OXYCODONE AND ACETAMINOPHEN 1 TABLET(S): 5; 325 TABLET ORAL at 11:38

## 2021-07-06 RX ADMIN — Medication 4: at 08:12

## 2021-07-06 RX ADMIN — Medication 1 DROP(S): at 05:28

## 2021-07-06 RX ADMIN — Medication 15 UNIT(S): at 11:34

## 2021-07-06 RX ADMIN — DORZOLAMIDE HYDROCHLORIDE 1 DROP(S): 20 SOLUTION/ DROPS OPHTHALMIC at 05:28

## 2021-07-06 RX ADMIN — BRIMONIDINE TARTRATE 1 DROP(S): 2 SOLUTION/ DROPS OPHTHALMIC at 16:55

## 2021-07-06 RX ADMIN — OXYCODONE AND ACETAMINOPHEN 1 TABLET(S): 5; 325 TABLET ORAL at 05:25

## 2021-07-06 NOTE — PROGRESS NOTE ADULT - ASSESSMENT
HPI:  57 yo F with PMhx of asthma, DM, GERD, DLD, PAD, HTN, TRAVIS, OA presents with left eye pain. Pt had developed itchiness of left eye, which then gradually progressed to pain of left sided face and eye about 1 week ago. She presented to the ED several days ago, CT Orbit revealed mild L dacryoadenitis. Pt was given Unasyn in ED and sent home on Augmentin. Her left eye pain and swelling continued to worsen, especially with eye movement, associated with photophobia and "little black dots" in her vision, so she saw her Opthalmologist and was recommended to come to ED.   In the ED, T 97, /87, HR 84, RR 18, SpO2 99% on RA. Lab work revealed WBC ct 16. Pt was given Metronidazole, Unasyn, Ceftriaxone and Vancomycin. CT Orbits again revealed L sided dacryoadenitis.     #Left eye pain / photophobia secondary to l dacryoadenitis / orbital cellulitis   abx regiment per id   prednisone 60 mg q24h and decrease by 10 mg q daily weekly   eye clinic today     #DM     CAPILLARY BLOOD GLUCOSE      POCT Blood Glucose.: 306 mg/dL (06 Jul 2021 11:27)  POCT Blood Glucose.: 222 mg/dL (06 Jul 2021 07:37)  POCT Blood Glucose.: 164 mg/dL (05 Jul 2021 21:47)  POCT Blood Glucose.: 259 mg/dL (05 Jul 2021 16:32)  better controlled     #Obstructive sleep apnea    #Overweight BMI 29 patient needs to see dieitian outpatient for further evaluation     #CKD 2     #DL     #peripheral artery disease    # left suly bullosa    #Hypomagnesemia  repleted    Progress Note Handoff    Pending: eye clinic , if no further inpatient workup by opthalmology anticipate dc within 24 hours     Family discussion: patient verbalized understanding and agreeable to plan of care     Disposition: Home___

## 2021-07-06 NOTE — CHART NOTE - NSCHARTNOTEFT_GEN_A_CORE
Below is a summary of the  exam performed by Dr. Per Ruiz MD.    notes in [facility] EMR     cc: f/u orbital cellulitis/dacryadenitis left eye. Since Last Visit: improving OS. Course: pt was placed on a pulse dose of IV methylprednisolone 1000mg/day with a transition to oral steroids. pt states last dose this morning.. Associated Symptoms: pt did report some light pain OS last night. Overall much improved with no pain today. Pertinent Negatives: no diplopia    VA OD: Dcc20/40 PH20/20-3  VA OS: Dcc20/60- PH20/25-  IOP: TP: OD:19 OS:14 Time:03:45 PM Comment:[facility]    SPECIALTY MEDS: Latanoprost qhs OS   Dorzolamide 3x/day OS   Brimonidine 0.2% 2x/day OS   Timolol 0.5% 2x/day OS OS.     7/6/21 Improvement in VA motility IOP and chamber depth. Patient will need a long steroid taper, likely 60-55-50.  5mg weekly and will follow with rheum for probably inflammatory dacryoadenitis.    Please schedule follow up with Dr. Per Ruiz for 7/8/2021 [phone], [facility],   [address] [place] [address], [address]    Will need rheumatology follow up    Continue all drops, left eye

## 2021-07-06 NOTE — PROGRESS NOTE ADULT - PROVIDER SPECIALTY LIST ADULT
Hospitalist
Internal Medicine
Internal Medicine
Ophthalmology
Hospitalist
Hospitalist
Internal Medicine
Internal Medicine
Hospitalist
Internal Medicine
Ophthalmology
Infectious Disease

## 2021-07-06 NOTE — DISCHARGE NOTE PROVIDER - NSDCCPTREATMENT_GEN_ALL_CORE_FT
PRINCIPAL PROCEDURE  Procedure: CT orbit w con  Findings and Treatment: NTERPRETATION:  Clinical history: Left orbit and facial swelling, pain for 3 days.  Technique: Multidetector noncontrast CT examination of the orbits.  Coronal and sagittal reformations in soft tissue and bone windows were obtained.  COMPARISON: None available  Findings:  Orbits:  There is a questionable subtle enhancement of the left preorbital soft tissue and the left lacrimal gland.  Globes: Normal.  The lenses are normally located.  Retrobulbar fat: Normal.  Extraocular muscles: Normal.  Optic nerve sheath complexes: Normal.  Skin and subcutaneous soft tissues: Minimal left preorbital soft tissue enhancement without swelling.  Osseous structures: No fracture, dislocation or destructive lesion.  Paranasal sinuses: Clear.  Mastoid air cells: Clear.  Partially visualized intracranial structures: Normal.  Impression:  Subtle asymmetric enhancement of the left lacrimal gland and left preorbital soft tissue questionable for mild left dacryoadenitis. No drainable fluid collection.

## 2021-07-06 NOTE — DISCHARGE NOTE PROVIDER - HOSPITAL COURSE
57 yo F with PMhx of asthma, DM, GERD, DLD, PAD, HTN, TRAVIS, OA presents with left eye pain. Pt had developed itchiness of left eye, which then gradually progressed to pain of left sided face and eye about 1 week ago. CT Orbit revealed mild L dacryoadenitis. Pt was given Unasyn in ED and sent home on Augmentin. Her left eye pain and swelling continued to worsen, especially with eye movement, associated with photophobia and "little black dots" in her vision, so she saw her Opthalmologist and was recommended to come to ED. In the ED, T 97, /87, HR 84, RR 18, SpO2 99% on RA. Lab work revealed WBC ct 16. Pt was given Metronidazole, Unasyn, Ceftriaxone and Vancomycin. CT Orbits again revealed L sided dacryoadenitis. Over course of admission, pt has been worked up for infectious and autoimmune related causes. Pt was found to be within the normal range of atyptical ANCA, IgG, AUDREY, C-ANCA, P-ANCA, anti-SSA, anti-SSB, anti-CCP, rheumatic factor. Today, patient was determined to be continually progressing on a prednisone PO 60mg dose with taper as well as augmentin and doxycycline until july 10th. Per her clinical improvement and management, pt was determined to be stable for discharge at this time.

## 2021-07-06 NOTE — DISCHARGE NOTE NURSING/CASE MANAGEMENT/SOCIAL WORK - PATIENT PORTAL LINK FT
You can access the FollowMyHealth Patient Portal offered by [facility] by registering at the following website: [email or web address]. By joining [facility]’s FollowMyHealth portal, you will also be able to view your health information using other applications (apps) compatible with our system.

## 2021-07-06 NOTE — PROGRESS NOTE ADULT - SUBJECTIVE AND OBJECTIVE BOX
AMBER KU 56y Female  MRN#: [id number]   CODE STATUS:________    Hospital Day: 8d    Patient is a 56y old Female who presents with a chief complaint of L orbital cellulitis    Overnight  -no overnight events per staff or patients  Subjective  -light eye pain this AM controlled well with medication. Pt reports using daily drops and having a slight headache that got better overtime at night.    Present Today:   - Moore:  No [X  ], Yes [   ] : Indication:     - Type of IV Access:       .. CVC/Piccline:  No [ X ], Yes [   ] : Indication:       .. Midline: No [ X ], Yes [   ] : Indication:                                             ----------------------------------------------------------  OBJECTIVE  PAST MEDICAL & SURGICAL HISTORY  HTN (hypertension)    Diabetes    Asthma  LAST EPISODE SEVERAL YRS    TRAVIS on CPAP    Arthritis  OA    High cholesterol    GERD (gastroesophageal reflux disease)    History of peripheral arterial disease    S/P trigger finger release    H/O carpal tunnel repair    H/O arthroscopy of shoulder                                              -----------------------------------------------------------  ALLERGIES:  ibuprofen (Urticaria)                                            ------------------------------------------------------------    HOME MEDICATIONS  Home Medications:  atorvastatin 40 mg oral tablet: 1 tab(s) orally once a day (29 Mar 2021 06:33)  clopidogrel 75 mg oral tablet: 1 tab(s) orally once a day (29 Mar 2021 06:33)  cyclobenzaprine 10 mg oral tablet: 1 tab(s) orally 2 times a day (29 Jun 2021 02:49)  docusate sodium 100 mg oral tablet: 1 tab(s) orally once a day (29 Jun 2021 02:49)  enalapril 5 mg oral tablet: 1 tab(s) orally once a day (29 Mar 2021 06:33)  famotidine 40 mg oral tablet: 1 tab(s) orally once a day (at bedtime) (29 Mar 2021 06:33)  gabapentin 300 mg oral tablet: 300 milligram(s) orally 2 times a day (29 Jun 2021 02:44)  meloxicam 15 mg oral tablet: 1 tab(s) orally once a day (29 Jun 2021 02:50)  metFORMIN 1000 mg oral tablet: 1 tab(s) orally 2 times a day (29 Mar 2021 06:33)  methocarbamol 500 mg oral tablet: 1 tab(s) orally every 8 hours (29 Jun 2021 02:48)  metoprolol tartrate 100 mg oral tablet: 1 tab(s) orally 2 times a day (29 Mar 2021 06:33)  pantoprazole 40 mg oral delayed release tablet: 1 tab(s) orally once a day (29 Jun 2021 02:49)  Percocet 10/325 oral tablet: 1 tab(s) orally 2 times a day, As Needed (29 Jun 2021 02:50)  pioglitazone 30 mg oral tablet: 1 tab(s) orally once a day (29 Mar 2021 06:33)  Xultophy 100 units-3.6 mg/mL subcutaneous solution: subcutaneous once a day (29 Mar 2021 06:33)                           MEDICATIONS:  STANDING MEDICATIONS  amoxicillin  875 milliGRAM(s)/clavulanate 1 Tablet(s) Oral every 12 hours  artificial tears (preservative free) Ophthalmic Solution 2 Drop(s) Left EYE once  atorvastatin 40 milliGRAM(s) Oral at bedtime  brimonidine 0.2% Ophthalmic Solution 1 Drop(s) Left EYE two times a day  chlorhexidine 4% Liquid 1 Application(s) Topical <User Schedule>  clopidogrel Tablet 75 milliGRAM(s) Oral daily  dorzolamide 2% Ophthalmic Solution 1 Drop(s) Left EYE three times a day  doxycycline IVPB 100 milliGRAM(s) IV Intermittent every 12 hours  enalapril 10 milliGRAM(s) Oral daily  enoxaparin Injectable 40 milliGRAM(s) SubCutaneous at bedtime  famotidine    Tablet 40 milliGRAM(s) Oral daily  gabapentin 300 milliGRAM(s) Oral two times a day  insulin glargine Injectable (LANTUS) 33 Unit(s) SubCutaneous at bedtime  insulin lispro (ADMELOG) corrective regimen sliding scale   SubCutaneous three times a day before meals  insulin lispro Injectable (ADMELOG) 15 Unit(s) SubCutaneous three times a day before meals  latanoprost 0.005% Ophthalmic Solution 1 Drop(s) Left EYE at bedtime  magnesium sulfate  IVPB 2 Gram(s) IV Intermittent once  methocarbamol 500 milliGRAM(s) Oral every 8 hours  metoprolol tartrate 100 milliGRAM(s) Oral two times a day  pantoprazole    Tablet 40 milliGRAM(s) Oral two times a day  predniSONE   Tablet 60 milliGRAM(s) Oral daily  timolol 0.25% Solution 1 Drop(s) Left EYE two times a day    PRN MEDICATIONS  acetaminophen   Tablet .. 650 milliGRAM(s) Oral every 6 hours PRN  cyclobenzaprine 10 milliGRAM(s) Oral three times a day PRN  morphine  - Injectable 2 milliGRAM(s) IV Push every 6 hours PRN  oxycodone    5 mG/acetaminophen 325 mG 1 Tablet(s) Oral every 6 hours PRN                                            ------------------------------------------------------------  VITAL SIGNS: Last 24 Hours  T(C): 36.6 (06 Jul 2021 08:00), Max: 36.6 (06 Jul 2021 00:58)  T(F): 97.8 (06 Jul 2021 08:00), Max: 97.9 (06 Jul 2021 00:58)  HR: 56 (06 Jul 2021 08:00) (55 - 62)  BP: 158/70 (06 Jul 2021 08:00) (157/73 - 173/74)  BP(mean): --  RR: 18 (06 Jul 2021 08:00) (18 - 19)  SpO2: --      07-05-21 @ 07:01  -  07-06-21 @ 07:00  --------------------------------------------------------  IN: 1080 mL / OUT: 0 mL / NET: 1080 mL    07-06-21 @ 07:01  -  07-06-21 @ 09:55  --------------------------------------------------------  IN: 480 mL / OUT: 0 mL / NET: 480 mL                                             --------------------------------------------------------------  LABS:                        12.5   10.87 )-----------( 370      ( 06 Jul 2021 05:44 )             37.3     07-06    142  |  103  |  12  ----------------------------<  150<H>  4.3   |  31  |  0.7    Ca    8.8      06 Jul 2021 05:44  Mg     1.6     07-05    TPro  5.9<L>  /  Alb  4.0  /  TBili  0.2  /  DBili  x   /  AST  24  /  ALT  51<H>  /  AlkPhos  81  07-06                                            --------------------------------------------------------------    PHYSICAL EXAM:  General: AAOx3. Well nourished, sitting in a chair in no acute distress.  HEENT: Minimal left facial swelling over L cheek. Slight conjunctivitis of left eye. tenderness to palpation over lateral side of left eye. EOMI, no FNDs.  LUNGS: Clear to auscultation bilaterally.  HEART: S1/S2. No heaves or thrills.  ABDOMEN: Soft, nontender. bowel sounds present in all quadrants  EXT: ROM grossly intact.   NEURO: 5/5 strength in all joints. Sensation grossly intact.  SKIN: Minimal swelling over L cheek. No skin breaks No erythema.                                           --------------------------------------------------------------    ASSESSMENT & PLAN  57 yo F with . Pt had developed itchiness of left eye, which then gradually progressed to pain of left sided face and eye about 1 week ago. She presented to the ED several days ago, CT Orbit revealed mild L dacryoadenitis. Pt was given Unasyn in ED and sent home on Augmentin. Her left eye pain and swelling continued to worsen, especially with eye movement, associated with photophobia and "little black dots" in her vision.    #L Dacryoadenitis /orbital cellulitis   -Augmentin 875 mg q12h&Doxycycline 100 mg q12 [id number] per ID  -continue prednisone 60mg PO x7day with 10mg decrease weekly  -f/u optho today    #DM  -continue on basal and bolus insulin    #Overweight BMI 29  -DASH/TLC  -outpt dietician f/u    #CKD 2     #DL     #peripheral artery disease  -continue with plavix, atorvastatin    #Hypomagnesemia  -repleted    #Dispo  -home pending optho recs, continued improvement on oral steroids and oral `antibiotics   AMBER KU 56y Female  MRN#: [id number]   CODE STATUS:________    Hospital Day: 8d    Patient is a 56y old Female who presents with a chief complaint of L orbital cellulitis    Overnight  -no overnight events per staff or patients  Subjective  -light eye pain this AM controlled well with medication. Pt reports using daily drops and having a slight headache that got better overtime at night.    Present Today:   - Moore:  No [X  ], Yes [   ] : Indication:     - Type of IV Access:       .. CVC/Piccline:  No [ X ], Yes [   ] : Indication:       .. Midline: No [ X ], Yes [   ] : Indication:                                             ----------------------------------------------------------  OBJECTIVE  PAST MEDICAL & SURGICAL HISTORY  HTN (hypertension)    Diabetes    Asthma  LAST EPISODE SEVERAL YRS    TRAVIS on CPAP    Arthritis  OA    High cholesterol    GERD (gastroesophageal reflux disease)    History of peripheral arterial disease    S/P trigger finger release    H/O carpal tunnel repair    H/O arthroscopy of shoulder                                              -----------------------------------------------------------  ALLERGIES:  ibuprofen (Urticaria)                                            ------------------------------------------------------------    HOME MEDICATIONS  Home Medications:  atorvastatin 40 mg oral tablet: 1 tab(s) orally once a day (29 Mar 2021 06:33)  clopidogrel 75 mg oral tablet: 1 tab(s) orally once a day (29 Mar 2021 06:33)  cyclobenzaprine 10 mg oral tablet: 1 tab(s) orally 2 times a day (29 Jun 2021 02:49)  docusate sodium 100 mg oral tablet: 1 tab(s) orally once a day (29 Jun 2021 02:49)  enalapril 5 mg oral tablet: 1 tab(s) orally once a day (29 Mar 2021 06:33)  famotidine 40 mg oral tablet: 1 tab(s) orally once a day (at bedtime) (29 Mar 2021 06:33)  gabapentin 300 mg oral tablet: 300 milligram(s) orally 2 times a day (29 Jun 2021 02:44)  meloxicam 15 mg oral tablet: 1 tab(s) orally once a day (29 Jun 2021 02:50)  metFORMIN 1000 mg oral tablet: 1 tab(s) orally 2 times a day (29 Mar 2021 06:33)  methocarbamol 500 mg oral tablet: 1 tab(s) orally every 8 hours (29 Jun 2021 02:48)  metoprolol tartrate 100 mg oral tablet: 1 tab(s) orally 2 times a day (29 Mar 2021 06:33)  pantoprazole 40 mg oral delayed release tablet: 1 tab(s) orally once a day (29 Jun 2021 02:49)  Percocet 10/325 oral tablet: 1 tab(s) orally 2 times a day, As Needed (29 Jun 2021 02:50)  pioglitazone 30 mg oral tablet: 1 tab(s) orally once a day (29 Mar 2021 06:33)  Xultophy 100 units-3.6 mg/mL subcutaneous solution: subcutaneous once a day (29 Mar 2021 06:33)                           MEDICATIONS:  STANDING MEDICATIONS  amoxicillin  875 milliGRAM(s)/clavulanate 1 Tablet(s) Oral every 12 hours  artificial tears (preservative free) Ophthalmic Solution 2 Drop(s) Left EYE once  atorvastatin 40 milliGRAM(s) Oral at bedtime  brimonidine 0.2% Ophthalmic Solution 1 Drop(s) Left EYE two times a day  chlorhexidine 4% Liquid 1 Application(s) Topical <User Schedule>  clopidogrel Tablet 75 milliGRAM(s) Oral daily  dorzolamide 2% Ophthalmic Solution 1 Drop(s) Left EYE three times a day  doxycycline IVPB 100 milliGRAM(s) IV Intermittent every 12 hours  enalapril 10 milliGRAM(s) Oral daily  enoxaparin Injectable 40 milliGRAM(s) SubCutaneous at bedtime  famotidine    Tablet 40 milliGRAM(s) Oral daily  gabapentin 300 milliGRAM(s) Oral two times a day  insulin glargine Injectable (LANTUS) 33 Unit(s) SubCutaneous at bedtime  insulin lispro (ADMELOG) corrective regimen sliding scale   SubCutaneous three times a day before meals  insulin lispro Injectable (ADMELOG) 15 Unit(s) SubCutaneous three times a day before meals  latanoprost 0.005% Ophthalmic Solution 1 Drop(s) Left EYE at bedtime  magnesium sulfate  IVPB 2 Gram(s) IV Intermittent once  methocarbamol 500 milliGRAM(s) Oral every 8 hours  metoprolol tartrate 100 milliGRAM(s) Oral two times a day  pantoprazole    Tablet 40 milliGRAM(s) Oral two times a day  predniSONE   Tablet 60 milliGRAM(s) Oral daily  timolol 0.25% Solution 1 Drop(s) Left EYE two times a day    PRN MEDICATIONS  acetaminophen   Tablet .. 650 milliGRAM(s) Oral every 6 hours PRN  cyclobenzaprine 10 milliGRAM(s) Oral three times a day PRN  morphine  - Injectable 2 milliGRAM(s) IV Push every 6 hours PRN  oxycodone    5 mG/acetaminophen 325 mG 1 Tablet(s) Oral every 6 hours PRN                                            ------------------------------------------------------------  VITAL SIGNS: Last 24 Hours  T(C): 36.6 (06 Jul 2021 08:00), Max: 36.6 (06 Jul 2021 00:58)  T(F): 97.8 (06 Jul 2021 08:00), Max: 97.9 (06 Jul 2021 00:58)  HR: 56 (06 Jul 2021 08:00) (55 - 62)  BP: 158/70 (06 Jul 2021 08:00) (157/73 - 173/74)  BP(mean): --  RR: 18 (06 Jul 2021 08:00) (18 - 19)  SpO2: --      07-05-21 @ 07:01  -  07-06-21 @ 07:00  --------------------------------------------------------  IN: 1080 mL / OUT: 0 mL / NET: 1080 mL    07-06-21 @ 07:01  -  07-06-21 @ 09:55  --------------------------------------------------------  IN: 480 mL / OUT: 0 mL / NET: 480 mL                                             --------------------------------------------------------------  LABS:                        12.5   10.87 )-----------( 370      ( 06 Jul 2021 05:44 )             37.3     07-06    142  |  103  |  12  ----------------------------<  150<H>  4.3   |  31  |  0.7    Ca    8.8      06 Jul 2021 05:44  Mg     1.6     07-05    TPro  5.9<L>  /  Alb  4.0  /  TBili  0.2  /  DBili  x   /  AST  24  /  ALT  51<H>  /  AlkPhos  81  07-06                                            --------------------------------------------------------------    PHYSICAL EXAM:  General: AAOx3. Well nourished, sitting in a chair in no acute distress.  HEENT: Minimal left facial swelling over L cheek. Slight conjunctivitis of left eye. tenderness to palpation over lateral side of left eye. EOMI, no FNDs.  LUNGS: Clear to auscultation bilaterally.  HEART: S1/S2. No heaves or thrills.  ABDOMEN: Soft, nontender. bowel sounds present in all quadrants  EXT: ROM grossly intact.   NEURO: 5/5 strength in all joints. Sensation grossly intact.  SKIN: Minimal swelling over L cheek. No skin breaks No erythema.                                           --------------------------------------------------------------

## 2021-07-06 NOTE — PROGRESS NOTE ADULT - NSICDXPILOT_GEN_ALL_CORE
[place]

## 2021-07-06 NOTE — DISCHARGE NOTE PROVIDER - NSDCMRMEDTOKEN_GEN_ALL_CORE_FT
amoxicillin-clavulanate 875 mg-125 mg oral tablet: 1 tab(s) orally every 12 hours  atorvastatin 40 mg oral tablet: 1 tab(s) orally once a day  brimonidine 0.2% ophthalmic solution: 1 drop(s) to each affected eye 2 times a day  clopidogrel 75 mg oral tablet: 1 tab(s) orally once a day  cyclobenzaprine 10 mg oral tablet: 1 tab(s) orally 2 times a day  docusate sodium 100 mg oral tablet: 1 tab(s) orally once a day  dorzolamide 2% ophthalmic solution: 1 drop(s) to each affected eye 3 times a day  doxycycline hyclate 100 mg oral delayed release tablet: 1 tab(s) orally 2 times a day   enalapril 5 mg oral tablet: 1 tab(s) orally once a day  famotidine 40 mg oral tablet: 1 tab(s) orally once a day (at bedtime)  gabapentin 300 mg oral tablet: 300 milligram(s) orally 2 times a day  latanoprost 0.005% ophthalmic solution: 1 drop(s) to each affected eye once a day (at bedtime)  meloxicam 15 mg oral tablet: 1 tab(s) orally once a day  metFORMIN 1000 mg oral tablet: 1 tab(s) orally 2 times a day  methocarbamol 500 mg oral tablet: 1 tab(s) orally every 8 hours  metoprolol tartrate 100 mg oral tablet: 1 tab(s) orally 2 times a day  ocular lubricant ophthalmic solution: 2 drop(s) to each affected eye once  pantoprazole 40 mg oral delayed release tablet: 1 tab(s) orally once a day  Percocet 10/325 oral tablet: 1 tab(s) orally 2 times a day, As Needed  pioglitazone 30 mg oral tablet: 1 tab(s) orally once a day  predniSONE 20 mg oral tablet: 3 tab(s) orally once a day  predniSONE 20 mg oral tablet: 3 tab(s) orally once a day  timolol maleate 0.25% ophthalmic solution: 1 drop(s) to each affected eye 2 times a day  Xultophy 100 units-3.6 mg/mL subcutaneous solution: subcutaneous once a day

## 2021-07-06 NOTE — PROGRESS NOTE ADULT - ASSESSMENT
ASSESSMENT & PLAN  55 yo F with . Pt had developed itchiness of left eye, which then gradually progressed to pain of left sided face and eye about 1 week ago. She presented to the ED several days ago, CT Orbit revealed mild L dacryoadenitis. Pt was given Unasyn in ED and sent home on Augmentin. Her left eye pain and swelling continued to worsen, especially with eye movement, associated with photophobia and "little black dots" in her vision.    #L Dacryoadenitis /orbital cellulitis   -Augmentin 875 mg q12h&Doxycycline 100 mg q12 [id number] per ID  -continue prednisone 60mg PO x7day with 10mg decrease weekly  -f/u optho today    #DM  -continue on basal and bolus insulin    #Overweight BMI 29  -DASH/TLC  -outpt dietician f/u    #peripheral artery disease  -continue with plavix, atorvastatin    #Hypomagnesemia  -repleted    #Dispo  -home pending optho recs, continued improvement on oral steroids and oral `antibiotics

## 2021-07-06 NOTE — PROGRESS NOTE ADULT - REASON FOR ADMISSION
L orbital cellulitis
L orbital cellulitis / Dacryoadenitis
Left orbital cellulitis and Dacryoadenitis
L orbital cellulitis

## 2021-07-06 NOTE — DISCHARGE NOTE PROVIDER - NSDCCPCAREPLAN_GEN_ALL_CORE_FT
PRINCIPAL DISCHARGE DIAGNOSIS  Diagnosis: Orbital cellulitis  Assessment and Plan of Treatment: You were diagnosed with orbital cellulitis. This is a condition where inflammation occurs around your eye. Although no specific cause was found, your symptoms were well controlled with antibiotics and steroids. Please check your discharge instructions to follow up with suggestions from your doctors to continue to improve and prevent this from happening again.

## 2021-07-06 NOTE — PROGRESS NOTE ADULT - SUBJECTIVE AND OBJECTIVE BOX
AMBER KU  56y  [facility] [id number] B      Patient is a 56y old  Female who presents with a chief complaint of L orbital cellulitis (06 Jul 2021 09:54)      INTERVAL HPI/OVERNIGHT EVENTS:    no acute events overnight     REVIEW OF SYSTEMS:  CONSTITUTIONAL: No fever, weight loss, or fatigue  EYES: No eye pain, visual disturbances, or discharge  ENMT:  No difficulty hearing, tinnitus, vertigo; No sinus or throat pain  NECK: No pain or stiffness  BREASTS: No pain, masses, or nipple discharge  RESPIRATORY: No cough, wheezing, chills or hemoptysis; No shortness of breath  CARDIOVASCULAR: No chest pain, palpitations, dizziness, or leg swelling  GASTROINTESTINAL: No abdominal or epigastric pain. No nausea, vomiting, or hematemesis; No diarrhea or constipation. No melena or hematochezia.  GENITOURINARY: No dysuria, frequency, hematuria, or incontinence  NEUROLOGICAL: No headaches, memory loss, loss of strength, numbness, or tremors  SKIN: No itching, burning, rashes, or lesions   LYMPH NODES: No enlarged glands  ENDOCRINE: No heat or cold intolerance; No hair loss  MUSCULOSKELETAL: No joint pain or swelling; No muscle, back, or extremity pain  PSYCHIATRIC: No depression, anxiety, mood swings, or difficulty sleeping  HEME/LYMPH: No easy bruising, or bleeding gums  ALLERY AND IMMUNOLOGIC: No hives or eczema  FAMILY HISTORY:  Family history of malignant neoplasm of esophagus    FH: stroke    FH: aneurysm      T(C): 36.6 (07-06-21 @ 08:00), Max: 36.6 (07-06-21 @ 00:58)  HR: 56 (07-06-21 @ 08:00) (55 - 62)  BP: 158/70 (07-06-21 @ 08:00) (157/73 - 173/74)  RR: 18 (07-06-21 @ 08:00) (18 - 19)  SpO2: --  Wt(kg): --Vital Signs Last 24 Hrs  T(C): 36.6 (06 Jul 2021 08:00), Max: 36.6 (06 Jul 2021 00:58)  T(F): 97.8 (06 Jul 2021 08:00), Max: 97.9 (06 Jul 2021 00:58)  HR: 56 (06 Jul 2021 08:00) (55 - 62)  BP: 158/70 (06 Jul 2021 08:00) (157/73 - 173/74)  BP(mean): --  RR: 18 (06 Jul 2021 08:00) (18 - 19)  SpO2: --    PHYSICAL EXAM:  GENERAL: NAD, well-groomed, well-developed  HEAD:  Atraumatic, Normocephalic  EYES: EOMI, PERRLA, conjunctiva and sclera clear  ENMT: No tonsillar erythema, exudates, or enlargement; Moist mucous membranes, Good dentition, No lesions  NECK: Supple, No JVD, Normal thyroid  NERVOUS SYSTEM:  Alert & Oriented X3, Good concentration; Motor Strength 5/5 B/L upper and lower extremities; DTRs 2+ intact and symmetric  PULM: Clear to auscultation bilaterally  CARDIAC: Regular rate and rhythm; No murmurs, rubs, or gallops  GI: Soft, Nontender, Nondistended; Bowel sounds present  EXTREMITIES:  2+ Peripheral Pulses, No clubbing, cyanosis, or edema  LYMPH: No lymphadenopathy noted  SKIN: No rashes or lesions    Consultant(s) Notes Reviewed:  [x ] YES  [ ] NO  Care Discussed with Consultants/Other Providers [ x] YES  [ ] NO    LABS:                            12.5   10.87 )-----------( 370      ( 06 Jul 2021 05:44 )             37.3   07-06    142  |  103  |  12  ----------------------------<  150<H>  4.3   |  31  |  0.7    Ca    8.8      06 Jul 2021 05:44  Mg     1.6     07-05    TPro  5.9<L>  /  Alb  4.0  /  TBili  0.2  /  DBili  x   /  AST  24  /  ALT  51<H>  /  AlkPhos  81  07-06            acetaminophen   Tablet .. 650 milliGRAM(s) Oral every 6 hours PRN  amoxicillin  875 milliGRAM(s)/clavulanate 1 Tablet(s) Oral every 12 hours  artificial tears (preservative free) Ophthalmic Solution 2 Drop(s) Left EYE once  atorvastatin 40 milliGRAM(s) Oral at bedtime  brimonidine 0.2% Ophthalmic Solution 1 Drop(s) Left EYE two times a day  chlorhexidine 4% Liquid 1 Application(s) Topical <User Schedule>  clopidogrel Tablet 75 milliGRAM(s) Oral daily  cyclobenzaprine 10 milliGRAM(s) Oral three times a day PRN  dorzolamide 2% Ophthalmic Solution 1 Drop(s) Left EYE three times a day  doxycycline IVPB 100 milliGRAM(s) IV Intermittent every 12 hours  enalapril 10 milliGRAM(s) Oral daily  enoxaparin Injectable 40 milliGRAM(s) SubCutaneous at bedtime  famotidine    Tablet 40 milliGRAM(s) Oral daily  gabapentin 300 milliGRAM(s) Oral two times a day  insulin glargine Injectable (LANTUS) 33 Unit(s) SubCutaneous at bedtime  insulin lispro (ADMELOG) corrective regimen sliding scale   SubCutaneous three times a day before meals  insulin lispro Injectable (ADMELOG) 15 Unit(s) SubCutaneous three times a day before meals  latanoprost 0.005% Ophthalmic Solution 1 Drop(s) Left EYE at bedtime  magnesium sulfate  IVPB 2 Gram(s) IV Intermittent once  methocarbamol 500 milliGRAM(s) Oral every 8 hours  metoprolol tartrate 100 milliGRAM(s) Oral two times a day  morphine  - Injectable 2 milliGRAM(s) IV Push every 6 hours PRN  oxycodone    5 mG/acetaminophen 325 mG 1 Tablet(s) Oral every 6 hours PRN  pantoprazole    Tablet 40 milliGRAM(s) Oral two times a day  predniSONE   Tablet 60 milliGRAM(s) Oral daily  timolol 0.25% Solution 1 Drop(s) Left EYE two times a day      HEALTH ISSUES - PROBLEM Dx:          Case Discussed with House Staff   Spectra [phone]

## 2021-07-06 NOTE — DISCHARGE NOTE PROVIDER - CARE PROVIDER_API CALL
Marcelo Erickson  [facility]  [address] [place] [address]  Phone: [phone]  Fax: [phone]  Follow Up Time: 2 weeks    Girish Mcfadden  Cardiovascular Disease  [address], [address]  [place] [address]  Phone: [phone]  Fax: [phone]  Follow Up Time: 2 weeks    Hood Strickland (OD)  [facility]  [address] [place] [address]  Phone: [phone]  Fax: [phone]  Follow Up Time: 2 weeks

## 2021-07-06 NOTE — DISCHARGE NOTE PROVIDER - PROVIDER TOKENS
PROVIDER:[TOKEN:[[id number]],FOLLOWUP:[2 weeks]],PROVIDER:[TOKEN:[[id number]],FOLLOWUP:[2 weeks]],PROVIDER:[TOKEN:[[id number]],FOLLOWUP:[2 weeks]]

## 2021-07-06 NOTE — DISCHARGE NOTE PROVIDER - NSDCPNSUBOBJ_GEN_ALL_CORE
<<<RESIDENT DISCHARGE NOTE>>>     AMBER KU  MRN-[id number]    VITAL SIGNS:  T(F): 96.9 (07-06-21 @ 15:23), Max: 97.9 (07-06-21 @ 00:58)  HR: 62 (07-06-21 @ 15:23)  BP: 169/70 (07-06-21 @ 15:23)  SpO2: --      PHYSICAL EXAMINATION:  General: AAOx3. Well nourished, sitting in a chair in no acute distress.  HEENT: Minimal left facial swelling over L cheek. Slight conjunctivitis of left eye. tenderness to palpation over lateral side of left eye. EOMI, no FNDs.  LUNGS: Clear to auscultation bilaterally.  HEART: S1/S2. No heaves or thrills.  ABDOMEN: Soft, nontender. bowel sounds present in all quadrants  EXT: ROM grossly intact.   NEURO: 5/5 strength in all joints. Sensation grossly intact.  SKIN: Minimal swelling over L cheek. No skin breaks No erythema.      TEST RESULTS:                        12.5   10.87 )-----------( 370      ( 06 Jul 2021 05:44 )             37.3       07-06    142  |  103  |  12  ----------------------------<  150<H>  4.3   |  31  |  0.7    Ca    8.8      06 Jul 2021 05:44  Mg     1.6     07-05    TPro  5.9<L>  /  Alb  4.0  /  TBili  0.2  /  DBili  x   /  AST  24  /  ALT  51<H>  /  AlkPhos  81  07-06      FINAL DISCHARGE INTERVIEW:  Resident(s) Present: (Name:___Samir Marvin__________), RN Present: (Name:  ___________)    DISCHARGE MEDICATION RECONCILIATION  reviewed with Attending (Name:___________)    DISPOSITION:   [  X] Home,    [  ] Home with Visiting Nursing Services,   [    ]  SNF/ NH,    [   ] Acute Rehab (4A),   [   ] Other (Specify:_________)

## 2021-07-06 NOTE — DISCHARGE NOTE PROVIDER - CARE PROVIDERS DIRECT ADDRESSES
,DirectAddress_Unknown,[email or web address],DirectAddress_Unknown

## 2021-07-07 LAB
DRVVT SCREEN TO CONFIRM RATIO: SIGNIFICANT CHANGE UP
LA NT DPL PPP QL: SIGNIFICANT CHANGE UP
NORMALIZED SCT PPP-RTO: 0.97 RATIO — SIGNIFICANT CHANGE UP (ref 0–1.16)
NORMALIZED SCT PPP-RTO: SIGNIFICANT CHANGE UP

## 2021-07-17 DIAGNOSIS — E11.51 TYPE 2 DIABETES MELLITUS WITH DIABETIC PERIPHERAL ANGIOPATHY WITHOUT GANGRENE: ICD-10-CM

## 2021-07-17 DIAGNOSIS — H05.012 CELLULITIS OF LEFT ORBIT: ICD-10-CM

## 2021-07-17 DIAGNOSIS — G47.33 OBSTRUCTIVE SLEEP APNEA (ADULT) (PEDIATRIC): ICD-10-CM

## 2021-07-17 DIAGNOSIS — J34.3 HYPERTROPHY OF NASAL TURBINATES: ICD-10-CM

## 2021-07-17 DIAGNOSIS — E83.42 HYPOMAGNESEMIA: ICD-10-CM

## 2021-07-17 DIAGNOSIS — H35.033 HYPERTENSIVE RETINOPATHY, BILATERAL: ICD-10-CM

## 2021-07-17 DIAGNOSIS — Z88.8 ALLERGY STATUS TO OTHER DRUGS, MEDICAMENTS AND BIOLOGICAL SUBSTANCES: ICD-10-CM

## 2021-07-17 DIAGNOSIS — H02.051 TRICHIASIS WITHOUT ENTROPION RIGHT UPPER EYELID: ICD-10-CM

## 2021-07-17 DIAGNOSIS — E11.22 TYPE 2 DIABETES MELLITUS WITH DIABETIC CHRONIC KIDNEY DISEASE: ICD-10-CM

## 2021-07-17 DIAGNOSIS — Z79.84 LONG TERM (CURRENT) USE OF ORAL HYPOGLYCEMIC DRUGS: ICD-10-CM

## 2021-07-17 DIAGNOSIS — K21.9 GASTRO-ESOPHAGEAL REFLUX DISEASE WITHOUT ESOPHAGITIS: ICD-10-CM

## 2021-07-17 DIAGNOSIS — J45.20 MILD INTERMITTENT ASTHMA, UNCOMPLICATED: ICD-10-CM

## 2021-07-17 DIAGNOSIS — M19.90 UNSPECIFIED OSTEOARTHRITIS, UNSPECIFIED SITE: ICD-10-CM

## 2021-07-17 DIAGNOSIS — N18.2 CHRONIC KIDNEY DISEASE, STAGE 2 (MILD): ICD-10-CM

## 2021-07-17 DIAGNOSIS — E66.3 OVERWEIGHT: ICD-10-CM

## 2021-07-17 DIAGNOSIS — H04.002 UNSPECIFIED DACRYOADENITIS, LEFT LACRIMAL GLAND: ICD-10-CM

## 2021-07-17 DIAGNOSIS — E78.5 HYPERLIPIDEMIA, UNSPECIFIED: ICD-10-CM

## 2021-07-17 DIAGNOSIS — I12.9 HYPERTENSIVE CHRONIC KIDNEY DISEASE WITH STAGE 1 THROUGH STAGE 4 CHRONIC KIDNEY DISEASE, OR UNSPECIFIED CHRONIC KIDNEY DISEASE: ICD-10-CM

## 2021-07-23 ENCOUNTER — RESULT REVIEW (OUTPATIENT)
Age: 56
End: 2021-07-23

## 2021-07-23 ENCOUNTER — OUTPATIENT (OUTPATIENT)
Dept: OUTPATIENT SERVICES | Facility: HOSPITAL | Age: 56
LOS: 1 days | Discharge: HOME | End: 2021-07-23
Payer: COMMERCIAL

## 2021-07-23 DIAGNOSIS — R10.9 UNSPECIFIED ABDOMINAL PAIN: ICD-10-CM

## 2021-07-23 DIAGNOSIS — Z98.890 OTHER SPECIFIED POSTPROCEDURAL STATES: Chronic | ICD-10-CM

## 2021-07-23 DIAGNOSIS — R07.9 CHEST PAIN, UNSPECIFIED: ICD-10-CM

## 2021-07-23 PROCEDURE — 74220 X-RAY XM ESOPHAGUS 1CNTRST: CPT | Mod: 26

## 2021-07-23 PROCEDURE — 71046 X-RAY EXAM CHEST 2 VIEWS: CPT | Mod: 26

## 2021-10-23 ENCOUNTER — EMERGENCY (EMERGENCY)
Facility: HOSPITAL | Age: 56
LOS: 0 days | Discharge: HOME | End: 2021-10-23
Attending: EMERGENCY MEDICINE | Admitting: EMERGENCY MEDICINE
Payer: COMMERCIAL

## 2021-10-23 VITALS
RESPIRATION RATE: 18 BRPM | DIASTOLIC BLOOD PRESSURE: 72 MMHG | HEART RATE: 83 BPM | SYSTOLIC BLOOD PRESSURE: 158 MMHG | OXYGEN SATURATION: 100 % | TEMPERATURE: 98 F

## 2021-10-23 VITALS
WEIGHT: 154.1 LBS | RESPIRATION RATE: 16 BRPM | SYSTOLIC BLOOD PRESSURE: 140 MMHG | HEIGHT: 62 IN | TEMPERATURE: 98 F | HEART RATE: 96 BPM | OXYGEN SATURATION: 97 % | DIASTOLIC BLOOD PRESSURE: 67 MMHG

## 2021-10-23 DIAGNOSIS — I10 ESSENTIAL (PRIMARY) HYPERTENSION: ICD-10-CM

## 2021-10-23 DIAGNOSIS — Z79.84 LONG TERM (CURRENT) USE OF ORAL HYPOGLYCEMIC DRUGS: ICD-10-CM

## 2021-10-23 DIAGNOSIS — Z98.890 OTHER SPECIFIED POSTPROCEDURAL STATES: Chronic | ICD-10-CM

## 2021-10-23 DIAGNOSIS — E78.5 HYPERLIPIDEMIA, UNSPECIFIED: ICD-10-CM

## 2021-10-23 DIAGNOSIS — Z88.8 ALLERGY STATUS TO OTHER DRUGS, MEDICAMENTS AND BIOLOGICAL SUBSTANCES: ICD-10-CM

## 2021-10-23 DIAGNOSIS — Z79.01 LONG TERM (CURRENT) USE OF ANTICOAGULANTS: ICD-10-CM

## 2021-10-23 DIAGNOSIS — Z20.822 CONTACT WITH AND (SUSPECTED) EXPOSURE TO COVID-19: ICD-10-CM

## 2021-10-23 DIAGNOSIS — R51.9 HEADACHE, UNSPECIFIED: ICD-10-CM

## 2021-10-23 DIAGNOSIS — H57.12 OCULAR PAIN, LEFT EYE: ICD-10-CM

## 2021-10-23 DIAGNOSIS — G47.33 OBSTRUCTIVE SLEEP APNEA (ADULT) (PEDIATRIC): ICD-10-CM

## 2021-10-23 DIAGNOSIS — I73.9 PERIPHERAL VASCULAR DISEASE, UNSPECIFIED: ICD-10-CM

## 2021-10-23 DIAGNOSIS — Z79.02 LONG TERM (CURRENT) USE OF ANTITHROMBOTICS/ANTIPLATELETS: ICD-10-CM

## 2021-10-23 DIAGNOSIS — M54.2 CERVICALGIA: ICD-10-CM

## 2021-10-23 DIAGNOSIS — E11.9 TYPE 2 DIABETES MELLITUS WITHOUT COMPLICATIONS: ICD-10-CM

## 2021-10-23 DIAGNOSIS — J45.909 UNSPECIFIED ASTHMA, UNCOMPLICATED: ICD-10-CM

## 2021-10-23 LAB
ALBUMIN SERPL ELPH-MCNC: 4.1 G/DL — SIGNIFICANT CHANGE UP (ref 3.5–5.2)
ALP SERPL-CCNC: 74 U/L — SIGNIFICANT CHANGE UP (ref 30–115)
ALT FLD-CCNC: 28 U/L — SIGNIFICANT CHANGE UP (ref 0–41)
ANION GAP SERPL CALC-SCNC: 14 MMOL/L — SIGNIFICANT CHANGE UP (ref 7–14)
APPEARANCE UR: CLEAR — SIGNIFICANT CHANGE UP
AST SERPL-CCNC: 16 U/L — SIGNIFICANT CHANGE UP (ref 0–41)
B-OH-BUTYR SERPL-SCNC: <0.2 MMOL/L — SIGNIFICANT CHANGE UP
BASE EXCESS BLDV CALC-SCNC: 4.4 MMOL/L — HIGH (ref -2–3)
BASOPHILS # BLD AUTO: 0.01 K/UL — SIGNIFICANT CHANGE UP (ref 0–0.2)
BASOPHILS NFR BLD AUTO: 0.2 % — SIGNIFICANT CHANGE UP (ref 0–1)
BILIRUB SERPL-MCNC: <0.2 MG/DL — SIGNIFICANT CHANGE UP (ref 0.2–1.2)
BILIRUB UR-MCNC: NEGATIVE — SIGNIFICANT CHANGE UP
BUN SERPL-MCNC: 7 MG/DL — LOW (ref 10–20)
CA-I SERPL-SCNC: 1.17 MMOL/L — SIGNIFICANT CHANGE UP (ref 1.15–1.33)
CALCIUM SERPL-MCNC: 8.5 MG/DL — SIGNIFICANT CHANGE UP (ref 8.5–10.1)
CHLORIDE SERPL-SCNC: 101 MMOL/L — SIGNIFICANT CHANGE UP (ref 98–110)
CO2 SERPL-SCNC: 26 MMOL/L — SIGNIFICANT CHANGE UP (ref 17–32)
COLOR SPEC: SIGNIFICANT CHANGE UP
CREAT SERPL-MCNC: 0.6 MG/DL — LOW (ref 0.7–1.5)
DIFF PNL FLD: NEGATIVE — SIGNIFICANT CHANGE UP
EOSINOPHIL # BLD AUTO: 0.04 K/UL — SIGNIFICANT CHANGE UP (ref 0–0.7)
EOSINOPHIL NFR BLD AUTO: 0.9 % — SIGNIFICANT CHANGE UP (ref 0–8)
GAS PNL BLDV: 125 MMOL/L — LOW (ref 136–145)
GAS PNL BLDV: SIGNIFICANT CHANGE UP
GLUCOSE SERPL-MCNC: 335 MG/DL — HIGH (ref 70–99)
GLUCOSE UR QL: ABNORMAL
HCO3 BLDV-SCNC: 31 MMOL/L — HIGH (ref 22–29)
HCT VFR BLD CALC: 36.3 % — LOW (ref 37–47)
HCT VFR BLDA CALC: 41 % — SIGNIFICANT CHANGE UP (ref 34.5–46.5)
HGB BLD CALC-MCNC: 13.6 G/DL — SIGNIFICANT CHANGE UP (ref 11.7–16.1)
HGB BLD-MCNC: 12.1 G/DL — SIGNIFICANT CHANGE UP (ref 12–16)
IMM GRANULOCYTES NFR BLD AUTO: 0.4 % — HIGH (ref 0.1–0.3)
KETONES UR-MCNC: NEGATIVE — SIGNIFICANT CHANGE UP
LACTATE BLDV-MCNC: 2.1 MMOL/L — HIGH (ref 0.5–2)
LEUKOCYTE ESTERASE UR-ACNC: NEGATIVE — SIGNIFICANT CHANGE UP
LYMPHOCYTES # BLD AUTO: 1.96 K/UL — SIGNIFICANT CHANGE UP (ref 1.2–3.4)
LYMPHOCYTES # BLD AUTO: 42.3 % — SIGNIFICANT CHANGE UP (ref 20.5–51.1)
MAGNESIUM SERPL-MCNC: 1.5 MG/DL — LOW (ref 1.8–2.4)
MCHC RBC-ENTMCNC: 30 PG — SIGNIFICANT CHANGE UP (ref 27–31)
MCHC RBC-ENTMCNC: 33.3 G/DL — SIGNIFICANT CHANGE UP (ref 32–37)
MCV RBC AUTO: 89.9 FL — SIGNIFICANT CHANGE UP (ref 81–99)
MONOCYTES # BLD AUTO: 0.39 K/UL — SIGNIFICANT CHANGE UP (ref 0.1–0.6)
MONOCYTES NFR BLD AUTO: 8.4 % — SIGNIFICANT CHANGE UP (ref 1.7–9.3)
NEUTROPHILS # BLD AUTO: 2.21 K/UL — SIGNIFICANT CHANGE UP (ref 1.4–6.5)
NEUTROPHILS NFR BLD AUTO: 47.8 % — SIGNIFICANT CHANGE UP (ref 42.2–75.2)
NITRITE UR-MCNC: NEGATIVE — SIGNIFICANT CHANGE UP
NRBC # BLD: 0 /100 WBCS — SIGNIFICANT CHANGE UP (ref 0–0)
PCO2 BLDV: 52 MMHG — HIGH (ref 39–42)
PH BLDV: 7.38 — SIGNIFICANT CHANGE UP (ref 7.32–7.43)
PH UR: 7 — SIGNIFICANT CHANGE UP (ref 5–8)
PHOSPHATE SERPL-MCNC: 4.3 MG/DL — SIGNIFICANT CHANGE UP (ref 2.1–4.9)
PLATELET # BLD AUTO: 289 K/UL — SIGNIFICANT CHANGE UP (ref 130–400)
PO2 BLDV: 62 MMHG — SIGNIFICANT CHANGE UP
POTASSIUM BLDV-SCNC: 4.9 MMOL/L — SIGNIFICANT CHANGE UP (ref 3.5–5.1)
POTASSIUM SERPL-MCNC: 4.1 MMOL/L — SIGNIFICANT CHANGE UP (ref 3.5–5)
POTASSIUM SERPL-SCNC: 4.1 MMOL/L — SIGNIFICANT CHANGE UP (ref 3.5–5)
PROT SERPL-MCNC: 6.1 G/DL — SIGNIFICANT CHANGE UP (ref 6–8)
PROT UR-MCNC: NEGATIVE — SIGNIFICANT CHANGE UP
RBC # BLD: 4.04 M/UL — LOW (ref 4.2–5.4)
RBC # FLD: 12.3 % — SIGNIFICANT CHANGE UP (ref 11.5–14.5)
SAO2 % BLDV: 91.1 % — SIGNIFICANT CHANGE UP
SARS-COV-2 RNA SPEC QL NAA+PROBE: SIGNIFICANT CHANGE UP
SODIUM SERPL-SCNC: 141 MMOL/L — SIGNIFICANT CHANGE UP (ref 135–146)
SP GR SPEC: 1.02 — SIGNIFICANT CHANGE UP (ref 1.01–1.03)
UROBILINOGEN FLD QL: SIGNIFICANT CHANGE UP
WBC # BLD: 4.63 K/UL — LOW (ref 4.8–10.8)
WBC # FLD AUTO: 4.63 K/UL — LOW (ref 4.8–10.8)

## 2021-10-23 PROCEDURE — 70496 CT ANGIOGRAPHY HEAD: CPT | Mod: 26,MA

## 2021-10-23 PROCEDURE — 99285 EMERGENCY DEPT VISIT HI MDM: CPT

## 2021-10-23 PROCEDURE — 70498 CT ANGIOGRAPHY NECK: CPT | Mod: 26,MA

## 2021-10-23 RX ORDER — MAGNESIUM SULFATE 500 MG/ML
2 VIAL (ML) INJECTION ONCE
Refills: 0 | Status: COMPLETED | OUTPATIENT
Start: 2021-10-23 | End: 2021-10-23

## 2021-10-23 RX ORDER — ACETAZOLAMIDE 250 MG/1
500 TABLET ORAL ONCE
Refills: 0 | Status: COMPLETED | OUTPATIENT
Start: 2021-10-23 | End: 2021-10-23

## 2021-10-23 RX ORDER — TIMOLOL 0.5 %
1 DROPS OPHTHALMIC (EYE) ONCE
Refills: 0 | Status: COMPLETED | OUTPATIENT
Start: 2021-10-23 | End: 2021-10-23

## 2021-10-23 RX ORDER — DORZOLAMIDE HYDROCHLORIDE 20 MG/ML
1 SOLUTION/ DROPS OPHTHALMIC ONCE
Refills: 0 | Status: DISCONTINUED | OUTPATIENT
Start: 2021-10-23 | End: 2021-10-23

## 2021-10-23 RX ORDER — LATANOPROST 0.05 MG/ML
1 SOLUTION/ DROPS OPHTHALMIC; TOPICAL AT BEDTIME
Refills: 0 | Status: DISCONTINUED | OUTPATIENT
Start: 2021-10-23 | End: 2021-10-23

## 2021-10-23 RX ORDER — ACETAZOLAMIDE 250 MG/1
1 TABLET ORAL
Qty: 60 | Refills: 0
Start: 2021-10-23 | End: 2021-11-21

## 2021-10-23 RX ORDER — SODIUM CHLORIDE 9 MG/ML
1000 INJECTION INTRAMUSCULAR; INTRAVENOUS; SUBCUTANEOUS ONCE
Refills: 0 | Status: COMPLETED | OUTPATIENT
Start: 2021-10-23 | End: 2021-10-23

## 2021-10-23 RX ORDER — BRINZOLAMIDE 10 MG/ML
13 SUSPENSION/ DROPS OPHTHALMIC
Qty: 1 | Refills: 0
Start: 2021-10-23 | End: 2021-11-21

## 2021-10-23 RX ORDER — BRIMONIDINE TARTRATE, TIMOLOL MALEATE 2; 5 MG/ML; MG/ML
2 SOLUTION/ DROPS OPHTHALMIC
Qty: 1 | Refills: 0
Start: 2021-10-23 | End: 2021-11-21

## 2021-10-23 RX ORDER — LATANOPROST 0.05 MG/ML
1 SOLUTION/ DROPS OPHTHALMIC; TOPICAL
Qty: 1 | Refills: 0
Start: 2021-10-23 | End: 2021-11-21

## 2021-10-23 RX ORDER — BRIMONIDINE TARTRATE 2 MG/MG
1 SOLUTION/ DROPS OPHTHALMIC ONCE
Refills: 0 | Status: COMPLETED | OUTPATIENT
Start: 2021-10-23 | End: 2021-10-23

## 2021-10-23 RX ADMIN — LATANOPROST 1 DROP(S): 0.05 SOLUTION/ DROPS OPHTHALMIC; TOPICAL at 14:45

## 2021-10-23 RX ADMIN — BRIMONIDINE TARTRATE 1 DROP(S): 2 SOLUTION/ DROPS OPHTHALMIC at 14:46

## 2021-10-23 RX ADMIN — Medication 1 DROP(S): at 14:45

## 2021-10-23 RX ADMIN — SODIUM CHLORIDE 1000 MILLILITER(S): 9 INJECTION INTRAMUSCULAR; INTRAVENOUS; SUBCUTANEOUS at 10:12

## 2021-10-23 RX ADMIN — Medication 50 GRAM(S): at 14:46

## 2021-10-23 RX ADMIN — ACETAZOLAMIDE 500 MILLIGRAM(S): 250 TABLET ORAL at 14:44

## 2021-10-23 NOTE — ED PROVIDER NOTE - PHYSICAL EXAMINATION
CONSTITUTIONAL: Well-developed; well-nourished; in no acute distress.   SKIN: warm, dry  HEAD: Normocephalic; atraumatic.  EYES: PERRL, EOMI, +b/l conjucntival erythema and water, mild photophobia +IOP 17 on L and 24 on R side, no periorbital cellulitis   ENT: No nasal discharge; airway clear.  NECK: Supple; non tender.  CARD: S1, S2 normal; no murmurs, gallops, or rubs. Regular rate and rhythm.   RESP: No wheezes, rales or rhonchi.  ABD: soft ntnd  EXT: Normal ROM.  No clubbing, cyanosis or edema.   LYMPH: No acute cervical adenopathy.  NEURO: Alert, oriented, grossly unremarkable  PSYCH: Cooperative, appropriate.

## 2021-10-23 NOTE — ED ADULT NURSE NOTE - OBJECTIVE STATEMENT
Patient c.o left eye redness and glassiness. Patient states she was hospitalized for cellulitis in the same eye last month.

## 2021-10-23 NOTE — ED PROVIDER NOTE - CLINICAL SUMMARY MEDICAL DECISION MAKING FREE TEXT BOX
brian bhatt, seen by ophtho, rec dc home eye drops, f/u eye clinic monday, strict return precautions provided

## 2021-10-23 NOTE — ED PROVIDER NOTE - CARE PROVIDER_API CALL
Robert Ruiz)  Ophthalmology  [address] [place] [address]  Phone: [phone]  Fax: [phone]  Established Patient  Follow Up Time: Urgent

## 2021-10-23 NOTE — ED PROVIDER NOTE - NSFOLLOWUPCLINICS_GEN_ALL_ED_FT
[facility]  Ophthalmolgy  [address], [address]  [place] [address]  Phone: [phone]  Fax:   Follow Up Time: Urgent
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
## 2021-10-23 NOTE — ED PROVIDER NOTE - NSFOLLOWUPINSTRUCTIONS_ED_ALL_ED_FT
PLEASE TAKE MEDICATIONS AS PRESCRIBED AND FOLLOW UP IN OPTHALMOLOGY CLINIC ON MONDAY.     Eye Pain    WHAT YOU NEED TO KNOW:    Eye pain may be caused by a problem within your eye. A problem or condition in another body area can also cause pain that travels to your eye. Some causes of eye pain include dry eyes, inflammation, a sinus infection, or a cluster headache.    DISCHARGE INSTRUCTIONS:    Medicines: You may need any of the following:     Artificial tears are eyedrops that can help moisturize your eyes and relieve your pain. Ask your healthcare provider how often to use artificial tears.       NSAIDs, such as ibuprofen, help decrease swelling, pain, and fever. This medicine is available with or without a doctor's order. NSAIDs can cause stomach bleeding or kidney problems in certain people. If you take blood thinner medicine, always ask if NSAIDs are safe for you. Always read the medicine label and follow directions. Do not give these medicines to children under 6 months of age without direction from your child's healthcare provider.      Take your medicine as directed. Contact your healthcare provider if you think your medicine is not helping or if you have side effects. Tell him of her if you are allergic to any medicine. Keep a list of the medicines, vitamins, and herbs you take. Include the amounts, and when and why you take them. Bring the list or the pill bottles to follow-up visits. Carry your medicine list with you in case of an emergency.    Follow up with your healthcare provider as directed: You may be referred to an eye specialist. Write down your questions so you remember to ask them during your visits.    Contact your healthcare provider if:     You have a fever.       Your eye pain gets worse when you move your eyes.       You have questions or concerns about your condition or care.    Return to the emergency department if:     You have any vision loss.       You have sudden vision changes such as blurred vision, double vision, or seeing halos around lights.       You develop severe eye pain.          © Copyright [employer] 2019 All illustrations and images included in CareNotes are the copyrighted property of [employer]., Inc. or [employer].

## 2021-10-23 NOTE — ED PROVIDER NOTE - IV ALTEPLASE DOOR HIDDEN
STUDY: NSABP- B59  ID # [id number]  C1 d8 TAXOL    Patient seen and examined per Dr. Rob Thomas. Labs drawn via portacath without difficulty. CBC results noted and WNL. Patient states she had some constipation that resolved with prune juice.  Per MD, s show

## 2021-10-23 NOTE — ED PROVIDER NOTE - NS ED ROS FT
Eyes:  No visual changes, +left eye eye pain   ENMT:  No hearing changes, pain, discharge or infections. No neck pain or stiffness.  Cardiac:  No chest pain, SOB or edema. No chest pain with exertion.  Respiratory:  No cough or respiratory distress. No hemoptysis. No history of asthma or RAD.  GI:  No nausea, vomiting, diarrhea or abdominal pain.  :  No dysuria, frequency or burning.  MS:  No myalgia, muscle weakness, joint pain or back pain.  Neuro:  No headache or weakness.  No LOC.  Skin:  No skin rash.   Endocrine: No history of thyroid disease +diabetes.

## 2021-10-23 NOTE — ED PROVIDER NOTE - PROGRESS NOTE DETAILS
TA: Pressures increased in left eye ranging rom 24-31 and right eye is 16-17; consulted optho resident who will see pt in ED. TA: Rpt pressure 19 in left eye; sent optho meds to rx; will d/c with eye clinic follow-up.

## 2021-10-23 NOTE — ED PROVIDER NOTE - PATIENT PORTAL LINK FT
You can access the FollowMyHealth Patient Portal offered by [facility] by registering at the following website: [email or web address]. By joining [employer]’s FollowMyHealth portal, you will also be able to view your health information using other applications (apps) compatible with our system.
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
## 2021-10-25 LAB
CULTURE RESULTS: SIGNIFICANT CHANGE UP
SPECIMEN SOURCE: SIGNIFICANT CHANGE UP

## 2021-10-29 ENCOUNTER — APPOINTMENT (OUTPATIENT)
Dept: RHEUMATOLOGY | Facility: CLINIC | Age: 56
End: 2021-10-29
Payer: COMMERCIAL

## 2021-10-29 ENCOUNTER — OUTPATIENT (OUTPATIENT)
Dept: OUTPATIENT SERVICES | Facility: HOSPITAL | Age: 56
LOS: 1 days | Discharge: HOME | End: 2021-10-29

## 2021-10-29 VITALS
DIASTOLIC BLOOD PRESSURE: 99 MMHG | WEIGHT: 154 LBS | OXYGEN SATURATION: 100 % | TEMPERATURE: 97.7 F | BODY MASS INDEX: 28.34 KG/M2 | HEART RATE: 89 BPM | HEIGHT: 62 IN | SYSTOLIC BLOOD PRESSURE: 184 MMHG

## 2021-10-29 DIAGNOSIS — Z98.890 OTHER SPECIFIED POSTPROCEDURAL STATES: Chronic | ICD-10-CM

## 2021-10-29 PROCEDURE — 99215 OFFICE O/P EST HI 40 MIN: CPT | Mod: GC

## 2021-10-29 PROCEDURE — 99205 OFFICE O/P NEW HI 60 MIN: CPT | Mod: GC

## 2021-10-29 NOTE — ASSESSMENT
[FreeTextEntry1] : 55 yo F with PMhx of asthma, DM, GERD, DLD, PAD, HTN, TRAVIS, OA presents to clinic for evaluation of chronic recurrent left eye pain and swelling recently seen inpatient in July 21 for suspected dacroadenitis. 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 Anti-nuclear factor: Negative, C-ANCA and P-ANCA negative, Anti-SSA and Anti-SSB: negative\par - anti-ccp and RF factor, Lupus profile (less likely)\par - CT imaging consistent with left lacrimal inflammation and left preseptal soft tissue inflammation\par - On Solumedrol 60 daily for now with slow taper\par - Given jaw claudication + persistent HA, will give ENT referral for temporal artery biopsy\par - Monitor for blood sugars and tighter glycemic control\par - RTC in one month

## 2021-10-29 NOTE — PHYSICAL EXAM
[Outer Ear] : the ears and nose were normal in appearance [Oropharynx] : the oropharynx was normal [Neck Appearance] : the appearance of the neck was normal [Neck Cervical Mass (___cm)] : no neck mass was observed [Jugular Venous Distention Increased] : there was no jugular-venous distention [Thyroid Diffuse Enlargement] : the thyroid was not enlarged [Thyroid Nodule] : there were no palpable thyroid nodules [Heart Rate And Rhythm] : heart rate was normal and rhythm regular [Heart Sounds] : normal S1 and S2 [Heart Sounds Gallop] : no gallops [Murmurs] : no murmurs [Heart Sounds Pericardial Friction Rub] : no pericardial rub [Deep Tendon Reflexes (DTR)] : deep tendon reflexes were 2+ and symmetric [Sensation] : the sensory exam was normal to light touch and pinprick [No Focal Deficits] : no focal deficits

## 2021-10-29 NOTE — REVIEW OF SYSTEMS
[Eye Pain] : eye pain [Red Eyes] : red eyes [Negative] : Constitutional [FreeTextEntry4] : + left jaw pain [FreeTextEntry9] : + b/l  weakness

## 2021-10-29 NOTE — END OF VISIT
[] : Resident [FreeTextEntry3] : will await ophthalmology opinion about the inflammatory dacryoadenitis being still active. THe fact that she is not able to wean off steroids without getting a flare is concerning. Underlying IgG4 disease? get labs as outlined above. will consider TNF next visit if suspicion for uveitis high. if temporal arteritis biopsy positive will start therapy for GCA. \par \par Total time spent was   . Time was divided in review of labs and imaging studies, obtaining history, personally examining patient, counselling patient/ family, ordering medications/tests/ imaging tests, communicating with other health care providers, documentation in electronic health records, independent interpretation of labs, imaging results and procedure results and communicating to the patient/family and care co ordination.\par  [Time Spent: ___ minutes] : I have spent [unfilled] minutes of time on the encounter.

## 2021-10-29 NOTE — HISTORY OF PRESENT ILLNESS
[Weight Loss] : weight loss [Difficulty Standing] : difficulty standing [FreeTextEntry1] :  56y old  Female who presents with a chief complaint of L orbital cellulitis and left dacryoadenitis previously been hospitalized in July for this complaint was started on steroids with improvement of symptoms. Patient sent to rheum clinic by Opthomologist due to persistent orbital swelling and erythema of the left eye off of steroids. Patient currently endorses Headaches and left sided jaw pain when chewing. She also endorses b/l shoulder weakness,  overall weight loss of about 30 lbs over the past year. She otherwise denies any difficulty breathing, rashes, vision loss, oral ulcers, or acute joint pain. \par \par

## 2021-11-03 ENCOUNTER — OUTPATIENT (OUTPATIENT)
Dept: OUTPATIENT SERVICES | Facility: HOSPITAL | Age: 56
LOS: 1 days | Discharge: HOME | End: 2021-11-03

## 2021-11-03 ENCOUNTER — APPOINTMENT (OUTPATIENT)
Dept: NUTRITION | Facility: CLINIC | Age: 56
End: 2021-11-03

## 2021-11-03 ENCOUNTER — APPOINTMENT (OUTPATIENT)
Dept: ENDOCRINOLOGY | Facility: CLINIC | Age: 56
End: 2021-11-03

## 2021-11-03 VITALS
SYSTOLIC BLOOD PRESSURE: 155 MMHG | TEMPERATURE: 97.6 F | DIASTOLIC BLOOD PRESSURE: 75 MMHG | BODY MASS INDEX: 27.97 KG/M2 | WEIGHT: 152 LBS | HEART RATE: 85 BPM | HEIGHT: 62 IN

## 2021-11-03 DIAGNOSIS — Z98.890 OTHER SPECIFIED POSTPROCEDURAL STATES: Chronic | ICD-10-CM

## 2021-11-03 DIAGNOSIS — I10 ESSENTIAL (PRIMARY) HYPERTENSION: ICD-10-CM

## 2021-11-03 NOTE — ASSESSMENT
[FreeTextEntry1] : Diabetes Mellitus Type 2, uncontrolled\par - on chronic steroids (currently Prednisone 60 mg)  due to possible temporal arteritis vs. autoimmune condition?\par - Labs completed with PCP 10/22: A1C 13.1, Total Cholesterol 275, \par - Diabetes Educator referral\par - Nutritionist referral \par - opthalmology follow up - appointment next month 11/12 \par - BP: enalapril 5 mg, Metoprolol succinate 100 \par \par Plan: \par stop clopidogrel, change to prasugrel qd \par start pioglitazone 30 mg qd\par stop metformin \par start toujeoMax 30 units --> increase by 2 units daily until fasting sugar <110\par start atorvastatin 40 mg \par stop enalapril --> start amlodipine- benazepril 10 mg -40 mg, c/w metoprolol succinate \par \par \par \par \par

## 2021-11-03 NOTE — HISTORY OF PRESENT ILLNESS
[FreeTextEntry1] : Patient is a 56 year old female presenting to clinic for management of uncontrolled DM Type 2. 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 Patients A1c is 13.1, fasting , likely exacerbated by prednisone 60 mg.

## 2021-11-05 DIAGNOSIS — E11.9 TYPE 2 DIABETES MELLITUS WITHOUT COMPLICATIONS: ICD-10-CM

## 2021-11-05 DIAGNOSIS — Z00.00 ENCOUNTER FOR GENERAL ADULT MEDICAL EXAMINATION WITHOUT ABNORMAL FINDINGS: ICD-10-CM

## 2021-11-05 DIAGNOSIS — H04.002 UNSPECIFIED DACRYOADENITIS, LEFT LACRIMAL GLAND: ICD-10-CM

## 2021-11-05 DIAGNOSIS — Z79.899 OTHER LONG TERM (CURRENT) DRUG THERAPY: ICD-10-CM

## 2021-11-05 DIAGNOSIS — H57.10 OCULAR PAIN, UNSPECIFIED EYE: ICD-10-CM

## 2021-11-05 DIAGNOSIS — I10 ESSENTIAL (PRIMARY) HYPERTENSION: ICD-10-CM

## 2021-11-23 LAB
CRP SERPL-MCNC: 5 MG/L
ERYTHROCYTE [SEDIMENTATION RATE] IN BLOOD BY WESTERGREN METHOD: 13 MM/HR
HAV IGM SER QL: NONREACTIVE
HBV CORE IGM SER QL: NONREACTIVE
HBV SURFACE AG SER QL: NONREACTIVE
HCV AB SER QL: NONREACTIVE
HCV S/CO RATIO: 0.12 S/CO
M TB IFN-G BLD-IMP: NEGATIVE
QUANTIFERON TB PLUS MITOGEN MINUS NIL: 8.27 IU/ML
QUANTIFERON TB PLUS NIL: 0.01 IU/ML
QUANTIFERON TB PLUS TB1 MINUS NIL: 0.01 IU/ML
QUANTIFERON TB PLUS TB2 MINUS NIL: 0.01 IU/ML

## 2021-11-24 ENCOUNTER — OUTPATIENT (OUTPATIENT)
Dept: OUTPATIENT SERVICES | Facility: HOSPITAL | Age: 56
LOS: 1 days | Discharge: HOME | End: 2021-11-24
Payer: COMMERCIAL

## 2021-11-24 DIAGNOSIS — Z98.890 OTHER SPECIFIED POSTPROCEDURAL STATES: Chronic | ICD-10-CM

## 2021-11-24 DIAGNOSIS — H53.8 OTHER VISUAL DISTURBANCES: ICD-10-CM

## 2021-11-24 PROCEDURE — 70543 MRI ORBT/FAC/NCK W/O &W/DYE: CPT | Mod: 26

## 2021-12-02 ENCOUNTER — APPOINTMENT (OUTPATIENT)
Dept: OTOLARYNGOLOGY | Facility: CLINIC | Age: 56
End: 2021-12-02
Payer: COMMERCIAL

## 2021-12-02 ENCOUNTER — RESULT REVIEW (OUTPATIENT)
Age: 56
End: 2021-12-02

## 2021-12-02 PROCEDURE — 99204 OFFICE O/P NEW MOD 45 MIN: CPT | Mod: 25

## 2021-12-02 PROCEDURE — 31231 NASAL ENDOSCOPY DX: CPT

## 2021-12-02 NOTE — HISTORY OF PRESENT ILLNESS
[de-identified] : Patient presents today following up after recent ER visit due to left eye cellulitis. Patient having lacrimal gland swelling. Patient c/o right sided neck pain started two days ago. Recent MRI and CT scan done that were reviewed by me. Pt has recurrent orbital inflammatory process.

## 2021-12-02 NOTE — PROCEDURE
[Recalcitrant Symptoms] : recalcitrant symptoms  [Rigid Endoscope] : examined with a rigid endoscope [Normal] : the paranasal sinuses had no abnormalities [FreeTextEntry6] : ethmoid sinus bulla prominence

## 2021-12-02 NOTE — DATA REVIEWED
[de-identified] : MRI: unremarkable MRI of the orbits.\par CT: No acute intracranial pathology. No large vessel occlusion, aneurysm, vascular malformation.

## 2021-12-03 ENCOUNTER — LABORATORY RESULT (OUTPATIENT)
Age: 56
End: 2021-12-03

## 2021-12-03 ENCOUNTER — OUTPATIENT (OUTPATIENT)
Dept: OUTPATIENT SERVICES | Facility: HOSPITAL | Age: 56
LOS: 1 days | Discharge: HOME | End: 2021-12-03

## 2021-12-03 ENCOUNTER — APPOINTMENT (OUTPATIENT)
Dept: RHEUMATOLOGY | Facility: CLINIC | Age: 56
End: 2021-12-03
Payer: COMMERCIAL

## 2021-12-03 VITALS
OXYGEN SATURATION: 97 % | TEMPERATURE: 98.1 F | DIASTOLIC BLOOD PRESSURE: 93 MMHG | HEART RATE: 96 BPM | WEIGHT: 156 LBS | BODY MASS INDEX: 28.71 KG/M2 | SYSTOLIC BLOOD PRESSURE: 168 MMHG | HEIGHT: 62 IN

## 2021-12-03 DIAGNOSIS — Z98.890 OTHER SPECIFIED POSTPROCEDURAL STATES: Chronic | ICD-10-CM

## 2021-12-03 LAB — HLA-B27 RELATED AG QL: NEGATIVE

## 2021-12-03 PROCEDURE — 99215 OFFICE O/P EST HI 40 MIN: CPT | Mod: GC

## 2021-12-03 NOTE — ASSESSMENT
[FreeTextEntry1] : 57 yo F with PMhx of asthma, DM, GERD, DLD, PAD, HTN, TRAVIS, OA presents to clinic for evaluation of chronic recurrent left eye pain and swelling recently seen inpatient in July 21 for suspected dacroadenitis. 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 Anti-nuclear factor: Negative, C-ANCA and P-ANCA negative, Anti-SSA and Anti-SSB: negative\par - ESR and CRP: unremarkable\par - anti-ccp and RF factor, Lupus profile (less likely)\par - CT imaging consistent with left lacrimal inflammation and left preseptal soft tissue inflammation\par - MRI 11/2021: Unremarkable\par - On prednisone taper, currently on 20mg\par  \par - needs biopsy of the lacrimal glands by optho\par - Monitor for blood sugars and tighter glycemic control\par - RTC in one month.

## 2021-12-03 NOTE — PHYSICAL EXAM
[General Appearance - Alert] : alert [General Appearance - In No Acute Distress] : in no acute distress [Sclera] : the sclera and conjunctiva were normal [PERRL With Normal Accommodation] : pupils were equal in size, round, and reactive to light [Extraocular Movements] : extraocular movements were intact [Outer Ear] : the ears and nose were normal in appearance [Hearing Threshold Finger Rub Not Faulk] : hearing was normal [Both Tympanic Membranes Were Examined] : both tympanic membranes were normal [Neck Appearance] : the appearance of the neck was normal [] : no respiratory distress [Respiration, Rhythm And Depth] : normal respiratory rhythm and effort [Auscultation Breath Sounds / Voice Sounds] : lungs were clear to auscultation bilaterally [Apical Impulse] : the apical impulse was normal [Heart Rate And Rhythm] : heart rate was normal and rhythm regular [Heart Sounds] : normal S1 and S2 [Heart Sounds Gallop] : no gallops [Bowel Sounds] : normal bowel sounds [Abdomen Soft] : soft [Abdomen Tenderness] : non-tender [Abnormal Walk] : normal gait [Musculoskeletal - Swelling] : no joint swelling seen [Skin Color & Pigmentation] : normal skin color and pigmentation [Oriented To Time, Place, And Person] : oriented to person, place, and time

## 2021-12-03 NOTE — END OF VISIT
[] : Resident [FreeTextEntry3] : ENT following. ENT is planning CT sinus. \par ESR WNL so GCA less likely\par Inflammatory dacryoadenitis. c/w steroid taper as per ophthalmology. She needs lacrimal biopsy before we can start immunnosuppression [Time Spent: ___ minutes] : I have spent [unfilled] minutes of time on the encounter.

## 2021-12-03 NOTE — HISTORY OF PRESENT ILLNESS
[FreeTextEntry1] : 56y old Female who presents for fup for chief complaint of L orbital cellulitis and left dacryoadenitis previously been hospitalized in July for this complaint was started on steroids with improvement of symptoms. Patient sent to rheum clinic by Opthomologist due to persistent orbital swelling and erythema of the left eye.\par \par Patient currently endorses Headaches and left sided jaw pain when chewing which is now on the right side as well. She also endorses b/l shoulder weakness and bilateral arm pain and weight gain of 4 pounds attributing it to the steroids. She also has knee discomfort along with joint stiffness in the foot. Pt also endorses dizziness for the past couple of weeks along with symptoms of SOB\par \par Denies, fevers, chest pain, chills, new rashes, abdominal pain, urinating problems or GI problems

## 2021-12-03 NOTE — REVIEW OF SYSTEMS
[Eye Pain] : eye pain [Shortness Of Breath] : shortness of breath [Dizziness] : dizziness [Muscle Weakness] : muscle weakness [Feelings Of Weakness] : feelings of weakness [Fever] : no fever [Chills] : no chills [Red Eyes] : eyes not red [Earache] : no earache [Loss Of Hearing] : no hearing loss [Heart Rate Is Slow] : the heart rate was not slow [Heart Rate Is Fast] : the heart rate was not fast [Abdominal Pain] : no abdominal pain [Vomiting] : no vomiting [Constipation] : no constipation [Diarrhea] : no diarrhea [Heartburn] : no heartburn [Skin Lesions] : no skin lesions [Skin Wound] : no skin wound [Itching] : no itching [Confused] : no confusion [Fainting] : no fainting

## 2021-12-07 NOTE — ED PROVIDER NOTE - OBJECTIVE STATEMENT
No retinal holes, tears, or detachment at this time. The patient is a 56 year old female with a history of DM, HTN, HLD, TRAVIS, recent L sided endolphthalmitis on 6/2021 followed by   on oral prednisone taper at 2.5mg presenting for left eye pain for 3 weeks. Pt saw Dr. Gonzalez yesterday in the office but states her eye pain is persisiting. No blurry vision, slurred speech, numbness/tingling.

## 2021-12-09 DIAGNOSIS — H05.019 CELLULITIS OF UNSPECIFIED ORBIT: ICD-10-CM

## 2021-12-09 DIAGNOSIS — H04.002 UNSPECIFIED DACRYOADENITIS, LEFT LACRIMAL GLAND: ICD-10-CM

## 2021-12-09 DIAGNOSIS — Z00.00 ENCOUNTER FOR GENERAL ADULT MEDICAL EXAMINATION WITHOUT ABNORMAL FINDINGS: ICD-10-CM

## 2021-12-09 LAB
ANA SER IF-ACNC: NEGATIVE
B2 GLYCOPROT1 IGA SERPL IA-ACNC: <5 SAU
B2 GLYCOPROT1 IGG SER-ACNC: <5 SGU
B2 GLYCOPROT1 IGM SER-ACNC: <5 SMU
C3 SERPL-MCNC: 195 MG/DL
C4 SERPL-MCNC: 62 MG/DL
CARDIOLIPIN AB SER IA-ACNC: NEGATIVE
CREAT SPEC-SCNC: 47 MG/DL
CREAT/PROT UR: <0.1 RATIO
DIRECT COOMBS: NORMAL
ENA RNP AB SER IA-ACNC: <0.2 AL
ENA SM AB SER IA-ACNC: <0.2 AL
ENA SS-A AB SER IA-ACNC: <0.2 AL
ENA SS-B AB SER IA-ACNC: <0.2 AL
PROT UR-MCNC: <5 MG/DLG/24H
STREP DNASE B TITR SER: 90 U/ML

## 2021-12-12 ENCOUNTER — OUTPATIENT (OUTPATIENT)
Dept: OUTPATIENT SERVICES | Facility: HOSPITAL | Age: 56
LOS: 1 days | Discharge: HOME | End: 2021-12-12
Payer: COMMERCIAL

## 2021-12-12 DIAGNOSIS — J34.89 OTHER SPECIFIED DISORDERS OF NOSE AND NASAL SINUSES: ICD-10-CM

## 2021-12-12 DIAGNOSIS — Z98.890 OTHER SPECIFIED POSTPROCEDURAL STATES: Chronic | ICD-10-CM

## 2021-12-12 PROCEDURE — 70486 CT MAXILLOFACIAL W/O DYE: CPT | Mod: 26

## 2022-01-18 ENCOUNTER — NON-APPOINTMENT (OUTPATIENT)
Age: 57
End: 2022-01-18

## 2022-01-19 ENCOUNTER — OUTPATIENT (OUTPATIENT)
Dept: OUTPATIENT SERVICES | Facility: HOSPITAL | Age: 57
LOS: 1 days | Discharge: HOME | End: 2022-01-19

## 2022-01-19 ENCOUNTER — APPOINTMENT (OUTPATIENT)
Dept: NUTRITION | Facility: CLINIC | Age: 57
End: 2022-01-19

## 2022-01-19 DIAGNOSIS — Z98.890 OTHER SPECIFIED POSTPROCEDURAL STATES: Chronic | ICD-10-CM

## 2022-01-19 DIAGNOSIS — E55.9 VITAMIN D DEFICIENCY, UNSPECIFIED: ICD-10-CM

## 2022-01-25 DIAGNOSIS — E11.9 TYPE 2 DIABETES MELLITUS WITHOUT COMPLICATIONS: ICD-10-CM

## 2022-02-02 ENCOUNTER — RX RENEWAL (OUTPATIENT)
Age: 57
End: 2022-02-02

## 2022-02-02 ENCOUNTER — OUTPATIENT (OUTPATIENT)
Dept: OUTPATIENT SERVICES | Facility: HOSPITAL | Age: 57
LOS: 1 days | Discharge: HOME | End: 2022-02-02

## 2022-02-02 ENCOUNTER — APPOINTMENT (OUTPATIENT)
Dept: ENDOCRINOLOGY | Facility: CLINIC | Age: 57
End: 2022-02-02

## 2022-02-02 VITALS
SYSTOLIC BLOOD PRESSURE: 125 MMHG | BODY MASS INDEX: 30 KG/M2 | WEIGHT: 163 LBS | DIASTOLIC BLOOD PRESSURE: 78 MMHG | HEIGHT: 62 IN

## 2022-02-02 DIAGNOSIS — E11.9 TYPE 2 DIABETES MELLITUS WITHOUT COMPLICATIONS: ICD-10-CM

## 2022-02-02 DIAGNOSIS — Z98.890 OTHER SPECIFIED POSTPROCEDURAL STATES: Chronic | ICD-10-CM

## 2022-02-02 LAB
GLUCOSE BLDC GLUCOMTR-MCNC: 170 MG/DL — HIGH (ref 70–99)
GLUCOSE SERPL-MCNC: 174

## 2022-02-02 RX ORDER — EMPAGLIFLOZIN 25 MG/1
25 TABLET, FILM COATED ORAL
Qty: 90 | Refills: 3 | Status: DISCONTINUED | COMMUNITY
Start: 2021-12-22 | End: 2022-02-02

## 2022-02-02 RX ORDER — PIOGLITAZONE HYDROCHLORIDE 30 MG/1
30 TABLET ORAL
Qty: 90 | Refills: 2 | Status: DISCONTINUED | COMMUNITY
Start: 2021-11-03 | End: 2022-02-02

## 2022-02-02 RX ORDER — FUROSEMIDE 20 MG/1
20 TABLET ORAL DAILY
Refills: 0 | Status: ACTIVE | COMMUNITY
Start: 2022-02-02

## 2022-02-02 NOTE — ASSESSMENT
[FreeTextEntry1] : Diabetes Mellitus Type 2, uncontrolled\par - Previously on chronic steroids Prednisone 60 mg)  due to possible temporal arteritis vs. autoimmune condition -> Followed with rheum, now on Pred 20mg PRN (average 3 times/week). \par - On ozempic, farxiga, and Toujeo insulin\par - Labs on initial visit: A1C 13.1, Total Cholesterol 275, \par - Labs completed with PCP 1 week before this visit, will obtain records\par - Fasting blood sugar ~150s since going down on prednisone dose\par - Diabetes Educator referral - pt follows up\par - Nutritionist referral \par - opthalmology follow up - Pt going for lacrimal gland biopsy in March 2022\par \par Plan: \par Changed clopidogrel to prasugrel qd last visit\par Off metformin and pioglitazone\par started toujeoMax 30 units last visit --> increase by 2 units daily until fasting sugar <110\par c/w atorvastatin 40 mg \par started amlodipine- benazepril 10 mg -40 mg last visit\par \par \par \par \par  [Diabetes Foot Care] : diabetes foot care [Long Term Vascular Complications] : long term vascular complications of diabetes [Carbohydrate Consistent Diet] : carbohydrate consistent diet [Importance of Diet and Exercise] : importance of diet and exercise to improve glycemic control, achieve weight loss and improve cardiovascular health [Retinopathy Screening] : Patient was referred to ophthalmology for retinopathy screening

## 2022-02-02 NOTE — HISTORY OF PRESENT ILLNESS
[FreeTextEntry1] : Patient is a 56 year old female presenting to clinic for follow up of management of uncontrolled DM Type 2. 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 Patient's A1c is 13.1 on initial visit, fasting  --> 150s, likely exacerbated by prednisone 60 mg. She has recently went down on her prednisone and now only takes 20mg PRN (averages around 3 times a week) with a drop in FSG to around 150s.\par \par PCP is Dr. Nolasco

## 2022-02-02 NOTE — REVIEW OF SYSTEMS
[Negative] : Heme/Lymph [Dry Eyes] : dryness [Eyes Itch] : itch [Myalgia] : myalgia  [Back Pain] : back pain [FreeTextEntry4] : Jaw clauditation

## 2022-02-02 NOTE — PHYSICAL EXAM
[Alert] : alert [Well Nourished] : well nourished [No Acute Distress] : no acute distress [Well Developed] : well developed [Normal Sclera/Conjunctiva] : normal sclera/conjunctiva [EOMI] : extra ocular movement intact [No Proptosis] : no proptosis [Normal Oropharynx] : the oropharynx was normal [Thyroid Not Enlarged] : the thyroid was not enlarged [No Thyroid Nodules] : no palpable thyroid nodules [No Respiratory Distress] : no respiratory distress [No Accessory Muscle Use] : no accessory muscle use [Clear to Auscultation] : lungs were clear to auscultation bilaterally [Normal S1, S2] : normal S1 and S2 [Normal Rate] : heart rate was normal [Regular Rhythm] : with a regular rhythm [No Edema] : no peripheral edema [Normal Bowel Sounds] : normal bowel sounds [Not Tender] : non-tender [Not Distended] : not distended [Soft] : abdomen soft [Normal Anterior Cervical Nodes] : no anterior cervical lymphadenopathy [No Spinal Tenderness] : no spinal tenderness [No Stigmata of Cushings Syndrome] : no stigmata of Cushings Syndrome [Normal Gait] : normal gait [Normal Strength/Tone] : muscle strength and tone were normal [No Rash] : no rash [Normal Reflexes] : deep tendon reflexes were 2+ and symmetric [No Tremors] : no tremors [Oriented x3] : oriented to person, place, and time [Obese] : obese [Acanthosis Nigricans] : no acanthosis nigricans

## 2022-02-04 ENCOUNTER — NON-APPOINTMENT (OUTPATIENT)
Age: 57
End: 2022-02-04

## 2022-02-04 ENCOUNTER — OUTPATIENT (OUTPATIENT)
Dept: OUTPATIENT SERVICES | Facility: HOSPITAL | Age: 57
LOS: 1 days | Discharge: HOME | End: 2022-02-04

## 2022-02-04 ENCOUNTER — APPOINTMENT (OUTPATIENT)
Dept: RHEUMATOLOGY | Facility: CLINIC | Age: 57
End: 2022-02-04
Payer: COMMERCIAL

## 2022-02-04 VITALS
SYSTOLIC BLOOD PRESSURE: 145 MMHG | DIASTOLIC BLOOD PRESSURE: 85 MMHG | BODY MASS INDEX: 30.55 KG/M2 | HEIGHT: 62 IN | OXYGEN SATURATION: 96 % | WEIGHT: 166 LBS | HEART RATE: 92 BPM

## 2022-02-04 DIAGNOSIS — H04.002 UNSPECIFIED DACRYOADENITIS, LEFT LACRIMAL GLAND: ICD-10-CM

## 2022-02-04 DIAGNOSIS — J34.89 OTHER SPECIFIED DISORDERS OF NOSE AND NASAL SINUSES: ICD-10-CM

## 2022-02-04 DIAGNOSIS — Z98.890 OTHER SPECIFIED POSTPROCEDURAL STATES: Chronic | ICD-10-CM

## 2022-02-04 PROCEDURE — 99215 OFFICE O/P EST HI 40 MIN: CPT | Mod: GC

## 2022-02-04 NOTE — PHYSICAL EXAM
[General Appearance - Alert] : alert [General Appearance - In No Acute Distress] : in no acute distress [Sclera] : the sclera and conjunctiva were normal [PERRL With Normal Accommodation] : pupils were equal in size, round, and reactive to light [Extraocular Movements] : extraocular movements were intact [Outer Ear] : the ears and nose were normal in appearance [Hearing Threshold Finger Rub Not [place]] : hearing was normal [Both Tympanic Membranes Were Examined] : both tympanic membranes were normal [Neck Appearance] : the appearance of the neck was normal [] : no respiratory distress [Respiration, Rhythm And Depth] : normal respiratory rhythm and effort [Auscultation Breath Sounds / Voice Sounds] : lungs were clear to auscultation bilaterally [Apical Impulse] : the apical impulse was normal [Heart Rate And Rhythm] : heart rate was normal and rhythm regular [Heart Sounds] : normal S1 and S2 [Heart Sounds Gallop] : no gallops [Bowel Sounds] : normal bowel sounds [Abdomen Soft] : soft [Abdomen Tenderness] : non-tender [Abnormal Walk] : normal gait [Musculoskeletal - Swelling] : no joint swelling seen [Skin Color & Pigmentation] : normal skin color and pigmentation [Oriented To Time, Place, And Person] : oriented to person, place, and time [FreeTextEntry1] : +1 b/l LE edema

## 2022-02-04 NOTE — HISTORY OF PRESENT ILLNESS
[FreeTextEntry1] : 57y old Female who presents for fup for chief complaint of L orbital cellulitis and left dacryoadenitis previously been hospitalized in July for this complaint was started on steroids with improvement of symptoms. Patient sent to rheum clinic by Opthomologist due to persistent orbital swelling and erythema of the left eye.\par \par Patient currently endorses proximal muscle weakness and neck pain radiating to her lower back on lateral movement of the neck. No proximal LE weakness appreciated. She also has knee discomfort along with joint stiffness in the foot that is intermittent, but is not present today. Patient has been taking prednisone 20mg around 3 times/week instead of daily but has intermittent symptoms ~6 times/month.\par \par Denies, fevers, chest pain, chills, new rashes, abdominal pain, urinating problems or GI problems.

## 2022-02-04 NOTE — END OF VISIT
[] : Resident [FreeTextEntry3] : Total time spent was   . Time was divided in review of labs and imaging studies, obtaining history, personally examining patient, counselling patient/ family, ordering medications/tests/ imaging tests, communicating with other health care providers, documentation in electronic health records, independent interpretation of labs, imaging results and procedure results and communicating to the patient/family and care co ordination. Risks, benefits, alternatives of all medications were discussed with patient and family.\par  [Time Spent: ___ minutes] : I have spent [unfilled] minutes of time on the encounter.

## 2022-02-04 NOTE — ASSESSMENT
[FreeTextEntry1] : 56 yo F with PMhx of asthma, DM, GERD, DLD, PAD, HTN, TRAVIS, OA presents to clinic for evaluation of chronic recurrent left eye pain and swelling recently seen inpatient in July 21 for suspected dacroadenitis. 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 Anti-nuclear factor: Negative, C-ANCA and P-ANCA negative, Anti-SSA and Anti-SSB: negative\par - ESR and CRP: unremarkable\par - C3 and C4 elevated\par - anti-ccp and RF factor\par - Lupus profile negative\par - CT imaging consistent with left lacrimal inflammation and left preseptal soft tissue inflammation\par - MRI 11/2021: Unremarkable\par - On prednisone taper, currently on 20mg - patient only takes 20mg PRN when needed, average 3 times/week. Encouraged patient to take 20mg daily as the frequency of her exacerbations has not been well controlled.\par  \par - needs biopsy of the lacrimal glands by optho, scheduled for 3/11/2022\par - Monitor for blood sugars and tighter glycemic control - fasting blood sugars down to ~150s from 250s when she was on prednisone 60mg\par - RTC 2 weeks after biopsy by ophthalmology.

## 2022-02-04 NOTE — REVIEW OF SYSTEMS
[Eye Pain] : eye pain [Shortness Of Breath] : shortness of breath [Muscle Weakness] : muscle weakness [Feelings Of Weakness] : feelings of weakness [Dry Eyes] : dryness of the eyes [Arthralgias] : arthralgias [Fever] : no fever [Chills] : no chills [Red Eyes] : eyes not red [Earache] : no earache [Loss Of Hearing] : no hearing loss [Heart Rate Is Slow] : the heart rate was not slow [Heart Rate Is Fast] : the heart rate was not fast [Chest Pain] : no chest pain [Cough] : no cough [Abdominal Pain] : no abdominal pain [Vomiting] : no vomiting [Constipation] : no constipation [Diarrhea] : no diarrhea [Heartburn] : no heartburn [Skin Lesions] : no skin lesions [Skin Wound] : no skin wound [Itching] : no itching [Confused] : no confusion [Dizziness] : no dizziness [Fainting] : no fainting

## 2022-02-08 ENCOUNTER — APPOINTMENT (OUTPATIENT)
Dept: NEUROSURGERY | Facility: CLINIC | Age: 57
End: 2022-02-08
Payer: COMMERCIAL

## 2022-02-08 PROCEDURE — 99204 OFFICE O/P NEW MOD 45 MIN: CPT

## 2022-02-23 ENCOUNTER — OUTPATIENT (OUTPATIENT)
Dept: OUTPATIENT SERVICES | Facility: HOSPITAL | Age: 57
LOS: 1 days | Discharge: HOME | End: 2022-02-23
Payer: COMMERCIAL

## 2022-02-23 ENCOUNTER — RESULT REVIEW (OUTPATIENT)
Age: 57
End: 2022-02-23

## 2022-02-23 DIAGNOSIS — Z98.890 OTHER SPECIFIED POSTPROCEDURAL STATES: Chronic | ICD-10-CM

## 2022-02-23 DIAGNOSIS — M54.2 CERVICALGIA: ICD-10-CM

## 2022-02-23 PROCEDURE — 72040 X-RAY EXAM NECK SPINE 2-3 VW: CPT | Mod: 26

## 2022-02-23 PROCEDURE — 72141 MRI NECK SPINE W/O DYE: CPT | Mod: 26

## 2022-02-28 ENCOUNTER — OUTPATIENT (OUTPATIENT)
Dept: OUTPATIENT SERVICES | Facility: HOSPITAL | Age: 57
LOS: 1 days | Discharge: HOME | End: 2022-02-28
Payer: SELF-PAY

## 2022-02-28 ENCOUNTER — RESULT REVIEW (OUTPATIENT)
Age: 57
End: 2022-02-28

## 2022-02-28 DIAGNOSIS — Z98.890 OTHER SPECIFIED POSTPROCEDURAL STATES: Chronic | ICD-10-CM

## 2022-02-28 DIAGNOSIS — M43.20 FUSION OF SPINE, SITE UNSPECIFIED: ICD-10-CM

## 2022-02-28 DIAGNOSIS — M54.2 CERVICALGIA: ICD-10-CM

## 2022-02-28 PROCEDURE — 72125 CT NECK SPINE W/O DYE: CPT | Mod: 26

## 2022-03-08 ENCOUNTER — NON-APPOINTMENT (OUTPATIENT)
Age: 57
End: 2022-03-08

## 2022-03-17 ENCOUNTER — NON-APPOINTMENT (OUTPATIENT)
Age: 57
End: 2022-03-17

## 2022-03-18 ENCOUNTER — APPOINTMENT (OUTPATIENT)
Dept: NUTRITION | Facility: CLINIC | Age: 57
End: 2022-03-18

## 2022-03-18 ENCOUNTER — NON-APPOINTMENT (OUTPATIENT)
Age: 57
End: 2022-03-18

## 2022-03-18 ENCOUNTER — APPOINTMENT (OUTPATIENT)
Dept: RHEUMATOLOGY | Facility: CLINIC | Age: 57
End: 2022-03-18
Payer: SELF-PAY

## 2022-03-18 ENCOUNTER — OUTPATIENT (OUTPATIENT)
Dept: OUTPATIENT SERVICES | Facility: HOSPITAL | Age: 57
LOS: 1 days | Discharge: HOME | End: 2022-03-18

## 2022-03-18 VITALS
BODY MASS INDEX: 30.91 KG/M2 | WEIGHT: 168 LBS | HEIGHT: 62 IN | DIASTOLIC BLOOD PRESSURE: 76 MMHG | OXYGEN SATURATION: 96 % | HEART RATE: 92 BPM | SYSTOLIC BLOOD PRESSURE: 145 MMHG | TEMPERATURE: 95.8 F

## 2022-03-18 DIAGNOSIS — J34.89 OTHER SPECIFIED DISORDERS OF NOSE AND NASAL SINUSES: ICD-10-CM

## 2022-03-18 DIAGNOSIS — Z98.890 OTHER SPECIFIED POSTPROCEDURAL STATES: Chronic | ICD-10-CM

## 2022-03-18 DIAGNOSIS — H05.019 CELLULITIS OF UNSPECIFIED ORBIT: ICD-10-CM

## 2022-03-18 DIAGNOSIS — H04.002 UNSPECIFIED DACRYOADENITIS, LEFT LACRIMAL GLAND: ICD-10-CM

## 2022-03-18 PROCEDURE — 99214 OFFICE O/P EST MOD 30 MIN: CPT

## 2022-03-18 NOTE — END OF VISIT
[FreeTextEntry3] : 57 year old female with left inflammatory dacryoadenitis on prednisone 20 mg daily here for follow up. She had biopsy 3/11/22 going for follow up 3/29/22 with Dr. Saldivar at [facility]. She has left eye swelling and pain since biopsy. Continue prednisone 20 mg daily for now and follow up in 3 weeks after biopsy results finalized to discuss steroid sparing agent likely MTX. Check CBC, CMP, ESR. CRP [] : Resident [Time Spent: ___ minutes] : I have spent [unfilled] minutes of time on the encounter.

## 2022-03-18 NOTE — PHYSICAL EXAM
[General Appearance - Alert] : alert [Outer Ear] : the ears and nose were normal in appearance [Both Tympanic Membranes Were Examined] : both tympanic membranes were normal [Neck Appearance] : the appearance of the neck was normal [Jugular Venous Distention Increased] : there was no jugular-venous distention [Thyroid Nodule] : there were no palpable thyroid nodules [Abnormal Walk] : normal gait [Nail Clubbing] : no clubbing  or cyanosis of the fingernails [Musculoskeletal - Swelling] : no joint swelling seen [Motor Tone] : muscle strength and tone were normal [Skin Color & Pigmentation] : normal skin color and pigmentation [Skin Turgor] : normal skin turgor [] : no rash [Cranial Nerves] : cranial nerves 2-12 were intact [Deep Tendon Reflexes (DTR)] : deep tendon reflexes were 2+ and symmetric [FreeTextEntry1] : neck pain

## 2022-03-18 NOTE — ASSESSMENT
[FreeTextEntry1] : 58 yo F with PMhx of asthma, DM, GERD, DLD, PAD, HTN, TRAVIS, OA presents to clinic for evaluation of chronic recurrent left eye pain and swelling recently seen inpatient in July 21 for suspected dacroadenitis. 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 Anti-nuclear factor: Negative, C-ANCA and P-ANCA negative, Anti-SSA and Anti-SSB: negative\par - ESR and CRP: unremarkable\par - C3 and C4 elevated\par - anti-ccp and RF factor\par - Lupus profile negative\par - CT imaging consistent with left lacrimal inflammation and left preseptal soft tissue inflammation\par - MRI 11/2021: Unremarkable\par - On prednisone taper, currently on 20mg - patient only takes 20mg PRN when needed, average 3 times/week. -  - c/w prednisone 20mg daily, might switch to MTX after bx \par - s/p biopsy of the lacrimal glands by optho 3/11/2022, will f/u with optho on the 29th \par - Monitor for blood sugars and tighter glycemic control - fasting blood sugars down to ~150s from 250s when she was on prednisone 60mg\par - RTC 4 weeks after she obtains bx results from optho\par - f/u CBC, CMP, ESR, CRP\par

## 2022-03-18 NOTE — HISTORY OF PRESENT ILLNESS
[FreeTextEntry1] : 58 yo old Female who presents for follow up appointment for  L orbital cellulitis and left dacryoadenitis previously been hospitalized in July for this complaint was started on steroids with improvement of symptoms. Patient sent to rheum clinic by Opthomologist due to persistent orbital swelling and erythema of the left eye. On last admission patient endorsed proximal muscle weakness and neck pain radiating to her lower back on lateral movement of the neck. No proximal LE weakness appreciated. She also had knee discomfort along with joint stiffness in the foot that is intermittent (reported improvements last time). Patient has been taking prednisone 20mg around 3 times/week instead of daily but has intermittent symptoms ~6 times/month. Denies, fevers, chest pain, chills, new rashes, abdominal pain, urinating problems or GI problems. Today she reports pain in the left eye )mainly due to recent biopsy), she also complains of HAs, dizziness and back pain.

## 2022-03-22 ENCOUNTER — APPOINTMENT (OUTPATIENT)
Dept: NEUROSURGERY | Facility: CLINIC | Age: 57
End: 2022-03-22
Payer: SELF-PAY

## 2022-03-22 PROCEDURE — 99214 OFFICE O/P EST MOD 30 MIN: CPT

## 2022-03-23 DIAGNOSIS — E11.9 TYPE 2 DIABETES MELLITUS WITHOUT COMPLICATIONS: ICD-10-CM

## 2022-04-06 ENCOUNTER — OUTPATIENT (OUTPATIENT)
Dept: OUTPATIENT SERVICES | Facility: HOSPITAL | Age: 57
LOS: 1 days | Discharge: HOME | End: 2022-04-06
Payer: SELF-PAY

## 2022-04-06 ENCOUNTER — RESULT REVIEW (OUTPATIENT)
Age: 57
End: 2022-04-06

## 2022-04-06 DIAGNOSIS — Z98.890 OTHER SPECIFIED POSTPROCEDURAL STATES: Chronic | ICD-10-CM

## 2022-04-06 DIAGNOSIS — M47.812 SPONDYLOSIS WITHOUT MYELOPATHY OR RADICULOPATHY, CERVICAL REGION: ICD-10-CM

## 2022-04-06 PROCEDURE — 72148 MRI LUMBAR SPINE W/O DYE: CPT | Mod: 26

## 2022-04-12 LAB
ALBUMIN SERPL ELPH-MCNC: 4.5 G/DL
ALP BLD-CCNC: 84 U/L
ALT SERPL-CCNC: 29 U/L
ANION GAP SERPL CALC-SCNC: 13 MMOL/L
AST SERPL-CCNC: 22 U/L
BASOPHILS # BLD AUTO: 0.01 K/UL
BASOPHILS NFR BLD AUTO: 0.2 %
BILIRUB SERPL-MCNC: 0.2 MG/DL
BUN SERPL-MCNC: 10 MG/DL
CALCIUM SERPL-MCNC: 9.2 MG/DL
CHLORIDE SERPL-SCNC: 105 MMOL/L
CO2 SERPL-SCNC: 27 MMOL/L
CREAT SERPL-MCNC: 0.8 MG/DL
EGFR: 86 ML/MIN/1.73M2
EOSINOPHIL # BLD AUTO: 0.18 K/UL
EOSINOPHIL NFR BLD AUTO: 3.7 %
GLUCOSE SERPL-MCNC: 109 MG/DL
HCT VFR BLD CALC: 42.5 %
HGB BLD-MCNC: 13.5 G/DL
IMM GRANULOCYTES NFR BLD AUTO: 0.2 %
LYMPHOCYTES # BLD AUTO: 1.97 K/UL
LYMPHOCYTES NFR BLD AUTO: 40.8 %
MAN DIFF?: NORMAL
MCHC RBC-ENTMCNC: 28.7 PG
MCHC RBC-ENTMCNC: 31.8 G/DL
MCV RBC AUTO: 90.4 FL
MONOCYTES # BLD AUTO: 0.42 K/UL
MONOCYTES NFR BLD AUTO: 8.7 %
NEUTROPHILS # BLD AUTO: 2.24 K/UL
NEUTROPHILS NFR BLD AUTO: 46.4 %
PLATELET # BLD AUTO: 328 K/UL
POTASSIUM SERPL-SCNC: 3.6 MMOL/L
PROT SERPL-MCNC: 6.6 G/DL
RBC # BLD: 4.7 M/UL
RBC # FLD: 12.9 %
SODIUM SERPL-SCNC: 145 MMOL/L
WBC # FLD AUTO: 4.83 K/UL

## 2022-04-15 ENCOUNTER — APPOINTMENT (OUTPATIENT)
Dept: RHEUMATOLOGY | Facility: CLINIC | Age: 57
End: 2022-04-15

## 2022-04-15 ENCOUNTER — INPATIENT (INPATIENT)
Facility: HOSPITAL | Age: 57
LOS: 2 days | Discharge: HOME | End: 2022-04-18
Attending: INTERNAL MEDICINE | Admitting: INTERNAL MEDICINE
Payer: SELF-PAY

## 2022-04-15 VITALS
SYSTOLIC BLOOD PRESSURE: 204 MMHG | DIASTOLIC BLOOD PRESSURE: 98 MMHG | TEMPERATURE: 96 F | HEART RATE: 105 BPM | HEIGHT: 62 IN | WEIGHT: 160.06 LBS | RESPIRATION RATE: 20 BRPM | OXYGEN SATURATION: 95 %

## 2022-04-15 DIAGNOSIS — Z98.890 OTHER SPECIFIED POSTPROCEDURAL STATES: Chronic | ICD-10-CM

## 2022-04-15 LAB
ANION GAP SERPL CALC-SCNC: 14 MMOL/L — SIGNIFICANT CHANGE UP (ref 7–14)
APPEARANCE UR: CLEAR — SIGNIFICANT CHANGE UP
BACTERIA # UR AUTO: NEGATIVE — SIGNIFICANT CHANGE UP
BILIRUB UR-MCNC: ABNORMAL
BUN SERPL-MCNC: 7 MG/DL — LOW (ref 10–20)
CALCIUM SERPL-MCNC: 9.6 MG/DL — SIGNIFICANT CHANGE UP (ref 8.5–10.1)
CHLORIDE SERPL-SCNC: 108 MMOL/L — SIGNIFICANT CHANGE UP (ref 98–110)
CO2 SERPL-SCNC: 25 MMOL/L — SIGNIFICANT CHANGE UP (ref 17–32)
COLOR SPEC: YELLOW — SIGNIFICANT CHANGE UP
CREAT SERPL-MCNC: 0.6 MG/DL — LOW (ref 0.7–1.5)
DIFF PNL FLD: NEGATIVE — SIGNIFICANT CHANGE UP
EGFR: 105 ML/MIN/1.73M2 — SIGNIFICANT CHANGE UP
EPI CELLS # UR: 7 /HPF — HIGH (ref 0–5)
GLUCOSE SERPL-MCNC: 111 MG/DL — HIGH (ref 70–99)
GLUCOSE UR QL: ABNORMAL
HCT VFR BLD CALC: 41.8 % — SIGNIFICANT CHANGE UP (ref 37–47)
HGB BLD-MCNC: 13.8 G/DL — SIGNIFICANT CHANGE UP (ref 12–16)
HYALINE CASTS # UR AUTO: 6 /LPF — SIGNIFICANT CHANGE UP (ref 0–7)
KETONES UR-MCNC: SIGNIFICANT CHANGE UP
LEUKOCYTE ESTERASE UR-ACNC: NEGATIVE — SIGNIFICANT CHANGE UP
MCHC RBC-ENTMCNC: 28.8 PG — SIGNIFICANT CHANGE UP (ref 27–31)
MCHC RBC-ENTMCNC: 33 G/DL — SIGNIFICANT CHANGE UP (ref 32–37)
MCV RBC AUTO: 87.3 FL — SIGNIFICANT CHANGE UP (ref 81–99)
NITRITE UR-MCNC: NEGATIVE — SIGNIFICANT CHANGE UP
NRBC # BLD: 0 /100 WBCS — SIGNIFICANT CHANGE UP (ref 0–0)
PH UR: 6 — SIGNIFICANT CHANGE UP (ref 5–8)
PLATELET # BLD AUTO: 372 K/UL — SIGNIFICANT CHANGE UP (ref 130–400)
POTASSIUM SERPL-MCNC: 4.6 MMOL/L — SIGNIFICANT CHANGE UP (ref 3.5–5)
POTASSIUM SERPL-SCNC: 4.6 MMOL/L — SIGNIFICANT CHANGE UP (ref 3.5–5)
PROT UR-MCNC: ABNORMAL
RBC # BLD: 4.79 M/UL — SIGNIFICANT CHANGE UP (ref 4.2–5.4)
RBC # FLD: 12.7 % — SIGNIFICANT CHANGE UP (ref 11.5–14.5)
RBC CASTS # UR COMP ASSIST: 8 /HPF — HIGH (ref 0–4)
SARS-COV-2 RNA SPEC QL NAA+PROBE: SIGNIFICANT CHANGE UP
SODIUM SERPL-SCNC: 147 MMOL/L — HIGH (ref 135–146)
SP GR SPEC: 1.03 — HIGH (ref 1.01–1.03)
UROBILINOGEN FLD QL: SIGNIFICANT CHANGE UP
WBC # BLD: 9.69 K/UL — SIGNIFICANT CHANGE UP (ref 4.8–10.8)
WBC # FLD AUTO: 9.69 K/UL — SIGNIFICANT CHANGE UP (ref 4.8–10.8)
WBC UR QL: 2 /HPF — SIGNIFICANT CHANGE UP (ref 0–5)

## 2022-04-15 PROCEDURE — 99285 EMERGENCY DEPT VISIT HI MDM: CPT

## 2022-04-15 RX ORDER — METHOCARBAMOL 500 MG/1
1000 TABLET, FILM COATED ORAL ONCE
Refills: 0 | Status: COMPLETED | OUTPATIENT
Start: 2022-04-15 | End: 2022-04-15

## 2022-04-15 RX ORDER — OXYCODONE HYDROCHLORIDE 5 MG/1
5 TABLET ORAL ONCE
Refills: 0 | Status: DISCONTINUED | OUTPATIENT
Start: 2022-04-15 | End: 2022-04-15

## 2022-04-15 RX ORDER — MORPHINE SULFATE 50 MG/1
4 CAPSULE, EXTENDED RELEASE ORAL ONCE
Refills: 0 | Status: DISCONTINUED | OUTPATIENT
Start: 2022-04-15 | End: 2022-04-15

## 2022-04-15 RX ORDER — HYDROMORPHONE HYDROCHLORIDE 2 MG/ML
1 INJECTION INTRAMUSCULAR; INTRAVENOUS; SUBCUTANEOUS ONCE
Refills: 0 | Status: DISCONTINUED | OUTPATIENT
Start: 2022-04-15 | End: 2022-04-15

## 2022-04-15 RX ADMIN — METHOCARBAMOL 1000 MILLIGRAM(S): 500 TABLET, FILM COATED ORAL at 10:12

## 2022-04-15 RX ADMIN — OXYCODONE HYDROCHLORIDE 5 MILLIGRAM(S): 5 TABLET ORAL at 11:54

## 2022-04-15 RX ADMIN — MORPHINE SULFATE 4 MILLIGRAM(S): 50 CAPSULE, EXTENDED RELEASE ORAL at 11:49

## 2022-04-15 RX ADMIN — OXYCODONE HYDROCHLORIDE 5 MILLIGRAM(S): 5 TABLET ORAL at 10:12

## 2022-04-15 NOTE — ED PROVIDER NOTE - NS ED ATTENDING STATEMENT MOD
This was a shared visit with the ZOE. I reviewed and verified the documentation and independently performed the documented:

## 2022-04-15 NOTE — ED PROVIDER NOTE - PROGRESS NOTE DETAILS
EP: The patient still in pain, another dose of pain is given.  Will reassess. EP: Another dose of analgesia was given, patient still in pain, will admit for pain management of intractable pain.

## 2022-04-15 NOTE — ED PROVIDER NOTE - CLINICAL SUMMARY MEDICAL DECISION MAKING FREE TEXT BOX
57-year-old female PMH PAD, GERD, elevated cholesterol, TRAVIS on CPAP, asthma, DM, HTN, HLD, chronic back pain presenting for evaluation of acute right lower back pain since yesterday.  Patient states she has chronic back pain, sees pain management doctor, went for physical therapy yesterday, upon returning home developed right lower back pain last night.  Went to sleep, woke up this morning had as appointment with a rheumatologist, did not take any of her medications, did not go to see the rheumatologist,   drove herself to ED instead. Denies any associated fever chills, no abdominal pain, nausea vomiting, no bowel bladder dysfunction, no focal weakness.  The patient had an MRI of her lumbar spine on 4/6/2022 showing spinal stenosis and mild multilevel lumbar spondylolysis and facet arthropathy worse at L4-5. Patient denies any alcohol or drug use .Middle-aged female sitting on stretcher appears tearful due to pain, PERRLA, pink conjunctiva, normal forced breathing, speaking full sentences, lungs clear to auscultation, RRR, well-perfused extremities, abdomen soft nontender nondistended, no midline spine TTP,+ right lower lumbar/sacral pain paraspinal muscle tenderness to palpation, no unilateral leg edema or calf tenderness to palpation, patient is awake and alert, no gross neuro deficits.  Plan: Pain medications, reassess.

## 2022-04-15 NOTE — PATIENT PROFILE ADULT - VISION (WITH CORRECTIVE LENSES IF THE PATIENT USUALLY WEARS THEM):
Primary care MD,   Phone: (   )    -  Fax: (   )    -  Follow Up Time: 1 week   Normal vision: sees adequately in most situations; can see medication labels, newsprint

## 2022-04-15 NOTE — ED PROVIDER NOTE - ATTENDING CONTRIBUTION TO CARE
57-year-old female PMH PAD, GERD, elevated cholesterol, TRAVIS on CPAP, asthma, DM, HTN, HLD, chronic back pain presenting for evaluation of acute right lower back pain since yesterday.  Patient states she has chronic back pain, sees pain management doctor, went for physical therapy yesterday, upon returning home developed right lower back pain last night.  Went to sleep, woke up this morning had as appointment with a rheumatologist, did not take any of her medications, did not go to see the rheumatologist,   drove herself to ED instead. Denies any associated fever chills, no abdominal pain, nausea vomiting, no bowel bladder dysfunction, no focal weakness.  The patient had an MRI of her lumbar spine on 4/6/2022 showing spinal stenosis and mild multilevel lumbar spondylolysis and facet arthropathy worse at L4-5. Patient denies any alcohol or drug use .Middle-aged female sitting on stretcher appears tearful due to pain, PERRLA, pink conjunctiva, normal forced breathing, speaking full sentences, lungs clear to auscultation, RRR, well-perfused extremities, abdomen soft nontender nondistended, no midline spine TTP,+ right lower lumbar/sacral pain paraspinal muscle tenderness to palpation, no unilateral leg edema or calf tenderness to palpation, patient is awake and alert, no gross neuro deficits.  Impression: acute exacerbation chronic back pain.  Plan: Pain medications, reassess.

## 2022-04-15 NOTE — ED PROVIDER NOTE - OBJECTIVE STATEMENT
57 year old female with a history of DM, HTN, HLD, TRAVIS, L sided endolphthalmitis, PAD w stents, and back pain presents to the ED with acute on chronic low back pain. Patient follows with pain management for Low back pain and was scheduled for a rheumatology appointment this morning. Severe right sided low back pain woke her from sleep this morning described as sharp and cramping radiating to right knee. Admits to numbness and tingling of the right thigh but denies bladder and bowel incontinence. Patient has Oxycodone, Gabapentin, and baclofen for low back pain but did not take her medications this morning. Denies recent heavy lifting, falls or trauma. Further denies skin rash, fever, chills, nausea, vomiting, abdominal pain, dysuria, hematuria. Denies history of kidney stones. Of note, recent MRI 04/06 revealed "Mild multilevel lumbar spondylosis and facet arthropathies, worse at L4-5 with mild-moderate spinal stenosis and mild bilateral foraminal stenosis."

## 2022-04-15 NOTE — PATIENT PROFILE ADULT - FALL HARM RISK - HARM RISK INTERVENTIONS
Assistance with ambulation/Assistance OOB with selected safe patient handling equipment/Communicate Risk of Fall with Harm to all staff/Discuss with provider need for PT consult/Monitor gait and stability/Reinforce activity limits and safety measures with patient and family/Tailored Fall Risk Interventions/Visual Cue: Yellow wristband and red socks/Bed in lowest position, wheels locked, appropriate side rails in place/Call bell, personal items and telephone in reach/Instruct patient to call for assistance before getting out of bed or chair/Non-slip footwear when patient is out of bed/[place] to call system/Physically safe environment - no spills, clutter or unnecessary equipment/Purposeful Proactive Rounding/Room/bathroom lighting operational, light cord in reach

## 2022-04-15 NOTE — ED PROVIDER NOTE - NSICDXPASTMEDICALHX_GEN_ALL_CORE_FT
PAST MEDICAL HISTORY:  Arthritis OA    Asthma LAST EPISODE SEVERAL YRS    Diabetes     GERD (gastroesophageal reflux disease)     High cholesterol     History of peripheral arterial disease     HTN (hypertension)     TRAIVS on CPAP

## 2022-04-15 NOTE — ED PROVIDER NOTE - NS ED ROS FT
Constitutional: (-) fever  Eyes/ENT: (-) blurry vision, (-) epistaxis  Cardiovascular: (-) chest pain, (-) syncope  Respiratory: (-) cough, (-) shortness of breath  Gastrointestinal: (-) vomiting, (-) diarrhea  : (-) dysuria (-) hematuria   Musculoskeletal: (-) neck pain, (+) right low back pain radiating to right leg   Integumentary: (-) rash, (-) edema  Neurological: (-) headache, (-) altered mental status  Psychiatric: (-) hallucinations  Allergic/Immunologic: (-) pruritus

## 2022-04-15 NOTE — ED PROVIDER NOTE - PHYSICAL EXAMINATION
Physical Exam    Vital Signs: I have reviewed the initial vital signs.  Constitutional: well-nourished. Tearful on exam.   Eyes: Conjunctiva pink, Sclera clear, PERRLA, EOMI.  Cardiovascular: S1 and S2, regular rate, regular rhythm, well-perfused extremities, radial pulses equal and 2+  Respiratory: unlabored respiratory effort, clear to auscultation bilaterally no wheezing, rales and rhonchi  Gastrointestinal: soft, non-tender abdomen, no pulsatile mass, normal bowel sounds  Musculoskeletal: supple neck, no midline tenderness. Tenderness of right low back/flank.   Integumentary: warm, dry, no rash  Neurologic: awake, alert, cranial nerves II-XII grossly intact, extremities’ motor functions grossly intact. Decreased sensation over right proximal thigh. Strength intact.

## 2022-04-16 DIAGNOSIS — Z98.890 OTHER SPECIFIED POSTPROCEDURAL STATES: Chronic | ICD-10-CM

## 2022-04-16 LAB
A1C WITH ESTIMATED AVERAGE GLUCOSE RESULT: 8.5 % — HIGH (ref 4–5.6)
ALBUMIN SERPL ELPH-MCNC: 4.2 G/DL — SIGNIFICANT CHANGE UP (ref 3.5–5.2)
ALP SERPL-CCNC: 89 U/L — SIGNIFICANT CHANGE UP (ref 30–115)
ALT FLD-CCNC: 24 U/L — SIGNIFICANT CHANGE UP (ref 0–41)
ANION GAP SERPL CALC-SCNC: 13 MMOL/L — SIGNIFICANT CHANGE UP (ref 7–14)
AST SERPL-CCNC: 17 U/L — SIGNIFICANT CHANGE UP (ref 0–41)
BASOPHILS # BLD AUTO: 0.02 K/UL — SIGNIFICANT CHANGE UP (ref 0–0.2)
BASOPHILS NFR BLD AUTO: 0.3 % — SIGNIFICANT CHANGE UP (ref 0–1)
BILIRUB SERPL-MCNC: 0.3 MG/DL — SIGNIFICANT CHANGE UP (ref 0.2–1.2)
BUN SERPL-MCNC: 8 MG/DL — LOW (ref 10–20)
CALCIUM SERPL-MCNC: 9.2 MG/DL — SIGNIFICANT CHANGE UP (ref 8.5–10.1)
CHLORIDE SERPL-SCNC: 106 MMOL/L — SIGNIFICANT CHANGE UP (ref 98–110)
CHOLEST SERPL-MCNC: 228 MG/DL — HIGH
CO2 SERPL-SCNC: 25 MMOL/L — SIGNIFICANT CHANGE UP (ref 17–32)
CREAT SERPL-MCNC: 0.6 MG/DL — LOW (ref 0.7–1.5)
EGFR: 105 ML/MIN/1.73M2 — SIGNIFICANT CHANGE UP
EOSINOPHIL # BLD AUTO: 0.2 K/UL — SIGNIFICANT CHANGE UP (ref 0–0.7)
EOSINOPHIL NFR BLD AUTO: 3.3 % — SIGNIFICANT CHANGE UP (ref 0–8)
ESTIMATED AVERAGE GLUCOSE: 197 MG/DL — HIGH (ref 68–114)
GLUCOSE BLDC GLUCOMTR-MCNC: 109 MG/DL — HIGH (ref 70–99)
GLUCOSE BLDC GLUCOMTR-MCNC: 120 MG/DL — HIGH (ref 70–99)
GLUCOSE BLDC GLUCOMTR-MCNC: 139 MG/DL — HIGH (ref 70–99)
GLUCOSE BLDC GLUCOMTR-MCNC: 146 MG/DL — HIGH (ref 70–99)
GLUCOSE SERPL-MCNC: 115 MG/DL — HIGH (ref 70–99)
HCT VFR BLD CALC: 39.5 % — SIGNIFICANT CHANGE UP (ref 37–47)
HDLC SERPL-MCNC: 56 MG/DL — SIGNIFICANT CHANGE UP
HGB BLD-MCNC: 13 G/DL — SIGNIFICANT CHANGE UP (ref 12–16)
IMM GRANULOCYTES NFR BLD AUTO: 0.2 % — SIGNIFICANT CHANGE UP (ref 0.1–0.3)
LIPID PNL WITH DIRECT LDL SERPL: 153 MG/DL — HIGH
LYMPHOCYTES # BLD AUTO: 2.14 K/UL — SIGNIFICANT CHANGE UP (ref 1.2–3.4)
LYMPHOCYTES # BLD AUTO: 35.3 % — SIGNIFICANT CHANGE UP (ref 20.5–51.1)
MAGNESIUM SERPL-MCNC: 1.7 MG/DL — LOW (ref 1.8–2.4)
MCHC RBC-ENTMCNC: 28.4 PG — SIGNIFICANT CHANGE UP (ref 27–31)
MCHC RBC-ENTMCNC: 32.9 G/DL — SIGNIFICANT CHANGE UP (ref 32–37)
MCV RBC AUTO: 86.2 FL — SIGNIFICANT CHANGE UP (ref 81–99)
MONOCYTES # BLD AUTO: 0.65 K/UL — HIGH (ref 0.1–0.6)
MONOCYTES NFR BLD AUTO: 10.7 % — HIGH (ref 1.7–9.3)
NEUTROPHILS # BLD AUTO: 3.05 K/UL — SIGNIFICANT CHANGE UP (ref 1.4–6.5)
NEUTROPHILS NFR BLD AUTO: 50.2 % — SIGNIFICANT CHANGE UP (ref 42.2–75.2)
NON HDL CHOLESTEROL: 172 MG/DL — HIGH
NRBC # BLD: 0 /100 WBCS — SIGNIFICANT CHANGE UP (ref 0–0)
PLATELET # BLD AUTO: 359 K/UL — SIGNIFICANT CHANGE UP (ref 130–400)
POTASSIUM SERPL-MCNC: 3.1 MMOL/L — LOW (ref 3.5–5)
POTASSIUM SERPL-SCNC: 3.1 MMOL/L — LOW (ref 3.5–5)
PROT SERPL-MCNC: 6.2 G/DL — SIGNIFICANT CHANGE UP (ref 6–8)
RBC # BLD: 4.58 M/UL — SIGNIFICANT CHANGE UP (ref 4.2–5.4)
RBC # FLD: 12.8 % — SIGNIFICANT CHANGE UP (ref 11.5–14.5)
SODIUM SERPL-SCNC: 144 MMOL/L — SIGNIFICANT CHANGE UP (ref 135–146)
TRIGL SERPL-MCNC: 96 MG/DL — SIGNIFICANT CHANGE UP
WBC # BLD: 6.07 K/UL — SIGNIFICANT CHANGE UP (ref 4.8–10.8)
WBC # FLD AUTO: 6.07 K/UL — SIGNIFICANT CHANGE UP (ref 4.8–10.8)

## 2022-04-16 PROCEDURE — 99253 IP/OBS CNSLTJ NEW/EST LOW 45: CPT

## 2022-04-16 PROCEDURE — 99223 1ST HOSP IP/OBS HIGH 75: CPT

## 2022-04-16 PROCEDURE — 71045 X-RAY EXAM CHEST 1 VIEW: CPT | Mod: 26

## 2022-04-16 RX ORDER — SENNA PLUS 8.6 MG/1
2 TABLET ORAL AT BEDTIME
Refills: 0 | Status: DISCONTINUED | OUTPATIENT
Start: 2022-04-16 | End: 2022-04-18

## 2022-04-16 RX ORDER — CYCLOBENZAPRINE HYDROCHLORIDE 10 MG/1
1 TABLET, FILM COATED ORAL
Qty: 0 | Refills: 0 | DISCHARGE

## 2022-04-16 RX ORDER — ATORVASTATIN CALCIUM 80 MG/1
40 TABLET, FILM COATED ORAL AT BEDTIME
Refills: 0 | Status: DISCONTINUED | OUTPATIENT
Start: 2022-04-16 | End: 2022-04-18

## 2022-04-16 RX ORDER — METHOCARBAMOL 500 MG/1
1 TABLET, FILM COATED ORAL
Qty: 0 | Refills: 0 | DISCHARGE

## 2022-04-16 RX ORDER — PRASUGREL 5 MG/1
10 TABLET, FILM COATED ORAL DAILY
Refills: 0 | Status: DISCONTINUED | OUTPATIENT
Start: 2022-04-16 | End: 2022-04-18

## 2022-04-16 RX ORDER — AMLODIPINE BESYLATE 2.5 MG/1
10 TABLET ORAL DAILY
Refills: 0 | Status: DISCONTINUED | OUTPATIENT
Start: 2022-04-16 | End: 2022-04-18

## 2022-04-16 RX ORDER — MAGNESIUM SULFATE 500 MG/ML
2 VIAL (ML) INJECTION ONCE
Refills: 0 | Status: COMPLETED | OUTPATIENT
Start: 2022-04-16 | End: 2022-04-16

## 2022-04-16 RX ORDER — (INSULIN DEGLUDEC AND LIRAGLUTIDE) 100; 3.6 [IU]/ML; MG/ML
0 INJECTION, SOLUTION SUBCUTANEOUS
Qty: 0 | Refills: 0 | DISCHARGE

## 2022-04-16 RX ORDER — INSULIN GLARGINE 100 [IU]/ML
80 INJECTION, SOLUTION SUBCUTANEOUS EVERY MORNING
Refills: 0 | Status: DISCONTINUED | OUTPATIENT
Start: 2022-04-16 | End: 2022-04-18

## 2022-04-16 RX ORDER — CARBAMAZEPINE 200 MG
200 TABLET ORAL
Refills: 0 | Status: DISCONTINUED | OUTPATIENT
Start: 2022-04-16 | End: 2022-04-18

## 2022-04-16 RX ORDER — MELOXICAM 15 MG/1
1 TABLET ORAL
Qty: 0 | Refills: 0 | DISCHARGE

## 2022-04-16 RX ORDER — LISINOPRIL 2.5 MG/1
40 TABLET ORAL DAILY
Refills: 0 | Status: DISCONTINUED | OUTPATIENT
Start: 2022-04-16 | End: 2022-04-18

## 2022-04-16 RX ORDER — FAMOTIDINE 10 MG/ML
1 INJECTION INTRAVENOUS
Qty: 0 | Refills: 0 | DISCHARGE

## 2022-04-16 RX ORDER — DEXTROSE 10 % IN WATER 10 %
250 INTRAVENOUS SOLUTION INTRAVENOUS ONCE
Refills: 0 | Status: DISCONTINUED | OUTPATIENT
Start: 2022-04-16 | End: 2022-04-18

## 2022-04-16 RX ORDER — INSULIN LISPRO 100/ML
VIAL (ML) SUBCUTANEOUS
Refills: 0 | Status: DISCONTINUED | OUTPATIENT
Start: 2022-04-16 | End: 2022-04-18

## 2022-04-16 RX ORDER — BACLOFEN 100 %
5 POWDER (GRAM) MISCELLANEOUS THREE TIMES A DAY
Refills: 0 | Status: DISCONTINUED | OUTPATIENT
Start: 2022-04-16 | End: 2022-04-18

## 2022-04-16 RX ORDER — GABAPENTIN 400 MG/1
300 CAPSULE ORAL
Refills: 0 | Status: DISCONTINUED | OUTPATIENT
Start: 2022-04-16 | End: 2022-04-18

## 2022-04-16 RX ORDER — ERGOCALCIFEROL 1.25 MG/1
50000 CAPSULE ORAL
Refills: 0 | Status: DISCONTINUED | OUTPATIENT
Start: 2022-04-16 | End: 2022-04-18

## 2022-04-16 RX ORDER — PANTOPRAZOLE SODIUM 20 MG/1
40 TABLET, DELAYED RELEASE ORAL
Refills: 0 | Status: DISCONTINUED | OUTPATIENT
Start: 2022-04-16 | End: 2022-04-18

## 2022-04-16 RX ORDER — POTASSIUM CHLORIDE 20 MEQ
40 PACKET (EA) ORAL ONCE
Refills: 0 | Status: COMPLETED | OUTPATIENT
Start: 2022-04-16 | End: 2022-04-16

## 2022-04-16 RX ORDER — LANOLIN ALCOHOL/MO/W.PET/CERES
3 CREAM (GRAM) TOPICAL AT BEDTIME
Refills: 0 | Status: DISCONTINUED | OUTPATIENT
Start: 2022-04-16 | End: 2022-04-18

## 2022-04-16 RX ORDER — OXYCODONE AND ACETAMINOPHEN 5; 325 MG/1; MG/1
2 TABLET ORAL EVERY 12 HOURS
Refills: 0 | Status: DISCONTINUED | OUTPATIENT
Start: 2022-04-16 | End: 2022-04-18

## 2022-04-16 RX ADMIN — Medication 40 MILLIEQUIVALENT(S): at 13:09

## 2022-04-16 RX ADMIN — ERGOCALCIFEROL 50000 UNIT(S): 1.25 CAPSULE ORAL at 11:51

## 2022-04-16 RX ADMIN — OXYCODONE AND ACETAMINOPHEN 2 TABLET(S): 5; 325 TABLET ORAL at 01:32

## 2022-04-16 RX ADMIN — Medication 5 MILLIGRAM(S): at 13:10

## 2022-04-16 RX ADMIN — INSULIN GLARGINE 80 UNIT(S): 100 INJECTION, SOLUTION SUBCUTANEOUS at 08:06

## 2022-04-16 RX ADMIN — Medication 200 MILLIGRAM(S): at 17:38

## 2022-04-16 RX ADMIN — ATORVASTATIN CALCIUM 40 MILLIGRAM(S): 80 TABLET, FILM COATED ORAL at 21:32

## 2022-04-16 RX ADMIN — Medication 25 GRAM(S): at 17:38

## 2022-04-16 RX ADMIN — PRASUGREL 10 MILLIGRAM(S): 5 TABLET, FILM COATED ORAL at 11:51

## 2022-04-16 RX ADMIN — GABAPENTIN 300 MILLIGRAM(S): 400 CAPSULE ORAL at 17:37

## 2022-04-16 RX ADMIN — OXYCODONE AND ACETAMINOPHEN 2 TABLET(S): 5; 325 TABLET ORAL at 13:09

## 2022-04-16 RX ADMIN — Medication 20 MILLIGRAM(S): at 05:30

## 2022-04-16 RX ADMIN — LISINOPRIL 40 MILLIGRAM(S): 2.5 TABLET ORAL at 05:30

## 2022-04-16 RX ADMIN — PANTOPRAZOLE SODIUM 40 MILLIGRAM(S): 20 TABLET, DELAYED RELEASE ORAL at 05:30

## 2022-04-16 RX ADMIN — OXYCODONE AND ACETAMINOPHEN 2 TABLET(S): 5; 325 TABLET ORAL at 13:40

## 2022-04-16 RX ADMIN — SENNA PLUS 2 TABLET(S): 8.6 TABLET ORAL at 21:32

## 2022-04-16 RX ADMIN — Medication 5 MILLIGRAM(S): at 05:29

## 2022-04-16 RX ADMIN — OXYCODONE AND ACETAMINOPHEN 2 TABLET(S): 5; 325 TABLET ORAL at 07:06

## 2022-04-16 RX ADMIN — Medication 5 MILLIGRAM(S): at 21:33

## 2022-04-16 RX ADMIN — GABAPENTIN 300 MILLIGRAM(S): 400 CAPSULE ORAL at 05:30

## 2022-04-16 RX ADMIN — Medication 200 MILLIGRAM(S): at 05:35

## 2022-04-16 RX ADMIN — AMLODIPINE BESYLATE 10 MILLIGRAM(S): 2.5 TABLET ORAL at 02:43
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
## 2022-04-16 NOTE — OCCUPATIONAL THERAPY INITIAL EVALUATION ADULT - TRANSFER TRAINING, PT EVAL
Patient will perform bed mobility independently by discharge ; Patient will perform toilet transfer independently with appropriate DME by discharge.

## 2022-04-16 NOTE — PHYSICAL THERAPY INITIAL EVALUATION ADULT - PERTINENT HX OF CURRENT PROBLEM, REHAB EVAL
Admitted fro acute on chronic LBP R sided with sensory changes and radiation on RLE, worse with sitting and standing, present at rest. PMH as below.

## 2022-04-16 NOTE — H&P ADULT - NSHPSOCIALHISTORY_GEN_ALL_CORE
Currently . Works at [employer]. Prior to admission, patient able to independently ambulate w/o mechanical assistance and perform all ADL's/iADLS. No reported tobacco, alcohol, or illicit drug use.
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
## 2022-04-16 NOTE — OCCUPATIONAL THERAPY INITIAL EVALUATION ADULT - PLANNED THERAPY INTERVENTIONS, OT EVAL
ADL retraining/balance training/fine motor coordination training/motor coordination training/parent/caregiver training.../ROM/strengthening/stretching/transfer training

## 2022-04-16 NOTE — H&P ADULT - NSHPOUTPATIENTPROVIDERS_GEN_ALL_CORE
PCP: Dr. Marcelo Erickson  Neurosurgery: Dr. Yoni Haq  Endocrinologist: Dr. José Jensen  Rheumatologist: Dr. Gilma Lopez

## 2022-04-16 NOTE — H&P ADULT - NSICDXPASTSURGICALHX_GEN_ALL_CORE_FT
PAST SURGICAL HISTORY:  H/O arthroscopy of shoulder     H/O carpal tunnel repair     H/O Spinal surgery C5-6 and C6-7 ACDF in March 2021    S/P trigger finger release

## 2022-04-16 NOTE — H&P ADULT - NSHPLABSRESULTS_GEN_ALL_CORE
13.8   9.69  )-----------( 372      ( 04-15-22 @ 14:04 )             41.8     147  |  108  |  7   -------------------------<  111   04-15-22 @ 14:04  4.6  |  25  |  0.6    Ca      9.6     04-15-22 @ 14:04      MR Lumbar Spine No Cont (04.06.22 @ 09:50)  1. No conus/nerve root compression or signal abnormality  2. Mild multilevel lumbar spondylosis and facet arthropathies, worse at L4-5 with mild-moderate spinal stenosis and mild bilateral foraminal stenosis

## 2022-04-16 NOTE — OCCUPATIONAL THERAPY INITIAL EVALUATION ADULT - ADL RETRAINING, OT EVAL
Patient will perform lower body dressing with supervision with use of appropriate adaptive equipment as needed by discharge. ; Patient will perform bathing independently with use of appropriate adaptive equipment and/or DME by discharge.

## 2022-04-16 NOTE — H&P ADULT - NSICDXPASTMEDICALHX_GEN_ALL_CORE_FT
PAST MEDICAL HISTORY:  Arthritis OA    Asthma LAST EPISODE SEVERAL YRS    Diabetes     Dyslipidemia     GERD (gastroesophageal reflux disease)     History of peripheral arterial disease     HTN (hypertension)     TRAVIS on CPAP

## 2022-04-16 NOTE — H&P ADULT - ASSESSMENT
57F w/ h/o HTN, DM, DLD, PAD (s/p stents), and chronic back pain p/w acute exacerbation of lower back pain.     #Acute on Chronic Lower Back Pain  - c/w gabapentin 300mg PO BID  - c/w baclofen 5mg PO TID  - c/w Percocet 2 tabs PO BID PRN  - Pain management consulted; f/u recs  - PT/OT consulted; f/u recs    #Chronic Endophthalmitis  - c/w prednisone 20mg PO QD    #Diabetes Mellitus  #Dyslipidemia  A1c 10.5% in June 2021. DM managed w/ Trujeo, Ozempic .25mg QW, and Farxiga 10mg QD. DLD managed w/ atorvastatin 40mg QHS and ezetimibe 10mg QD.   - f/u A1c and lipid panel (no recent values available in HIE)  - Monitor FS TIDAC and QHS  - c/w Lantus U SQ QHS  - c/w Lispro U SQ TIDAC  - c/w high-dose ISS  - c/w atorvastatin 40mg PO QHS    #Peripheral Artery Disease  S/P stent in both lower extremities. Managed w/ prasugrel 10mg QD.  - c/w prasugrel 10mg PO QD    #Hypertension  Managed w/ amlodipine-benazapril 10mg-40mg QD.  - c/w amlodipine 10mg PO QD  - c/w lisinopril 40mg PO QD (therapeutic interchange for benazapril)    #GERD  - c/w pantoprazole 40mg PO QD    #Vitamin D Deficiency  - c/w ergocalciferol (D2) 1.25mg PO QW    DVT PPX: none indicated (IMPROVEDD score 0)  GI PPX: pantoprazole 40mg PO QD (home med)  DIET: DASH/TLC + CC  ACTIVITY: IAT  CODE STATUS: Full Code  DISPOSITION: From Home    PENDING: pain management consult; PT/OT consult 57F w/ h/o HTN, DM, DLD, PAD (s/p stents), and chronic back pain p/w acute exacerbation of lower back pain.     #Acute on Chronic Lower Back Pain  Initially p/w bilateral lower back pain. Likely secondary to muscle spasms given b/l paraspinal muscle tenderness on exam. Patient has chronic low back pain that was previously well-managed w/ gabapentin 300mg BID, baclofen 5mg TID, and Percocet 10mg BID. Recent MRI demonstrates mild L4-5 spinal and b/l foraminal stenosis.   - c/w gabapentin 300mg PO BID  - c/w baclofen 5mg PO TID  - c/w Percocet 2 tabs PO BID PRN  - Pain management consulted; f/u recs  - PT/OT consulted; f/u recs    #Chronic Orbital Cellulitis, Left  #Dacryoadenitis, Left  Admitted in July 2021 for left orbital cellulitis. Symptoms initially resolved after treatment, but pt has had persistent left orbital swelling and erythema. Initially on prednisone 60mg QD, but since tapered down to prednisone 20mg QD. Recently underwent left lacrimal gland biopsy w/ opthalmology and awaiting results. Follows w/ Dr. Lopez (rheumatology).  - c/w prednisone 20mg PO QD    #Diabetes Mellitus  #Dyslipidemia  A1c 10.5% in June 2021. DM managed w/ Trujeo, Ozempic .25mg QW, and Farxiga 10mg QD. DLD managed w/ atorvastatin 40mg QHS and ezetimibe 10mg QD.   - f/u A1c and lipid panel (no recent values available in HIE)  - Monitor FS TIDAC and QHS  - c/w Lantus U SQ QHS  - c/w Lispro U SQ TIDAC  - c/w high-dose ISS  - c/w atorvastatin 40mg PO QHS    #Peripheral Artery Disease  S/P stent in both lower extremities. Managed w/ prasugrel 10mg QD.  - c/w prasugrel 10mg PO QD    #Hypertension  Managed w/ amlodipine-benazapril 10mg-40mg QD.  - c/w amlodipine 10mg PO QD  - c/w lisinopril 40mg PO QD (therapeutic interchange for benazapril)    #GERD  - c/w pantoprazole 40mg PO QD    #Vitamin D Deficiency  - c/w ergocalciferol (D2) 1.25mg PO QW    DVT PPX: none indicated (IMPROVEDD score 0)  GI PPX: pantoprazole 40mg PO QD (home med)  DIET: DASH/TLC + CC  ACTIVITY: IAT  CODE STATUS: Full Code  DISPOSITION: From Home    PENDING: pain management consult; PT/OT consult 57F w/ h/o HTN, DM, DLD, PAD (s/p stents), and chronic back pain p/w acute exacerbation of lower back pain.     #Acute on Chronic Lower Back Pain  Initially p/w bilateral lower back pain. Likely secondary to muscle spasms given b/l paraspinal muscle tenderness on exam. Patient has chronic low back pain that was previously well-managed w/ gabapentin 300mg BID, baclofen 5mg TID, and Percocet 10mg BID. Recent MRI demonstrates mild L4-5 spinal and b/l foraminal stenosis.   - c/w gabapentin 300mg PO BID  - c/w baclofen 5mg PO TID  - c/w Percocet 2 tabs PO BID PRN  - Pain management consulted; f/u recs  - PT/OT consulted; f/u recs    #Chronic Orbital Cellulitis, Left  #Dacryoadenitis, Left  Admitted in July 2021 for left orbital cellulitis. Symptoms initially resolved after treatment, but pt has had persistent left orbital swelling and erythema. Initially on prednisone 60mg QD, but since tapered down to prednisone 20mg QD. Recently underwent left lacrimal gland biopsy w/ opthalmology and awaiting results. Follows w/ Dr. Lopez (rheumatology).  - c/w prednisone 20mg PO QD    #Diabetes Mellitus  #Dyslipidemia  A1c 10.5% in June 2021. DM managed w/ Trujeo 80U QD, Ozempic .25mg QW, and Farxiga 10mg QD. DLD managed w/ atorvastatin 40mg QHS and ezetimibe 10mg QD.   - f/u A1c and lipid panel (no recent values available in HIE)  - Monitor FS TIDAC and QHS  - c/w Lantus 80U SQ QAM  - c/w high-dose ISS  - c/w atorvastatin 40mg PO QHS    #Peripheral Artery Disease  S/P stent in both lower extremities. Managed w/ prasugrel 10mg QD.  - c/w prasugrel 10mg PO QD    #Hypertension  Managed w/ amlodipine-benazapril 10mg-40mg QD.  - c/w amlodipine 10mg PO QD  - c/w lisinopril 40mg PO QD (therapeutic interchange for benazapril)    #GERD  - c/w pantoprazole 40mg PO QD    #Vitamin D Deficiency  - c/w ergocalciferol (D2) 1.25mg PO QW    DVT PPX: none indicated (IMPROVEDD score 0)  GI PPX: pantoprazole 40mg PO QD (home med)  DIET: DASH/TLC + CC  ACTIVITY: IAT  CODE STATUS: Full Code  DISPOSITION: From Home    PENDING: pain management consult; PT/OT consult

## 2022-04-16 NOTE — PHYSICAL THERAPY INITIAL EVALUATION ADULT - GAIT DEVIATIONS NOTED, PT EVAL
decreased ponce/increased time in double stance/decreased velocity of limb motion/decreased step length/decreased weight-shifting ability

## 2022-04-16 NOTE — OCCUPATIONAL THERAPY INITIAL EVALUATION ADULT - LIGHT TOUCH SENSATION, RUE, REHAB EVAL
decreased sensation at lateral aspect of upper portion of arm, decreased at palm of hand and in posterior elbow region/moderate impairment

## 2022-04-16 NOTE — OCCUPATIONAL THERAPY INITIAL EVALUATION ADULT - REHAB POTENTIAL, OT EVAL
[facility] Emergency Department  [address] [address]  Phone:  [phone]  Fax:  [phone]                                    Carolina Hernandez   MRN: [id number]    Department:  [facility] Emergency Department   Date of Visit:  10/5/2019           After Visit Summary Signature Page    I have received my discharge instructions, and my questions have been answered. I have discussed any challenges I see with this plan with the nurse or doctor.    ..........................................................................................................................................  Patient/Patient Representative Signature      ..........................................................................................................................................  Patient Representative Print Name and Relationship to Patient    ..................................................               ................................................  Date                                   Time    ..........................................................................................................................................  Reviewed by Signature/Title    ...................................................              ..............................................  Date                                               Time          22EPIC Rev 08/18        good, to achieve stated therapy goals
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
## 2022-04-16 NOTE — OCCUPATIONAL THERAPY INITIAL EVALUATION ADULT - PERTINENT HX OF CURRENT PROBLEM, REHAB EVAL
57F w/ h/o HTN, DM, DLD, PAD (s/p stents), and chronic back pain p/w acute exacerbation of lower back pain. Patient describes bilateral, cramping, lower back pain that woke her from sleep. Pain a/w numbness and tingling radiating down to bilateral knees

## 2022-04-16 NOTE — PHYSICAL THERAPY INITIAL EVALUATION ADULT - GENERAL OBSERVATIONS, REHAB EVAL
Chart reviewed. Pt. in bed upon PT and OT arrival. C/o as noted in flowsheets. Agreeable to PT and OT evaluations.
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
## 2022-04-16 NOTE — H&P ADULT - NSHPPHYSICALEXAM_GEN_ALL_CORE
VITALS  T(C): 37.4 (04-16-22 @ 00:00), Max: 37.7 (04-15-22 @ 17:54)  HR: 107 (04-16-22 @ 00:00) (104 - 107)  BP: 183/92 (04-16-22 @ 00:00) (137/78 - 204/98)  RR: 19 (04-16-22 @ 00:00) (19 - 20)  SpO2: 100% (04-15-22 @ 17:54) (95% - 100%)    PHYSICAL EXAM  GENERAL: NAD, well-developed  NEURO: AO x3, PERRLA, EOMI, motor strength intact in 4/4 extremities, sensation intact  HEAD:  Atraumatic, Normocephalic  EYES: conjunctiva and sclera clear  NECK: Supple, No JVD, no lymphadenopathy, no thyromegaly  CHEST/LUNG: Clear to auscultation bilaterally; No wheezes, rales or rhonchi  HEART: Regular rate and rhythm; No murmurs, rubs, or gallops  ABDOMEN: Soft, Nontender, Nondistended; Bowel sounds present, no masses.  EXTREMITIES:  2+ Peripheral Pulses, No clubbing, cyanosis, or edema  MSK: bilateral lumbosacral paraspinal muscle tenderness; no spinal ttp; no CVA tenderness  SKIN: Warm, dry, intact, no rashes or lesions  PSYCH: affect appropriate

## 2022-04-17 LAB
ALBUMIN SERPL ELPH-MCNC: 4.4 G/DL — SIGNIFICANT CHANGE UP (ref 3.5–5.2)
ALP SERPL-CCNC: 82 U/L — SIGNIFICANT CHANGE UP (ref 30–115)
ALT FLD-CCNC: 30 U/L — SIGNIFICANT CHANGE UP (ref 0–41)
ANION GAP SERPL CALC-SCNC: 12 MMOL/L — SIGNIFICANT CHANGE UP (ref 7–14)
AST SERPL-CCNC: 24 U/L — SIGNIFICANT CHANGE UP (ref 0–41)
BASOPHILS # BLD AUTO: 0.04 K/UL — SIGNIFICANT CHANGE UP (ref 0–0.2)
BASOPHILS NFR BLD AUTO: 0.7 % — SIGNIFICANT CHANGE UP (ref 0–1)
BILIRUB SERPL-MCNC: 0.2 MG/DL — SIGNIFICANT CHANGE UP (ref 0.2–1.2)
BUN SERPL-MCNC: 11 MG/DL — SIGNIFICANT CHANGE UP (ref 10–20)
CALCIUM SERPL-MCNC: 9.2 MG/DL — SIGNIFICANT CHANGE UP (ref 8.5–10.1)
CHLORIDE SERPL-SCNC: 108 MMOL/L — SIGNIFICANT CHANGE UP (ref 98–110)
CO2 SERPL-SCNC: 23 MMOL/L — SIGNIFICANT CHANGE UP (ref 17–32)
CREAT SERPL-MCNC: 0.7 MG/DL — SIGNIFICANT CHANGE UP (ref 0.7–1.5)
EGFR: 101 ML/MIN/1.73M2 — SIGNIFICANT CHANGE UP
EOSINOPHIL # BLD AUTO: 0.26 K/UL — SIGNIFICANT CHANGE UP (ref 0–0.7)
EOSINOPHIL NFR BLD AUTO: 4.8 % — SIGNIFICANT CHANGE UP (ref 0–8)
GLUCOSE BLDC GLUCOMTR-MCNC: 113 MG/DL — HIGH (ref 70–99)
GLUCOSE BLDC GLUCOMTR-MCNC: 136 MG/DL — HIGH (ref 70–99)
GLUCOSE BLDC GLUCOMTR-MCNC: 137 MG/DL — HIGH (ref 70–99)
GLUCOSE BLDC GLUCOMTR-MCNC: 175 MG/DL — HIGH (ref 70–99)
GLUCOSE SERPL-MCNC: 131 MG/DL — HIGH (ref 70–99)
HCT VFR BLD CALC: 40.9 % — SIGNIFICANT CHANGE UP (ref 37–47)
HGB BLD-MCNC: 13.5 G/DL — SIGNIFICANT CHANGE UP (ref 12–16)
IMM GRANULOCYTES NFR BLD AUTO: 0.2 % — SIGNIFICANT CHANGE UP (ref 0.1–0.3)
LYMPHOCYTES # BLD AUTO: 2.5 K/UL — SIGNIFICANT CHANGE UP (ref 1.2–3.4)
LYMPHOCYTES # BLD AUTO: 46.4 % — SIGNIFICANT CHANGE UP (ref 20.5–51.1)
MAGNESIUM SERPL-MCNC: 1.8 MG/DL — SIGNIFICANT CHANGE UP (ref 1.8–2.4)
MCHC RBC-ENTMCNC: 28.8 PG — SIGNIFICANT CHANGE UP (ref 27–31)
MCHC RBC-ENTMCNC: 33 G/DL — SIGNIFICANT CHANGE UP (ref 32–37)
MCV RBC AUTO: 87.4 FL — SIGNIFICANT CHANGE UP (ref 81–99)
MONOCYTES # BLD AUTO: 0.49 K/UL — SIGNIFICANT CHANGE UP (ref 0.1–0.6)
MONOCYTES NFR BLD AUTO: 9.1 % — SIGNIFICANT CHANGE UP (ref 1.7–9.3)
NEUTROPHILS # BLD AUTO: 2.09 K/UL — SIGNIFICANT CHANGE UP (ref 1.4–6.5)
NEUTROPHILS NFR BLD AUTO: 38.8 % — LOW (ref 42.2–75.2)
NRBC # BLD: 0 /100 WBCS — SIGNIFICANT CHANGE UP (ref 0–0)
PLATELET # BLD AUTO: 391 K/UL — SIGNIFICANT CHANGE UP (ref 130–400)
POTASSIUM SERPL-MCNC: 3.8 MMOL/L — SIGNIFICANT CHANGE UP (ref 3.5–5)
POTASSIUM SERPL-SCNC: 3.8 MMOL/L — SIGNIFICANT CHANGE UP (ref 3.5–5)
PROT SERPL-MCNC: 6.5 G/DL — SIGNIFICANT CHANGE UP (ref 6–8)
RBC # BLD: 4.68 M/UL — SIGNIFICANT CHANGE UP (ref 4.2–5.4)
RBC # FLD: 12.9 % — SIGNIFICANT CHANGE UP (ref 11.5–14.5)
SODIUM SERPL-SCNC: 143 MMOL/L — SIGNIFICANT CHANGE UP (ref 135–146)
WBC # BLD: 5.39 K/UL — SIGNIFICANT CHANGE UP (ref 4.8–10.8)
WBC # FLD AUTO: 5.39 K/UL — SIGNIFICANT CHANGE UP (ref 4.8–10.8)

## 2022-04-17 PROCEDURE — 99232 SBSQ HOSP IP/OBS MODERATE 35: CPT

## 2022-04-17 RX ORDER — KETOROLAC TROMETHAMINE 30 MG/ML
15 SYRINGE (ML) INJECTION ONCE
Refills: 0 | Status: DISCONTINUED | OUTPATIENT
Start: 2022-04-17 | End: 2022-04-17

## 2022-04-17 RX ORDER — ACETAMINOPHEN 500 MG
650 TABLET ORAL ONCE
Refills: 0 | Status: COMPLETED | OUTPATIENT
Start: 2022-04-17 | End: 2022-04-17

## 2022-04-17 RX ORDER — LIDOCAINE 4 G/100G
1 CREAM TOPICAL ONCE
Refills: 0 | Status: COMPLETED | OUTPATIENT
Start: 2022-04-17 | End: 2022-04-17

## 2022-04-17 RX ADMIN — GABAPENTIN 300 MILLIGRAM(S): 400 CAPSULE ORAL at 05:33

## 2022-04-17 RX ADMIN — Medication 200 MILLIGRAM(S): at 21:39

## 2022-04-17 RX ADMIN — LISINOPRIL 40 MILLIGRAM(S): 2.5 TABLET ORAL at 05:33

## 2022-04-17 RX ADMIN — OXYCODONE AND ACETAMINOPHEN 2 TABLET(S): 5; 325 TABLET ORAL at 01:12

## 2022-04-17 RX ADMIN — Medication 650 MILLIGRAM(S): at 09:07

## 2022-04-17 RX ADMIN — GABAPENTIN 300 MILLIGRAM(S): 400 CAPSULE ORAL at 17:25

## 2022-04-17 RX ADMIN — Medication 5 MILLIGRAM(S): at 21:31

## 2022-04-17 RX ADMIN — OXYCODONE AND ACETAMINOPHEN 2 TABLET(S): 5; 325 TABLET ORAL at 13:50

## 2022-04-17 RX ADMIN — Medication 15 MILLIGRAM(S): at 12:05

## 2022-04-17 RX ADMIN — Medication 650 MILLIGRAM(S): at 09:40

## 2022-04-17 RX ADMIN — Medication 5 MILLIGRAM(S): at 13:12

## 2022-04-17 RX ADMIN — Medication 5 MILLIGRAM(S): at 05:33

## 2022-04-17 RX ADMIN — Medication 20 MILLIGRAM(S): at 05:33

## 2022-04-17 RX ADMIN — PANTOPRAZOLE SODIUM 40 MILLIGRAM(S): 20 TABLET, DELAYED RELEASE ORAL at 05:33

## 2022-04-17 RX ADMIN — PRASUGREL 10 MILLIGRAM(S): 5 TABLET, FILM COATED ORAL at 12:04

## 2022-04-17 RX ADMIN — ATORVASTATIN CALCIUM 40 MILLIGRAM(S): 80 TABLET, FILM COATED ORAL at 21:30

## 2022-04-17 RX ADMIN — Medication 200 MILLIGRAM(S): at 05:36

## 2022-04-17 RX ADMIN — AMLODIPINE BESYLATE 10 MILLIGRAM(S): 2.5 TABLET ORAL at 05:33

## 2022-04-17 RX ADMIN — SENNA PLUS 2 TABLET(S): 8.6 TABLET ORAL at 21:31

## 2022-04-17 RX ADMIN — LIDOCAINE 1 PATCH: 4 CREAM TOPICAL at 10:39

## 2022-04-17 RX ADMIN — OXYCODONE AND ACETAMINOPHEN 2 TABLET(S): 5; 325 TABLET ORAL at 13:11

## 2022-04-17 NOTE — PROVIDER CONTACT NOTE (OTHER) - ACTION/TREATMENT ORDERED:
MD Soto aware. Pain medication administered. Will continue to monitor
MD Pozo aware. BP medication ordered and administered. Will continue to monitor.
MD Whittington aware. Pain meds administered. Will continue to monitor.

## 2022-04-18 ENCOUNTER — TRANSCRIPTION ENCOUNTER (OUTPATIENT)
Age: 57
End: 2022-04-18

## 2022-04-18 VITALS
SYSTOLIC BLOOD PRESSURE: 131 MMHG | HEART RATE: 79 BPM | TEMPERATURE: 98 F | RESPIRATION RATE: 18 BRPM | DIASTOLIC BLOOD PRESSURE: 74 MMHG

## 2022-04-18 DIAGNOSIS — M54.41 LUMBAGO WITH SCIATICA, RIGHT SIDE: ICD-10-CM

## 2022-04-18 LAB
GLUCOSE BLDC GLUCOMTR-MCNC: 137 MG/DL — HIGH (ref 70–99)
GLUCOSE BLDC GLUCOMTR-MCNC: 180 MG/DL — HIGH (ref 70–99)

## 2022-04-18 PROCEDURE — 99239 HOSP IP/OBS DSCHRG MGMT >30: CPT

## 2022-04-18 RX ORDER — GABAPENTIN 400 MG/1
300 CAPSULE ORAL
Qty: 90 | Refills: 0
Start: 2022-04-18 | End: 2022-05-17

## 2022-04-18 RX ORDER — GABAPENTIN 400 MG/1
300 CAPSULE ORAL
Qty: 0 | Refills: 0 | DISCHARGE

## 2022-04-18 RX ADMIN — PRASUGREL 10 MILLIGRAM(S): 5 TABLET, FILM COATED ORAL at 11:21

## 2022-04-18 RX ADMIN — Medication 200 MILLIGRAM(S): at 06:14

## 2022-04-18 RX ADMIN — INSULIN GLARGINE 80 UNIT(S): 100 INJECTION, SOLUTION SUBCUTANEOUS at 07:51

## 2022-04-18 RX ADMIN — Medication 20 MILLIGRAM(S): at 05:41

## 2022-04-18 RX ADMIN — AMLODIPINE BESYLATE 10 MILLIGRAM(S): 2.5 TABLET ORAL at 05:42

## 2022-04-18 RX ADMIN — OXYCODONE AND ACETAMINOPHEN 2 TABLET(S): 5; 325 TABLET ORAL at 01:12

## 2022-04-18 RX ADMIN — GABAPENTIN 300 MILLIGRAM(S): 400 CAPSULE ORAL at 05:40

## 2022-04-18 RX ADMIN — PANTOPRAZOLE SODIUM 40 MILLIGRAM(S): 20 TABLET, DELAYED RELEASE ORAL at 07:52

## 2022-04-18 RX ADMIN — Medication 5 MILLIGRAM(S): at 05:41

## 2022-04-18 RX ADMIN — Medication 2: at 11:57

## 2022-04-18 RX ADMIN — LISINOPRIL 40 MILLIGRAM(S): 2.5 TABLET ORAL at 05:41
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
## 2022-04-18 NOTE — DISCHARGE NOTE PROVIDER - NPI NUMBER (FOR SYSADMIN USE ONLY) :
[[id number]],[[id number]] [[id number]],[[id number]],[[id number]]

## 2022-04-18 NOTE — DISCHARGE NOTE PROVIDER - NSDCMRMEDTOKEN_GEN_ALL_CORE_FT
amlodipine-benazepril 10 mg-40 mg oral capsule: 1 cap(s) orally once a day  atorvastatin 40 mg oral tablet: 1 tab(s) orally once a day  baclofen 5 mg oral tablet: 1 tab(s) orally 3 times a day  carBAMazepine 200 mg oral capsule, extended release: 1 cap(s) orally 2 times a day  docusate sodium 100 mg oral tablet: 1 tab(s) orally once a day  ezetimibe 10 mg oral tablet: 1 tab(s) orally once a day  Farxiga 10 mg oral tablet: 1 tab(s) orally once a day  gabapentin 300 mg oral tablet: 300 milligram(s) orally 2 times a day  Ozempic 2 mg/1.5 mL (0.25 mg or 0.5 mg dose) subcutaneous solution: 0.25 milligram(s) subcutaneous once a week  pantoprazole 40 mg oral delayed release tablet: 1 tab(s) orally once a day  Percocet 10/325 oral tablet: 1 tab(s) orally 2 times a day, As Needed  prasugrel 10 mg oral tablet: 1 tab(s) orally once a day  predniSONE 20 mg oral tablet: 1 tab(s) orally once a day  Toujeo Max SoloStar 300 units/mL subcutaneous solution: 80 unit(s) subcutaneous once a day (in the morning)  Vitamin D2 1.25 mg (50,000 intl units) oral capsule: 1 cap(s) orally once a week   amlodipine-benazepril 10 mg-40 mg oral capsule: 1 cap(s) orally once a day  atorvastatin 40 mg oral tablet: 1 tab(s) orally once a day  baclofen 5 mg oral tablet: 1 tab(s) orally 3 times a day  carBAMazepine 200 mg oral capsule, extended release: 1 cap(s) orally 2 times a day  docusate sodium 100 mg oral tablet: 1 tab(s) orally once a day  ezetimibe 10 mg oral tablet: 1 tab(s) orally once a day  Farxiga 10 mg oral tablet: 1 tab(s) orally once a day  gabapentin 300 mg oral tablet: 300 milligram(s) orally 3 times a day   Ozempic 2 mg/1.5 mL (0.25 mg or 0.5 mg dose) subcutaneous solution: 0.25 milligram(s) subcutaneous once a week  pantoprazole 40 mg oral delayed release tablet: 1 tab(s) orally once a day  Percocet 10/325 oral tablet: 1 tab(s) orally 2 times a day, As Needed  prasugrel 10 mg oral tablet: 1 tab(s) orally once a day  predniSONE 20 mg oral tablet: 1 tab(s) orally once a day  Toujeo Max SoloStar 300 units/mL subcutaneous solution: 80 unit(s) subcutaneous once a day (in the morning)  Tylenol 8 Hour 650 mg oral tablet, extended release: 1 tab(s) orally every 8 hours  Vitamin D2 1.25 mg (50,000 intl units) oral capsule: 1 cap(s) orally once a week   amlodipine-benazepril 10 mg-40 mg oral capsule: 1 cap(s) orally once a day  atorvastatin 40 mg oral tablet: 1 tab(s) orally once a day  baclofen 5 mg oral tablet: 1 tab(s) orally 3 times a day  carBAMazepine 200 mg oral capsule, extended release: 1 cap(s) orally 2 times a day  docusate sodium 100 mg oral tablet: 1 tab(s) orally once a day  ezetimibe 10 mg oral tablet: 1 tab(s) orally once a day  Farxiga 10 mg oral tablet: 1 tab(s) orally once a day  gabapentin 300 mg oral tablet: 300 milligram(s) orally 3 times a day   MiraLax oral powder for reconstitution: 17 gram(s) orally once a day  Ozempic 2 mg/1.5 mL (0.25 mg or 0.5 mg dose) subcutaneous solution: 0.25 milligram(s) subcutaneous once a week  pantoprazole 40 mg oral delayed release tablet: 1 tab(s) orally once a day  Percocet 10/325 oral tablet: 1 tab(s) orally 2 times a day, As Needed  prasugrel 10 mg oral tablet: 1 tab(s) orally once a day  predniSONE 20 mg oral tablet: 1 tab(s) orally once a day  Toujeo Max SoloStar 300 units/mL subcutaneous solution: 80 unit(s) subcutaneous once a day (in the morning)  Tylenol 8 Hour 650 mg oral tablet, extended release: 1 tab(s) orally every 8 hours  Vitamin D2 1.25 mg (50,000 intl units) oral capsule: 1 cap(s) orally once a week

## 2022-04-18 NOTE — DISCHARGE NOTE NURSING/CASE MANAGEMENT/SOCIAL WORK - PATIENT PORTAL LINK FT
You can access the FollowMyHealth Patient Portal offered by [facility] by registering at the following website: [email or web address]. By joining [facility]’s FollowMyHealth portal, you will also be able to view your health information using other applications (apps) compatible with our system.
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
## 2022-04-18 NOTE — DISCHARGE NOTE PROVIDER - NSDCFUSCHEDAPPT_GEN_ALL_CORE_FT
AMBER KU R ; 05/05/2022 ; [facility] [address] ; 05/05/2022 ; [facility] [address]

## 2022-04-18 NOTE — DISCHARGE NOTE PROVIDER - CARE PROVIDERS DIRECT ADDRESSES
,[email or web address],DirectAddress_Unknown,DirectAddress_Unknown,DirectAddress_Unknown

## 2022-04-18 NOTE — PROGRESS NOTE ADULT - SUBJECTIVE AND OBJECTIVE BOX
SUBJECTIVE:    Patient is a 57y old Female who presents with a chief complaint of back pain (18 Apr 2022 10:09)    Currently admitted to medicine with the primary diagnosis of Pain of back and right lower extremity       Today is hospital day 3d.     PAST MEDICAL & SURGICAL HISTORY  HTN (hypertension)    Diabetes    Asthma  LAST EPISODE SEVERAL YRS    TRAVIS on CPAP    Arthritis  OA    GERD (gastroesophageal reflux disease)    History of peripheral arterial disease    Dyslipidemia    S/P trigger finger release    H/O carpal tunnel repair    H/O arthroscopy of shoulder    H/O Spinal surgery  C5-6 and C6-7 ACDF in March 2021      ALLERGIES:  ibuprofen (Urticaria)    MEDICATIONS:  STANDING MEDICATIONS  amLODIPine   Tablet 10 milliGRAM(s) Oral daily  atorvastatin 40 milliGRAM(s) Oral at bedtime  baclofen 5 milliGRAM(s) Oral three times a day  carBAMazepine ER Capsule 200 milliGRAM(s) Oral two times a day  dextrose 10% Bolus 250 milliLiter(s) IV Bolus once  ergocalciferol [id number] Unit(s) Oral every week  gabapentin 300 milliGRAM(s) Oral two times a day  insulin glargine Injectable (LANTUS) 80 Unit(s) SubCutaneous every morning  insulin lispro (ADMELOG) corrective regimen sliding scale   SubCutaneous three times a day before meals  lisinopril 40 milliGRAM(s) Oral daily  pantoprazole    Tablet 40 milliGRAM(s) Oral before breakfast  prasugrel 10 milliGRAM(s) Oral daily  predniSONE   Tablet 20 milliGRAM(s) Oral daily  senna 2 Tablet(s) Oral at bedtime    PRN MEDICATIONS  melatonin 3 milliGRAM(s) Oral at bedtime PRN  oxycodone    5 mG/acetaminophen 325 mG 2 Tablet(s) Oral every 12 hours PRN    VITALS:   T(F): 97.6  HR: 79  BP: 131/74  RR: 18  SpO2: --    LABS:                        13.5   5.39  )-----------( 391      ( 17 Apr 2022 07:58 )             40.9     04-17    143  |  108  |  11  ----------------------------<  131<H>  3.8   |  23  |  0.7    Ca    9.2      17 Apr 2022 07:58  Mg     1.8     04-17    TPro  6.5  /  Alb  4.4  /  TBili  0.2  /  DBili  x   /  AST  24  /  ALT  30  /  AlkPhos  82  04-17                  RADIOLOGY:    PHYSICAL EXAM:  GEN: No acute distress  LUNGS: Clear to auscultation bilaterally   HEART: S1/S2 present. RRR.   ABD/ GI: Soft, non-tender, non-distended. Bowel sounds present  EXT: NC/NC/NE/2+PP/MORALES  NEURO: AAOX3    
SUBJECTIVE:    Patient is a 57y old Female who presents with a chief complaint of back pain (2022 18:11)    Currently admitted to medicine with the primary diagnosis of Pain of back and right lower extremity       Today is hospital day 2d.     PAST MEDICAL & SURGICAL HISTORY  HTN (hypertension)    Diabetes    Asthma  LAST EPISODE SEVERAL YRS    TRAVIS on CPAP    Arthritis  OA    GERD (gastroesophageal reflux disease)    History of peripheral arterial disease    Dyslipidemia    S/P trigger finger release    H/O carpal tunnel repair    H/O arthroscopy of shoulder    H/O Spinal surgery  C5-6 and C6-7 ACDF in 2021      ALLERGIES:  ibuprofen (Urticaria)    MEDICATIONS:  STANDING MEDICATIONS  amLODIPine   Tablet 10 milliGRAM(s) Oral daily  atorvastatin 40 milliGRAM(s) Oral at bedtime  baclofen 5 milliGRAM(s) Oral three times a day  carBAMazepine ER Capsule 200 milliGRAM(s) Oral two times a day  dextrose 10% Bolus 250 milliLiter(s) IV Bolus once  ergocalciferol 71474 Unit(s) Oral every week  gabapentin 300 milliGRAM(s) Oral two times a day  insulin glargine Injectable (LANTUS) 80 Unit(s) SubCutaneous every morning  insulin lispro (ADMELOG) corrective regimen sliding scale   SubCutaneous three times a day before meals  lisinopril 40 milliGRAM(s) Oral daily  pantoprazole    Tablet 40 milliGRAM(s) Oral before breakfast  prasugrel 10 milliGRAM(s) Oral daily  predniSONE   Tablet 20 milliGRAM(s) Oral daily  senna 2 Tablet(s) Oral at bedtime    PRN MEDICATIONS  melatonin 3 milliGRAM(s) Oral at bedtime PRN  oxycodone    5 mG/acetaminophen 325 mG 2 Tablet(s) Oral every 12 hours PRN    VITALS:   T(F): 97  HR: 86  BP: 129/62  RR: 18  SpO2: --    LABS:                        13.5   5.39  )-----------( 391      ( 2022 07:58 )             40.9     04-17    143  |  108  |  11  ----------------------------<  131<H>  3.8   |  23  |  0.7    Ca    9.2      2022 07:58  Mg     1.8     04-    TPro  6.5  /  Alb  4.4  /  TBili  0.2  /  DBili  x   /  AST  24  /  ALT  30  /  AlkPhos  82  04-17      Urinalysis Basic - ( 15 Apr 2022 17:22 )    Color: Yellow / Appearance: Clear / S.035 / pH: x  Gluc: x / Ketone: Trace  / Bili: Small / Urobili: <2 mg/dL   Blood: x / Protein: 30 mg/dL / Nitrite: Negative   Leuk Esterase: Negative / RBC: 8 /HPF / WBC 2 /HPF   Sq Epi: x / Non Sq Epi: 7 /HPF / Bacteria: Negative                RADIOLOGY:    PHYSICAL EXAM:  GEN: No acute distress  LUNGS: Clear to auscultation bilaterally   HEART: S1/S2 present. RRR.   ABD/ GI: Soft, non-tender, non-distended. Bowel sounds present  EXT: NC/NC/NE/2+PP/MORALES  NEURO: AAOX3

## 2022-04-18 NOTE — DISCHARGE NOTE PROVIDER - PROVIDER TOKENS
PROVIDER:[TOKEN:[[id number]],FOLLOWUP:[2 weeks]],PROVIDER:[TOKEN:[[id number]],FOLLOWUP:[2 weeks]],PROVIDER:[TOKEN:[[id number]],FOLLOWUP:[2 weeks]],PROVIDER:[TOKEN:[[id number]],FOLLOWUP:[2 weeks]] PROVIDER:[TOKEN:[[id number]],FOLLOWUP:[2 weeks]],PROVIDER:[TOKEN:[[id number]],FOLLOWUP:[2 weeks]],PROVIDER:[TOKEN:[[id number]],FOLLOWUP:[2 weeks]],PROVIDER:[TOKEN:[[id number]],FOLLOWUP:[2 weeks]]

## 2022-04-18 NOTE — CONSULT NOTE ADULT - PROBLEM SELECTOR RECOMMENDATION 9
Patient with acute on chronic low back pain. Patient on chronic opioid therapy (MED = 30), low risk for opioid abuse.  1) Would consider ELAINA, but patient notes that pain is improving. Discussed options, will proceed with conservative management.  2) Continue percocet 5-325mg 2 tabs BID prn (home regimen)  3) Start acetaminophen 650mg Q8h standing  4) Increase gabapentin 300mg to Q8h  5) Continue baclofen 5mg TID  6) Continue senna, add miralax

## 2022-04-18 NOTE — DISCHARGE NOTE NURSING/CASE MANAGEMENT/SOCIAL WORK - NSDCPEFALRISK_GEN_ALL_CORE
For information on Fall & Injury Prevention, visit: [email or web address] OR  [email or web address] OR  https://www.cdc.gov/steadi/patient.html

## 2022-04-18 NOTE — PROGRESS NOTE ADULT - ASSESSMENT
# Ac back pain-- sec to lumbar foraminal stenosis  MRI No conus/nerve root compression or signal abnormality.    Mild multilevel lumbar spondylosis and facet arthropathies, worse at L4-5   with mild-moderate spinal stenosis and mild bilateral foraminal stenosis.   Patient to be seen by PT and then neurosurgery--no surgery recommended  on percocet, baclofen-- feels better after dose of toradol     # Hypokalemia-- replaced  # hypomagnesemia-- replaced  # Uncontrolled DM on insulin-- hba1c is 8.5-- on high dose of insulin  # hx of orbital lt dacroadenitis--on po prednisone.  # PAD s/p stent b/l lower ext .    Patient feels better today and will discharge her-- spent more than 30mins.  
# Ac back pain-- sec to lumbar foraminal stenosis  MRI No conus/nerve root compression or signal abnormality.    Mild multilevel lumbar spondylosis and facet arthropathies, worse at L4-5   with mild-moderate spinal stenosis and mild bilateral foraminal stenosis.   Patient to be seen by PT and then neurosurgery--no surgery recommended  on percocet, baclofen-- will add one dose of toradol today    # Hypokalemia-- replace po  # hypomagnesemia-- replace IV  # Uncontrolled DM on insulin-- hba1c is 8.5-- on high dose of insulin  # hx of orbital lt dacroadenitis--on po prednisone.  # PAD s/p stent b/l lower ext .    Patient will do one more session of PT in AM and can go home if pain is better. can do extra toradol before PT.

## 2022-04-18 NOTE — DISCHARGE NOTE PROVIDER - NSDCCPCAREPLAN_GEN_ALL_CORE_FT
PRINCIPAL DISCHARGE DIAGNOSIS  Diagnosis: Pain of back and right lower extremity  Assessment and Plan of Treatment: You have back pain because of  Mild multilevel lumbar spondylosis and facet arthropathies, worse at L4-5  with mild-moderate spinal stenosis and mild bilateral foraminal stenosis. You were evaluted by neurosurgery team in the hospital. No surgical intervetion is required at this time. You will need to continue current pain medications for pain control and continue physical therapy to improve functional status.   Follow up with neurosurgery/Pain management outpatient for further assessment and management.   Back Pain  Back pain is very common in adults. The cause of back pain is rarely dangerous and the pain often gets better over time. The cause of your back pain may not be known and may include strain of muscles or ligaments, degeneration of the spinal disks, or arthritis. Occasionally the pain may radiate down your leg(s). Over-the-counter medicines to reduce pain and inflammation are often the most helpful. Stretching and remaining active frequently helps the healing process.   SEEK IMMEDIATE MEDICAL CARE IF YOU HAVE ANY OF THE FOLLOWING SYMPTOMS: bowel or bladder control problems, unusual weakness or numbness in your arms or legs, nausea or vomiting, abdominal pain, fever, dizziness/lightheadedness.        SECONDARY DISCHARGE DIAGNOSES  Diagnosis: HTN (hypertension)  Assessment and Plan of Treatment: Hypertension  Hypertension, commonly called high blood pressure, is when the force of blood pumping through your arteries is too strong. Hypertension forces your heart to work harder to pump blood. Your arteries may become narrow or stiff. Having untreated or uncontrolled hypertension for a long period of time can cause heart attack, stroke, kidney disease, and other problems. If started on a medication, take exactly as prescribed by your health care professional. Maintain a healthy lifestyle and follow up with your primary care physician.  SEEK IMMEDIATE MEDICAL CARE IF YOU HAVE ANY OF THE FOLLOWING SYMPTOMS: severe headache, confusion, chest pain, abdominal pain, vomiting, or shortness of breath.      Diagnosis: Diabetes  Assessment and Plan of Treatment: Diabetes is a chronic condition caused by high blood sugar levels. Your blood sugar levels become high because your body does not have enough insulin. Insulin helps move sugar out of the blood so it can be used for energy. Increased sugar in your blood can cause problems in several organs of your body including but not limited to your blood vessels, your kidneys, your brain, and your eyes. The lack of insulin forces your body to use fat instead of sugar for energy. This can be dangerous if not controlled.  Seek Medical Attention If:  You have a seizure.  You begin to breathe fast, or are short of breath.  You become weak and confused.  You are more drowsy than usual.  You have fruity, sweet breath.  You have severe, new stomach pain and are vomiting.  Your blood sugar level is lower or higher than your healthcare provider says it should be.  You have ketones in your blood or urine.  You have a fever or chills.  You are more thirsty than usual.  You are urinating more often than usual.  You have questions or concerns about your condition or care.  Insulin and diabetes medicine decreases the amount of sugar in your blood.  The best way to prevent problems from Diabetes is to control your blood sugars.  Monitor your blood sugar levels closely. Administer Insulin as directed by your physician.   Speak with your doctor and/or a nutritionist about the best way to change your lifestyle and dietary habits to avoid any problems from Diabetes in the future.

## 2022-04-18 NOTE — DISCHARGE NOTE PROVIDER - HOSPITAL COURSE
57F with PMH of HTN, DM, DLD, PAD (s/p stents), Orbital dacryoadenitis, Neck pain s/p ACDF, chronic back pain presented with acute worsening of the lower back pain and numbness of the right thigh. Patient with known history of Mild multilevel lumbar spondylosis and facet arthropathies, worse at L4-5 with mild-moderate spinal stenosis and mild bilateral foraminal stenosis; Treated with pain control - gabapentin 300mg PO BID, baclofen 5mg PO TID, Percocet 2 tabs PO BID PRN. Toradol was added PRN for pain control before PT. Prednisone was continued for the orbital dacryoadenitis.    She was evaluated during this admission by neurosurgery - No acute surgical intervention recommended, medical management continued. She was evaluated by PT/OT during admission - Recommended - dc home with home PT, will need shower chair on dc.   Patient has been advised to follow up outpatient with Neurosurgery/Neurology/Pain management/rheumatology and PCP.     04/18:   Patient was seen at the bedside this morning. She is lying comfortably on the bed. There were no acute events overnight. She c/o upper back pain which increases on movement of neck sideways. Patient denies any chest pain, shortness of breath, cough, nausea, vomiting, dizziness. She still has numbness on the right thigh which has been stable.     Vitals (Last 24 Hrs):  T(C): 36.4 (04-18-22 @ 08:00), Max: 36.4 (04-18-22 @ 08:00)  T(F): 97.6 (04-18-22 @ 08:00), Max: 97.6 (04-18-22 @ 08:00)  HR: 79 (04-18-22 @ 08:00) (79 - 95)  BP: 131/74 (04-18-22 @ 08:00) (127/68 - 138/65)  RR: 18 (04-18-22 @ 08:00) (18 - 19)    General: No acute distress; Pallor (-), Icterus (-), Cyanosis (-), Clubbing (-)  HEENT: Normocephalic, atraumatic, PERRLA, EOMI  PULM: Bilaterally equal and clear breath sounds, wheeze (-), rubs (-), crackles (-)  CVS: Normal S1 and S2, murmurs (-), rubs (-), gallops (-)   GI: Soft, nondistended, nontender, BS +  MSK: Edema (-), no muscle, bone or joint tenderness noted  SKIN: Warm and well perfused, no rashes noted  NEURO:  Alert and Oriented x 3; No focal deficit noted, strength 5/5 on bilateral upper and lower extremities.       Plan:   # Acute on Chronic Lower Back Pain  Initially p/w bilateral lower back pain. Likely secondary to muscle spasms given b/l paraspinal muscle tenderness on exam. Patient has chronic low back pain that was previously well-managed w/ gabapentin 300mg BID, baclofen 5mg TID, and Percocet 10mg BID. Recent MRI demonstrates mild L4-5 spinal and b/l foraminal stenosis.   - c/w gabapentin 300mg PO BID  - c/w baclofen 5mg PO TID  - c/w Percocet 2 tabs PO BID PRN  - Neurosurgery eval appreciated - No surgical intervention; continue with conservative management  - PT/OT consulted- DC home with service    #Chronic Orbital Cellulitis, Left  #Dacryoadenitis, Left  Admitted in July 2021 for left orbital cellulitis. Symptoms initially resolved after treatment, but pt has had persistent left orbital swelling and erythema. Initially on prednisone 60mg QD, but since tapered down to prednisone 20mg QD. Recently underwent left lacrimal gland biopsy w/ opthalmology and awaiting results. Follows w/ Dr. Lopez (rheumatology).  - c/w prednisone 20mg PO QD    #Diabetes Mellitus  #Dyslipidemia  A1c 10.5% in June 2021. DM managed w/ Trujeo 80U QD, Ozempic .25mg QW, and Farxiga 10mg QD. DLD managed w/ atorvastatin 40mg QHS and ezetimibe 10mg QD.   - A1c - 8.5 (04/16)  - Resume home meds on discharge  - c/w atorvastatin 40mg PO QHS    # Peripheral Artery Disease  S/P stent in both lower extremities. Managed w/ prasugrel 10mg QD.  - c/w prasugrel 10mg PO QD    # Hypertension  Managed w/ amlodipine-benazapril 10mg-40mg QD.  - resume home meds on discharge    #GERD  - c/w pantoprazole 40mg PO QD    #Vitamin D Deficiency  - c/w ergocalciferol (D2) 1.25mg PO QW    DISPOSITION: Home with service   57F with PMH of HTN, DM, DLD, PAD (s/p stents), Orbital dacryoadenitis, Neck pain s/p ACDF, chronic back pain presented with acute worsening of the lower back pain and numbness of the right thigh. Patient with known history of Mild multilevel lumbar spondylosis and facet arthropathies, worse at L4-5 with mild-moderate spinal stenosis and mild bilateral foraminal stenosis; Treated with pain control - gabapentin 300mg PO BID, baclofen 5mg PO TID, Percocet 2 tabs PO BID PRN. Toradol was added PRN for pain control before PT. Prednisone was continued for the orbital dacryoadenitis.    She was evaluated during this admission by neurosurgery - No acute surgical intervention recommended, medical management continued. She was evaluated by PT/OT during admission - Recommended - dc home with home PT, will need shower chair on dc. Seen by pain medicine gabapentin increased to TID and Tylenol added.  Patient has been advised to follow up outpatient with Neurosurgery/Neurology/Pain management/rheumatology and PCP.     04/18:   Patient was seen at the bedside this morning. She is lying comfortably on the bed. There were no acute events overnight. She c/o upper back pain which increases on movement of neck sideways. Patient denies any chest pain, shortness of breath, cough, nausea, vomiting, dizziness. She still has numbness on the right thigh which has been stable.     Vitals (Last 24 Hrs):  T(C): 36.4 (04-18-22 @ 08:00), Max: 36.4 (04-18-22 @ 08:00)  T(F): 97.6 (04-18-22 @ 08:00), Max: 97.6 (04-18-22 @ 08:00)  HR: 79 (04-18-22 @ 08:00) (79 - 95)  BP: 131/74 (04-18-22 @ 08:00) (127/68 - 138/65)  RR: 18 (04-18-22 @ 08:00) (18 - 19)    General: No acute distress; Pallor (-), Icterus (-), Cyanosis (-), Clubbing (-)  HEENT: Normocephalic, atraumatic, PERRLA, EOMI  PULM: Bilaterally equal and clear breath sounds, wheeze (-), rubs (-), crackles (-)  CVS: Normal S1 and S2, murmurs (-), rubs (-), gallops (-)   GI: Soft, nondistended, nontender, BS +  MSK: Edema (-), no muscle, bone or joint tenderness noted  SKIN: Warm and well perfused, no rashes noted  NEURO:  Alert and Oriented x 3; No focal deficit noted, strength 5/5 on bilateral upper and lower extremities.       Plan:   # Acute on Chronic Lower Back Pain  Initially p/w bilateral lower back pain. Likely secondary to muscle spasms given b/l paraspinal muscle tenderness on exam. Patient has chronic low back pain that was previously well-managed w/ gabapentin 300mg BID, baclofen 5mg TID, and Percocet 10mg BID. Recent MRI demonstrates mild L4-5 spinal and b/l foraminal stenosis.   - c/w gabapentin 300mg PO BID  - c/w baclofen 5mg PO TID  - c/w Percocet 2 tabs PO BID PRN  - Neurosurgery eval appreciated - No surgical intervention; continue with conservative management  - PT/OT consulted- DC home with service    #Chronic Orbital Cellulitis, Left  #Dacryoadenitis, Left  Admitted in July 2021 for left orbital cellulitis. Symptoms initially resolved after treatment, but pt has had persistent left orbital swelling and erythema. Initially on prednisone 60mg QD, but since tapered down to prednisone 20mg QD. Recently underwent left lacrimal gland biopsy w/ opthalmology and awaiting results. Follows w/ Dr. Lopez (rheumatology).  - c/w prednisone 20mg PO QD    #Diabetes Mellitus  #Dyslipidemia  A1c 10.5% in June 2021. DM managed w/ Trujeo 80U QD, Ozempic .25mg QW, and Farxiga 10mg QD. DLD managed w/ atorvastatin 40mg QHS and ezetimibe 10mg QD.   - A1c - 8.5 (04/16)  - Resume home meds on discharge  - c/w atorvastatin 40mg PO QHS    # Peripheral Artery Disease  S/P stent in both lower extremities. Managed w/ prasugrel 10mg QD.  - c/w prasugrel 10mg PO QD    # Hypertension  Managed w/ amlodipine-benazapril 10mg-40mg QD.  - resume home meds on discharge    #GERD  - c/w pantoprazole 40mg PO QD    #Vitamin D Deficiency  - c/w ergocalciferol (D2) 1.25mg PO QW    DISPOSITION: Home with service

## 2022-04-19 PROBLEM — E78.5 HYPERLIPIDEMIA, UNSPECIFIED: Chronic | Status: ACTIVE | Noted: 2022-04-16

## 2022-04-21 ENCOUNTER — APPOINTMENT (OUTPATIENT)
Dept: RHEUMATOLOGY | Facility: CLINIC | Age: 57
End: 2022-04-21
Payer: SELF-PAY

## 2022-04-21 VITALS
DIASTOLIC BLOOD PRESSURE: 80 MMHG | OXYGEN SATURATION: 97 % | SYSTOLIC BLOOD PRESSURE: 140 MMHG | WEIGHT: 160 LBS | HEART RATE: 84 BPM | TEMPERATURE: 97.1 F | HEIGHT: 61 IN | BODY MASS INDEX: 30.21 KG/M2

## 2022-04-21 PROCEDURE — 99214 OFFICE O/P EST MOD 30 MIN: CPT

## 2022-04-21 NOTE — HISTORY OF PRESENT ILLNESS
[FreeTextEntry1] : In July 2021, pt developed severe pain, redness and swelling in her L eye "overnight." She was admitted to [facility] and was diagnosed with L orbital cellulitis and L dacryoadenitis, was started on high-dose steroids with improvement of symptoms, which flared again when she was tapered down to 5 mg prednisone. Pt's prednisone was subsequently increased again, and she has been on steroids since July 2021. She has been following in the rheum clinic, most recently was seen in the clinic in March 2022, when she was continued on prednisone 20 mg pending biopsy results. Pt says she received the biopsy results but they are not available within the [facility] records.\par \par Pt says that since her last appointment, her b/l eyelids have been mildly inflamed but the symptoms have been tolerable and overall stable. She reports some dyspepsia. Also her blood glucose levels were very high on the prednisone before but she reports improvement recently. + Continued severe chronic neck and back pain.\par \par Physical exam: GEN: Pleasant, AAO woman sitting on exam table in NAD\par SKIN: no rashes\par ENT: normal tympanic reflex\par EYES: + Mild swelling noted in b/l eyelids, symmetric\par MOUTH: Moist mucous membranes, no oral ulcers\par PULM: Clear to auscultation b/l\par CV: Regular rate and rhythm, no murmurs\par MSK: Back: + TTP diffusely in all paraspinal regions

## 2022-04-25 DIAGNOSIS — I10 ESSENTIAL (PRIMARY) HYPERTENSION: ICD-10-CM

## 2022-04-25 DIAGNOSIS — E83.42 HYPOMAGNESEMIA: ICD-10-CM

## 2022-04-25 DIAGNOSIS — K21.9 GASTRO-ESOPHAGEAL REFLUX DISEASE WITHOUT ESOPHAGITIS: ICD-10-CM

## 2022-04-25 DIAGNOSIS — G47.33 OBSTRUCTIVE SLEEP APNEA (ADULT) (PEDIATRIC): ICD-10-CM

## 2022-04-25 DIAGNOSIS — M47.816 SPONDYLOSIS WITHOUT MYELOPATHY OR RADICULOPATHY, LUMBAR REGION: ICD-10-CM

## 2022-04-25 DIAGNOSIS — E55.9 VITAMIN D DEFICIENCY, UNSPECIFIED: ICD-10-CM

## 2022-04-25 DIAGNOSIS — J45.909 UNSPECIFIED ASTHMA, UNCOMPLICATED: ICD-10-CM

## 2022-04-25 DIAGNOSIS — E87.6 HYPOKALEMIA: ICD-10-CM

## 2022-04-25 DIAGNOSIS — E11.51 TYPE 2 DIABETES MELLITUS WITH DIABETIC PERIPHERAL ANGIOPATHY WITHOUT GANGRENE: ICD-10-CM

## 2022-04-25 DIAGNOSIS — H04.002 UNSPECIFIED DACRYOADENITIS, LEFT LACRIMAL GLAND: ICD-10-CM

## 2022-04-25 DIAGNOSIS — M54.31 SCIATICA, RIGHT SIDE: ICD-10-CM

## 2022-04-25 DIAGNOSIS — Z95.820 PERIPHERAL VASCULAR ANGIOPLASTY STATUS WITH IMPLANTS AND GRAFTS: ICD-10-CM

## 2022-04-25 DIAGNOSIS — M19.90 UNSPECIFIED OSTEOARTHRITIS, UNSPECIFIED SITE: ICD-10-CM

## 2022-05-05 ENCOUNTER — OUTPATIENT (OUTPATIENT)
Dept: OUTPATIENT SERVICES | Facility: HOSPITAL | Age: 57
LOS: 1 days | Discharge: HOME | End: 2022-05-05

## 2022-05-05 ENCOUNTER — APPOINTMENT (OUTPATIENT)
Dept: ENDOCRINOLOGY | Facility: CLINIC | Age: 57
End: 2022-05-05

## 2022-05-05 ENCOUNTER — APPOINTMENT (OUTPATIENT)
Dept: NUTRITION | Facility: CLINIC | Age: 57
End: 2022-05-05

## 2022-05-05 VITALS
WEIGHT: 163 LBS | HEART RATE: 85 BPM | BODY MASS INDEX: 30.78 KG/M2 | TEMPERATURE: 97.3 F | DIASTOLIC BLOOD PRESSURE: 80 MMHG | OXYGEN SATURATION: 97 % | HEIGHT: 61 IN | SYSTOLIC BLOOD PRESSURE: 136 MMHG

## 2022-05-05 DIAGNOSIS — Z98.890 OTHER SPECIFIED POSTPROCEDURAL STATES: Chronic | ICD-10-CM

## 2022-05-05 DIAGNOSIS — E11.9 TYPE 2 DIABETES MELLITUS WITHOUT COMPLICATIONS: ICD-10-CM

## 2022-05-05 RX ORDER — ATORVASTATIN CALCIUM 40 MG/1
40 TABLET, FILM COATED ORAL
Qty: 90 | Refills: 2 | Status: DISCONTINUED | COMMUNITY
Start: 2021-11-03 | End: 2022-05-05

## 2022-05-05 NOTE — PHYSICAL EXAM
[Alert] : alert [Well Nourished] : well nourished [de-identified] : Stigmata of cushings - was on high dose prednisone

## 2022-05-05 NOTE — HISTORY OF PRESENT ILLNESS
[FreeTextEntry1] : 57 year old female presenting to clinic for follow up of management of uncontrolled DM Type 2. 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 Patient's A1c is 13.1 on initial visit, fasting  --> 150s, likely exacerbated by prednisone 60 mg. She has recently went down on her prednisone and now only takes 20mg PRN (averages around 3 times a week) with a drop in FSG to around 150s.

## 2022-05-05 NOTE — ASSESSMENT
[FreeTextEntry1] : 57 year old female presenting to clinic for follow up of management of uncontrolled DM Type 2.\par \par \par DM uncontrolled \par - currently on Farxiga 10mg qd, Toujeo max 80 units qd decrease to 60 , ozempic 1 mg increase to 2 mg weekly \par - rheumatology is in the process of weening patient of prednisone \par - A1C 8.5- April 2022\par atorvastatin 40 mg  reports that she get dizzy, will switch to rosuvastatin 20 mg \par \par  [Diabetes Foot Care] : diabetes foot care [Long Term Vascular Complications] : long term vascular complications of diabetes [Carbohydrate Consistent Diet] : carbohydrate consistent diet [Importance of Diet and Exercise] : importance of diet and exercise to improve glycemic control, achieve weight loss and improve cardiovascular health [Retinopathy Screening] : Patient was referred to ophthalmology for retinopathy screening

## 2022-05-26 ENCOUNTER — APPOINTMENT (OUTPATIENT)
Dept: RHEUMATOLOGY | Facility: CLINIC | Age: 57
End: 2022-05-26
Payer: SELF-PAY

## 2022-05-26 VITALS
DIASTOLIC BLOOD PRESSURE: 80 MMHG | SYSTOLIC BLOOD PRESSURE: 120 MMHG | TEMPERATURE: 97.6 F | WEIGHT: 160 LBS | HEART RATE: 86 BPM | HEIGHT: 61 IN | BODY MASS INDEX: 30.21 KG/M2 | OXYGEN SATURATION: 97 %

## 2022-05-26 PROCEDURE — 99214 OFFICE O/P EST MOD 30 MIN: CPT

## 2022-05-26 RX ORDER — METHOTREXATE 2.5 MG/1
2.5 TABLET ORAL
Qty: 16 | Refills: 1 | Status: DISCONTINUED | COMMUNITY
Start: 2022-04-21 | End: 2022-05-26

## 2022-05-26 NOTE — HISTORY OF PRESENT ILLNESS
[FreeTextEntry1] : Pt went down to 15 mg prednisone after her last visit and felt "okay" on this dose, but approx 2 weeks ago she tried to decrease the prednisone dose further, to 10 mg, and she started having redness and irritation in her eye, so she increased the prednisone back to 20 mg. Yesterday, she also had some irritation in her eyelid "like it is \par getting a boil," which resolved spontaneously. + Eyelid itching at night. \par \par Also + daily headaches "for a while," getting more frequent than before. Sometimes she gets them in the middle of the night, sometimes wakes up with headache, other times develops it randomly in the middle of the day. Also + intermittent pain in her jaw with eating. Denies any vision changes or double vision. + Sinus pressure not relieved with nasal spray. She saw an ENT as part of her dacryoadenitis, was found to have a "mass" in one of her sinuses but has not followed up.\par \par + Continued low back pain, pt going for epidural with pain management.\par \par History of present illness: In July 2021, pt developed severe pain, redness and swelling in her L eye "overnight." She was admitted to [facility] and was diagnosed with L orbital cellulitis and L dacryoadenitis, was started on high-dose steroids with improvement of symptoms, which flared again when she was tapered down to 5 mg prednisone. Pt's prednisone was subsequently increased again, and she has been on steroids since July 2021. She has been following in the rheum clinic, most recently was seen in the clinic in March 2022, when she was continued on prednisone 20 mg pending biopsy results. Pt says she received the biopsy results but they are not available within the [facility] records.\par \par Pt says that since her last appointment, her b/l eyelids have been mildly inflamed but the symptoms have been tolerable and overall stable. She reports some dyspepsia. Also her blood glucose levels were very high on the prednisone before but she reports improvement recently. + Continued severe chronic neck and back pain.\par \par Physical exam: GEN: Pleasant, AAO woman sitting on exam table in NAD\par SKIN: no rashes\par ENT: + TTP in maxillary sinuses. Normal tympanic reflex\par EYES: + ? Mild swelling noted in b/l eyelids, symmetric, no erythema\par MOUTH: Moist mucous membranes, no oral ulcers\par PULM: Clear to auscultation b/l\par CV: Regular rate and rhythm, no murmurs\par MSK: Back: + TTP diffusely in all paraspinal regions

## 2022-05-26 NOTE — ASSESSMENT
[FreeTextEntry1] : Dacryoadenitis: Pt had biopsy 3/2022 but [facility]. With improvement of symptoms on prednisone, but pt has diabetes and has been taking prednisone monotherapy for >6 months at this point. With previous flareup on rapid prednisone taper to 5 mg, also with recent flareup of symptoms when pt tried to decrease prednisone to 10 mg from 15 mg.\par - f/u CBC and CMP\par - Increase methotrexate to 15 mg q week if CBC and LFTs are unremarkable\par - Continue folic acid 1 mg q day\par - Decrease prednisone to 15 mg q day again, recommended pt to stay on this dose until her next visit as methotrexate takes time to take effect\par - f/u in 1 month\par \par Headache: Also with jaw pain, although pt is on the younger side for giant cell arteritis. Given her recent sinus congestion, I suspect that her headache is more likely related to allergies or sinus issues\par - f/u ESR and CRP\par - Advised pt to follow up with ENT for further management of her sinuses

## 2022-06-06 NOTE — ED PROVIDER NOTE - INPATIENT RESIDENT/ACP NOTIFIED DATE
Patient's chart accessed by me because pt calling requesting work no tefrom visit earlier today.     Flaquito Cadena, DO 15-Apr-2022 15:43

## 2022-06-13 ENCOUNTER — APPOINTMENT (OUTPATIENT)
Dept: NEUROSURGERY | Facility: CLINIC | Age: 57
End: 2022-06-13
Payer: SELF-PAY

## 2022-06-13 PROCEDURE — 99214 OFFICE O/P EST MOD 30 MIN: CPT

## 2022-06-15 NOTE — ASU DISCHARGE PLAN (ADULT/PEDIATRIC) - PROCEDURE
[Dear  ___] : Dear  [unfilled], [Consult Letter:] : I had the pleasure of evaluating your patient, [unfilled]. [Please see my note below.] : Please see my note below. [Consult Closing:] : Thank you very much for allowing me to participate in the care of this patient.  If you have any questions, please do not hesitate to contact me. [Sincerely,] : Sincerely, [FreeTextEntry3] : Jo Escobar MD\par Medical Oncology/Hematology\par [facility] [place], [facility]\par \par \par [facility]\par   ELMER CROSS

## 2022-06-16 NOTE — H&P PST ADULT - PAIN, CHRONIC: FACTORS THAT AGGRAVATE, PROFILE
" Subjective:    Patient ID:  Mau Livingston Jr. is a 79 y.o. male patient here for evaluation Hypertension and Hyperlipidemia      History of Present Illness:         Patient is here for follow up visit. He has been having chest tightness and SOB. Last ECHO showed Perivalvular leak. Today he is SB with 1 st degree AV Block and moderate LVH. He gets SOB with exertion. He had an ECHO today which has not yet been read. He has lightheadedness at times.      Review of patient's allergies indicates:   Allergen Reactions    Lipitor [atorvastatin] Other (See Comments)     Didn't feel well    Reglan [metoclopramide hcl] Anaphylaxis and Other (See Comments)    Crestor [rosuvastatin]      myalgia    Metoclopramide Other (See Comments)     "attacks central nervous system and makes me jerk all over the place"    Statins-hmg-coa reductase inhibitors Other (See Comments)     Joint pain        Past Medical History:   Diagnosis Date    Acute Prostatitis 5/17/16     Am RXing Cipro 500 Mg Bid For 21 Days With U/A And UCx Now.   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    Stomach and duodenum    BPH (benign prostatic hyperplasia)     Common variable immunodeficiency     Diabetes mellitus, type 2     Erosive esophagitis     Full dentures     Gastritis     Gastropathy 8/19/2015    REACTIVE     GERD (gastroesophageal reflux disease)     History of blood clots     LEFT LEG 20 YRS AGO    History of MRSA infection     Hypertension     Pneumonia     11-12    Prostatitis 9/21/2012    Renal stone     Wears glasses      Past Surgical History:   Procedure Laterality Date    AORTIC VALVE REPLACEMENT N/A 6/19/2019    Procedure: Replacement-valve-aortic;  Surgeon: Miky Donnelly MD;  Location: [facility] CATH LAB;  Service: Cardiology;  Laterality: N/A;    APPENDECTOMY      CARDIAC CATHETERIZATION      x3    CHOLECYSTECTOMY      " ESOPHAGOGASTRODUODENOSCOPY  2017    ESOPHAGOGASTRODUODENOSCOPY N/A 2020    Procedure: EGD (ESOPHAGOGASTRODUODENOSCOPY);  Surgeon: Ambrocio Sosa Jr., MD;  Location: [facility];  Service: Endoscopy;  Laterality: N/A;    eyelid lift  2021    HERNIA REPAIR Right     JOINT REPLACEMENT Left     x2    KNEE SCOPE Left     x2    NECK SURGERY      fusion and bone graft    NISSEN FUNDOPLICATION      X2    PERIPHERAL ANGIOGRAPHY N/A 2019    Procedure: Peripheral angiography;  Surgeon: Miky Donnelly MD;  Location: [facility] CATH LAB;  Service: Cardiology;  Laterality: N/A;    PORTACATH PLACEMENT Left 2019    [facility]    right hand ring finger surgery  2017    splinter removal    SHOULDER SURGERY Right     x2    ulner nerve Right 2018    carpel tunnel release     Social History     Tobacco Use    Smoking status: Former Smoker     Packs/day: 2.00     Years: 18.00     Pack years: 36.00     Quit date: 1972     Years since quittin.6    Smokeless tobacco: Never Used   Substance Use Topics    Alcohol use: No    Drug use: No        Review of Systems:    As noted in HPI in addition      REVIEW OF SYSTEMS  CARDIOVASCULAR: No recent chest pain, palpitations, arm, neck, or jaw pain  RESPIRATORY: No recent fever, cough chills, SOB or congestion  : No blood in the urine  GI: No Nausea, vomiting, constipation, diarrhea, blood, or reflux.  MUSCULOSKELETAL: No myalgias  NEURO: No lightheadedness or dizziness  EYES: No Double vision, blurry, vision or headache              Objective        Vitals:    22 1508   BP: 110/60   Pulse: 60       LIPIDS - LAST 2   Lab Results   Component Value Date    CHOL 209 (H) 10/19/2021    CHOL 193 2020    HDL 42 10/19/2021    HDL 41 2020    LDLCALC 95.2 10/19/2021    LDLCALC 82.8 2020    TRIG 359 (H) 10/19/2021    TRIG 346 (H) 2020    CHOLHDL 20.1 10/19/2021    CHOLHDL 21.2 2020       CBC - LAST 2  Lab Results   Component Value  Date    WBC 7.19 06/16/2022    WBC 6.54 10/19/2021    RBC 3.81 (L) 06/16/2022    RBC 4.42 (L) 10/19/2021    HGB 11.6 (L) 06/16/2022    HGB 13.2 (L) 10/19/2021    HCT 34.8 (L) 06/16/2022    HCT 40.3 10/19/2021    MCV 91 06/16/2022    MCV 91 10/19/2021    MCH 30.4 06/16/2022    MCH 29.9 10/19/2021    MCHC 33.3 06/16/2022    MCHC 32.8 10/19/2021    RDW 14.2 06/16/2022    RDW 14.1 10/19/2021     06/16/2022     10/19/2021    MPV 10.3 06/16/2022    MPV 10.7 10/19/2021    GRAN 3.8 06/16/2022    GRAN 52.7 06/16/2022    LYMPH 2.5 06/16/2022    LYMPH 34.1 06/16/2022    MONO 0.7 06/16/2022    MONO 9.6 06/16/2022    BASO 0.01 06/16/2022    BASO 0.01 10/19/2021    NRBC 0 06/16/2022    NRBC 0 10/19/2021       CHEMISTRY & LIVER FUNCTION - LAST 2  Lab Results   Component Value Date     05/12/2022     10/19/2021    K 4.2 05/12/2022    K 4.6 10/19/2021     05/12/2022     10/19/2021    CO2 25 05/12/2022    CO2 26 10/19/2021    ANIONGAP 8 05/12/2022    ANIONGAP 9 10/19/2021    BUN 16 05/12/2022    BUN 16 10/19/2021    CREATININE 1.2 05/12/2022    CREATININE 1.2 10/19/2021     05/12/2022     10/19/2021    CALCIUM 9.2 05/12/2022    CALCIUM 9.9 10/19/2021    MG 2.0 01/10/2021    MG 1.8 01/09/2021    ALBUMIN 4.5 10/19/2021    ALBUMIN 4.3 01/07/2021    PROT 7.8 10/19/2021    PROT 7.3 01/07/2021    ALKPHOS 86 10/19/2021    ALKPHOS 82 01/07/2021    ALT 20 10/19/2021    ALT 21 01/07/2021    AST 25 10/19/2021    AST 23 01/07/2021    BILITOT 0.7 10/19/2021    BILITOT 1.2 (H) 01/07/2021        CARDIAC PROFILE - LAST 2  Lab Results   Component Value Date     (H) 05/12/2022    BNP 78 01/07/2021    CPKMB 0.6 11/06/2012     05/05/2016    TROPONINI <0.030 01/07/2021    TROPONINI <0.030 08/07/2019        COAGULATION - LAST 2  Lab Results   Component Value Date    LABPT 13.5 08/07/2019    INR 1.1 08/07/2019    INR 1.0 06/19/2019    APTT 27.1 06/19/2019    APTT 26.5 12/12/2018        ENDOCRINE & PSA - LAST 2  Lab Results   Component Value Date    HGBA1C 5.6 10/19/2021    HGBA1C 5.6 12/22/2020    TSH 1.670 12/26/2019    TSH 1.679 12/26/2019    PROCAL 0.28 01/07/2021    PSA 1.0 01/07/2019    PSA 10.1 (H) 07/29/2018        ECHOCARDIOGRAM RESULTS  Results for orders placed in visit on 02/03/21    Echo Color Flow Doppler? Yes    Interpretation Summary  · The left ventricle is normal in size with mild concentric hypertrophy and normal systolic function. The estimated ejection fraction is 65%  · The estimated PA systolic pressure is 29 mmHg.  · Normal left ventricular diastolic function.  · Normal right ventricular size with normal right ventricular systolic function.  · There is a transcutaneously-placed aortic bioprosthesis present. There is mild paravalvular aortic insufficiency present.  · The aortic valve mean gradient is 12 mmHg with a dimensionless index of 0.60.  · Mild aortic regurgitation.  · Normal central venous pressure (3 mmHg).      CURRENT/PREVIOUS VISIT EKG  Results for orders placed or performed in visit on 05/12/22   IN OFFICE EKG 12-LEAD (to [employer])    Collection Time: 05/12/22  9:19 AM    Narrative    Test Reason : Z95.2,R06.02,    Vent. Rate : 058 BPM     Atrial Rate : 058 BPM     P-R Int : 218 ms          QRS Dur : 092 ms      QT Int : 430 ms       P-R-T Axes : 067 -06 037 degrees     QTc Int : 422 ms    Sinus bradycardia with 1st degree A-V block  Minimal voltage criteria for LVH, may be normal variant  Borderline Abnormal ECG  When compared with ECG of 07-JAN-2021 15:56,  Previous ECG has undetermined rhythm, needs review  Criteria for Septal infarct are no longer Present  Nonspecific T wave abnormality no longer evident in Anterior leads    Referred By: CASSIE MONTOYA           Confirmed By:          PHYSICAL EXAM  CONSTITUTIONAL: Well built, well nourished in no apparent distress  NECK: Bilateral Bruit  LUNGS: CTA  CHEST WALL: no tenderness  HEART: regular rate and rhythm,  S1, S2 normal, Systolic, Diastolic murmur noted  ABDOMEN: soft, non-tender; bowel sounds normal; no masses,  no organomegaly  EXTREMITIES: Extremities normal, no edema, no calf tenderness noted  NEURO: AAO X 3    I HAVE REVIEWED :    The vital signs, nurses notes, and all the pertinent radiology and labs.        Current Outpatient Medications   Medication Instructions    acetaminophen/diphenhydramine (TYLENOL PM ORAL) 1 tablet, Oral, Nightly    amLODIPine (NORVASC) 10 MG tablet TAKE 1 TABLET(10 MG) BY MOUTH EVERY DAY    clopidogreL (PLAVIX) 75 mg tablet TAKE 1 TABLET(75 MG) BY MOUTH EVERY DAY    cyanocobalamin (VITAMIN B-12) 100 mcg, Oral, Every morning    esomeprazole (NEXIUM) 40 mg, Oral, Every morning    ezetimibe (ZETIA) 10 mg tablet TAKE 1 TABLET(10 MG) BY MOUTH EVERY DAY    ezetimibe (ZETIA) 10 mg tablet TAKE 1 TABLET(10 MG) BY MOUTH EVERY DAY    furosemide (LASIX) 20 mg, Oral, Daily, Take daily for three days then as needed for shortness of breath or swelling    metFORMIN (GLUCOPHAGE) 500 MG tablet TAKE 1 TABLET(500 MG) BY MOUTH TWICE DAILY WITH MEALS    metoprolol tartrate (LOPRESSOR) 100 MG tablet TAKE 1 TABLET(100 MG) BY MOUTH EVERY DAY    multivitamin (ONE DAILY MULTIVITAMIN) per tablet 1 tablet, Oral, Daily    ondansetron (ZOFRAN-ODT) 4 MG TbDL 1 tablet, Oral, Every 8 hours PRN    tamsulosin (FLOMAX) 0.4 mg, Oral, Daily    traMADoL (ULTRAM) 50 mg, Oral, Nightly PRN    VITAMIN B COMPLEX ORAL 1 tablet, Oral, Daily          Assessment & Plan     1. FARIA  2. Chest Tightness  3. S/P TAVR  4. IgG deficiency            PLAN:    ECHO today awaiting results, not in any acute decompensation on evaluation  Has a stress test on the 21 st  Will follow up with Dr. Brady after this  Dr. Tan would like cardiac clearance prior to IVIG infusion.        activity

## 2022-06-22 ENCOUNTER — NON-APPOINTMENT (OUTPATIENT)
Age: 57
End: 2022-06-22

## 2022-06-23 ENCOUNTER — APPOINTMENT (OUTPATIENT)
Dept: RHEUMATOLOGY | Facility: CLINIC | Age: 57
End: 2022-06-23
Payer: SELF-PAY

## 2022-06-23 VITALS
OXYGEN SATURATION: 97 % | WEIGHT: 161 LBS | HEART RATE: 98 BPM | TEMPERATURE: 97.9 F | BODY MASS INDEX: 30.4 KG/M2 | HEIGHT: 61 IN

## 2022-06-23 LAB
ALBUMIN SERPL ELPH-MCNC: 4.5 G/DL
ALP BLD-CCNC: 92 U/L
ALT SERPL-CCNC: 39 U/L
ANION GAP SERPL CALC-SCNC: 18 MMOL/L
AST SERPL-CCNC: 22 U/L
BASOPHILS # BLD AUTO: 0.03 K/UL
BASOPHILS NFR BLD AUTO: 0.4 %
BILIRUB SERPL-MCNC: 0.2 MG/DL
BUN SERPL-MCNC: 9 MG/DL
CALCIUM SERPL-MCNC: 9.3 MG/DL
CHLORIDE SERPL-SCNC: 102 MMOL/L
CO2 SERPL-SCNC: 21 MMOL/L
CREAT SERPL-MCNC: 0.8 MG/DL
CRP SERPL-MCNC: 4 MG/L
EGFR: 86 ML/MIN/1.73M2
EOSINOPHIL # BLD AUTO: 0.12 K/UL
EOSINOPHIL NFR BLD AUTO: 1.8 %
ERYTHROCYTE [SEDIMENTATION RATE] IN BLOOD BY WESTERGREN METHOD: 20 MM/HR
GLUCOSE SERPL-MCNC: 161 MG/DL
HCT VFR BLD CALC: 42.5 %
HGB BLD-MCNC: 13.9 G/DL
IMM GRANULOCYTES NFR BLD AUTO: 0.1 %
LYMPHOCYTES # BLD AUTO: 2.27 K/UL
LYMPHOCYTES NFR BLD AUTO: 33.9 %
MAN DIFF?: NORMAL
MCHC RBC-ENTMCNC: 29.2 PG
MCHC RBC-ENTMCNC: 32.7 G/DL
MCV RBC AUTO: 89.3 FL
MONOCYTES # BLD AUTO: 0.38 K/UL
MONOCYTES NFR BLD AUTO: 5.7 %
NEUTROPHILS # BLD AUTO: 3.88 K/UL
NEUTROPHILS NFR BLD AUTO: 58.1 %
PLATELET # BLD AUTO: 328 K/UL
POTASSIUM SERPL-SCNC: 3.5 MMOL/L
PROT SERPL-MCNC: 7 G/DL
RBC # BLD: 4.76 M/UL
RBC # FLD: 13.6 %
SODIUM SERPL-SCNC: 141 MMOL/L
WBC # FLD AUTO: 6.69 K/UL

## 2022-06-23 PROCEDURE — 99214 OFFICE O/P EST MOD 30 MIN: CPT

## 2022-06-23 RX ORDER — BETAMETHASONE DIPROPIONATE 0.5 MG/G
0.05 CREAM, AUGMENTED TOPICAL
Qty: 50 | Refills: 0 | Status: DISCONTINUED | COMMUNITY
Start: 2022-03-21

## 2022-06-23 RX ORDER — KETOCONAZOLE 20 MG/G
2 CREAM TOPICAL
Qty: 60 | Refills: 0 | Status: DISCONTINUED | COMMUNITY
Start: 2022-03-14

## 2022-06-23 RX ORDER — ALBUTEROL SULFATE 90 UG/1
108 (90 BASE) INHALANT RESPIRATORY (INHALATION)
Qty: 8 | Refills: 0 | Status: ACTIVE | COMMUNITY
Start: 2022-01-27

## 2022-06-23 RX ORDER — FLUCONAZOLE 150 MG/1
150 TABLET ORAL
Qty: 1 | Refills: 0 | Status: DISCONTINUED | COMMUNITY
Start: 2022-02-07

## 2022-06-23 RX ORDER — PANTOPRAZOLE 40 MG/1
40 TABLET, DELAYED RELEASE ORAL
Qty: 90 | Refills: 0 | Status: DISCONTINUED | COMMUNITY
Start: 2021-11-10

## 2022-06-23 RX ORDER — OXYCODONE AND ACETAMINOPHEN 10; 325 MG/1; MG/1
10-325 TABLET ORAL
Qty: 60 | Refills: 0 | Status: DISCONTINUED | COMMUNITY
Start: 2022-03-30

## 2022-06-23 RX ORDER — MELOXICAM 15 MG/1
15 TABLET ORAL
Qty: 90 | Refills: 0 | Status: DISCONTINUED | COMMUNITY
Start: 2021-07-12

## 2022-06-23 RX ORDER — BETAMETHASONE DIPROPIONATE 0.5 MG/G
0.05 OINTMENT, AUGMENTED TOPICAL
Qty: 50 | Refills: 0 | Status: DISCONTINUED | COMMUNITY
Start: 2022-04-05

## 2022-06-23 RX ORDER — DOXYCYCLINE HYCLATE 100 MG/1
100 CAPSULE ORAL
Qty: 28 | Refills: 0 | Status: DISCONTINUED | COMMUNITY
Start: 2022-03-21

## 2022-06-23 RX ORDER — NEOMYCIN SULFATE, POLYMYXIN B SULFATE AND DEXAMETHASONE 3.5; 10000; 1 MG/ML; [USP'U]/ML; MG/ML
3.5-10000-0.1 SUSPENSION OPHTHALMIC
Qty: 5 | Refills: 0 | Status: DISCONTINUED | COMMUNITY
Start: 2022-01-06

## 2022-06-23 RX ORDER — CELECOXIB 100 MG/1
100 CAPSULE ORAL
Qty: 10 | Refills: 0 | Status: DISCONTINUED | COMMUNITY
Start: 2022-01-26

## 2022-06-23 RX ORDER — METOPROLOL SUCCINATE 50 MG/1
50 TABLET, EXTENDED RELEASE ORAL
Qty: 90 | Refills: 0 | Status: ACTIVE | COMMUNITY
Start: 2022-02-25

## 2022-06-23 RX ORDER — CLINDAMYCIN PHOSPHATE 10 MG/ML
1 SOLUTION TOPICAL
Qty: 60 | Refills: 0 | Status: DISCONTINUED | COMMUNITY
Start: 2022-03-21

## 2022-06-23 RX ORDER — DOCUSATE SODIUM 100 MG/1
100 CAPSULE, LIQUID FILLED ORAL
Qty: 30 | Refills: 0 | Status: DISCONTINUED | COMMUNITY
Start: 2021-12-29

## 2022-06-23 RX ORDER — METHOTREXATE 2.5 MG/1
2.5 TABLET ORAL
Qty: 26 | Refills: 1 | Status: DISCONTINUED | COMMUNITY
Start: 2022-05-26 | End: 2022-06-23

## 2022-06-23 RX ORDER — LEVOFLOXACIN 750 MG/1
750 TABLET, FILM COATED ORAL
Qty: 5 | Refills: 0 | Status: DISCONTINUED | COMMUNITY
Start: 2022-02-05

## 2022-06-23 RX ORDER — SUCRALFATE 1 G/1
1 TABLET ORAL
Qty: 90 | Refills: 0 | Status: ACTIVE | COMMUNITY
Start: 2022-04-28

## 2022-06-23 RX ORDER — BACLOFEN 5 MG/1
5 TABLET ORAL
Qty: 90 | Refills: 0 | Status: DISCONTINUED | COMMUNITY
Start: 2022-03-02

## 2022-06-23 RX ORDER — CARBAMAZEPINE 200 MG/1
200 TABLET, EXTENDED RELEASE ORAL
Qty: 60 | Refills: 0 | Status: DISCONTINUED | COMMUNITY
Start: 2021-03-12

## 2022-06-23 RX ORDER — CLOBETASOL PROPIONATE 0.5 MG/G
0.05 OINTMENT TOPICAL
Qty: 30 | Refills: 0 | Status: DISCONTINUED | COMMUNITY
Start: 2021-08-28

## 2022-06-23 RX ORDER — FLUTICASONE PROPIONATE 50 UG/1
50 SPRAY, METERED NASAL
Qty: 48 | Refills: 0 | Status: DISCONTINUED | COMMUNITY
Start: 2021-09-07

## 2022-06-23 RX ORDER — GABAPENTIN 300 MG/1
300 CAPSULE ORAL
Qty: 180 | Refills: 0 | Status: DISCONTINUED | COMMUNITY
Start: 2022-04-30

## 2022-06-23 NOTE — HISTORY OF PRESENT ILLNESS
[FreeTextEntry1] : Pt continues to experience a lot of symptoms with her eyes, including eye irritation and itching which keeps her up at night and is very bothersome in the morning, as well as difficulty seeing at night; has trouble seeing signs. She tried using eye drops which help somewhat but she continues to have significant symptoms. She says that she was told it may take a while for the biopsy site to heal. \par \par Also + continued headaches in the front of her head/forehead. + Continued sinus pressure; she is seeing ENT in 3 days for f/u for the sinus mass. \par \par Pt had a repeat epidural for neck pain and will have additional injections for back and neck pain; she was told she may need another surgery but would have to be off steroids first. \par \par + Mild nausea and fatigue with methotrexate use but generally tolerable.\par \par History of present illness: In July 2021, pt developed severe pain, redness and swelling in her L eye "overnight." She was admitted to [facility] and was diagnosed with L orbital cellulitis and L dacryoadenitis, was started on high-dose steroids with improvement of symptoms, which flared again when she was tapered down to 5 mg prednisone. Pt's prednisone was subsequently increased again, and she has been on steroids since July 2021. She has been following in the rheum clinic, most recently was seen in the clinic in March 2022, when she was continued on prednisone 20 mg pending biopsy results. Pt says she received the biopsy results but they are not available within the [facility] records.\par \par \par Physical exam: GEN: Pleasant, AAO woman sitting on exam table in NAD\par SKIN: no rashes\par ENT: + TTP in maxillary sinuses. Normal tympanic reflex\par EYES: + ? Mild swelling noted in b/l eyelids, symmetric, no erythema\par MOUTH: Moist mucous membranes, no oral ulcers\par PULM: Clear to auscultation b/l\par CV: Regular rate and rhythm, no murmurs\par MSK: Back: + TTP diffusely in all paraspinal regions

## 2022-06-23 NOTE — ASSESSMENT
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 With improvement of symptoms on prednisone, but pt has diabetes and has been taking prednisone monotherapy for >6 months at this point. With previous flareup on rapid prednisone taper to 5 mg, also with recent flareup of symptoms when pt tried to decrease prednisone to 10 mg from 15 mg. Pt now on 15 mg prednisone with continued irritation of her eyes and dry sensation.\par - Advised pt to follow up with ophtho given her continued symptoms of irritation\par - Increase methotrexate to 20 mg q week. CBC and LFTs 2 days ago were unremarkable.\par - Continue folic acid 1 mg q day\par - Decrease prednisone to 12.5 mg q day\par - f/u in 3 weeks\par \par Headache: Also with jaw pain, but ESR and CRP were negative so low suspicion for GCA. Given her recent sinus congestion, I suspect that her headache is more likely related to allergies or sinus issues\par - Pt has ENT appt in 3 days \par - If pt's ENT workup is unrevealing, can consider neurology evaluation

## 2022-06-27 ENCOUNTER — APPOINTMENT (OUTPATIENT)
Dept: OTOLARYNGOLOGY | Facility: CLINIC | Age: 57
End: 2022-06-27
Payer: SELF-PAY

## 2022-06-27 DIAGNOSIS — J34.89 OTHER SPECIFIED DISORDERS OF NOSE AND NASAL SINUSES: ICD-10-CM

## 2022-06-27 DIAGNOSIS — R51.9 HEADACHE, UNSPECIFIED: ICD-10-CM

## 2022-06-27 PROCEDURE — 99214 OFFICE O/P EST MOD 30 MIN: CPT

## 2022-06-27 NOTE — HISTORY OF PRESENT ILLNESS
[FreeTextEntry1] : PAtient returns today following up on headaches and sinus mass . PT slight swelling of the  eyes . Waking up with headaches that radiate over in to the neck.  Had CT performed , here to discuss  results which were reviewed that show no sins mass

## 2022-06-27 NOTE — DATA REVIEWED
[de-identified] : CT spine reviewed\par PROCEDURE DATE:  02/28/2022\par \par \par \par INTERPRETATION:  CLINICAL INDICATION: Neck pain, limited range of motion, status post spinal fusion\par \par TECHNIQUE: CT of the cervical spine was performed without the administration of intravenous contrast, according to standard protocol.\par \par COMPARISON: Correlation with MRI cervical spine 2/23/2022, cervical spine radiographs 2/23/2022\par \par FINDINGS:\par \par Redemonstrated stand-alone interbody cages at C5-6 and C6-7 without CT evidence of hardware complications. There are associated streak artifacts.\par \par ALIGNMENT: Straightening of the cervical spine without listhesis.\par \par VERTEBRAE: Sclerotic changes involving C5, C6 and C7. The remaining vertebral heights are preserved. No fracture or aggressive osseous lesion.\par \par DISCS: The disc spaces are maintained.\par \par PARAVERTEBRAL SOFT TISSUES: Unremarkable.\par \par EVALUATION OF INDIVIDUAL LEVELS DEMONSTRATES:\par C2-3: No disc herniation, spinal canal or neuroforaminal stenosis.\par \par C3-4: Shallow disc bulge without spinal or neuroforaminal stenosis\par \par C4-5: No disc herniation, spinal canal or neuroforaminal stenosis.\par \par C5-6: Mild bilateral uncovertebral hypertrophy, left more than right, contributing to mild left worse than right neuroforaminal stenosis.\par \par C6-7: Mild bilateral uncovertebral hypertrophy without spinal canal or neuroforaminal stenosis.\par \par C7-T1: No disc herniation, spinal canal or neuroforaminal stenosis.\par \par \par IMPRESSION:\par \par Redemonstrated stand-alone interbody cages at C5-6 and C6-7, without CT evidence of hardware complications.\par \par Mild multilevel cervical spondylosis as above.\par \par --- End of Report ---\par \par PROCEDURE DATE:  12/12/2021\par \par \par \par INTERPRETATION:  CLINICAL INDICATION: Pain and swelling of the left eye and face/cheek. Prominent left suly bullosa.\par \par TECHNIQUE: CT examination of the paranasal sinuses. These images were used to create axial, coronal and sagittal reformatted images through the paranasal sinuses. No intravenous contrast was administered.  The images were reviewed in bone and soft-tissue windows.\par \par COMPARISON: CT orbits dated 6/20/2021, MRI of the orbits dated 11/24/2021.\par \par FINDINGS:\par \par Orbits: The orbits are grossly unremarkable. The previously noted enlargement of the lacrimal gland is not well evaluated without intravenous contrast.\par \par Nasal septum and nasal cavity: There is minimal rightward nasal septal deviation. Prominent suly bullosa on the left, small suly bullosa on the right.\par \par Frontal sinuses, drainage pathways, and associated anatomic variants: The right frontal sinus is hypoplastic. Left frontal sinus is well-developed..  The frontal sinus outflow tracts are clear.\par \par Ethmoid sinuses:The ethmoid air cells are free of mucosal disease.  The lamina papyracea are intact.\par \par Sphenoid sinuses, drainage pathways, and associated variants: The sphenoid sinuses are clear. The sphenoethmoidal recesses are free of abnormal soft tissue bilaterally.    The carotid canals are covered by bone.\par \par Maxillary sinuses, drainage pathways, and associated variants: The maxillary sinuses are well developed and clear. The ostiomeatal units are free of mucosal disease.\par \par Other:\par Partially visualized intracranial structures: Normal\par \par \par \par IMPRESSION:\par \par Unremarkable noncontrast maxillofacial CT. Please note evaluation of the soft tissues is limited without intravenous contrast.\par \par No significant paranasal sinus disease or sinus outflow obstruction.\par \par \par

## 2022-06-27 NOTE — REASON FOR VISIT
[Subsequent Evaluation] : a subsequent evaluation for [FreeTextEntry2] : Lacrimal gland  , headaches

## 2022-06-27 NOTE — ASSESSMENT
Report given to RIVAS Serrato   [FreeTextEntry1] : Clear sinus CT will follow with neurosurgery and opthalmology.

## 2022-06-29 ENCOUNTER — APPOINTMENT (OUTPATIENT)
Dept: PAIN MANAGEMENT | Facility: CLINIC | Age: 57
End: 2022-06-29

## 2022-06-29 PROCEDURE — 64490 INJ PARAVERT F JNT C/T 1 LEV: CPT | Mod: LT

## 2022-06-29 PROCEDURE — 64491 INJ PARAVERT F JNT C/T 2 LEV: CPT | Mod: LT

## 2022-06-29 PROCEDURE — 00600 ANES PX CRV SPINE&CORD NOS: CPT | Mod: QZ,1L,P3

## 2022-06-29 PROCEDURE — 94761 N-INVAS EAR/PLS OXIMETRY MLT: CPT

## 2022-06-29 PROCEDURE — 64492 INJ PARAVERT F JNT C/T 3 LEV: CPT | Mod: LT

## 2022-06-29 PROCEDURE — 93770 DETERMINATION VENOUS PRESS: CPT

## 2022-06-29 PROCEDURE — 93040 RHYTHM ECG WITH REPORT: CPT | Mod: 59

## 2022-06-29 NOTE — PROCEDURE
[FreeTextEntry1] : CERVICAL MEDIAL BRANCH INJECTION UNDER FLUOROSCOPY [FreeTextEntry3] : Preoperative Diagnosis: Cervical facet arthropathy.\par Postoperative Diagnosis: Cervical facet arthropathy.\par \par Procedure: Diagnostic right and left sided cervical median branch nerve injection, for C2-3, C3-4, C4-5  under fluoroscopy\par \par Physician: Darian Germain D.O.\par \par Anesthesiologist/CRNA: \par \par Anesthesia: See nurses notes/MAC/Cold spray. Versed 2 mg,  Fentanyl 1 mg\par \par Medical Necessity:  Failure of conservative management.\par \par Indications:  The patient was admitted for a median branch injection for diagnostic purposes.  The patient was explained the technique, complications and rationale for the procedure and elected to proceed.\par \par Indication for Fluoroscopy:  This procedure requires the precise placement of the spinal needle.  It is the only way to accurately and safely perform the injection.\par \par CONSENT: The possible complications including infection, bleeding, nerve damage, hospital death or failure of the procedure; though unusual, are theoretically possible. The patient was educated about the of the procedure and alternative therapies. All questions were answered and the patient freely gave consent to proceed.\par \par Monitoring:  Patient had continuous blood pressure, EKG, and pulse oximetry throughout the case. See nurse's notes.\par \par  \par \par PROCEDURE NOTE: \par \par After obtaining written consent, the patient was positioned in a prone position on the fluoroscopy table. A betadine prep was performed and the area surrounded by sterile drapes. A time out was performed.  Fluoroscopy unit was positioned over the patient and images of the spine (AP views) obtained.  Cold spray was used to localize the area. At each level a 22guage 3 ½ inch spinal needle was placed at the level of the median branch to the facet under fluoroscopic guidance. This was performed by determining needle position based on aligning the needle point with the waist of each cervical vertebrate under tunnel vision.  A dose of lidocaine 2%, 1cc was administered at each level.  The needles at each level were withdrawn following administration of the medication. The needle was removed intact. A band aid was place on the site.\par \par  \par \par Findings: Cervical spine AP and oblique views with x-ray no degenerative changes noted.\par \par  \par \par Complications: None. The patient tolerated the procedure well.\par \par  \par \par Disposition: I have examined the patient and there are no new physical findings since the original presentation.  Sensory and motor function were intact. The patient met discharge criteria see nurses notes. The discharge instruction sheet was reviewed and given to the patient. The patient was discharged home with a .\par \par Comment: If patient has a diagnostic median branch nerve injection with 70% relief greater than 2 hours or 50% greater than 24 hours would proceed to RFA. If 50% less than 24 hours would repeat to confirm for RFA. Follow in office. Call if any problems.\par \par  \par \par  \par \par Indication for RFA: The pt had a positive response to medial branch diagnostic injections and is considered a good candidate for the thermal RF ablation procedure. The diagnostic injection provided at least 50% reduction in pain for the duration of the local anesthetic.\par \par The following criteria have been met: 1) failed response to at least 3 months of conservative management; 2) patient has LBP that is non-radicular, suggesting facet joint origin supported by absence of nerve root compression documented on the medical record on H&P and radiographic evaluation; 3) minimum of 6 months has elapsed since any prior RF treatments. If prior ablation therapy has been performed, it provided at least 50% relief for minimum of 10-12 weeks; 4) no prior spinal fusion at the vertebral level being treated;\par \par  \par \par This document was electronically signed by: \par \par Darian Germain D.O.\par \par Diplomat, American Board of Anesthesiology\par \par Diplomat, American Board of Pain Medicine\par \par Diplomat, American Board of Pain Management\par \par \par \par \par

## 2022-07-14 ENCOUNTER — APPOINTMENT (OUTPATIENT)
Dept: RHEUMATOLOGY | Facility: CLINIC | Age: 57
End: 2022-07-14

## 2022-07-19 ENCOUNTER — APPOINTMENT (OUTPATIENT)
Dept: ORTHOPEDIC SURGERY | Facility: CLINIC | Age: 57
End: 2022-07-19
Payer: OTHER MISCELLANEOUS

## 2022-07-19 PROCEDURE — 99213 OFFICE O/P EST LOW 20 MIN: CPT

## 2022-07-19 PROCEDURE — 99072 ADDL SUPL MATRL&STAF TM PHE: CPT

## 2022-07-19 NOTE — HISTORY OF PRESENT ILLNESS
[de-identified] : The patient is a 57-year-old female here for subsequent re-evaluation of her left shoulder.  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  She still getting pain in the shoulder.  She is doing home therapy exercises for the shoulder.  Of note she is status post epidural injections for the cervical spine 1 month ago.  She was also started on methotrexate by her rheumatologist and her dose of prednisone was lowered to 2.5 mg.

## 2022-07-19 NOTE — PHYSICAL EXAM
[Left] : left shoulder [] : no atrophy [de-identified] :   Negative Nav testing.  Good strength with rotator cuff resistance testing. [TWNoteComboBox7] : active forward flexion 165 degrees [TWNoteComboBox4] : passive forward flexion 180 degrees [de-identified] : active abduction 110 degrees [TWNoteComboBox6] : internal rotation L4

## 2022-07-19 NOTE — DISCUSSION/SUMMARY
[de-identified] :   At this point I recommend she continues with home therapy exercises.  She is retired, 100% temporary disabled.  She will follow up in 2 months for further evaluation.

## 2022-07-23 ENCOUNTER — RX RENEWAL (OUTPATIENT)
Age: 57
End: 2022-07-23

## 2022-07-23 RX ORDER — PRASUGREL 10 MG/1
10 TABLET, FILM COATED ORAL DAILY
Qty: 90 | Refills: 2 | Status: ACTIVE | COMMUNITY
Start: 2021-11-03 | End: 1900-01-01

## 2022-08-10 ENCOUNTER — APPOINTMENT (OUTPATIENT)
Dept: ORTHOPEDIC SURGERY | Facility: CLINIC | Age: 57
End: 2022-08-10

## 2022-08-10 VITALS — BODY MASS INDEX: 29.44 KG/M2 | HEIGHT: 62 IN | WEIGHT: 160 LBS

## 2022-08-10 PROCEDURE — 99213 OFFICE O/P EST LOW 20 MIN: CPT

## 2022-08-10 PROCEDURE — 99072 ADDL SUPL MATRL&STAF TM PHE: CPT

## 2022-08-10 NOTE — HISTORY OF PRESENT ILLNESS
[de-identified] : Patient comes in with complaints of right hand numbness and tingling as well some wrist pain.  She had an EMG NCV.  She says when she holds her elbow flexed she does have some numbness in the fingers as well.  She also is having neck problems for cervical radiculopathy.  She is being seen by Dr. Haq for this problem.  She says she dropped her phone because of the pain in her arm and her hand.

## 2022-08-10 NOTE — ASSESSMENT
[FreeTextEntry1] :  EMG NCV show mild carpal tunnel which did not show much difference since the 1 she had prior.  The patient is having these problems and may have compression at the lacertus as well.  I discussed with the patient the option for the possibility of surgical intervention which would be releasing the lacertus and possibly a revision carpal tunnel however the patient is having problems with her cervical spine and that may be causing the problem in her arm.  She is going to see Dr. Haq in the near future and then will follow up with me once she sees Dr. Haq once they have a planned.  In the meantime I gave her a cock-up wrist splint for the right wrist to help give her some support where she is having some pain in the wrist.  Her exam of the wrist was negative otherwise besides neurovascular status.

## 2022-08-10 NOTE — IMAGING
[de-identified] :   Right upper extremity:  slightly tender palpation over carpal joint, full range of motion of wrist with mild pain, mildly positive Tinel's at the wrist, positive Tinel's at lacertus, positive compression at lacertus, positive Spurling's

## 2022-08-11 NOTE — ED ADULT NURSE NOTE - FINAL NURSING ELECTRONIC SIGNATURE
Hospitalist Progress Note    Subjective:  No acute events overnight.  She feels better.  Shortness of breath improving      Objective:  Vitals with min/max:      Vital Last Value 24 Hour Range   Temperature 97.8 °F (36.6 °C) (08/11/22 0300) Temp  Min: 97 °F (36.1 °C)  Max: 97.8 °F (36.6 °C)   Pulse (!) 54 (08/11/22 0300) Pulse  Min: 54  Max: 78   Respiratory 19 (08/11/22 0300) Resp  Min: 17  Max: 27   Non-Invasive  Blood Pressure 104/68 (08/11/22 0300) BP  Min: 91/63  Max: 124/62   Pulse Oximetry 100 % (08/11/22 0300) SpO2  Min: 95 %  Max: 100 %   Arterial   Blood Pressure   No data recorded     08/10 0700 - 08/11 0659  In: 265 [P.O.:15]  Out: 1155 [Urine:1155]      Physical Exam:  Gen alert, nad  Eyes anicteric, EOMI  ENT MMM, OP clear  cor rrr, no g/r  lungs ctab, normal lung excursion  abd nt, nd, soft, +bs  skin no rash on UE, no pallor  MSK normal muscle bulk, no joint swelling  Neuro answers questions appropriately, no tremors  Psych calm, good eye contact        Labs:  Recent Labs   Lab 08/11/22  0449 08/10/22  0358 08/09/22  0230 08/08/22  1633 08/08/22  1232 08/08/22  0413 08/07/22  1216   WBC 10.3 11.8* 13.4*  --   --  5.7 8.5   HGB 10.7* 9.9* 10.4* 11.2* 10.9* 10.5* 12.5    217 233  --   --  197 247     Recent Labs   Lab 08/11/22  0449 08/10/22  0358 08/09/22  0230 08/08/22  0413 08/07/22  1641   SODIUM 140 145 148* 141 137   POTASSIUM 3.6 3.8 3.8 4.0 3.4   CHLORIDE 103 107 111* 106 99   CO2 30 30 25 23 28   BUN 20 17 17 16 16   CREATININE 0.82 0.80 0.83 1.04* 1.46*   GLUCOSE 212* 156* 244* 196* 158*   CALCIUM 8.2* 7.9* 8.2* 8.1* 9.4     Recent Labs   Lab 08/11/22  0449 08/10/22  0358 08/09/22  0230 08/08/22  0413 08/07/22  1641   AST 23 23 24 32 36   GPT 13 12 10 14 14   ALKPT 71 62 60 64 71   BILIRUBIN 0.3 0.2 0.2 0.3 0.5   ALBUMIN 2.4* 2.3* 2.4* 2.7* 3.1*       Recent Labs   Lab 08/07/22  1216   INR 1.2        No results found  No results found  No results found for: CHOLESTEROL No results found  for: HDL   CHOL/HDL (no units)   Date Value   10/05/2018 4.4    No results found for: TRIGLYCERIDE   CALCULATED LDL (mg/dL)   Date Value   10/05/2018 165 (H)        Microbiology Results     None          * Cannot find OR log *     Other results:  XR CHEST PA OR AP 1 VIEW    Result Date: 2022  EXAM: XR CHEST PA OR AP 1 VIEW CLINICAL INDICATION: Shortness of breath. COMPARISON: 2022.     FINDINGS AND IMPRESSION:  Low lung volumes.  Heart size stable.  Calcification of aortic arch. Redemonstrated although coarse confluent reticular interstitial and reticular nodular opacities throughout both lungs with peripheral and basilar predominance with interval decrease in groundglass opacities suggestive of superimposed infectious process in a background of underlying scarring/pulmonary fibrosis.  No pleural effusion or pneumothorax. Electronically Signed by: ELBA MUNOZ M.D. Signed on: 2022 5:32 AM        Results for orders placed during the hospital encounter of 22    TRANSTHORACIC ECHO (TTE) COMPLETE W/ W/O IMAGING AGENT    Impression  *[facility]*  [address] [place] [id number]  Phone [phone]  Fax [phone]  Transthoracic Echocardiogram (TTE)    Patient: Carina Toro    Study Date/Time:        Aug 8 2022 9:47AM  MRN:     [id number]           FIN#:                   [id number]  :     1968        Ht/Wt:                  154.9cm 61.2kg  Age:     54                BSA/BMI:                1.6m^2 25.5kg/m^2  Gender:  F                 Baseline BP:            74 / 49  Ordering Physician:    Ugo Bunn    Referring Physician:   Ugo Bunn    Attending Physician:   Son Vazquez  Performing Physician:  Rochelle Ruth MD  Diagnostic Physician:  Rochelle Ruth MD  Sonographer:           Pop Mora RDCS    --------------------------------------------------------------------------  INDICATIONS:   Shortness of  breath.    --------------------------------------------------------------------------  STUDY CONCLUSIONS  SUMMARY:    1. Left ventricle: The cavity size is normal. Wall thickness is normal.  The ejection fraction was measured by 3D assessment. The ejection  fraction is 54%.  2. Right ventricle: The cavity size is normal. Wall thickness is normal.  Systolic function is normal. Systolic pressure is moderately increased.  The estimated peak pressure is 48mm Hg.  3. Pulmonary arteries: Systolic pressure is moderately increased.  4. Inferior vena cava: The vessel is normal in size.  5. Compared to prior study from 2/15/2020, there is now moderate pulmonary  HTN.    --------------------------------------------------------------------------  STUDY DATA:   Procedure:  Transthoracic echocardiography was performed.  Image quality was adequate.  M-mode, complete 2D, complete spectral  Doppler, and color Doppler.  Study status:  Routine.  Study completion:  There were no complications.    FINDINGS    LEFT VENTRICLE:  The cavity size is normal. Wall thickness is normal.  Systolic function is normal. Wall motion is normal; there are no regional  wall motion abnormalities.    The ejection fraction was measured by 3D  assessment. The ejection fraction is 54%. Left ventricular diastolic  function parameters are normal.    AORTIC VALVE:  The annulus is mildly calcified. The valve is trileaflet.  The leaflets are normal thickness. Cusp separation is normal.  Doppler:  Transvalvular velocity is within the normal range. There is no stenosis.  No regurgitation.    The ratio of LVOT to aortic valve peak velocity is  0.77. The valve area by the peak velocity method is 2.4cm^2. The valve  area index by the peak velocity method is 1.51cm^2/m^2.    The peak  systolic gradient is 3mm Hg.    AORTA:  Aortic root: The aortic root is normal in size.    MITRAL VALVE:  The annulus is mildly calcified. The leaflets are normal  thickness. Leaflet  separation is normal.  Doppler:  Transvalvular velocity  is within the normal range. There is no evidence for stenosis.  No  regurgitation.    LEFT ATRIUM:  The atrium is normal in size.    RIGHT VENTRICLE:  The cavity size is normal. Wall thickness is normal.  Systolic function is normal. Systolic pressure is moderately increased.  The estimated peak pressure is 48mm Hg.       The RV pressure during  systole by Doppler is 48mm Hg.    PULMONIC VALVE:   Poorly visualized.  Doppler:  Transvalvular velocity is  within the normal range.  No regurgitation. The peak systolic gradient is  3mm Hg.    TRICUSPID VALVE:   Structurally normal valve.   Leaflet separation is  normal.  Doppler:  Transvalvular velocity is within the normal range.  Mild regurgitation.    PULMONARY ARTERY:   Systolic pressure is moderately increased.    RIGHT ATRIUM:  The atrium is normal in size.       The estimated central  venous pressure is 3mm Hg.    PERICARDIUM:  There is no pericardial effusion.    SYSTEMIC VEINS:  Inferior vena cava: The vessel is normal in size.    --------------------------------------------------------------------------  Measurements    Left ventricle             Value        Ref        Left atrium continued          Value          Ref  SIMON, LAX chord             3.8   cm     3.8 - 5.2  Vol/bsa, ES, 1-p A4C           17    ml/m^2   11 - 40  ESD, LAX chord             2.7   cm     2.2 - 3.5  Vol, ES, 1-p A2C       (L)     9     ml       22 - 52  SIMON/bsa, LAX chord         2.4   cm/m^2 2.3 - 3.1  Vol/bsa, ES, 1-p A2C   (L)     6     ml/m^2   13 - 40  ESD/bsa, LAX chord         1.7   cm/m^2 1.3 - 2.1  PW, ED, LAX                0.9   cm     0.6 - 0.9  Aortic valve                   Value          Ref  PW, ED                     0.9   cm     0.6 - 0.9  Peak v, S                      0.8   m/sec    ---------  IVS/PW, ED                 0.95         ---------  Peak grad, S                   3     mm Hg    ---------  EDV                         54    ml     46 - 106   LVOT/AV, Vpeak ratio           0.77           ---------  ESV                        19    ml     14 - 42    NICKOLAS, Vmax                      2.4   cm^2     ---------  EF                         54    %      54 - 74    NICKOLAS/bsa, Vmax                  1.51  cm^2/m^2 ---------  SV                         35    ml     ---------  EDV/bsa                    34    ml/m^2 29 - 61    Mitral valve                   Value          Ref  ESV/bsa                    12    ml/m^2 8 - 24     Peak E                         0.65  m/sec    ---------  SV/bsa                     22    ml/m^2 ---------  Peak A                         0.54  m/sec    ---------  E', lat diana, TDI           12.4  cm/sec >=10       Decel time                     312   ms       ---------  E/e', lat diana, TDI         5            ---------  Peak E/A ratio                 1.2            ---------  E', med diana, TDI           9.36  cm/sec >=7  E/e', med diana, TDI         7            ---------  Pulmonic valve                 Value          Ref  E', avg, TDI               10.88 cm/sec ---------  Peak v, S                      0.8   m/sec    ---------  E/e', avg, TDI             6            <=14       Peak grad, S                   3     mm Hg    ---------    LVOT                       Value        Ref        Tricuspid valve                Value          Ref  Diam, S                    2.0   cm     ---------  TR peak v              (H)     3.3   m/sec    <=2.8  Area                       3.1   cm^2   ---------  Peak RV-RA grad, S             45    mm Hg    ---------  Peak suresh, S                0.63  m/sec  ---------  Max TR suresh                     2.76  m/sec    ---------  Peak grad, S               2     mm Hg  ---------  Aortic root                    Value          Ref  Ventricular septum         Value        Ref        Root diam, ED                  2.9   cm       <3.8  IVS, ED                    0.9   cm     0.6 - 0.9  S-T junct  diam, ED             2.0   cm       2.0 - 3.2  S-T junct diam/bsa, ED         1.3   cm/m^2   1.1 - 1.9  Right ventricle            Value        Ref  TAPSE, MM          (L)     1.5   cm     1.7 - 3.1  Ascending aorta                Value          Ref  Pressure, S                48    mm Hg  ---------  AAo AP diam, ED                2.8   cm       1.9 - 3.5  AAo AP diam/bsa, ED            1.7   cm/m^2   1.0 - 2.2  Left atrium                Value        Ref  AP dim, ES         (L)     2.2   cm     2.7 - 3.8  Pulmonary artery               Value          Ref  AP dim index       (L)     1.4   cm/m^2 1.5 - 2.3  Pressure, S                    45    mm Hg    ---------  Area ES, A4C               14    cm^2   <=20  Area ES, A2C               8     cm^2   ---------  Systemic veins                 Value          Ref  Vol, ES, 1-p A4C           28    ml     22 - 52    Estimated CVP                  3     mm Hg    ---------  Legend:  (L)  and  (H)  elba values outside specified reference range.    Prepared and electronically signed by  Rochelle Ruth MD  08/08/2022 12:10    No results found for this or any previous visit.    No results found for this or any previous visit.    No results found for this or any previous visit.    No results found for this or any previous visit.       XR CHEST PA OR AP 1 VIEW   Final Result   FINDINGS AND IMPRESSION:       Low lung volumes.  Heart size stable.  Calcification of aortic arch.    Redemonstrated although coarse confluent reticular interstitial and   reticular nodular opacities throughout both lungs with peripheral and   basilar predominance with interval decrease in groundglass opacities   suggestive of superimposed infectious process in a background of underlying   scarring/pulmonary fibrosis.  No pleural effusion or pneumothorax.         Electronically Signed by: ELBA MUNOZ M.D.    Signed on: 8/11/2022 5:32 AM          XR CHEST AP OR PA 1 VIEW   Final Result   FINDINGS AND IMPRESSION:        Low lung volumes.  Heart size stable.  Calcification of aortic arch.    Coarse confluent reticular interstitial and reticular nodular opacities   throughout both lungs with basilar peripheral predominance suggestive of   superimposed infectious process in a background of underlying   scarring/pulmonary fibrosis.  No pleural effusion or pneumothorax.         Electronically Signed by: ELBA MUNOZ M.D.    Signed on: 8/9/2022 7:54 AM          XR ABDOMEN 1 VIEW   Final Result   FINDINGS AND IMPRESSION:       Nonspecific bowel gas pattern.  No obstruction or free air.  Lower pelvis   partially excluded.  Contrast material in the urinary bladder and probable   Fuchs catheter.  Correlate clinically.  Coarse reticulonodular interstitial   opacities lung bases likely secondary to fibrosis.         Electronically Signed by: ELBA MUNOZ M.D.    Signed on: 8/9/2022 6:14 AM          CTA CHEST PULMONARY EMBOLISM W CONTRAST   Final Result      Significantly Limited evaluation secondary to respiratory motion artifact.    Questionable intraluminal filling defect within the right superior lobar   artery concerning for pulmonary embolism.      Diffuse groundglass densities in the bilateral lungs, right greater than   left and additional multifocal areas of consolidation concerning for   atypical/multifocal pneumonia on a background of underlying chronic   interstitial lung disease.        Multiple 1.2 cm and smaller mediastinal lymph nodes, likely reactive.      Dense coronary artery calcifications.      Additional findings as above.      Dr. Bunn was informed of these findings by Dr. Degroot via telephone on   8/8/2022 7:35 AM.       Dictated by: RICCO DEGROOT   Dictated on: 8/8/2022 7:06 AM       ELBA ESPITIA M.D., have reviewed the images and report and concur with   these findings interpreted by RICCO DEGROOT.      Electronically Signed by: ELBA MUNOZ M.D.    Signed on: 8/8/2022 8:06 AM          XR CHEST AP OR PA 1 VIEW    Final Result   FINDINGS AND IMPRESSION:       Poor inspiratory effort.  Heart size stable.  Calcification of aortic   arch.  Interval worsening of confluent reticular interstitial and   groundglass opacities throughout both lungs with basilar predominance   suggestive of superimposed infectious multifocal infectious process in a   background of underlying scarring/pulmonary fibrosis.  Superimposed   vascular congestive changes not excluded.  No pleural effusion thorax.         Electronically Signed by: ELBA MUNOZ M.D.    Signed on: 8/8/2022 8:07 AM          XR CHEST PA OR AP 1 VIEW   Final Result   FINDINGS AND IMPRESSION:       Coarse bilateral reticular nodular and subpleural opacities associated   with low lung volumes secondary to scarring/pulmonary fibrosis.  No   infiltrate, pleural effusion or pneumothorax.  Heart size normal.      Electronically Signed by: ELBA MUNOZ M.D.    Signed on: 8/7/2022 12:18 PM              Current Facility-Administered Medications   Medication Dose Route Frequency Provider Last Rate Last Admin   • sodium chloride (PF) 0.9 % injection 2 mL  2 mL Intracatheter 2 times per day DARRELL Dallas   2 mL at 08/11/22 0853   • enoxaparin (LOVENOX) injection 60 mg  60 mg Subcutaneous Q12H Tyra Celeste MD   60 mg at 08/11/22 0023   • ipratropium (ATROVENT HFA) 17 MCG/ACT inhaler 2 puff  2 puff Inhalation 4x Daily Resp Coleman Benitez MD   2 puff at 08/11/22 0712   • albuterol inhaler 2 puff  2 puff Inhalation 4x Daily Resp Coleman Benitez MD   2 puff at 08/11/22 0711   • perflutren lipid microsphere (DEFINITY) injection 2 mL  2 mL Intravenous Once Monica Mendiola DO       • methylPREDNISolone (SOLU-Medrol) PF injection 60 mg  60 mg Intravenous 4 times per day DARRELL Dallas   60 mg at 08/11/22 0609   • remdesivir (VEKLURY) 100 mg in sodium chloride 0.9 % 250 mL total volume infusion  100 mg Intravenous Daily at noon DARRELL Dallas   Completed at 08/10/22 1348   • insulin  lispro (ADMELOG,HumaLOG) - Correction Dose   Subcutaneous TID WC DARRELL Dallas   2 Units at 08/11/22 0853   • pantoprazole (PROTONIX INJECT) injection 40 mg  40 mg Intravenous Nightly DARRELL Dallas   40 mg at 08/10/22 2142   • lidocaine (LIDOCARE) 4 % patch 1 patch  1 patch Transdermal Daily DARRELL Dallas   1 patch at 08/11/22 0853   • [Held by provider] amLODIPine (NORVASC) tablet 5 mg  5 mg Oral Daily DARRELL Dallas       • atorvastatin (LIPITOR) tablet 80 mg  80 mg Oral Daily DARRELL Dallas   80 mg at 08/11/22 0850   • clopidogrel (PLAVIX) tablet 75 mg  75 mg Oral Daily DARRELL Dallas   75 mg at 08/11/22 0850   • [Held by provider] diazePAM (VALIUM) tablet 5 mg  5 mg Oral BID DARRELL Dallas   5 mg at 08/09/22 0852   • [Held by provider] divalproex (DEPAKOTE) delayed release EC tablet 1,500 mg  1,500 mg Oral Nightly DARRELL Dallas   1,500 mg at 08/08/22 2033   • pregabalin (LYRICA) capsule 75 mg  75 mg Oral Daily DARRELL Dallas   75 mg at 08/11/22 0850   • insulin glargine (LANTUS) injection 12 Units  12 Units Subcutaneous Daily Radha Alonso MD   12 Units at 08/11/22 0850   • insulin lispro (ADMELOG,HumaLOG) - Correction Dose   Subcutaneous Nightly Radha Alonso MD          Current Facility-Administered Medications   Medication Dose Route Frequency Provider Last Rate Last Admin   • sodium chloride (NORMAL SALINE) 0.9 % bolus 500 mL  500 mL Intravenous PRN DARRELL Dallas       • sodium chloride 0.9 % flush bag 25 mL  25 mL Intravenous PRN DARRELL Dallas       • sodium chloride 0.9% infusion   Intravenous Continuous PRN DARRELL Dallas       • sodium chloride 0.9% infusion   Intravenous Continuous PRN DARRELL Dallas       • benzonatate (TESSALON PERLES) capsule 100 mg  100 mg Oral TID PRN Tyra Celeste MD   100 mg at 08/10/22 0839   • dextrose 50 % injection 25 g  25 g Intravenous PRN DARRELL Dallas       • dextrose 50 % injection 12.5  g  12.5 g Intravenous PRN DARRELL Dallas       • glucagon (GLUCAGEN) injection 1 mg  1 mg Intramuscular PRN DARRELL Dallas       • dextrose (GLUTOSE) 40 % gel 15 g  15 g Oral PRN DARRELL Dallas       • dextrose (GLUTOSE) 40 % gel 30 g  30 g Oral PRN DARRELL Dallas            Assessment and Plan:    Acute on chronic hypoxic respiratory failure 2/2 COVID, PE, and ILD  Septic shock  -improving and now out of the MICU  -PE protocol CT 8/8 personally reviewed 8/11: Bilateral GGO.  Bilateral fibrotic changes.  Radiology notes an acute RIGHT superior lobe PE.  -off pressors  -continue remdesivir x 5 days ( to complete 8/12)     decreased solumedrol 60 mg iv to BID  -Wean O2, on 3LNC at baseline     Acute Pulmonary Embolus R superior lobe  -pt with prior history  -likely provoked this time by covid  -TTE 8/8: EF 54%, RVSP 48  -ct LMWH -> can change to DOAC tomorrow     Bipolar  -Does not appear to be on any meds    Lupus  -at home meds cellcept and prednisone    EDDIE  -resolved. Cr peaked at 1.71     DMII with hyperglycemia  -ISS while inpatinet  -hold PO meds      Anemia  -Stable. I personally reviewed her old labs and baseline in the 11s.    FEN: Nutrition Communication  Consistent Carb Moderate (45-75 Gm/meal); No Pork Diet  Nutrition Communication     Prophylaxis:   Current Active Medications for DVT Prophylaxis (From admission, onward)         Stop     enoxaparin (LOVENOX) injection 60 mg  60 mg,   Subcutaneous,   EVERY 12 HOURS         --                Dispo: Home pending improvement of above    Selective Treatment/DNR     PCP: No Pcp - routed H&P             25-Jun-2021 13:05

## 2022-08-18 ENCOUNTER — APPOINTMENT (OUTPATIENT)
Dept: PAIN MANAGEMENT | Facility: CLINIC | Age: 57
End: 2022-08-18

## 2022-08-18 VITALS
HEART RATE: 81 BPM | BODY MASS INDEX: 29.44 KG/M2 | SYSTOLIC BLOOD PRESSURE: 137 MMHG | HEIGHT: 62 IN | DIASTOLIC BLOOD PRESSURE: 79 MMHG | WEIGHT: 160 LBS

## 2022-08-18 LAB — AMPHET UR-MCNC: NEGATIVE

## 2022-08-18 PROCEDURE — 99214 OFFICE O/P EST MOD 30 MIN: CPT

## 2022-08-18 PROCEDURE — 80305 DRUG TEST PRSMV DIR OPT OBS: CPT | Mod: QW

## 2022-08-18 NOTE — PHYSICAL EXAM
[de-identified] : Cervical spine: well-healed surgical scar. Palpation of the cervical spine is as follows: bilateral trapezial spasm, bilateral trapezial tenderness, bilateral paracervical spasm and bilateral paracervical tenderness. Range of motion of the cervical spine is as follows: ROM restricted \par \par Back, including spine: Palpation of the thoracic/lumbar spine is as follows: bilateral lumbar paraspinal tenderness. Range of motion of the thoracic and lumbar spine is as follows: pain at extremes of flexion and pain at extremes extension.

## 2022-08-18 NOTE — ASSESSMENT
[FreeTextEntry1] : Ms. Ash is a 57-year-old female with neck pain and lower back pain. She is status post cervical fusion at C5-6 and C6-7 in the form of an ACDF on 3/31/2021. She underwent a bilateral C2-C5 MBB in June which did provide significant improvement for 4-5 days after the procedure. At this time, we will move forward with the RFA.  For the lower back, she has been approved for the bilateral L4-L5 SNRI with MAC which has yet to be scheduled. For pain control, she will continue with medication management. UDS done today

## 2022-08-18 NOTE — HISTORY OF PRESENT ILLNESS
[FreeTextEntry1] : ENCOUNTER TYPE: Continuing active.\par \par This is a 57 year old woman with chronic neck pain. She is s/p cervical fusion at C5-6 and C6-7 in the form of an ACDF on 3/31/2021. Postoperatively, she continues to have neck pain radiating into bilateral shoulders, along with spasms. She also reports of headaches intermittently. She recently underwent a bilateral C2-C5 MBB with MAC in June. With the injection, she noted significant improvement of her pain for 4-5 days after the procedure, reporting 70-80% relief. With the relief, she notes the severity of her pain was less, ROM was improved, and no headaches.  Her pain has now returned to baseline. We did discuss in moving forward with the RFA for which she will like to proceed with, starting with the left side. \par \par In regards to her lower back pain,  pain continues to be across her lower back radiating into the upper thighs, occasionally into the calf. It is primarily on the right however she does have intermittent left-sided radicular pain as well. Numbness and paresthesias reported as well. She did undergo a caudal injection with no relief. A bilateral L4-L5 SNRI was discussed, however this has yet to be scheduled. \par \par She continues with Percocet 10-325mg BID along with baclofen 5mg noting at least 30-45% relief of her pain.\par \par SOAPP (a Screener and Opioid Assessment for Patients with Pain) was performed on 10/2020: Low risk.\par \par 8/18/2022, see separate note\par

## 2022-08-18 NOTE — DISCUSSION/SUMMARY
[de-identified] : I have consulted the  registry for the purpose of reviewing the patients controlled substance\par \par \par Overall there is at least a 30-50% reduction in pain with the prescribed analgesics. The patient denies any adverse side effects due to the medication (sleeping disturbance, constipation, sleepiness, hallucinations and/or urination problems). \par \par \par There are no inconsistencies on urine toxicology screening which would necessitate an alteration of therapy.\par \par \par The patient will return to the office in 4 weeks time and is aware to contact me with any question or concerns in the interim.\par \par \par I personally reviewed with the PA, this patient's history and physical exam findings, as documented above. I have discussed the relevant areas of concern, having direct implications to the presenting problems and illnesses, and I have personally examined all pertinent and positive and negative findings, which impact on the prior pain management treatment.\par \par Arnoldo Starr PA-C\par Darian Germain DO\par \par \par

## 2022-08-24 ENCOUNTER — APPOINTMENT (OUTPATIENT)
Dept: PAIN MANAGEMENT | Facility: CLINIC | Age: 57
End: 2022-08-24

## 2022-08-29 ENCOUNTER — APPOINTMENT (OUTPATIENT)
Dept: NEUROSURGERY | Facility: CLINIC | Age: 57
End: 2022-08-29

## 2022-08-29 VITALS
DIASTOLIC BLOOD PRESSURE: 70 MMHG | HEART RATE: 79 BPM | WEIGHT: 160 LBS | HEIGHT: 62 IN | SYSTOLIC BLOOD PRESSURE: 123 MMHG | BODY MASS INDEX: 29.44 KG/M2

## 2022-08-29 PROCEDURE — 99072 ADDL SUPL MATRL&STAF TM PHE: CPT

## 2022-08-29 PROCEDURE — 99214 OFFICE O/P EST MOD 30 MIN: CPT

## 2022-08-29 NOTE — ASSESSMENT
[FreeTextEntry1] : Ms. Ash is a 56 yo F with hx of ACDF C5-7 March 2021 with Dr. Gu who has since left our practice. Updated CT C Spine reveals evidence of unconvertebral spurring at C5-6 for which a foraminotomy at the site along with mass screw placement C5-7 was discussed but considering her continued chronic steroid use she is not an optimal surgical candidate.\par \par She has been advised to pursue continued cervical interventional treatments and has responded well thus far. We will monitor her relief from the upcoming cervical RFA, as well as lumbar epidural, which has been arranged to take place as well over the coming weeks.\par \par She will also continue with close rheumatologic f/u and we will monitor her prednisone/methotrexate dosing.\par \par Valerie Brewer PA-C\par Yoni Haq MD

## 2022-08-29 NOTE — HISTORY OF PRESENT ILLNESS
[FreeTextEntry1] : Ms. KU is a 58 yo F who presents for a neurosurgical revisit encounter. As a review, chronic cervical pain noted due to a WC accident 2004, as a result she underwent an ACDF C5-7 with Dr. Gu (3/29/2021). She had done well during the immediate post-operative period but developed a return of neck pain, worse with lateral rotation. She had suffered other medical setbacks with regards to left periorbital cellulitis requiring long term steroid use. Updated CT C spine revealed evidence of uncovertebral hypertrophy, left worse than right, resulting in mild foraminal stenosis, left wore than right. Decompressive C5-6 foraminotomy and laminectomy along with lateral mass screws C5-7 outlined but considering her continued steroid use it is not an optimal time to consider such intervention.\par \par She returns today after undergone a bilateral cervical C2-5 MBB with Dr. Germain. Excellent temporary relief noted with the median branch block and she continues to await RFA scheduling which is to take place within the coming week. We expect relief at over 50% with regards to her isolated cervicalgia with improvements in cervical range of motion. She is hopeful for symptom control and quality of life improvements through such efforts. \par \par She is also scheduled to move forward with a lumbar epidural as well, we will outline her relief from those efforts as well at her next encounter.\par \par With regards to her chronic prednisone use, she has reduced to 2.5mg daily and has been placed on MTX 8 tabs/week as a weaning protocol. In the past, with stopping her prescribed steroid her cellulitis condition would flare and we will monitor her response closely this time.\par \par PHYSICAL EXAM: \par \par Constitutional: Well appearing, no distress\par HEENT: Normocephalic Atraumatic\par Psychiatric: Alert and oriented to all spheres, normal mood\par Pulmonary: No respiratory distress\par \par Neurologic: \par CN II-XII grossly intact\par Palpation: diffuse cervical paravertebral tenderness, trapezius/rhomboid tenderness\par Strength: Full strength in all major muscle groups\par Sensation: Full sensation to light touch in all extremities\par Reflexes: \par  2+ biceps\par  2+ triceps\par \par  No Yeung's\par  No clonus\par \par ROM: diminished in all ROM. worse left rotation.\par \par Gait: steady, walking w/o assistance.\par

## 2022-09-07 ENCOUNTER — APPOINTMENT (OUTPATIENT)
Dept: PAIN MANAGEMENT | Facility: CLINIC | Age: 57
End: 2022-09-07

## 2022-09-11 RX ORDER — AMLODIPINE BESYLATE AND BENAZEPRIL HYDROCHLORIDE 10; 40 MG/1; MG/1
10-40 CAPSULE ORAL
Qty: 90 | Refills: 2 | Status: ACTIVE | COMMUNITY
Start: 2021-11-03 | End: 1900-01-01

## 2022-09-14 ENCOUNTER — APPOINTMENT (OUTPATIENT)
Dept: PAIN MANAGEMENT | Facility: CLINIC | Age: 57
End: 2022-09-14

## 2022-09-16 ENCOUNTER — RX RENEWAL (OUTPATIENT)
Age: 57
End: 2022-09-16

## 2022-09-19 ENCOUNTER — APPOINTMENT (OUTPATIENT)
Dept: ORTHOPEDIC SURGERY | Facility: CLINIC | Age: 57
End: 2022-09-19

## 2022-09-21 ENCOUNTER — APPOINTMENT (OUTPATIENT)
Dept: OBGYN | Facility: CLINIC | Age: 57
End: 2022-09-21

## 2022-09-21 ENCOUNTER — OUTPATIENT (OUTPATIENT)
Dept: OUTPATIENT SERVICES | Facility: HOSPITAL | Age: 57
LOS: 1 days | Discharge: HOME | End: 2022-09-21

## 2022-09-21 VITALS
HEIGHT: 62 IN | WEIGHT: 157 LBS | SYSTOLIC BLOOD PRESSURE: 127 MMHG | HEART RATE: 78 BPM | BODY MASS INDEX: 28.89 KG/M2 | DIASTOLIC BLOOD PRESSURE: 60 MMHG

## 2022-09-21 DIAGNOSIS — Z98.890 OTHER SPECIFIED POSTPROCEDURAL STATES: Chronic | ICD-10-CM

## 2022-09-21 DIAGNOSIS — Z00.00 ENCOUNTER FOR GENERAL ADULT MEDICAL EXAMINATION W/OUT ABNORMAL FINDINGS: ICD-10-CM

## 2022-09-21 PROCEDURE — 99202 OFFICE O/P NEW SF 15 MIN: CPT | Mod: 25

## 2022-09-21 PROCEDURE — 99386 PREV VISIT NEW AGE 40-64: CPT

## 2022-09-21 NOTE — HISTORY OF PRESENT ILLNESS
[FreeTextEntry1] : 56 y/o female postmenopausal here for annual.\par Pt also has pain, right side of back to ribs-believes it is spinal stenosis\par no loss of movement or neuro issues\par \par no gyn complaints [Mammogramdate] : 5 yrs ago [PapSmeardate] : 5 yrs ago [ColonoscopyDate] : 2022

## 2022-09-21 NOTE — DISCUSSION/SUMMARY
[FreeTextEntry1] : #1 health maintenance\par \par #2 back pain\par hx of cervical spine disease\par wants gyn path ruled out-willsend for tvs\par stressed importance of primary care\par f/u witrh neuro for spinal stenosis

## 2022-09-26 LAB — HPV HIGH+LOW RISK DNA PNL CVX: NOT DETECTED

## 2022-10-03 LAB — CYTOLOGY CVX/VAG DOC THIN PREP: ABNORMAL

## 2022-10-06 ENCOUNTER — RESULT REVIEW (OUTPATIENT)
Age: 57
End: 2022-10-06

## 2022-10-06 ENCOUNTER — OUTPATIENT (OUTPATIENT)
Dept: OUTPATIENT SERVICES | Facility: HOSPITAL | Age: 57
LOS: 1 days | Discharge: HOME | End: 2022-10-06

## 2022-10-06 DIAGNOSIS — Z98.890 OTHER SPECIFIED POSTPROCEDURAL STATES: Chronic | ICD-10-CM

## 2022-10-06 DIAGNOSIS — Z12.31 ENCOUNTER FOR SCREENING MAMMOGRAM FOR MALIGNANT NEOPLASM OF BREAST: ICD-10-CM

## 2022-10-06 PROCEDURE — 77063 BREAST TOMOSYNTHESIS BI: CPT | Mod: 26

## 2022-10-06 PROCEDURE — 77067 SCR MAMMO BI INCL CAD: CPT | Mod: 26

## 2022-10-12 ENCOUNTER — EMERGENCY (EMERGENCY)
Facility: HOSPITAL | Age: 57
LOS: 0 days | Discharge: HOME | End: 2022-10-12
Attending: EMERGENCY MEDICINE | Admitting: EMERGENCY MEDICINE
Payer: SELF-PAY

## 2022-10-12 VITALS
TEMPERATURE: 98 F | DIASTOLIC BLOOD PRESSURE: 77 MMHG | OXYGEN SATURATION: 98 % | HEART RATE: 85 BPM | SYSTOLIC BLOOD PRESSURE: 147 MMHG | RESPIRATION RATE: 18 BRPM

## 2022-10-12 VITALS
RESPIRATION RATE: 18 BRPM | DIASTOLIC BLOOD PRESSURE: 65 MMHG | HEIGHT: 62 IN | OXYGEN SATURATION: 97 % | WEIGHT: 156.97 LBS | HEART RATE: 95 BPM | TEMPERATURE: 98 F | SYSTOLIC BLOOD PRESSURE: 132 MMHG

## 2022-10-12 DIAGNOSIS — M19.90 UNSPECIFIED OSTEOARTHRITIS, UNSPECIFIED SITE: ICD-10-CM

## 2022-10-12 DIAGNOSIS — Z98.890 OTHER SPECIFIED POSTPROCEDURAL STATES: Chronic | ICD-10-CM

## 2022-10-12 DIAGNOSIS — J45.909 UNSPECIFIED ASTHMA, UNCOMPLICATED: ICD-10-CM

## 2022-10-12 DIAGNOSIS — M54.2 CERVICALGIA: ICD-10-CM

## 2022-10-12 DIAGNOSIS — E78.5 HYPERLIPIDEMIA, UNSPECIFIED: ICD-10-CM

## 2022-10-12 DIAGNOSIS — Z88.6 ALLERGY STATUS TO ANALGESIC AGENT: ICD-10-CM

## 2022-10-12 DIAGNOSIS — M54.50 LOW BACK PAIN, UNSPECIFIED: ICD-10-CM

## 2022-10-12 DIAGNOSIS — Z98.1 ARTHRODESIS STATUS: ICD-10-CM

## 2022-10-12 DIAGNOSIS — G89.29 OTHER CHRONIC PAIN: ICD-10-CM

## 2022-10-12 DIAGNOSIS — G47.33 OBSTRUCTIVE SLEEP APNEA (ADULT) (PEDIATRIC): ICD-10-CM

## 2022-10-12 DIAGNOSIS — Z99.89 DEPENDENCE ON OTHER ENABLING MACHINES AND DEVICES: ICD-10-CM

## 2022-10-12 DIAGNOSIS — K21.9 GASTRO-ESOPHAGEAL REFLUX DISEASE WITHOUT ESOPHAGITIS: ICD-10-CM

## 2022-10-12 DIAGNOSIS — E11.51 TYPE 2 DIABETES MELLITUS WITH DIABETIC PERIPHERAL ANGIOPATHY WITHOUT GANGRENE: ICD-10-CM

## 2022-10-12 DIAGNOSIS — Z79.02 LONG TERM (CURRENT) USE OF ANTITHROMBOTICS/ANTIPLATELETS: ICD-10-CM

## 2022-10-12 PROCEDURE — 99284 EMERGENCY DEPT VISIT MOD MDM: CPT | Mod: FS

## 2022-10-12 RX ORDER — DEXAMETHASONE 0.5 MG/5ML
10 ELIXIR ORAL ONCE
Refills: 0 | Status: COMPLETED | OUTPATIENT
Start: 2022-10-12 | End: 2022-10-12

## 2022-10-12 RX ORDER — METHOCARBAMOL 500 MG/1
1500 TABLET, FILM COATED ORAL ONCE
Refills: 0 | Status: COMPLETED | OUTPATIENT
Start: 2022-10-12 | End: 2022-10-12

## 2022-10-12 RX ORDER — OXYCODONE AND ACETAMINOPHEN 5; 325 MG/1; MG/1
1 TABLET ORAL ONCE
Refills: 0 | Status: DISCONTINUED | OUTPATIENT
Start: 2022-10-12 | End: 2022-10-12

## 2022-10-12 RX ADMIN — OXYCODONE AND ACETAMINOPHEN 1 TABLET(S): 5; 325 TABLET ORAL at 16:23

## 2022-10-12 RX ADMIN — METHOCARBAMOL 1500 MILLIGRAM(S): 500 TABLET, FILM COATED ORAL at 14:53

## 2022-10-12 RX ADMIN — Medication 10 MILLIGRAM(S): at 14:53

## 2022-10-12 NOTE — ED ADULT TRIAGE NOTE - CHIEF COMPLAINT QUOTE
Patient complaining of back pain worsening over the past one day. Patient has history of spinal stenosis.

## 2022-10-12 NOTE — ED PROVIDER NOTE - CLINICAL SUMMARY MEDICAL DECISION MAKING FREE TEXT BOX
Pt feels better after pain meds.  explained to pt we can not prescribe narcotics.  pt to f/u with neurosurgery, pain management, and PT. Told to return to ER for worsening pain, motor weakness, paresthesias, fever, difficulty ambulating, or any other new/concerning symptoms.  Pt understands and agrees with plan.

## 2022-10-12 NOTE — ED PROVIDER NOTE - PATIENT PORTAL LINK FT
You can access the FollowMyHealth Patient Portal offered by [facility] by registering at the following website: [email or web address]. By joining [employer]’s FollowMyHealth portal, you will also be able to view your health information using other applications (apps) compatible with our system.

## 2022-10-12 NOTE — ED PROVIDER NOTE - OBJECTIVE STATEMENT
58 yo F with pmhx as below presenting with lower back pain. States pain is similar to her pain in the past. States she ran out of her pain meds and is trying to get new insurance to be seen by her pain management doctor. Symptoms are moderate. Pain is worse with movement. No trauma. No urinary or bowel incontinence. No cp, sob, fever, chills, abdominal pain, nausea, vomiting, diarrhea, urinary symptoms, headache, dizziness, paresthesias, or weakness.

## 2022-10-12 NOTE — ED ADULT TRIAGE NOTE - STATUS:
Intact Silver Nitrate Text: The wound bed was treated with silver nitrate after the biopsy was performed.

## 2022-10-12 NOTE — ED PROVIDER NOTE - ATTENDING APP SHARED VISIT CONTRIBUTION OF CARE
56 y/o female with h/o dm, htn, gerd, tita, spinal stenosis/chronic neck and back pain s/p cervical fusion, in ER with c/o back pain.  Pt states this is the same pain she has had for a long time, is following with neurosurgery and was also seeing pain management, but recently ran out of oxycodone. states she is trying to get back to pain management, but has new insurance that won't be active for another month.  Denies any recent injury/trauma.  No motor weakness.  no bowel/bladder dysfunction.  has pins/needles sensation to R anterior thigh that has been going on for > 6 months.  no fever.  no recent spinal injections. no cp/sob.  no abd pain.  no n/v/d.  no ha/dizziness/loc.  PE - nad, nc/at, eomi, perrl, op - clear, mmm, cta b/l, no w/r/r, rrr, abd - soft, nt/nd, nabs, from x 4, no LE swelling/tenderness, no vertebral tenderness, A&O x 3, cn 2-12 intact, motor 5/5 b/l UE and LE, no sensory deficits  -pain meds, re-eval

## 2022-10-12 NOTE — ED PROVIDER NOTE - PHYSICAL EXAMINATION
VITAL SIGNS: I have reviewed nursing notes and confirm.  CONSTITUTIONAL: Patient is in no acute distress.  SKIN: Skin exam is warm and dry, no acute rash.  HEAD: Normocephalic; atraumatic.  EYES: PERRL, EOM intact; conjunctiva and sclera clear.  ENT: No nasal discharge; airway clear.   NECK: Supple; non tender.  CARD: S1, S2 normal; no murmurs, gallops, or rubs. Regular rate and rhythm.  RESP: Clear to auscultation bilaterally. No wheezes, rales or rhonchi.  ABD: Normal bowel sounds; soft; non-distended; non-tender.   EXT: Normal ROM. No edema. +Tenderness to b/l paraspinal of lumbar region. No midline tenderness.   LYMPH: No acute cervical adenopathy.  NEURO: Alert, oriented. Grossly unremarkable. No focal deficits.  PSYCH: Cooperative, appropriate.

## 2022-10-21 ENCOUNTER — RX RENEWAL (OUTPATIENT)
Age: 57
End: 2022-10-21

## 2022-10-25 ENCOUNTER — OUTPATIENT (OUTPATIENT)
Dept: OUTPATIENT SERVICES | Facility: HOSPITAL | Age: 57
LOS: 1 days | Discharge: HOME | End: 2022-10-25

## 2022-10-25 ENCOUNTER — ASOB RESULT (OUTPATIENT)
Age: 57
End: 2022-10-25

## 2022-10-25 ENCOUNTER — APPOINTMENT (OUTPATIENT)
Dept: ANTEPARTUM | Facility: CLINIC | Age: 57
End: 2022-10-25

## 2022-10-25 DIAGNOSIS — Z98.890 OTHER SPECIFIED POSTPROCEDURAL STATES: Chronic | ICD-10-CM

## 2022-10-25 PROCEDURE — 76830 TRANSVAGINAL US NON-OB: CPT | Mod: 26

## 2022-11-02 ENCOUNTER — APPOINTMENT (OUTPATIENT)
Dept: PAIN MANAGEMENT | Facility: CLINIC | Age: 57
End: 2022-11-02
Payer: OTHER MISCELLANEOUS

## 2022-11-02 PROCEDURE — 99443: CPT | Mod: ACP

## 2022-11-02 NOTE — HISTORY OF PRESENT ILLNESS
[FreeTextEntry1] : TODAY:   I had the pleasure of seeing  today in follow up at her home via [email or web address] after informed consent was obtained beginning at 9:20 am ending at 9:34 am.  \par \par She is under our care for chronic cervical and lumbar pain which she is receiving continuing active treatment for. \par She states nothing has changed over the past few months with respect to her condition.  She unfortunately lost her job and as a result her prescription coverage and was unable to get her medication for over a month.  She is managed on a regimen of Percocet and baclofen she uses as needed providing her with 50% relief.  The regimen allows her to function and perform ADLs without side effects and we will continue as is without change.

## 2022-11-02 NOTE — ASSESSMENT
[FreeTextEntry1] : 57 year old female with chronic cervical and lumbar pain.  We will continue the above medication regimen and f/u in 4 weeks for re evaluation.\par \par chart review/documentation/call time 20 min\par \par I personally reviewed with the PA, this patient's history and physical exam findings, as documented above. I have discussed the relevant areas of concern, having direct implications to the presenting problems and illnesses, and I have personally examined all pertinent and positive and negative findings, which impact on the prior pain management treatment. \par \par \par Check of the  registry reveals compliance in regards to narcotic medication management use.\par \par Delilah Wallace, MS, PA-C\par Darian Germain, \par \par \par \par .

## 2022-11-02 NOTE — PHYSICAL EXAM
[de-identified] : Cervical spine: well-healed surgical scar. Palpation of the cervical spine is as follows: bilateral trapezial spasm, bilateral trapezial tenderness, bilateral paracervical spasm and bilateral paracervical tenderness. Range of motion of the cervical spine is as follows: ROM restricted \par \par Back, including spine: Palpation of the thoracic/lumbar spine is as follows: bilateral lumbar paraspinal tenderness. Range of motion of the thoracic and lumbar spine is as follows: pain at extremes of flexion and pain at extremes extension.

## 2022-11-10 ENCOUNTER — APPOINTMENT (OUTPATIENT)
Dept: ENDOCRINOLOGY | Facility: CLINIC | Age: 57
End: 2022-11-10

## 2022-11-28 ENCOUNTER — APPOINTMENT (OUTPATIENT)
Dept: PAIN MANAGEMENT | Facility: CLINIC | Age: 57
End: 2022-11-28

## 2022-11-30 ENCOUNTER — NON-APPOINTMENT (OUTPATIENT)
Age: 57
End: 2022-11-30

## 2022-12-02 ENCOUNTER — NON-APPOINTMENT (OUTPATIENT)
Age: 57
End: 2022-12-02

## 2022-12-05 ENCOUNTER — RX RENEWAL (OUTPATIENT)
Age: 57
End: 2022-12-05

## 2022-12-06 ENCOUNTER — LABORATORY RESULT (OUTPATIENT)
Age: 57
End: 2022-12-06

## 2022-12-07 ENCOUNTER — APPOINTMENT (OUTPATIENT)
Dept: NEUROLOGY | Facility: CLINIC | Age: 57
End: 2022-12-07
Payer: MEDICARE

## 2022-12-07 VITALS
BODY MASS INDEX: 28.89 KG/M2 | WEIGHT: 157 LBS | HEART RATE: 85 BPM | DIASTOLIC BLOOD PRESSURE: 80 MMHG | SYSTOLIC BLOOD PRESSURE: 132 MMHG | HEIGHT: 62 IN

## 2022-12-07 LAB — PM METHAMPHETAMINE: NORMAL

## 2022-12-07 PROCEDURE — 99213 OFFICE O/P EST LOW 20 MIN: CPT

## 2022-12-07 PROCEDURE — 80305 DRUG TEST PRSMV DIR OPT OBS: CPT | Mod: QW

## 2022-12-07 NOTE — HISTORY OF PRESENT ILLNESS
[FreeTextEntry1] : TODAY:   I had the pleasure of seeing  today in follow up.  Her previous history and physical findings have been reviewed.\par \par She is under our care for chronic cervical and lumbar pain which she is receiving continuing active treatment for. \par She states nothing has changed over the past few months with respect to her condition.  She is managed on a regimen of Percocet and baclofen she uses as needed providing her with 50% relief.  The regimen allows her to function and perform ADLs without side effects and we will continue as is without change.   She will undergo updated UDS testing today.

## 2022-12-07 NOTE — PHYSICAL EXAM
[de-identified] : Cervical spine: well-healed surgical scar. Palpation of the cervical spine is as follows: bilateral trapezial spasm, bilateral trapezial tenderness, bilateral paracervical spasm and bilateral paracervical tenderness. Range of motion of the cervical spine is as follows: ROM restricted \par \par Back, including spine: Palpation of the thoracic/lumbar spine is as follows: bilateral lumbar paraspinal tenderness. Range of motion of the thoracic and lumbar spine is as follows: pain at extremes of flexion and pain at extremes extension.

## 2022-12-07 NOTE — ASSESSMENT
[FreeTextEntry1] : 57 year old female with chronic cervical and lumbar pain.  We will continue the above medication regimen and f/u in 4 weeks for re evaluation.\par \par \par \par I personally reviewed with the PA, this patient's history and physical exam findings, as documented above. I have discussed the relevant areas of concern, having direct implications to the presenting problems and illnesses, and I have personally examined all pertinent and positive and negative findings, which impact on the prior pain management treatment. \par \par Urine drug screening collected today with rapid sample result consistent with given regimen. Sample to be sent for confirmatory testing.\par \par \par \par Check of the  registry reveals compliance in regards to narcotic medication management use.\par \par Delilah Wallace, MS, PA-C\par Darian Germain, \par \par \par \par .

## 2022-12-28 NOTE — H&P PST ADULT - NS NEC GEN PE MLT EXAM PC
No bruits; no thyromegaly or nodules Spironolactone Counseling: Patient advised regarding risks of diarrhea, abdominal pain, hyperkalemia, birth defects (for female patients), liver toxicity and renal toxicity. The patient may need blood work to monitor liver and kidney function and potassium levels while on therapy. The patient verbalized understanding of the proper use and possible adverse effects of spironolactone.  All of the patient's questions and concerns were addressed.

## 2023-01-05 NOTE — DISCHARGE NOTE NURSING/CASE MANAGEMENT/SOCIAL WORK - CAREGIVER ADDRESS
Pt aware , she currently seeing PT at home at this time and she will let us know if have any further concerns.   n/a

## 2023-01-06 ENCOUNTER — APPOINTMENT (OUTPATIENT)
Dept: PAIN MANAGEMENT | Facility: CLINIC | Age: 58
End: 2023-01-06
Payer: COMMERCIAL

## 2023-01-06 VITALS
HEIGHT: 62 IN | BODY MASS INDEX: 28.89 KG/M2 | WEIGHT: 157 LBS | HEART RATE: 82 BPM | SYSTOLIC BLOOD PRESSURE: 127 MMHG | DIASTOLIC BLOOD PRESSURE: 77 MMHG

## 2023-01-06 PROCEDURE — 99213 OFFICE O/P EST LOW 20 MIN: CPT

## 2023-01-06 NOTE — ASSESSMENT
[FreeTextEntry1] : 57 year old female with chronic cervical and lumbar pain.  We will continue the above medication regimen and f/u in 4 weeks for re evaluation.  She is aware if there are any issues she will contact the office.\par \par I personally reviewed with the PA, this patient's history and physical exam findings, as documented above. I have discussed the relevant areas of concern, having direct implications to the presenting problems and illnesses, and I have personally examined all pertinent and positive and negative findings, which impact on the prior pain management treatment. \par \par \par Check of the  registry reveals compliance in regards to narcotic medication management use.\par \par Delilah Wallace MS, PA-C\par Darian Germain, \par \par \par \par .

## 2023-01-06 NOTE — HISTORY OF PRESENT ILLNESS
[FreeTextEntry1] : TODAY:   I had the pleasure of seeing  today in follow up.  Her previous history and physical findings have been reviewed.\par \par She is under our care for chronic cervical and lumbar pain which she is receiving continuing active treatment for. \par She continues to remain stable on a regimen of Percocet and baclofen she uses as needed providing her with 40-50% relief.  She states over the past 3 weeks pain in her lower back especially on the L side has increased and now radiating into her L thigh.  She currently rates her pain a 6-7/10 and at worst a 10/10.  She has previously trialed PT with no improvement and wishes to hold off for now.   We will therefore continue as is without change as previous UDS testing was consistent.

## 2023-01-06 NOTE — PHYSICAL EXAM
[de-identified] : Cervical spine: well-healed surgical scar. Palpation of the cervical spine is as follows: bilateral trapezial spasm, bilateral trapezial tenderness, bilateral paracervical spasm and bilateral paracervical tenderness. Range of motion of the cervical spine is as follows: ROM restricted \par \par Back, including spine: Palpation of the thoracic/lumbar spine is as follows: bilateral lumbar paraspinal tenderness. Range of motion of the thoracic and lumbar spine is as follows: pain at extremes of flexion and pain at extremes extension.

## 2023-01-17 ENCOUNTER — APPOINTMENT (OUTPATIENT)
Dept: RHEUMATOLOGY | Facility: CLINIC | Age: 58
End: 2023-01-17
Payer: COMMERCIAL

## 2023-01-17 VITALS
BODY MASS INDEX: 29.08 KG/M2 | WEIGHT: 158 LBS | HEIGHT: 62 IN | SYSTOLIC BLOOD PRESSURE: 121 MMHG | HEART RATE: 94 BPM | DIASTOLIC BLOOD PRESSURE: 66 MMHG

## 2023-01-17 PROCEDURE — 99214 OFFICE O/P EST MOD 30 MIN: CPT

## 2023-01-17 RX ORDER — PREDNISONE 2.5 MG/1
2.5 TABLET ORAL DAILY
Qty: 30 | Refills: 0 | Status: COMPLETED | COMMUNITY
Start: 2022-06-23 | End: 2023-01-17

## 2023-01-17 RX ORDER — PREDNISONE 5 MG/1
5 TABLET ORAL DAILY
Qty: 90 | Refills: 1 | Status: COMPLETED | COMMUNITY
Start: 2022-05-26 | End: 2023-01-17

## 2023-01-17 RX ORDER — PREDNISONE 20 MG/1
20 TABLET ORAL DAILY
Qty: 30 | Refills: 3 | Status: COMPLETED | COMMUNITY
Start: 2022-02-04 | End: 2023-01-17

## 2023-01-17 RX ORDER — METHOTREXATE 2.5 MG/1
2.5 TABLET ORAL
Qty: 32 | Refills: 1 | Status: COMPLETED | COMMUNITY
Start: 2022-06-23 | End: 2023-01-17

## 2023-01-17 NOTE — ASSESSMENT
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 With improvement of symptoms on prednisone, but pt has diabetes and has been taking prednisone for >1 year at this point due to inconsistent follow-up. With worsening symptoms of irritation in the R eye now; unclear if this could be due to running out of methotrexate? Consider prednisone side effect as the underlying cause of pt's blurry vision.\par - Advised pt to see ophthalmology again given her worsening symptoms and no ophtho follow-up recently\par - Decrease prednisone to 15 mg q day\par - Restart methotrexate 15 mg q week\par - f/u CBC, CMP, A1c\par \par f/u in 1 month as virtual visit\par

## 2023-01-17 NOTE — HISTORY OF PRESENT ILLNESS
[FreeTextEntry1] : Pt has been experiencing worsening irritation in her R eye; she constantly feels like there is an eyelash in her eye. + New blurry vision worse at night; she can't drive at night anymore. She has not seen an ophthalmologist for follow up. She ran out of the methotrexate several months ago, and has been taking 20 mg prednisone because she only has 20 mg tablets left. + High blood glucose because pt's insulin was recalled and she just received a new supply.\par \par History of present illness: In July 2021, pt developed severe pain, redness and swelling in her L eye "overnight." She was admitted to [facility] and was diagnosed with L orbital cellulitis and L dacryoadenitis, was started on high-dose steroids with improvement of symptoms, which flared again when she was tapered down to 5 mg prednisone. Pt's prednisone was subsequently increased again, and she has been on steroids since July 2021. She has been following in the rheum clinic, most recently was seen in the clinic in March 2022, when she was continued on prednisone 20 mg pending biopsy results. Pt says she received the biopsy results but they are not available within the [facility] records.\par \par \par Physical exam: GEN: Pleasant, AAO woman sitting on exam table in NAD\par SKIN: no rashes\par EYES: + Mild swelling noted in b/l eyelids, symmetric, no erythema\par MOUTH: Moist mucous membranes, no oral ulcers\par PULM: Clear to auscultation b/l\par CV: Regular rate and rhythm, no murmurs\par MSK: \par Shoulders: Full ROM b/l\par Elbows: Full ROM b/l, no effusions\par Wrists: + Brace on R wrist, full ROM in L\par Hands: no synovitis\par Hips: Full ROM b/l\par Knees: no effusions, full ROM b/l\par Ankles: no effusions, full ROM b/l\par Feet: no effusions, no TTP\par EXT: no LE edema b/l

## 2023-01-22 ENCOUNTER — FORM ENCOUNTER (OUTPATIENT)
Age: 58
End: 2023-01-22

## 2023-01-23 ENCOUNTER — RX RENEWAL (OUTPATIENT)
Age: 58
End: 2023-01-23

## 2023-01-25 NOTE — ED ADULT NURSE NOTE - NSIMPLEMENTINTERV_GEN_ALL_ED
Consent: The patient's consent was obtained including but not limited to risks of crusting, scabbing, blistering, scarring, darker or lighter pigmentary change, recurrence, incomplete removal and infection. Detail Level: Simple Show Applicator Variable?: Yes Application Tool (Optional): Cry-AC Render Note In Bullet Format When Appropriate: No Number Of Freeze-Thaw Cycles: 1 freeze-thaw cycle Post-Care Instructions: I reviewed with the patient in detail post-care instructions. Patient is to wear sunprotection, and avoid picking at any of the treated lesions. Pt may apply Vaseline to crusted or scabbing areas. Duration Of Freeze Thaw-Cycle (Seconds): 2 Implemented All Universal Safety Interventions:  [place] to call system. Call bell, personal items and telephone within reach. Instruct patient to call for assistance. Room bathroom lighting operational. Non-slip footwear when patient is off stretcher. Physically safe environment: no spills, clutter or unnecessary equipment. Stretcher in lowest position, wheels locked, appropriate side rails in place. Medical Necessity Information: It is in your best interest to select a reason for this procedure from the list below. All of these items fulfill various CMS LCD requirements except the new and changing color options. Detail Level: Detailed Post-Care Instructions: I reviewed with the patient in detail post-care instructions. Patient is to wear sunprotection, and avoid picking at any of the treated lesions. Patient may apply Vaseline to crusted or scabbed areas. Spray Paint Text: The liquid nitrogen was applied to the skin utilizing a spray paint frosting technique. Medical Necessity Clause: This procedure was medically necessary because the lesions that were treated were intensely:

## 2023-01-30 RX ORDER — METHOTREXATE 2.5 MG/1
2.5 TABLET ORAL
Qty: 24 | Refills: 2 | Status: DISCONTINUED | COMMUNITY
Start: 2023-01-17 | End: 2023-01-30

## 2023-01-30 RX ORDER — PREDNISONE 5 MG/1
5 TABLET ORAL DAILY
Qty: 90 | Refills: 1 | Status: DISCONTINUED | COMMUNITY
Start: 2023-01-17 | End: 2023-01-30

## 2023-02-08 ENCOUNTER — APPOINTMENT (OUTPATIENT)
Dept: PAIN MANAGEMENT | Facility: CLINIC | Age: 58
End: 2023-02-08
Payer: COMMERCIAL

## 2023-02-08 VITALS
SYSTOLIC BLOOD PRESSURE: 132 MMHG | DIASTOLIC BLOOD PRESSURE: 79 MMHG | HEIGHT: 62 IN | BODY MASS INDEX: 29.08 KG/M2 | HEART RATE: 94 BPM | WEIGHT: 158 LBS

## 2023-02-08 PROCEDURE — 99214 OFFICE O/P EST MOD 30 MIN: CPT

## 2023-02-08 NOTE — PHYSICAL EXAM
[de-identified] : Cervical spine: well-healed surgical scar. Palpation of the cervical spine is as follows: bilateral trapezial spasm, bilateral trapezial tenderness, bilateral paracervical spasm and bilateral paracervical tenderness. Range of motion of the cervical spine is as follows: ROM restricted \par \par Back, including spine: Palpation of the thoracic/lumbar spine is as follows: bilateral lumbar paraspinal tenderness. Range of motion of the thoracic and lumbar spine is as follows: pain at extremes of flexion and pain at extremes extension.

## 2023-02-08 NOTE — ASSESSMENT
[FreeTextEntry1] : 57 year old female with chronic cervical and lumbar pain.  I will request authorization for EMG of lower extremities for further evaluation.  I will increase her gabapentin to 400 mg tid and continue Percocet as mentioned. She will f/u in 4 weeks for re evaluation.\par \par I personally reviewed with the PA, this patient's history and physical exam findings, as documented above. I have discussed the relevant areas of concern, having direct implications to the presenting problems and illnesses, and I have personally examined all pertinent and positive and negative findings, which impact on the prior pain management treatment. \par \par \par Check of the  registry reveals compliance in regards to narcotic medication management use.\par \par Delilah Wallace, MS, PA-C\par Darian Germain, \par \par \par \par .

## 2023-02-08 NOTE — HISTORY OF PRESENT ILLNESS
[FreeTextEntry1] : TODAY:   I had the pleasure of seeing  today in follow up.  Her previous history and physical findings have been reviewed.\par \par She is under our care for chronic cervical and lumbar pain which she is receiving continuing active treatment for. \par She states unfortunately over the past month she has been experiencing an increase in burning and parasthesias in her lower extremities waking her up and night and making ambulating difficult.   We did incresae her gabapentin 300 mg to tid dosing at her last appointment which provided minimal improvement. We will re evaluate her further with respect to this today.  In the interim she is also managed on a regimen of Percocet and baclofen she uses as needed providing her with 50% relief.  The regimen allows her to function and perform ADLs without side effects and we will continue as is without change.

## 2023-02-10 ENCOUNTER — APPOINTMENT (OUTPATIENT)
Dept: ORTHOPEDIC SURGERY | Facility: CLINIC | Age: 58
End: 2023-02-10
Payer: OTHER MISCELLANEOUS

## 2023-02-10 DIAGNOSIS — M77.11 LATERAL EPICONDYLITIS, RIGHT ELBOW: ICD-10-CM

## 2023-02-10 PROCEDURE — 99214 OFFICE O/P EST MOD 30 MIN: CPT

## 2023-02-10 PROCEDURE — 73110 X-RAY EXAM OF WRIST: CPT | Mod: RT

## 2023-02-10 PROCEDURE — 99072 ADDL SUPL MATRL&STAF TM PHE: CPT

## 2023-02-10 NOTE — SWALLOW BEDSIDE ASSESSMENT ADULT - CONSISTENCIES ADMINISTERED
Hide Additional Notes?: No 5oz/thin liquid/nectar thick/mech soft Include Location In Plan?: Yes Detail Level: Zone

## 2023-02-12 PROBLEM — M77.11 EPICONDYLITIS, LATERAL, RIGHT: Status: ACTIVE | Noted: 2023-02-12

## 2023-02-12 NOTE — WORK
[Partial] : partial [Does not reveal pre-existing condition(s) that may affect treatment/prognosis] : does not reveal pre-existing condition(s) that may affect treatment/prognosis [Cannot return to work because ________] : cannot return to work because [unfilled] [I provided the services listed above] :  I provided the services listed above. [FreeTextEntry1] : fair [FreeTextEntry3] : lg

## 2023-02-12 NOTE — HISTORY OF PRESENT ILLNESS
[de-identified] : Patient comes in with complaints of right wrist pain. Her elbow pain is getting worse.

## 2023-02-12 NOTE — IMAGING
[de-identified] : full active range of motion of the right shoulder, wrist and fingers\par full active range of motion of the right elbow\par Tenderness to palpation of the lateral epicondyle and proximal extensor supinator mass\par pain with resisted wrist extension and forearm supination\par 4/5 strength in supination and wrist extension, otherwise 5/5 strength\par median/ulnar/radial sensation intact to light touch\par ain/pin/ulnar motor intact\par palpable pulses CR<2s\par \par Right wrist with volar swelling on the radial aspect, tender palpation, full range of motion of the fingers, neurovascularly intact

## 2023-02-12 NOTE — ASSESSMENT
[FreeTextEntry1] : The patient was advised of the diagnosis. The natural history of the pathology was explained in full to the patient in layman's terms. All questions were answered. The risks and benefits of surgical and non-surgical treatment alternatives were explained in full to the patient.\par We discussed treatment options including nsaids, topical gels, tennis elbow strap, PT, activity modification, cortisone inj, sx .pt is aware that symptoms usually resolve on their own in 95-99% of people, but the timeframe is unknown.\par Home exercises/stretches were demonstrated and the patient practiced them as well- They are to do the exercises hourly and hold the stretch for 30 seconds. \par Avoid repetitive wrist flexion\par time was spent instructing the patient on appropriate placement of the elbow strap as well. \par \par  in addition for the patient's volar wrist pain, I am recommending the patient get an MRI.  She has some swelling in the area and I like to determine whether not it is a volar ganglion cyst versus is FCR tendinitis.  We need authorization for an MRI.\par \par Patient will get an MRI on her wrist.

## 2023-02-16 ENCOUNTER — NON-APPOINTMENT (OUTPATIENT)
Age: 58
End: 2023-02-16

## 2023-02-22 ENCOUNTER — APPOINTMENT (OUTPATIENT)
Dept: PAIN MANAGEMENT | Facility: CLINIC | Age: 58
End: 2023-02-22
Payer: MEDICARE

## 2023-02-22 PROCEDURE — 95912 NRV CNDJ TEST 11-12 STUDIES: CPT

## 2023-02-22 PROCEDURE — 95886 MUSC TEST DONE W/N TEST COMP: CPT

## 2023-02-23 ENCOUNTER — APPOINTMENT (OUTPATIENT)
Dept: RHEUMATOLOGY | Facility: CLINIC | Age: 58
End: 2023-02-23
Payer: COMMERCIAL

## 2023-02-23 DIAGNOSIS — E03.9 HYPOTHYROIDISM, UNSPECIFIED: ICD-10-CM

## 2023-02-23 PROCEDURE — 99214 OFFICE O/P EST MOD 30 MIN: CPT | Mod: 95

## 2023-02-23 RX ORDER — FOLIC ACID 1 MG/1
1 TABLET ORAL DAILY
Qty: 30 | Refills: 1 | Status: COMPLETED | COMMUNITY
Start: 2022-04-21 | End: 2023-02-23

## 2023-02-23 RX ORDER — LATANOPROST/PF 0.005 %
0.01 DROPS OPHTHALMIC (EYE)
Qty: 2 | Refills: 0 | Status: ACTIVE | COMMUNITY
Start: 2023-01-28

## 2023-02-23 RX ORDER — METHOTREXATE 2.5 MG/1
2.5 TABLET ORAL
Qty: 24 | Refills: 1 | Status: COMPLETED | COMMUNITY
Start: 2023-01-30 | End: 2023-02-23

## 2023-02-23 RX ORDER — PROMETHAZINE HYDROCHLORIDE 6.25 MG/5ML
6.25 SOLUTION ORAL
Qty: 180 | Refills: 0 | Status: ACTIVE | COMMUNITY
Start: 2022-11-28

## 2023-02-23 NOTE — ASSESSMENT
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 With improvement of symptoms on prednisone, but pt has diabetes and has been taking prednisone for >1 year at this point due to inconsistent follow-up. With worsening symptoms of irritation in the R eye now; unclear if this could be due to running out of methotrexate? Consider prednisone side effect as the underlying cause of pt's blurry vision, although her blurry vision does not seem to have improved with decreasing prednisone dose.\par \par - Pt has f/u ophtho appt in April 2023\par - Continue prednisone 5 mg q day for now\par - Increase methotrexate to 20 mg q week\par - Continue folic acid 1 mg q day\par - f/u CBC, CMP, A1c in 1 month\par \par f/u in 1 month as virtual visit\par

## 2023-02-23 NOTE — HISTORY OF PRESENT ILLNESS
[FreeTextEntry1] : Pt reports some improvement in the erythema and itching sensation in her eye after restarting methotrexate, although she continues to experience significant irritation, and unchanged nighttime blurry vision with inability to drive at night. She wasn't able to schedule an ophthalmology appointment until April 2023 so she has not seen an ophthalmologist yet. + Mild nausea with methotrexate. She has only been taking prednisone 5 mg daily instead of 15 mg. Pt restarted insulin last month.\par \par History of present illness: In July 2021, pt developed severe pain, redness and swelling in her L eye "overnight." She was admitted to [facility] and was diagnosed with L orbital cellulitis and L dacryoadenitis, was started on high-dose steroids with improvement of symptoms, which flared again when she was tapered down to 5 mg prednisone. Pt's prednisone was subsequently increased again, and she has been on steroids since July 2021. She has been following in the rheum clinic, most recently was seen in the clinic in March 2022, when she was continued on prednisone 20 mg pending biopsy results. Pt says she received the biopsy results but they are not available within the [facility] records.\par \par In January 2023, pt followed up with worsening irritation in her R eye and night time blurry vision after not taking methotrexate for several months (ran out). She was restarted on methotrexate and continued on prednisone taper.

## 2023-02-27 ENCOUNTER — APPOINTMENT (OUTPATIENT)
Dept: ORTHOPEDIC SURGERY | Facility: CLINIC | Age: 58
End: 2023-02-27
Payer: OTHER MISCELLANEOUS

## 2023-02-27 DIAGNOSIS — M67.431 GANGLION, RIGHT WRIST: ICD-10-CM

## 2023-02-27 PROCEDURE — 99214 OFFICE O/P EST MOD 30 MIN: CPT

## 2023-02-27 PROCEDURE — 99072 ADDL SUPL MATRL&STAF TM PHE: CPT

## 2023-02-27 NOTE — IMAGING
[de-identified] : full active range of motion of the right shoulder, wrist and fingers\par full active range of motion of the right elbow\par Tenderness to palpation of the lateral epicondyle and proximal extensor supinator mass\par pain with resisted wrist extension and forearm supination\par 4/5 strength in supination and wrist extension, otherwise 5/5 strength\par median/ulnar/radial sensation intact to light touch\par ain/pin/ulnar motor intact\par palpable pulses CR<2s\par \par Right wrist with volar swelling on the radial aspect, nontender to palpation, full range of motion of the fingers, tender to palpation over radial aspect of the wrist, neurovascularly intact

## 2023-02-27 NOTE — ASSESSMENT
[FreeTextEntry1] : Patient is good to continue with doing exercises on her own for the tennis elbow.  She says she has been doing a little bit better.  She brought the forearm band and she showed me how she was wearing it.  I recommend she wear it a little bit tighter.  As for her wrist, she does not have much pain over the volar aspect of the wrist where she had pain prior but she now has pain on the radial aspect of the wrist.  She already had a first dorsal extensor compartment release, she has some mild pain in that area.  I am recommending therapy.  She says the pain comes and goes and goes throughout her wrist.  She also complains of stiffness in her hand in the morning.  She is going to follow-up with me in 6 to 8 weeks.  She will also most likely bring in her disc.

## 2023-02-27 NOTE — HISTORY OF PRESENT ILLNESS
[de-identified] : Patient comes in with complaints of right wrist pain. She is doing well. She comes in to review her MRI.  She says the pain moved

## 2023-02-27 NOTE — DATA REVIEWED
[FreeTextEntry1] : MRI shows multiple findings, none of which address the radial or volar aspect of wrist, incidental findings

## 2023-03-06 ENCOUNTER — LABORATORY RESULT (OUTPATIENT)
Age: 58
End: 2023-03-06

## 2023-03-06 ENCOUNTER — APPOINTMENT (OUTPATIENT)
Dept: PAIN MANAGEMENT | Facility: CLINIC | Age: 58
End: 2023-03-06
Payer: COMMERCIAL

## 2023-03-06 ENCOUNTER — RESULT CHARGE (OUTPATIENT)
Age: 58
End: 2023-03-06

## 2023-03-06 VITALS
HEIGHT: 62 IN | HEART RATE: 93 BPM | DIASTOLIC BLOOD PRESSURE: 69 MMHG | WEIGHT: 158 LBS | SYSTOLIC BLOOD PRESSURE: 143 MMHG | BODY MASS INDEX: 29.08 KG/M2

## 2023-03-06 LAB
AMP / AMPHETAMINE: NEGATIVE
BAR / SECOBARBITAL: NEGATIVE
BUP / BUPRENORPHINE: NEGATIVE
BZO / OXAZEPAM: NEGATIVE
COC / COCAINE: NEGATIVE
CREATININE: 50 MG/DL
MDMA / METHYLENEDIOXYMETHAMPHETAMINE: NEGATIVE
MET / METHAMPHETAMINES: NEGATIVE
MOP / MORPHINE: NEGATIVE
MTD / METHADONE: NEGATIVE
OXY / OXYCODONE: POSITIVE
PCP / PHENCYCLIDINE: NEGATIVE
PH: 4
SPECIFIC GRAVITY: 1.02
TEMPERATURE: 90 C
THC / MARIJUANA: NEGATIVE

## 2023-03-06 PROCEDURE — 99213 OFFICE O/P EST LOW 20 MIN: CPT

## 2023-03-06 PROCEDURE — 80305 DRUG TEST PRSMV DIR OPT OBS: CPT | Mod: QW

## 2023-03-06 NOTE — DATA REVIEWED
[FreeTextEntry1] : EMG LE 2/22/23\par Impression- No electrophysiologic evidence of lumbar nerve root dysfunction, entrapment neuropathy, or peripheral neuropathy.

## 2023-03-06 NOTE — PHYSICAL EXAM
[de-identified] : Cervical spine: well-healed surgical scar. Palpation of the cervical spine is as follows: bilateral trapezial spasm, bilateral trapezial tenderness, bilateral paracervical spasm and bilateral paracervical tenderness. Range of motion of the cervical spine is as follows: ROM restricted \par \par Back, including spine: Palpation of the thoracic/lumbar spine is as follows: bilateral lumbar paraspinal tenderness. Range of motion of the thoracic and lumbar spine is as follows: pain at extremes of flexion and pain at extremes extension.

## 2023-03-06 NOTE — ASSESSMENT
[FreeTextEntry1] : 58 year old female with chronic cervical and lumbar pain. EMG of lower extremities was reviewed today. She will continue with medication management and will f/u in 4 weeks for re evaluation.\par \par Overall there is at least a 30-50% reduction in pain with the prescribed analgesics. The patient denies any adverse side effects due to the medication (sleeping disturbance, constipation, sleepiness, hallucinations and/or urination problems).\par \par Urine drug screening collected today with rapid sample result consistent with given regimen. Sample to be sent for confirmatory testing.\par \par I personally reviewed with the PA, this patient's history and physical exam findings, as documented above. I have discussed the relevant areas of concern, having direct implications to the presenting problems and illnesses, and I have personally examined all pertinent and positive and negative findings, which impact on the prior pain management treatment. \par \par Check of the  registry reveals compliance in regards to narcotic medication management use.\par \par Landon Ambrose PA-C\par Darian Germain D.O\par

## 2023-03-06 NOTE — HISTORY OF PRESENT ILLNESS
[FreeTextEntry1] : TODAY:   I had the pleasure of seeing  today in follow up.  Her previous history and physical findings have been reviewed.\par \par She is under our care for chronic cervical and lumbar pain which she is receiving continuing active treatment for. \par She was last seen on 2/08/23. Her pain has improved since her last visit. She notices a decrease in intensity regarding her complaints of burning and paresthesias of her lower extremities.  She feels that the increase from gabapentin 300 mg to 400 mg has bought her additional relief. In the interim she is also managed on a regimen of Percocet and baclofen she uses as needed providing her with 50% relief.  The regimen allows her to function and perform ADLs without side effects and we will continue as is without change.  \par \par UDS to be completed today

## 2023-03-09 LAB
PM 6 MAM: NEGATIVE NG/ML
PM 7-AMINO-CLONAZ: NEGATIVE NG/ML
PM ALPHA-HYDROXY-ALPRAZOLAM: NEGATIVE NG/ML
PM ALPHA-HYDROXY-MIDAZOLAM: NEGATIVE NG/ML
PM ALPRAZOLAM: NEGATIVE NG/ML
PM AMOBARBITAL: NEGATIVE NG/ML
PM AMPHETAMINE INTERP: NEGATIVE
PM AMPHETAMINE: NEGATIVE NG/ML
PM BARBURATES INTERP: NEGATIVE
PM BEG: NEGATIVE NG/ML
PM BENZODIAZEPINES INTERP: NEGATIVE
PM BUPRENORPHINE INTERP: NEGATIVE
PM BUPRENORPHINE: NEGATIVE NG/ML
PM BUTALBITAL: NEGATIVE NG/ML
PM CLONAZEPAM: NEGATIVE NG/ML
PM COCAINE INTERP: NEGATIVE
PM COCAINE: NEGATIVE NG/ML
PM CODIENE: NEGATIVE NG/ML
PM COTININE: >1000 NG/ML
PM DIAZEPAM: NEGATIVE NG/ML
PM DIHYROCODEINE: NEGATIVE NG/ML
PM EDDP: NEGATIVE NG/ML
PM FENTANYL INTERP: NEGATIVE
PM FENTANYL: NEGATIVE NG/ML
PM FLUNITRAZEPAM: NEGATIVE NG/ML
PM FLURAZEPAM: NEGATIVE NG/ML
PM HYDROCODONE: NEGATIVE NG/ML
PM HYDROMORPHONE: NEGATIVE NG/ML
PM LORAZEPAM: NEGATIVE NG/ML
PM MARIJUANA (DELTA-9-THC): NEGATIVE NG/ML
PM MARIJUANA INTERP: NEGATIVE
PM MDA: NEGATIVE NG/ML
PM MDEA: NEGATIVE NG/ML
PM MDMA: NEGATIVE NG/ML
PM MEPERIDINE: NEGATIVE NG/ML
PM METHADONE INTERP: NEGATIVE
PM METHADONE: NEGATIVE NG/ML
PM METHAMPHETAMINE: NEGATIVE NG/ML
PM MIDAZOLAM: NEGATIVE NG/ML
PM MORPHINE: NEGATIVE NG/ML
PM NALOXONE: NEGATIVE NG/ML
PM NALTREXONE: NEGATIVE NG/ML
PM NICOTINE INTERP: POSITIVE
PM NORBUPRENORPHINE: NEGATIVE NG/ML
PM NORDIAZEPAM: NEGATIVE NG/ML
PM NORMEPERIDINE: NEGATIVE NG/ML
PM NOROXYCODONE: >1000 NG/ML
PM OPIOID INTERP: NEGATIVE
PM OXAZEPAM: NEGATIVE NG/ML
PM OXXYCODONE INTERP: POSITIVE
PM OXYCODONE: 707 NG/ML
PM OXYMORPHONE: 114 NG/ML
PM PCP: NEGATIVE NG/ML
PM PHENCYCLIDINE INTERP: NEGATIVE
PM PHENOBARBITAL: NEGATIVE NG/ML
PM PPX: NEGATIVE NG/ML
PM PROPOXYPHENE INTERP: NEGATIVE
PM SECOBARBITAL: NEGATIVE NG/ML
PM SUFENTANIL: NEGATIVE NG/ML
PM TAPENTADOL: NEGATIVE NG/ML
PM TEMAZEPAM: NEGATIVE NG/ML
PM TRAMADOL INTERP: NEGATIVE
PM TRAMADOL: NEGATIVE NG/ML

## 2023-03-16 ENCOUNTER — APPOINTMENT (OUTPATIENT)
Dept: ENDOCRINOLOGY | Facility: CLINIC | Age: 58
End: 2023-03-16

## 2023-03-16 ENCOUNTER — OUTPATIENT (OUTPATIENT)
Dept: OUTPATIENT SERVICES | Facility: HOSPITAL | Age: 58
LOS: 1 days | End: 2023-03-16
Payer: MEDICARE

## 2023-03-16 VITALS
DIASTOLIC BLOOD PRESSURE: 85 MMHG | BODY MASS INDEX: 28.89 KG/M2 | SYSTOLIC BLOOD PRESSURE: 127 MMHG | WEIGHT: 157 LBS | HEIGHT: 62 IN | HEART RATE: 9 BPM

## 2023-03-16 DIAGNOSIS — Z98.890 OTHER SPECIFIED POSTPROCEDURAL STATES: Chronic | ICD-10-CM

## 2023-03-16 DIAGNOSIS — E78.00 PURE HYPERCHOLESTEROLEMIA, UNSPECIFIED: ICD-10-CM

## 2023-03-16 DIAGNOSIS — Z00.00 ENCOUNTER FOR GENERAL ADULT MEDICAL EXAMINATION WITHOUT ABNORMAL FINDINGS: ICD-10-CM

## 2023-03-16 PROCEDURE — 99214 OFFICE O/P EST MOD 30 MIN: CPT

## 2023-03-16 RX ORDER — ERGOCALCIFEROL 1.25 MG/1
1.25 MG CAPSULE ORAL
Qty: 13 | Refills: 3 | Status: ACTIVE | COMMUNITY
Start: 2022-02-24 | End: 1900-01-01

## 2023-03-16 NOTE — HISTORY OF PRESENT ILLNESS
[FreeTextEntry1] : patient sees many doctors, except endocrinologist, not seen for 1 year, insurance changed, unable to afford expensive drugs due to part d issues. continuous on long term glucocorticoids from rheumatologist.

## 2023-03-16 NOTE — DISCHARGE NOTE PROVIDER - NSDCACTIVITY_GEN_ALL_CORE
Per Dr. Almazan order, patient does not need a to follow up with an electrophysiologist, EP CNP unless patient develops an arrhythmia or device issue. Patient has established care with Dr Villafuerte. HF MD.    Do not drive or operate machinery/Showering allowed/Do not make important decisions/Stairs allowed/Walking - Indoors allowed/No heavy lifting/straining/Walking - Outdoors allowed

## 2023-03-16 NOTE — ASSESSMENT
[FreeTextEntry1] : try adding pioglitazone 30 mg. daily. [facility] for brand name drugs, so they are more affordable.

## 2023-03-17 ENCOUNTER — RX RENEWAL (OUTPATIENT)
Age: 58
End: 2023-03-17

## 2023-03-20 ENCOUNTER — FORM ENCOUNTER (OUTPATIENT)
Age: 58
End: 2023-03-20

## 2023-03-20 DIAGNOSIS — E11.65 TYPE 2 DIABETES MELLITUS WITH HYPERGLYCEMIA: ICD-10-CM

## 2023-03-20 DIAGNOSIS — E78.00 PURE HYPERCHOLESTEROLEMIA, UNSPECIFIED: ICD-10-CM

## 2023-03-20 DIAGNOSIS — I10 ESSENTIAL (PRIMARY) HYPERTENSION: ICD-10-CM

## 2023-03-27 ENCOUNTER — APPOINTMENT (OUTPATIENT)
Dept: RHEUMATOLOGY | Facility: CLINIC | Age: 58
End: 2023-03-27
Payer: MEDICARE

## 2023-03-27 PROCEDURE — 99214 OFFICE O/P EST MOD 30 MIN: CPT | Mod: 95

## 2023-03-27 NOTE — HISTORY OF PRESENT ILLNESS
[FreeTextEntry1] : Pt continues to experience significant pain and irritation in her eyes worse at night, with difficulty sleeping for the past few days due to the irritation. She had been taking the 20 mg methotrexate prescription but last week the pharmacy gave her a prescription for 15 mg dose of methotrexate for some reason instead of 20 mg. The blurry vision has improved recently. \par \par Pt's ophthalmology appointment was supposed to be this week, but it was cancelled because she changed insurances and her ophthalmologist's office no longer accepts her insurance.\par \par History of present illness: In July 2021, pt developed severe pain, redness and swelling in her L eye "overnight." She was admitted to [facility] and was diagnosed with L orbital cellulitis and L dacryoadenitis, was started on high-dose steroids with improvement of symptoms, which flared again when she was tapered down to 5 mg prednisone. Pt's prednisone was subsequently increased again, and she has been on steroids since July 2021. She has been following in the rheum clinic, most recently was seen in the clinic in March 2022, when she was continued on prednisone 20 mg pending biopsy results. Pt says she received the biopsy results but they are not available within the [facility] records.\par \par In January 2023, pt followed up with worsening irritation in her R eye and night time blurry vision after not taking methotrexate for several months (ran out). She was restarted on methotrexate and continued on prednisone taper.

## 2023-03-27 NOTE — ASSESSMENT
[FreeTextEntry1] : Dacryoadenitis: Pt had biopsy 3/2022 but the [facility]. With improvement of symptoms on prednisone, but pt has diabetes and has been taking prednisone for >1 year at this point due to inconsistent follow-up. With worsening symptoms of irritation in the R eye now; unclear if this could be due to running out of methotrexate? Consider prednisone side effect as the underlying cause of pt's blurry vision, and pt says that her blurry vision has been improving. \par \par - I sent pt a list of other ophthalmologists that she can make appointments with \par - Will attempt to obtain pt's recent lab results from her PMD's office; she says she had labs done last week but the results are not available in [facility]\par - Decrease prednisone to 2.5 mg q day\par - Continue methotrexate 20 mg q week, I called in another prescription for the higher dose to [facility]\par - Continue folic acid 1 mg q day\par - f/u CBC, CMP in 1 month\par \par f/u in 1 month as virtual visit\par

## 2023-04-07 ENCOUNTER — APPOINTMENT (OUTPATIENT)
Dept: PAIN MANAGEMENT | Facility: CLINIC | Age: 58
End: 2023-04-07
Payer: MEDICARE

## 2023-04-07 VITALS
DIASTOLIC BLOOD PRESSURE: 81 MMHG | HEIGHT: 62 IN | BODY MASS INDEX: 28.89 KG/M2 | HEART RATE: 95 BPM | WEIGHT: 157 LBS | SYSTOLIC BLOOD PRESSURE: 135 MMHG

## 2023-04-07 PROCEDURE — 99213 OFFICE O/P EST LOW 20 MIN: CPT

## 2023-04-07 NOTE — PHYSICAL EXAM
[de-identified] : Cervical spine: well-healed surgical scar. Palpation of the cervical spine is as follows: bilateral trapezial spasm, bilateral trapezial tenderness, bilateral paracervical spasm and bilateral paracervical tenderness. Range of motion of the cervical spine is as follows: ROM restricted \par \par Back, including spine: Palpation of the thoracic/lumbar spine is as follows: bilateral lumbar paraspinal tenderness. Range of motion of the thoracic and lumbar spine is as follows: pain at extremes of flexion and pain at extremes extension.

## 2023-04-07 NOTE — REASON FOR VISIT
Patient on stretcher son at bedside. Complains of cough for several days. Productive cough yellow and bloody sputum, shortness of breath, left rib pain, fever, bilateral leg swelling plus 2+ pitting. Triage nurse placed patient on 2 lit nc. Audible wheezing. [Follow-Up Visit] : a follow-up pain management visit [FreeTextEntry2] : med refill

## 2023-04-07 NOTE — ASSESSMENT
[FreeTextEntry1] : 58 year old female with chronic cervical and lumbar pain. She will continue with the above regimen. She is not interested in PT or injections at this time. We will f/u in 4 weeks for re evaluation.\par \par Overall there is at least a 30-50% reduction in pain with the prescribed analgesics. The patient denies any adverse side effects due to the medication (sleeping disturbance, constipation, sleepiness, hallucinations and/or urination problems).\par \par I personally reviewed with the PA, this patient's history and physical exam findings, as documented above. I have discussed the relevant areas of concern, having direct implications to the presenting problems and illnesses, and I have personally examined all pertinent and positive and negative findings, which impact on the prior pain management treatment. \par \par Check of the  registry reveals compliance in regards to narcotic medication management use.\par \par Landon Ambrose PA-C\par Darian Germain D.O\par

## 2023-04-07 NOTE — HISTORY OF PRESENT ILLNESS
[FreeTextEntry1] : TODAY:   I had the pleasure of seeing  today in follow up.  Her previous history and physical findings have been reviewed.\par \par She is under our care for chronic cervical and lumbar pain which she is receiving continuing active treatment for. \par She was last seen on 3/06/23. Her pain has been relatively stable since her last visit. For the last two days her pain was exacerbated by the weather as one day was warm and it started to get cold again. We recently increased her Gabapentin to 400 mg which continues to help manage her pain. She continues with a regimen of Percocet and baclofen which provides her with over 75% relief on most days and allows her to function and perform ADLs without side effects. We will continue as is without change.  \par \par UDS 3/06/23 - consistent

## 2023-04-18 ENCOUNTER — RX RENEWAL (OUTPATIENT)
Age: 58
End: 2023-04-18

## 2023-04-18 ENCOUNTER — APPOINTMENT (OUTPATIENT)
Dept: ORTHOPEDIC SURGERY | Facility: CLINIC | Age: 58
End: 2023-04-18
Payer: OTHER MISCELLANEOUS

## 2023-04-18 PROCEDURE — 99214 OFFICE O/P EST MOD 30 MIN: CPT

## 2023-04-18 NOTE — DATA REVIEWED
[FreeTextEntry1] : Reviewed the disc of the MRI again today, no significant evidence of any abnormalities on the area of the wrist and hand where she has a problem, mild arthritis visible

## 2023-04-18 NOTE — IMAGING
[de-identified] : full active range of motion of the right shoulder, wrist and fingers\par full active range of motion of the right elbow\par Tenderness to palpation of the lateral epicondyle and proximal extensor supinator mass\par pain with resisted wrist extension and forearm supination\par 4/5 strength in supination and wrist extension, otherwise 5/5 strength\par median/ulnar/radial sensation intact to light touch\par ain/pin/ulnar motor intact\par palpable pulses CR<2s\par \par Right wrist with volar swelling on the radial aspect, nontender to palpation, full range of motion of the fingers, tender to palpation by basal joint and radial aspect of hand, neurovascularly intact

## 2023-04-18 NOTE — ASSESSMENT
[FreeTextEntry1] : I discussed the findings of the MRI with the patient once again.  She still having some discomfort in the hand.  She has a lot of pain when she writes for long period of time.  She is keeping a journal.  I suggested considering using a computer for this.  In addition she has not had therapy approved as of yet.  She is going to discuss this with her  as well as Worker's Compensation.

## 2023-04-18 NOTE — HISTORY OF PRESENT ILLNESS
[de-identified] : Patient comes in with complaints of right wrist pain. She is doing well. She is having pain in her wrist when she is writing.  She has not gotten therapy approved as of yet.  She is awaiting to hear from her .  She says she does not have as much wrist pain.  She has some pain in the hands.  She has pain when she writes.  She is keeping a journal.

## 2023-04-24 ENCOUNTER — APPOINTMENT (OUTPATIENT)
Dept: RHEUMATOLOGY | Facility: CLINIC | Age: 58
End: 2023-04-24
Payer: MEDICARE

## 2023-04-24 PROCEDURE — 99214 OFFICE O/P EST MOD 30 MIN: CPT | Mod: 95

## 2023-04-24 RX ORDER — SEMAGLUTIDE 1.34 MG/ML
2 INJECTION, SOLUTION SUBCUTANEOUS
Qty: 3 | Refills: 0 | Status: DISCONTINUED | COMMUNITY
Start: 2021-11-30 | End: 2023-04-24

## 2023-04-24 NOTE — ASSESSMENT
[FreeTextEntry1] : Dacryoadenitis: Pt had biopsy 3/2022 but the results are not available in [facility] or [place]. Initially with improvement of symptoms on prednisone, but now with worsening symptoms of irritation in the R eye, which initially started when pt ran out of methotrexate but did not improve with restarting methotrexate. Also with blurry vision which appears to have improved with tapering prednisone - could be a prednisone side effect?\par \par - Continue methotrexate 20 mg q week for now, pt had normal LFTs and CBC in 3/2023. Would consider switching to a different DMARD depending on pt's exam during her upcoming ophthalmology appointment 5/2\par - Discontinue prednisone, especially as pt had A1c 9.5 on her most recent labs\par - Continue folic acid 1 mg q day\par \par f/u after ophthalmology appointment on 5/2

## 2023-04-24 NOTE — HISTORY OF PRESENT ILLNESS
[FreeTextEntry1] : Pt continues to experience significant pain and irritation in her eyes worse at night, with difficulty sleeping for the past few days due to the irritation. Her symptoms are unchanged compared to her prior visit 1 month ago, and she has not noticed a change with decreasing the prednisone dose. She has an upcoming appointment with Dr. Novak of ophthalmology on May 2nd. + Two episodes of blurry vision for 10 minutes while driving in the past month.\par \par History of present illness: In July 2021, pt developed severe pain, redness and swelling in her L eye "overnight." She was admitted to [facility] and was diagnosed with L orbital cellulitis and L dacryoadenitis, was started on high-dose steroids with improvement of symptoms, which flared again when she was tapered down to 5 mg prednisone. Pt's prednisone was subsequently increased again, and she has been on steroids since July 2021. She has been following in the rheum clinic, most recently was seen in the clinic in March 2022, when she was continued on prednisone 20 mg pending biopsy results. Pt says she received the biopsy results but they are not available within the [facility] records.\par \par In January 2023, pt followed up with worsening irritation in her R eye and night time blurry vision after not taking methotrexate for several months (ran out). She was restarted on methotrexate and continued on prednisone taper.

## 2023-05-05 ENCOUNTER — APPOINTMENT (OUTPATIENT)
Dept: PAIN MANAGEMENT | Facility: CLINIC | Age: 58
End: 2023-05-05
Payer: MEDICARE

## 2023-05-05 VITALS
SYSTOLIC BLOOD PRESSURE: 128 MMHG | BODY MASS INDEX: 28.89 KG/M2 | HEIGHT: 62 IN | DIASTOLIC BLOOD PRESSURE: 77 MMHG | HEART RATE: 99 BPM | WEIGHT: 157 LBS

## 2023-05-05 PROCEDURE — 99213 OFFICE O/P EST LOW 20 MIN: CPT

## 2023-05-05 NOTE — PHYSICAL EXAM
[de-identified] : Cervical spine: well-healed surgical scar. Palpation of the cervical spine is as follows: bilateral trapezial spasm, bilateral trapezial tenderness, bilateral paracervical spasm and bilateral paracervical tenderness. Range of motion of the cervical spine is as follows: ROM restricted \par \par Back, including spine: Palpation of the thoracic/lumbar spine is as follows: bilateral lumbar paraspinal tenderness. Range of motion of the thoracic and lumbar spine is as follows: pain at extremes of flexion and pain at extremes extension.

## 2023-05-05 NOTE — ASSESSMENT
[FreeTextEntry1] : 58 year old female with chronic cervical and lumbar pain. She is complaining of shooting pains into the lower extremities. Her recent lumbar MRI and symptomology point to a radicular component. We will submit for a caudal ELAINA w/MAC for further relief. She will continue with the above regimen unchanged. We will f/u in 4 weeks for re evaluation.\par \par Patient had a MRI that shows a radicular component along with pain referred into the lower extremity. Patient has trialed rehab (Home  exercise, physical therapy or chiropractic care) and medications  I will schedule a caudal 1-3 depending on effectiveness \par \par The patient has severe anxiety of procedures that necessitates monitored anesthesia care (MAC). The procedure performed will be close to major nerves, arteries, and spinal cord and/or joint structures. Due to the proximity of these structures, we need the patient to be still during the procedure.  With the help of MAC, this will be safely achieved and decrease the risk of any complications.\par \par Overall there is at least a 30-50% reduction in pain with the prescribed analgesics. The patient denies any adverse side effects due to the medication (sleeping disturbance, constipation, sleepiness, hallucinations and/or urination problems).\par \par Check of the  registry reveals compliance in regards to narcotic medication management use.\par \par Landon Ambrose PA-C\par Darian Germain D.O\par

## 2023-05-09 NOTE — ED ADULT NURSE NOTE - CAS DISCH TRANSFER METHOD
With the quite high TSH and low normal free T4 reasonable to increase dose to 100 mcg. Called patient and discussed in detail.    Sent in new prescription.    Also work on diet/ exercise/ wt loss since the hemoglobin a1c is higher.    See us in a couple months    Vasquez Denson MD     Private car

## 2023-05-10 ENCOUNTER — EMERGENCY (EMERGENCY)
Facility: HOSPITAL | Age: 58
LOS: 0 days | Discharge: HOME | End: 2023-05-10
Attending: EMERGENCY MEDICINE
Payer: MEDICARE

## 2023-05-10 VITALS
RESPIRATION RATE: 20 BRPM | DIASTOLIC BLOOD PRESSURE: 70 MMHG | TEMPERATURE: 98 F | OXYGEN SATURATION: 97 % | SYSTOLIC BLOOD PRESSURE: 149 MMHG | HEART RATE: 94 BPM | HEIGHT: 62 IN | WEIGHT: 156.97 LBS

## 2023-05-10 DIAGNOSIS — M25.511 PAIN IN RIGHT SHOULDER: ICD-10-CM

## 2023-05-10 DIAGNOSIS — Z87.39 PERSONAL HISTORY OF OTHER DISEASES OF THE MUSCULOSKELETAL SYSTEM AND CONNECTIVE TISSUE: ICD-10-CM

## 2023-05-10 DIAGNOSIS — I10 ESSENTIAL (PRIMARY) HYPERTENSION: ICD-10-CM

## 2023-05-10 DIAGNOSIS — M54.6 PAIN IN THORACIC SPINE: ICD-10-CM

## 2023-05-10 DIAGNOSIS — E11.9 TYPE 2 DIABETES MELLITUS WITHOUT COMPLICATIONS: ICD-10-CM

## 2023-05-10 DIAGNOSIS — R20.2 PARESTHESIA OF SKIN: ICD-10-CM

## 2023-05-10 DIAGNOSIS — R20.0 ANESTHESIA OF SKIN: ICD-10-CM

## 2023-05-10 DIAGNOSIS — M54.50 LOW BACK PAIN, UNSPECIFIED: ICD-10-CM

## 2023-05-10 DIAGNOSIS — Z98.890 OTHER SPECIFIED POSTPROCEDURAL STATES: Chronic | ICD-10-CM

## 2023-05-10 DIAGNOSIS — Z88.8 ALLERGY STATUS TO OTHER DRUGS, MEDICAMENTS AND BIOLOGICAL SUBSTANCES: ICD-10-CM

## 2023-05-10 PROCEDURE — 93010 ELECTROCARDIOGRAM REPORT: CPT

## 2023-05-10 PROCEDURE — 93005 ELECTROCARDIOGRAM TRACING: CPT

## 2023-05-10 PROCEDURE — 99283 EMERGENCY DEPT VISIT LOW MDM: CPT | Mod: 25

## 2023-05-10 PROCEDURE — 71045 X-RAY EXAM CHEST 1 VIEW: CPT | Mod: 26

## 2023-05-10 PROCEDURE — 99284 EMERGENCY DEPT VISIT MOD MDM: CPT

## 2023-05-10 PROCEDURE — 71045 X-RAY EXAM CHEST 1 VIEW: CPT

## 2023-05-10 RX ORDER — ACETAMINOPHEN 500 MG
975 TABLET ORAL ONCE
Refills: 0 | Status: COMPLETED | OUTPATIENT
Start: 2023-05-10 | End: 2023-05-10

## 2023-05-10 RX ORDER — METHOCARBAMOL 500 MG/1
1500 TABLET, FILM COATED ORAL ONCE
Refills: 0 | Status: COMPLETED | OUTPATIENT
Start: 2023-05-10 | End: 2023-05-10

## 2023-05-10 RX ORDER — DEXAMETHASONE 0.5 MG/5ML
10 ELIXIR ORAL ONCE
Refills: 0 | Status: COMPLETED | OUTPATIENT
Start: 2023-05-10 | End: 2023-05-10

## 2023-05-10 RX ADMIN — Medication 975 MILLIGRAM(S): at 15:57

## 2023-05-10 RX ADMIN — Medication 10 MILLIGRAM(S): at 15:56

## 2023-05-10 RX ADMIN — METHOCARBAMOL 1500 MILLIGRAM(S): 500 TABLET, FILM COATED ORAL at 15:57

## 2023-05-10 NOTE — ED ADULT NURSE NOTE - NSFALLUNIVINTERV_ED_ALL_ED
Bed/Stretcher in lowest position, wheels locked, appropriate side rails in place/Call bell, personal items and telephone in reach/Instruct patient to call for assistance before getting out of bed/chair/stretcher/Non-slip footwear applied when patient is off stretcher/[place] to call system/Physically safe environment - no spills, clutter or unnecessary equipment/Purposeful proactive rounding/Room/bathroom lighting operational, light cord in reach

## 2023-05-10 NOTE — ED PROVIDER NOTE - NSFOLLOWUPINSTRUCTIONS_ED_ALL_ED_FT
Back Pain    Back pain is very common in adults. The cause of back pain is rarely dangerous and the pain often gets better over time. The cause of your back pain may not be known and may include strain of muscles or ligaments, degeneration of the spinal disks, or arthritis. Occasionally the pain may radiate down your leg(s). Over-the-counter medicines to reduce pain and inflammation are often the most helpful. Stretching and remaining active frequently helps the healing process.     SEEK IMMEDIATE MEDICAL CARE IF YOU HAVE THE FOLLOWING SYMPTOMS: bowel or bladder control problems, unusual weakness or numbness in your arms or legs, nausea or vomiting, abdominal pain, fever, dizziness/lightheadedness. Please follow up with pain management in 1 week as scheduled.     Back Pain    Back pain is very common in adults. The cause of back pain is rarely dangerous and the pain often gets better over time. The cause of your back pain may not be known and may include strain of muscles or ligaments, degeneration of the spinal disks, or arthritis. Occasionally the pain may radiate down your leg(s). Over-the-counter medicines to reduce pain and inflammation are often the most helpful. Stretching and remaining active frequently helps the healing process.     SEEK IMMEDIATE MEDICAL CARE IF YOU HAVE THE FOLLOWING SYMPTOMS: bowel or bladder control problems, unusual weakness or numbness in your arms or legs, nausea or vomiting, abdominal pain, fever, dizziness/lightheadedness.

## 2023-05-10 NOTE — ED PROVIDER NOTE - PROGRESS NOTE DETAILS
Pain is improved. Offered muscle relaxers for prescription however she will continue with home Baclofen and follow up with her pain management Doctor on Monday as scheduled for nerve block.

## 2023-05-10 NOTE — ED PROVIDER NOTE - CLINICAL SUMMARY MEDICAL DECISION MAKING FREE TEXT BOX
59 yo F presented to ED for R shoulder pain which is worse with movement concerning for musculoskeletal pain. Pain is typical of past pain and pt has an appointment with pain management on Monday. DC home

## 2023-05-10 NOTE — ED PROVIDER NOTE - PATIENT PORTAL LINK FT
You can access the FollowMyHealth Patient Portal offered by [facility] by registering at the following website: [email or web address]. By joining [facility]’s FollowMyHealth portal, you will also be able to view your health information using other applications (apps) compatible with our system.

## 2023-05-10 NOTE — ED PROVIDER NOTE - OBJECTIVE STATEMENT
58 year old female with a history of Spinal stenosis and chronic back pain presents to the ED for back pain. Patient describes sharp pain in her right upper back radiating to her right shoulder that started 2 days ago without associated trauma/overuse. She has had a similar pain before for which she follows with Pain management and is scheduled for a nerve block. She also reports pain in her right low back radiating to her right leg associated with numbness and tingling. Denies incontinence, saddle anesthesia, fever, chills, chest pain, shortness of breath, abdominal pain, nausea, vomiting, diarrhea, constipation, dysuria, hematuria, lower extremity swelling, rash.

## 2023-05-11 DIAGNOSIS — M79.10 MYALGIA, UNSPECIFIED SITE: ICD-10-CM

## 2023-05-15 ENCOUNTER — APPOINTMENT (OUTPATIENT)
Dept: PAIN MANAGEMENT | Facility: CLINIC | Age: 58
End: 2023-05-15
Payer: MEDICARE

## 2023-05-15 PROCEDURE — 00630 ANES PX LUMBAR REGION NOS: CPT | Mod: QZ,P3

## 2023-05-15 PROCEDURE — 62323 NJX INTERLAMINAR LMBR/SAC: CPT

## 2023-05-15 PROCEDURE — 93770 DETERMINATION VENOUS PRESS: CPT

## 2023-05-15 PROCEDURE — 94761 N-INVAS EAR/PLS OXIMETRY MLT: CPT

## 2023-05-15 PROCEDURE — 93040 RHYTHM ECG WITH REPORT: CPT | Mod: 79

## 2023-05-15 NOTE — PROCEDURE
[FreeTextEntry3] : CAUDAL EPIDURAL STEROID INJECTION\par  \par Preoperative Diagnosis: Lumbar Radiculopathy\par Postoperative Diagnosis: Lumbar Radiculopathy\par Procedure: Caudal epidural injection under fluoroscopic guidance\par Physician: Darian Germain D.O.\par Anesthesiologist/CRNA:Bowen\par Anesthesia: See nurses note. MAC/Cold spray.\par \par Medical Necessity: Failure of conservative management.\par Indication for Fluoroscopy: This procedure requires the precise placement of the spinal needle into the epidural space. It is the only way to accurately and safely perform the injection.\par \par CONSENT: The possible complications including infection, bleeding, nerve damage, hospital admission or failure of the procedure; though unusual, are theoretically possible. The patient was educated about the of the procedure and alternative therapies. All questions were answered and the patient freely gave consent to proceed.\par Monitoring: Patient had continuous blood pressure, EKG, and pulse oximetry throughout the case. See nurse's notes.\par \par PROCEDURE NOTE: \par After obtaining written consent, the patient was placed in the prone position with a pillow under the pelvis. Multiple fluoroscopic views of the sacrum-AP & Lateral- were obtained. The lower back and upper gluteal region were prepped with Betadine and draped in the sterile fashion. A time out was performed. The skin over the sacral hiatus was infiltrated with local anesthetic and a 22 gauge 3 ½ inch needle was inserted. The angle of the needle was lowered until it was felt to penetrate the sacrococcygeal ligament at which time the needle was advanced without resistance. Fluoroscopy confirmed the position of the needle within the caudal space. Omnipaque 240, 3 cc was injected to confirm an appropriate epidural spread. A total of 9ml of preservative-free sterile saline, 1 ml of Methylprednisolone (80 mg/cc) was injected via the needle into the caudal space. The needle was cleared with three ml preservative-free normal saline. There was no evidence of CSF or heme. The needle was removed intact. A band aid was place on the site.\par \par Epidurogram Report: A spinal needle is in place in the caudal epidural space. Central epidural spread of dye is noted from the lower sacral segments to L4-5. Dye is seen outlining the sacral nerve roots bilaterally as they emerge from their respective foramen without obstruction pointing away from adhesion/fibrosis\par \par Complications: None. The patient tolerated the procedure well. \par \par Disposition: I have examined the patient and there are no new physical findings since the original presentation. Sensory and motor function were intact. The patient met discharge criteria see nurses notes. The discharge instruction sheet was reviewed and given to the patient. The patient was discharged home with a . If patient gets sustained relief will have patient do modified planks 3 times a day on carpet or yoga mat starting at 5 seconds building up to 1 minute without grimacing/Valsalva and walking. \par Comments: 1st caudal ELAINA today, depending on effectiveness would schedule a 2nd caudal ELAINA in 1-2 weeks or follow up in office depending on insurance. Call if any problems. \par \par \par Darian Germain D.O. \par Diplomat, American Board of Anesthesiology\par Diplomat, American Board of Pain Medicine\par Diplomat, American Board of Pain Management\par \par \par

## 2023-05-19 ENCOUNTER — RX RENEWAL (OUTPATIENT)
Age: 58
End: 2023-05-19

## 2023-05-22 ENCOUNTER — NON-APPOINTMENT (OUTPATIENT)
Age: 58
End: 2023-05-22

## 2023-05-22 RX ORDER — PEN NEEDLE, DIABETIC 29 G X1/2"
32G X 4 MM NEEDLE, DISPOSABLE MISCELLANEOUS
Qty: 100 | Refills: 2 | Status: ACTIVE | COMMUNITY
Start: 2021-12-06 | End: 1900-01-01

## 2023-05-23 ENCOUNTER — APPOINTMENT (OUTPATIENT)
Dept: RHEUMATOLOGY | Facility: CLINIC | Age: 58
End: 2023-05-23
Payer: MEDICARE

## 2023-05-23 VITALS
OXYGEN SATURATION: 91 % | DIASTOLIC BLOOD PRESSURE: 70 MMHG | WEIGHT: 155 LBS | BODY MASS INDEX: 28.52 KG/M2 | HEART RATE: 77 BPM | SYSTOLIC BLOOD PRESSURE: 130 MMHG | HEIGHT: 62 IN

## 2023-05-23 PROCEDURE — 99214 OFFICE O/P EST MOD 30 MIN: CPT

## 2023-05-23 RX ORDER — METHOTREXATE 2.5 MG/1
2.5 TABLET ORAL WEEKLY
Qty: 32 | Refills: 2 | Status: DISCONTINUED | COMMUNITY
Start: 2023-02-23 | End: 2023-05-23

## 2023-05-23 RX ORDER — FOLIC ACID 1 MG/1
1 TABLET ORAL DAILY
Qty: 90 | Refills: 1 | Status: DISCONTINUED | COMMUNITY
Start: 2023-02-23 | End: 2023-05-23

## 2023-05-23 RX ORDER — METHOTREXATE 2.5 MG/1
2.5 TABLET ORAL
Qty: 32 | Refills: 1 | Status: DISCONTINUED | COMMUNITY
Start: 2023-04-24 | End: 2023-05-23

## 2023-05-23 NOTE — ASSESSMENT
[FreeTextEntry1] : Dacryoadenitis: Pt had biopsy 3/2022 but the results are not available in [employer] or [place]. s/p hospitalization in 2021 for L orbital cellulitis and dacryoadenitis which improved with steroids. With non-contributory rheum lab workup. 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 Initially with improvement of symptoms on prednisone, but now with worsening symptoms of irritation in the R eye, which initially started when pt ran out of methotrexate but did not improve with restarting methotrexate. Also with blurry vision which appears to have improved with tapering prednisone - could be a prednisone side effect? s/p discontinuation of prednisone with development of severe body aches, concerning for possible withdrawal symptoms.\par - Requested prior records from previous ophthalmologist Dr. Ramirez's office, pt now follows with Dr. Novak\par - Switch methotrexate to mycophenolate mofetil 500 mg BID, discussed adverse effects\par - f/u CBC and CMP\par - Continue prednisone 4 mg q day; pt has poorly controlled DM but has had difficulty weaning off prednisone due to withdrawal symptoms and also eye irritation symptoms\par \par f/u in 1 month as virtual visit

## 2023-05-23 NOTE — HISTORY OF PRESENT ILLNESS
[FreeTextEntry1] : Pt continues to experience significant pain and irritation in her eyes worse at night, with difficulty sleeping for the past few days due to the irritation, which transiently improves with Benadryl. Since her last appointment, pt also developed severe body pains after discontinuing prednisone. She went to the ED and was told this may be withdrawal symptoms from the prednisone, and after this she was restarted on 4 mg prednisone with improvement of the body aches but not complete resolution. Pt also saw Dr. Novak and was told that she has early cataracts.\par \par History of present illness: In July 2021, pt developed severe pain, redness and swelling in her L eye "overnight." She was admitted to [facility] and was diagnosed with L orbital cellulitis and L dacryoadenitis, was started on high-dose steroids with improvement of symptoms, which flared again when she was tapered down to 5 mg prednisone. Pt's prednisone was subsequently increased again, and she has been on steroids since July 2021. She has been following in the rheum clinic, most recently was seen in the clinic in March 2022, when she was continued on prednisone 20 mg pending biopsy results. Pt says she received the biopsy results but they are not available within the [facility] records.\par \par In January 2023, pt followed up with worsening irritation in her R eye and night time blurry vision after not taking methotrexate for several months (ran out). She was restarted on methotrexate and continued on prednisone taper.\par \par Physical exam: GEN: Pleasant, AAO woman sitting on exam table in NAD\par SKIN: no rashes\par ENT: + TTP in maxillary sinuses. Normal tympanic reflex, + TTP in L TMJ\par EYES: + ? Mild swelling noted in b/l eyelids, symmetric, no erythema\par MOUTH: Moist mucous membranes, no oral ulcers\par PULM: Clear to auscultation b/l\par CV: Regular rate and rhythm, no murmurs\par MSK: Back: + TTP diffusely in all paraspinal regions

## 2023-06-02 ENCOUNTER — LABORATORY RESULT (OUTPATIENT)
Age: 58
End: 2023-06-02

## 2023-06-02 ENCOUNTER — APPOINTMENT (OUTPATIENT)
Dept: PAIN MANAGEMENT | Facility: CLINIC | Age: 58
End: 2023-06-02
Payer: MEDICARE

## 2023-06-02 ENCOUNTER — RESULT CHARGE (OUTPATIENT)
Age: 58
End: 2023-06-02

## 2023-06-02 VITALS
BODY MASS INDEX: 28.52 KG/M2 | HEART RATE: 79 BPM | DIASTOLIC BLOOD PRESSURE: 81 MMHG | WEIGHT: 155 LBS | SYSTOLIC BLOOD PRESSURE: 128 MMHG | HEIGHT: 62 IN

## 2023-06-02 PROCEDURE — 99213 OFFICE O/P EST LOW 20 MIN: CPT

## 2023-06-02 PROCEDURE — 80305 DRUG TEST PRSMV DIR OPT OBS: CPT | Mod: QW

## 2023-06-02 RX ORDER — MYCOPHENOLATE MOFETIL 500 MG/1
500 TABLET ORAL
Refills: 0 | Status: ACTIVE | COMMUNITY

## 2023-06-02 NOTE — ASSESSMENT
[FreeTextEntry1] : 58 year old female with chronic cervical and lumbar pain. She is s/p caudal ELAINA on 5/15/23 with four days of excellent relief. She states that her pain unfortunately returned to baseline following the fourth day. We will hold off with epidurals and continue with medication management in the form of Percocet, Gabapentin and baclofen. She will f/u in 4 weeks for re evaluation.\par \par Urine drug screening collected today with rapid sample result consistent with given regimen. Sample to be sent for confirmatory testing.\par \par Overall there is at least a 30-50% reduction in pain with the prescribed analgesics. The patient denies any adverse side effects due to the medication (sleeping disturbance, constipation, sleepiness, hallucinations and/or urination problems).\par \par Check of the  registry reveals compliance in regards to narcotic medication management use.\par \par Landon Ambrose PA-C\par Darian Germain D.O\par

## 2023-06-02 NOTE — HISTORY OF PRESENT ILLNESS
[FreeTextEntry1] : TODAY:   I had the pleasure of seeing  today in follow up.  Her previous history and physical findings have been reviewed.\par \par She is under our care for chronic cervical and lumbar pain which she is receiving continuing active treatment for. \par She was last seen on 5/05/23. She is s/p caudal ELAINA on 5/15/23 with four days of excellent relief. She states that for those four days she was able to walk without her cane and she was not experiencing pain in her lower extremities. Unfortunately, after the four days her pain returned to baseline. She will repeat the injection during the colder months as she generally does well in the summer months. She continues with a regimen of Percocet, Gabapentin and baclofen which provides her with over 75% relief on most days and allows her to function and perform ADLs without side effects. \par \par UDS to be completed today

## 2023-06-02 NOTE — PHYSICAL EXAM
[de-identified] : Cervical spine: well-healed surgical scar. Palpation of the cervical spine is as follows: bilateral trapezial spasm, bilateral trapezial tenderness, bilateral paracervical spasm and bilateral paracervical tenderness. Range of motion of the cervical spine is as follows: ROM restricted \par \par Back, including spine: Palpation of the thoracic/lumbar spine is as follows: bilateral lumbar paraspinal tenderness. Range of motion of the thoracic and lumbar spine is as follows: pain at extremes of flexion and pain at extremes extension.

## 2023-06-06 LAB
PM 6 MAM: NEGATIVE NG/ML
PM 7-AMINO-CLONAZ: NEGATIVE NG/ML
PM ALPHA-HYDROXY-ALPRAZOLAM: NEGATIVE NG/ML
PM ALPHA-HYDROXY-MIDAZOLAM: NEGATIVE NG/ML
PM ALPRAZOLAM: NEGATIVE NG/ML
PM AMOBARBITAL: NEGATIVE NG/ML
PM AMPHETAMINE INTERP: NEGATIVE
PM AMPHETAMINE: NEGATIVE NG/ML
PM BARBURATES INTERP: NEGATIVE
PM BEG: NEGATIVE NG/ML
PM BENZODIAZEPINES INTERP: POSITIVE
PM BUPRENORPHINE INTERP: NEGATIVE
PM BUPRENORPHINE: NEGATIVE NG/ML
PM BUTALBITAL: NEGATIVE NG/ML
PM CLONAZEPAM: NEGATIVE NG/ML
PM COCAINE INTERP: NEGATIVE
PM COCAINE: NEGATIVE NG/ML
PM CODIENE: NEGATIVE NG/ML
PM COTININE: 927 NG/ML
PM DIAZEPAM: NEGATIVE NG/ML
PM DIHYROCODEINE: NEGATIVE NG/ML
PM EDDP: NEGATIVE NG/ML
PM FENTANYL INTERP: NEGATIVE NG/ML
PM FENTANYL: NEGATIVE NG/ML
PM FLUNITRAZEPAM: NEGATIVE NG/ML
PM FLURAZEPAM: NEGATIVE NG/ML
PM HYDROCODONE: NEGATIVE NG/ML
PM HYDROMORPHONE: NEGATIVE NG/ML
PM LORAZEPAM: 340 NG/ML
PM MARIJUANA (DELTA-9-THC): NEGATIVE NG/ML
PM MARIJUANA INTERP: NEGATIVE
PM MDA: NEGATIVE NG/ML
PM MDEA: NEGATIVE NG/ML
PM MDMA: NEGATIVE NG/ML
PM MEPERIDINE: NEGATIVE NG/ML
PM METHADONE INTERP: NEGATIVE
PM METHADONE: NEGATIVE NG/ML
PM METHAMPHETAMINE: NEGATIVE NG/ML
PM MIDAZOLAM: NEGATIVE NG/ML
PM MORPHINE: NEGATIVE NG/ML
PM NALOXONE: NEGATIVE NG/ML
PM NALTREXONE: NEGATIVE NG/ML
PM NICOTINE INTERP: POSITIVE
PM NORBUPRENORPHINE: NEGATIVE NG/ML
PM NORDIAZEPAM: NEGATIVE NG/ML
PM NORMEPERIDINE: NEGATIVE NG/ML
PM NOROXYCODONE: >1000 NG/ML
PM OPIOID INTERP: NEGATIVE
PM OXAZEPAM: NEGATIVE NG/ML
PM OXXYCODONE INTERP: POSITIVE
PM OXYCODONE: 289 NG/ML
PM OXYMORPHONE: 44 NG/ML
PM PCP: NEGATIVE NG/ML
PM PHENCYCLIDINE INTERP: NEGATIVE
PM PHENOBARBITAL: NEGATIVE NG/ML
PM PPX: NEGATIVE NG/ML
PM PROPOXYPHENE INTERP: NEGATIVE
PM SECOBARBITAL: NEGATIVE NG/ML
PM SUFENTANIL: NEGATIVE NG/ML
PM TAPENTADOL: NEGATIVE NG/ML
PM TEMAZEPAM: NEGATIVE NG/ML
PM TRAMADOL INTERP: NEGATIVE
PM TRAMADOL: NEGATIVE NG/ML

## 2023-06-07 ENCOUNTER — RX RENEWAL (OUTPATIENT)
Age: 58
End: 2023-06-07

## 2023-06-19 ENCOUNTER — RX RENEWAL (OUTPATIENT)
Age: 58
End: 2023-06-19

## 2023-06-19 NOTE — SWALLOW BEDSIDE ASSESSMENT ADULT - ORAL PHASE
normal, alert, pleasant, well nourished, in no acute distress, well developed, well nourished, ambulating without difficulty
Within functional limits

## 2023-06-20 ENCOUNTER — APPOINTMENT (OUTPATIENT)
Dept: ORTHOPEDIC SURGERY | Facility: CLINIC | Age: 58
End: 2023-06-20
Payer: OTHER MISCELLANEOUS

## 2023-06-20 ENCOUNTER — APPOINTMENT (OUTPATIENT)
Dept: RHEUMATOLOGY | Facility: CLINIC | Age: 58
End: 2023-06-20
Payer: MEDICARE

## 2023-06-20 DIAGNOSIS — M25.541 PAIN IN JOINTS OF RIGHT HAND: ICD-10-CM

## 2023-06-20 PROCEDURE — 99214 OFFICE O/P EST MOD 30 MIN: CPT | Mod: 95

## 2023-06-20 PROCEDURE — 99213 OFFICE O/P EST LOW 20 MIN: CPT

## 2023-06-20 RX ORDER — MYCOPHENOLATE MOFETIL 500 MG/1
500 TABLET ORAL
Qty: 90 | Refills: 1 | Status: ACTIVE | COMMUNITY
Start: 2023-06-20 | End: 1900-01-01

## 2023-06-20 NOTE — IMAGING
[de-identified] : Right upper extremity: No gross deformities, full range of motion of fingers, nontender palpation anywhere, neurovascularly intact

## 2023-06-20 NOTE — ASSESSMENT
[FreeTextEntry1] : The patient is doing well.  She has had a control of her discomfort in the hip.  She will follow-up in 2 months.

## 2023-06-20 NOTE — HISTORY OF PRESENT ILLNESS
[FreeTextEntry1] : Pt has been feeling better; she feels that her eyes are not as burning and itching since she started the mycophenolate, although she does still experience some itching and blurry vision at night. + Mild nausea with taking mycophenolate. She is down to taking 2 mg prednisone daily, and her recent A1c was improved to 7.9. + Recent oral thrush and URI, now feeling better after taking clotrimazole and benzonatate.\par \par History of present illness: In July 2021, pt developed severe pain, redness and swelling in her L eye "overnight." She was admitted to [facility] and was diagnosed with L orbital cellulitis and L dacryoadenitis, was started on high-dose steroids with improvement of symptoms, which flared again when she was tapered down to 5 mg prednisone. Pt's prednisone was subsequently increased again, and she has been on steroids since July 2021. She has been following in the rheum clinic, most recently was seen in the clinic in March 2022, when she was continued on prednisone 20 mg pending biopsy results. Pt says she received the biopsy results but they are not available within the [facility] records.\par \par In January 2023, pt followed up with worsening irritation in her R eye and night time blurry vision after not taking methotrexate for several months (ran out). She was restarted on methotrexate and continued on prednisone taper.

## 2023-06-20 NOTE — ASSESSMENT
[FreeTextEntry1] : Dacryoadenitis: Pt had biopsy 3/2022 but the results are not available in [employer] or [place]. s/p hospitalization in 2021 for L orbital cellulitis and dacryoadenitis which improved with steroids. With non-contributory rheum lab workup. 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 Initially with improvement of symptoms on prednisone, but now with worsening symptoms of irritation in the R eye, which initially started when pt ran out of methotrexate but did not improve with restarting methotrexate. Also with blurry vision which appears to have improved with tapering prednisone - could be a prednisone side effect? s/p discontinuation of prednisone with development of severe body aches, concerning for possible withdrawal symptoms.\par - Pt reportedly had normal CBC and CMP with her PMD Dr. Erickson on June 15th, will request results from those tests\par - Increase mycophenolate mofetil dose to 1000 mg q AM and 500 mg q PM\par - Continue prednisone 2 mg q day for now\par - Pt has f/u appt with Dr. Novak in 9/2023\par \par f/u in 1 month as virtual visit

## 2023-06-20 NOTE — HISTORY OF PRESENT ILLNESS
[de-identified] : The patient is doing well. She has been wearing her brace only when she feels pain. She feels pain when she is writing and holding a book for a long time.

## 2023-06-26 ENCOUNTER — APPOINTMENT (OUTPATIENT)
Dept: PAIN MANAGEMENT | Facility: CLINIC | Age: 58
End: 2023-06-26
Payer: MEDICARE

## 2023-06-26 VITALS
WEIGHT: 155 LBS | SYSTOLIC BLOOD PRESSURE: 126 MMHG | HEIGHT: 62 IN | DIASTOLIC BLOOD PRESSURE: 78 MMHG | BODY MASS INDEX: 28.52 KG/M2 | HEART RATE: 87 BPM

## 2023-06-26 DIAGNOSIS — M48.9 SPONDYLOPATHY, UNSPECIFIED: ICD-10-CM

## 2023-06-26 PROCEDURE — 99214 OFFICE O/P EST MOD 30 MIN: CPT

## 2023-06-26 RX ORDER — BACLOFEN 20 MG/1
20 TABLET ORAL
Qty: 90 | Refills: 3 | Status: DISCONTINUED | COMMUNITY
Start: 2021-12-06 | End: 2023-06-26

## 2023-06-26 NOTE — HISTORY OF PRESENT ILLNESS
[FreeTextEntry1] : TODAY:   I had the pleasure of seeing  today in follow up.  Her previous history and physical findings have been reviewed.\par \par She is under our care for chronic cervical and lumbar pain which she is receiving continuing active treatment for. \par She was last seen on 6/02/23. She is most recently s/p caudal ELAINA on 5/15/23 with four days of relief. Her pain has returned the baseline. Overall, her pains have been unchanged in regards to intensity, severity, and duration. She notes severe morning low back stiffness and spasms. She continues to note pain radiating from her cervical and lumbar spine into her upper and lower extremities. The pains are constant and shooting in nature and are associated with numbness and tingling. She is medically managed with a regimen of Percocet, Gabapentin and baclofen which provides her with over 75% relief on most days and allows her to function and perform ADLs without side effects. She no longer feels as though Baclofen is providing relief and wishes to discontinue this medication. We discussed prescribing Tizanidine 4 mg TID as an alternative and she is agreeable. \par \par UDS 6/02/23- consistent

## 2023-06-26 NOTE — ASSESSMENT
[FreeTextEntry1] : 58 year old female with chronic cervical and lumbar pain. She is s/p caudal ELAINA on 5/15/23 with no sustained relief. She wishes to resume injection therapy in the colder months. We will continue to prescribe Percocet and Gabapentin unchanged. We will discontinue baclofen and start Tizanidine 4 mg TID PRN for stiffness and spasms. She will f/u in 4 weeks for re evaluation.\par \par Overall there is at least a 30-50% reduction in pain with the prescribed analgesics. The patient denies any adverse side effects due to the medication (sleeping disturbance, constipation, sleepiness, hallucinations and/or urination problems).\par \par Check of the  registry reveals compliance in regards to narcotic medication management use.\par \par Landon Ambrose PA-C\par Darina Germain D.O\par

## 2023-06-26 NOTE — PHYSICAL EXAM
[de-identified] : Cervical spine: well-healed surgical scar. Palpation of the cervical spine is as follows: bilateral trapezial spasm, bilateral trapezial tenderness, bilateral paracervical spasm and bilateral paracervical tenderness. Range of motion of the cervical spine is as follows: ROM restricted \par \par Back, including spine: Palpation of the thoracic/lumbar spine is as follows: bilateral lumbar paraspinal tenderness. Range of motion of the thoracic and lumbar spine is as follows: pain at extremes of flexion and pain at extremes extension.

## 2023-07-10 ENCOUNTER — EMERGENCY (EMERGENCY)
Facility: HOSPITAL | Age: 58
LOS: 0 days | Discharge: HOME | End: 2023-07-11
Attending: EMERGENCY MEDICINE
Payer: MEDICARE

## 2023-07-10 VITALS
RESPIRATION RATE: 18 BRPM | TEMPERATURE: 98 F | HEART RATE: 92 BPM | OXYGEN SATURATION: 98 % | DIASTOLIC BLOOD PRESSURE: 65 MMHG | SYSTOLIC BLOOD PRESSURE: 130 MMHG

## 2023-07-10 DIAGNOSIS — G89.29 OTHER CHRONIC PAIN: ICD-10-CM

## 2023-07-10 DIAGNOSIS — J45.909 UNSPECIFIED ASTHMA, UNCOMPLICATED: ICD-10-CM

## 2023-07-10 DIAGNOSIS — Z99.89 DEPENDENCE ON OTHER ENABLING MACHINES AND DEVICES: ICD-10-CM

## 2023-07-10 DIAGNOSIS — Z98.890 OTHER SPECIFIED POSTPROCEDURAL STATES: Chronic | ICD-10-CM

## 2023-07-10 DIAGNOSIS — R42 DIZZINESS AND GIDDINESS: ICD-10-CM

## 2023-07-10 DIAGNOSIS — Z88.6 ALLERGY STATUS TO ANALGESIC AGENT: ICD-10-CM

## 2023-07-10 DIAGNOSIS — R55 SYNCOPE AND COLLAPSE: ICD-10-CM

## 2023-07-10 DIAGNOSIS — Z98.1 ARTHRODESIS STATUS: ICD-10-CM

## 2023-07-10 DIAGNOSIS — E78.5 HYPERLIPIDEMIA, UNSPECIFIED: ICD-10-CM

## 2023-07-10 DIAGNOSIS — S09.90XA UNSPECIFIED INJURY OF HEAD, INITIAL ENCOUNTER: ICD-10-CM

## 2023-07-10 DIAGNOSIS — N39.0 URINARY TRACT INFECTION, SITE NOT SPECIFIED: ICD-10-CM

## 2023-07-10 DIAGNOSIS — S00.03XA CONTUSION OF SCALP, INITIAL ENCOUNTER: ICD-10-CM
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
## 2023-07-10 DIAGNOSIS — G47.33 OBSTRUCTIVE SLEEP APNEA (ADULT) (PEDIATRIC): ICD-10-CM
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
## 2023-07-10 DIAGNOSIS — M54.9 DORSALGIA, UNSPECIFIED: ICD-10-CM

## 2023-07-10 DIAGNOSIS — S93.601A UNSPECIFIED SPRAIN OF RIGHT FOOT, INITIAL ENCOUNTER: ICD-10-CM

## 2023-07-10 DIAGNOSIS — I10 ESSENTIAL (PRIMARY) HYPERTENSION: ICD-10-CM

## 2023-07-10 DIAGNOSIS — E11.9 TYPE 2 DIABETES MELLITUS WITHOUT COMPLICATIONS: ICD-10-CM

## 2023-07-10 DIAGNOSIS — Z79.02 LONG TERM (CURRENT) USE OF ANTITHROMBOTICS/ANTIPLATELETS: ICD-10-CM

## 2023-07-10 DIAGNOSIS — M54.2 CERVICALGIA: ICD-10-CM

## 2023-07-10 DIAGNOSIS — Z95.820 PERIPHERAL VASCULAR ANGIOPLASTY STATUS WITH IMPLANTS AND GRAFTS: ICD-10-CM

## 2023-07-10 PROCEDURE — 72125 CT NECK SPINE W/O DYE: CPT | Mod: MA

## 2023-07-10 PROCEDURE — 93005 ELECTROCARDIOGRAM TRACING: CPT

## 2023-07-10 PROCEDURE — 71045 X-RAY EXAM CHEST 1 VIEW: CPT

## 2023-07-10 PROCEDURE — 73630 X-RAY EXAM OF FOOT: CPT | Mod: RT

## 2023-07-10 PROCEDURE — 73502 X-RAY EXAM HIP UNI 2-3 VIEWS: CPT | Mod: RT

## 2023-07-10 PROCEDURE — 99285 EMERGENCY DEPT VISIT HI MDM: CPT

## 2023-07-10 PROCEDURE — 70486 CT MAXILLOFACIAL W/O DYE: CPT | Mod: MA

## 2023-07-10 PROCEDURE — 73562 X-RAY EXAM OF KNEE 3: CPT | Mod: RT

## 2023-07-10 PROCEDURE — 73552 X-RAY EXAM OF FEMUR 2/>: CPT | Mod: RT

## 2023-07-10 PROCEDURE — 70450 CT HEAD/BRAIN W/O DYE: CPT | Mod: MA

## 2023-07-10 PROCEDURE — 85025 COMPLETE CBC W/AUTO DIFF WBC: CPT

## 2023-07-10 PROCEDURE — 73610 X-RAY EXAM OF ANKLE: CPT | Mod: RT

## 2023-07-10 PROCEDURE — 36415 COLL VENOUS BLD VENIPUNCTURE: CPT

## 2023-07-10 PROCEDURE — 93010 ELECTROCARDIOGRAM REPORT: CPT

## 2023-07-10 PROCEDURE — 93306 TTE W/DOPPLER COMPLETE: CPT

## 2023-07-10 PROCEDURE — 82962 GLUCOSE BLOOD TEST: CPT

## 2023-07-10 PROCEDURE — 84484 ASSAY OF TROPONIN QUANT: CPT

## 2023-07-10 PROCEDURE — 73590 X-RAY EXAM OF LOWER LEG: CPT | Mod: RT

## 2023-07-10 PROCEDURE — G0378: CPT

## 2023-07-10 PROCEDURE — 80053 COMPREHEN METABOLIC PANEL: CPT

## 2023-07-10 NOTE — ED ADULT TRIAGE NOTE - GLASGOW COMA SCALE: EYE OPENING, MLM
Additional Notes: Patient has not received flu vaccine due to personal reasons. Quality 110: Preventive Care And Screening: Influenza Immunization: Influenza Immunization not Administered for Documented Reasons. Detail Level: Detailed (E4) spontaneous

## 2023-07-10 NOTE — ED ADULT TRIAGE NOTE - CHIEF COMPLAINT QUOTE
Pt BIBA from home c/o syncope with +LOC, states she hit her head on the sink when she fell. c/o right forehead pain/bump.   Pt on effient. Pt BIBA from home c/o syncope with +LOC, states she hit her head on the sink when she fell. c/o right forehead pain/bump  Pt on Effient Pt BIBA from home c/o syncope with +LOC, states she hit her head on the sink when she fell. c/o right forehead pain/bump  Pt on Effient - Dr Klerman notified, no trauma alert cleared by crit

## 2023-07-11 VITALS
TEMPERATURE: 98 F | HEART RATE: 88 BPM | SYSTOLIC BLOOD PRESSURE: 123 MMHG | OXYGEN SATURATION: 98 % | RESPIRATION RATE: 18 BRPM | DIASTOLIC BLOOD PRESSURE: 67 MMHG

## 2023-07-11 LAB
ALBUMIN SERPL ELPH-MCNC: 4.4 G/DL — SIGNIFICANT CHANGE UP (ref 3.5–5.2)
ALP SERPL-CCNC: 78 U/L — SIGNIFICANT CHANGE UP (ref 30–115)
ALT FLD-CCNC: 19 U/L — SIGNIFICANT CHANGE UP (ref 0–41)
ANION GAP SERPL CALC-SCNC: 16 MMOL/L — HIGH (ref 7–14)
AST SERPL-CCNC: 18 U/L — SIGNIFICANT CHANGE UP (ref 0–41)
BASOPHILS # BLD AUTO: 0.03 K/UL — SIGNIFICANT CHANGE UP (ref 0–0.2)
BASOPHILS NFR BLD AUTO: 0.4 % — SIGNIFICANT CHANGE UP (ref 0–1)
BILIRUB SERPL-MCNC: 0.3 MG/DL — SIGNIFICANT CHANGE UP (ref 0.2–1.2)
BUN SERPL-MCNC: 8 MG/DL — LOW (ref 10–20)
CALCIUM SERPL-MCNC: 9.1 MG/DL — SIGNIFICANT CHANGE UP (ref 8.4–10.5)
CHLORIDE SERPL-SCNC: 104 MMOL/L — SIGNIFICANT CHANGE UP (ref 98–110)
CO2 SERPL-SCNC: 21 MMOL/L — SIGNIFICANT CHANGE UP (ref 17–32)
CREAT SERPL-MCNC: 0.7 MG/DL — SIGNIFICANT CHANGE UP (ref 0.7–1.5)
EGFR: 100 ML/MIN/1.73M2 — SIGNIFICANT CHANGE UP
EOSINOPHIL # BLD AUTO: 0.13 K/UL — SIGNIFICANT CHANGE UP (ref 0–0.7)
EOSINOPHIL NFR BLD AUTO: 1.7 % — SIGNIFICANT CHANGE UP (ref 0–8)
GLUCOSE BLDC GLUCOMTR-MCNC: 94 MG/DL — SIGNIFICANT CHANGE UP (ref 70–99)
GLUCOSE SERPL-MCNC: 91 MG/DL — SIGNIFICANT CHANGE UP (ref 70–99)
HCT VFR BLD CALC: 40.5 % — SIGNIFICANT CHANGE UP (ref 37–47)
HGB BLD-MCNC: 13.2 G/DL — SIGNIFICANT CHANGE UP (ref 12–16)
IMM GRANULOCYTES NFR BLD AUTO: 0.3 % — SIGNIFICANT CHANGE UP (ref 0.1–0.3)
LYMPHOCYTES # BLD AUTO: 1.97 K/UL — SIGNIFICANT CHANGE UP (ref 1.2–3.4)
LYMPHOCYTES # BLD AUTO: 26 % — SIGNIFICANT CHANGE UP (ref 20.5–51.1)
MCHC RBC-ENTMCNC: 31.6 PG — HIGH (ref 27–31)
MCHC RBC-ENTMCNC: 32.6 G/DL — SIGNIFICANT CHANGE UP (ref 32–37)
MCV RBC AUTO: 96.9 FL — SIGNIFICANT CHANGE UP (ref 81–99)
MONOCYTES # BLD AUTO: 0.55 K/UL — SIGNIFICANT CHANGE UP (ref 0.1–0.6)
MONOCYTES NFR BLD AUTO: 7.2 % — SIGNIFICANT CHANGE UP (ref 1.7–9.3)
NEUTROPHILS # BLD AUTO: 4.89 K/UL — SIGNIFICANT CHANGE UP (ref 1.4–6.5)
NEUTROPHILS NFR BLD AUTO: 64.4 % — SIGNIFICANT CHANGE UP (ref 42.2–75.2)
NRBC # BLD: 0 /100 WBCS — SIGNIFICANT CHANGE UP (ref 0–0)
PLATELET # BLD AUTO: 250 K/UL — SIGNIFICANT CHANGE UP (ref 130–400)
PMV BLD: 9 FL — SIGNIFICANT CHANGE UP (ref 7.4–10.4)
POTASSIUM SERPL-MCNC: 3.9 MMOL/L — SIGNIFICANT CHANGE UP (ref 3.5–5)
POTASSIUM SERPL-SCNC: 3.9 MMOL/L — SIGNIFICANT CHANGE UP (ref 3.5–5)
PROT SERPL-MCNC: 6.7 G/DL — SIGNIFICANT CHANGE UP (ref 6–8)
RBC # BLD: 4.18 M/UL — LOW (ref 4.2–5.4)
RBC # FLD: 13.9 % — SIGNIFICANT CHANGE UP (ref 11.5–14.5)
SODIUM SERPL-SCNC: 141 MMOL/L — SIGNIFICANT CHANGE UP (ref 135–146)
TROPONIN T SERPL-MCNC: <0.01 NG/ML — SIGNIFICANT CHANGE UP
TROPONIN T SERPL-MCNC: <0.01 NG/ML — SIGNIFICANT CHANGE UP
WBC # BLD: 7.59 K/UL — SIGNIFICANT CHANGE UP (ref 4.8–10.8)
WBC # FLD AUTO: 7.59 K/UL — SIGNIFICANT CHANGE UP (ref 4.8–10.8)

## 2023-07-11 PROCEDURE — 73630 X-RAY EXAM OF FOOT: CPT | Mod: 26,RT

## 2023-07-11 PROCEDURE — 73590 X-RAY EXAM OF LOWER LEG: CPT | Mod: 26,RT

## 2023-07-11 PROCEDURE — 99223 1ST HOSP IP/OBS HIGH 75: CPT

## 2023-07-11 PROCEDURE — 73552 X-RAY EXAM OF FEMUR 2/>: CPT | Mod: 26,RT

## 2023-07-11 PROCEDURE — 72125 CT NECK SPINE W/O DYE: CPT | Mod: 26,MA

## 2023-07-11 PROCEDURE — 73610 X-RAY EXAM OF ANKLE: CPT | Mod: 26,RT

## 2023-07-11 PROCEDURE — 70450 CT HEAD/BRAIN W/O DYE: CPT | Mod: 26,MA

## 2023-07-11 PROCEDURE — 93010 ELECTROCARDIOGRAM REPORT: CPT

## 2023-07-11 PROCEDURE — 73502 X-RAY EXAM HIP UNI 2-3 VIEWS: CPT | Mod: 26,RT

## 2023-07-11 PROCEDURE — 71045 X-RAY EXAM CHEST 1 VIEW: CPT | Mod: 26

## 2023-07-11 PROCEDURE — 93306 TTE W/DOPPLER COMPLETE: CPT | Mod: 26

## 2023-07-11 PROCEDURE — 70486 CT MAXILLOFACIAL W/O DYE: CPT | Mod: 26,MA

## 2023-07-11 PROCEDURE — 73562 X-RAY EXAM OF KNEE 3: CPT | Mod: 26,RT

## 2023-07-11 RX ORDER — PANTOPRAZOLE SODIUM 20 MG/1
40 TABLET, DELAYED RELEASE ORAL
Refills: 0 | Status: DISCONTINUED | OUTPATIENT
Start: 2023-07-11 | End: 2023-07-11

## 2023-07-11 RX ORDER — CARBAMAZEPINE 200 MG
200 TABLET ORAL
Refills: 0 | Status: DISCONTINUED | OUTPATIENT
Start: 2023-07-11 | End: 2023-07-11

## 2023-07-11 RX ORDER — LISINOPRIL 2.5 MG/1
40 TABLET ORAL DAILY
Refills: 0 | Status: DISCONTINUED | OUTPATIENT
Start: 2023-07-11 | End: 2023-07-11

## 2023-07-11 RX ORDER — AMLODIPINE BESYLATE 2.5 MG/1
10 TABLET ORAL DAILY
Refills: 0 | Status: DISCONTINUED | OUTPATIENT
Start: 2023-07-11 | End: 2023-07-11

## 2023-07-11 RX ORDER — ATORVASTATIN CALCIUM 80 MG/1
80 TABLET, FILM COATED ORAL DAILY
Refills: 0 | Status: DISCONTINUED | OUTPATIENT
Start: 2023-07-11 | End: 2023-07-11

## 2023-07-11 RX ORDER — OXYCODONE HYDROCHLORIDE 5 MG/1
10 TABLET ORAL ONCE
Refills: 0 | Status: DISCONTINUED | OUTPATIENT
Start: 2023-07-11 | End: 2023-07-11

## 2023-07-11 RX ORDER — SUCRALFATE 1 G
1 TABLET ORAL ONCE
Refills: 0 | Status: COMPLETED | OUTPATIENT
Start: 2023-07-11 | End: 2023-07-11

## 2023-07-11 RX ORDER — METOPROLOL TARTRATE 50 MG
50 TABLET ORAL DAILY
Refills: 0 | Status: DISCONTINUED | OUTPATIENT
Start: 2023-07-11 | End: 2023-07-11

## 2023-07-11 RX ORDER — PRASUGREL 5 MG/1
10 TABLET, FILM COATED ORAL DAILY
Refills: 0 | Status: DISCONTINUED | OUTPATIENT
Start: 2023-07-11 | End: 2023-07-11

## 2023-07-11 RX ORDER — DAPAGLIFLOZIN 10 MG/1
10 TABLET, FILM COATED ORAL ONCE
Refills: 0 | Status: COMPLETED | OUTPATIENT
Start: 2023-07-11 | End: 2023-07-11

## 2023-07-11 RX ORDER — FUROSEMIDE 40 MG
20 TABLET ORAL DAILY
Refills: 0 | Status: DISCONTINUED | OUTPATIENT
Start: 2023-07-11 | End: 2023-07-11

## 2023-07-11 RX ADMIN — PRASUGREL 10 MILLIGRAM(S): 5 TABLET, FILM COATED ORAL at 11:46

## 2023-07-11 RX ADMIN — DAPAGLIFLOZIN 10 MILLIGRAM(S): 10 TABLET, FILM COATED ORAL at 06:46

## 2023-07-11 RX ADMIN — LISINOPRIL 40 MILLIGRAM(S): 2.5 TABLET ORAL at 06:47

## 2023-07-11 RX ADMIN — OXYCODONE HYDROCHLORIDE 10 MILLIGRAM(S): 5 TABLET ORAL at 15:12

## 2023-07-11 RX ADMIN — Medication 1 MILLIGRAM(S): at 06:47

## 2023-07-11 RX ADMIN — OXYCODONE HYDROCHLORIDE 10 MILLIGRAM(S): 5 TABLET ORAL at 03:39

## 2023-07-11 RX ADMIN — PANTOPRAZOLE SODIUM 40 MILLIGRAM(S): 20 TABLET, DELAYED RELEASE ORAL at 06:50

## 2023-07-11 RX ADMIN — Medication 50 MILLIGRAM(S): at 06:47

## 2023-07-11 RX ADMIN — AMLODIPINE BESYLATE 10 MILLIGRAM(S): 2.5 TABLET ORAL at 06:47

## 2023-07-11 RX ADMIN — Medication 20 MILLIGRAM(S): at 06:47

## 2023-07-11 RX ADMIN — ATORVASTATIN CALCIUM 80 MILLIGRAM(S): 80 TABLET, FILM COATED ORAL at 11:46

## 2023-07-11 RX ADMIN — Medication 1 GRAM(S): at 11:43

## 2023-07-11 RX ADMIN — OXYCODONE HYDROCHLORIDE 10 MILLIGRAM(S): 5 TABLET ORAL at 03:09

## 2023-07-11 NOTE — ED CDU PROVIDER DISPOSITION NOTE - NSFOLLOWUPINSTRUCTIONS_ED_ALL_ED_FT
Syncope    Syncope is when you temporarily lose consciousness, also called fainting or passing out. It is caused by a sudden decrease in blood flow to the brain. Even though most causes of syncope are not dangerous, syncope can be a sign of a serious medical problem. Signs that you may be about to faint include feeling dizzy, lightheaded, nauseas, visual changes, cold/clammy skin. Do not drive, use machinery, or play sports until your health care provider says it is okay.    SEEK IMMEDIATE MEDICAL CARE IF YOU HAVE THE FOLLOWING SYMPTOMS: severe headache, pain in your chest/abdomen/back, bleeding from your mouth or rectum, palpitations, shortness of breath, pain with breathing, seizure, confusion, trouble walking.    Contusion    A contusion is a deep bruise. Contusions are the result of a blunt injury to tissues and muscle fibers under the skin. The skin overlying the contusion may turn blue, purple, or yellow. Symptoms also include pain and swelling in the injured area.    SEEK IMMEDIATE MEDICAL CARE IF YOU HAVE THE FOLLOWING SYMPTOMS: severe pain, numbness, tingling, pain, weakness, or skin color/temperature change in any part of your body distal to the fracture.

## 2023-07-11 NOTE — ED CDU PROVIDER DISPOSITION NOTE - PROVIDER TOKENS
FREE:[LAST:[Your primary care provider],PHONE:[(   )    -],FAX:[(   )    -],FOLLOWUP:[1-3 Days]],PROVIDER:[TOKEN:[[id number]],FOLLOWUP:[1-3 Days]]

## 2023-07-11 NOTE — ED PROVIDER NOTE - PHYSICAL EXAMINATION
MEDICINE CONSTITUTIONAL: Well-appearing;  in no apparent distress.   EYES: PERRL; EOM intact.   CARDIOVASCULAR: Normal S1, S2; no murmurs, rubs, or gallops.   RESPIRATORY: Normal chest excursion with respiration; breath sounds clear and equal bilaterally; no wheezes, rhonchi, or rales.  GI/: Normal bowel sounds; non-distended; non-tender; no palpable organomegaly.   MS: No calf swelling and tenderness. ttp b/l malleoli of ankle. right foot nv intact. no midline tenderness to neck/back,   SKIN: mild erythema to right side of forehead with mild ttp.   NEURO/PSYCH: A & O x 4; grossly unremarkable. Speaking coherently and moving all extremities.

## 2023-07-11 NOTE — ED CDU PROVIDER INITIAL DAY NOTE - PHYSICAL EXAMINATION
CONSTITUTIONAL: Well-appearing;  in no apparent distress.   EYES: PERRL; EOM intact.   CARDIOVASCULAR: Normal S1, S2; no murmurs, rubs, or gallops.   RESPIRATORY: Normal chest excursion with respiration; breath sounds clear and equal bilaterally; no wheezes, rhonchi, or rales.  GI/: Normal bowel sounds; non-distended; non-tender; no palpable organomegaly.   MS: No calf swelling and tenderness. ttp b/l malleoli of ankle. right foot nv intact. no midline tenderness to neck/back,   SKIN: mild erythema to right side of forehead with mild ttp.   NEURO/PSYCH: A & O x 4; grossly unremarkable. Speaking coherently and moving all extremities.

## 2023-07-11 NOTE — ED ADULT NURSE NOTE - OBJECTIVE STATEMENT
Pt BIBA from home c/o syncope with +LOC, states she hit her head on the sink when she fell. c/o right forehead pain/bump

## 2023-07-11 NOTE — ED CDU PROVIDER DISPOSITION NOTE - ADDITIONAL NOTES AND INSTRUCTIONS:
Patient was placed in observation unit for further evaluation consideration of possible syncopal event.  Patient had no cardiac events on event cardiac monitoring.  pt with workup including labs wnl, including 2 sets of negative cardiac enzymes, 2 ekg with no ischemic changes, normal cxray, echo wnl. trauma imaging wnl. pt complained of persistent right lower ext pain. Ace wrap applied given crutches which pt trialed in ed. stable for dc.

## 2023-07-11 NOTE — ED PROVIDER NOTE - CARE PLAN
Principal Discharge DX:	Syncope  Secondary Diagnosis:	Head injury  Secondary Diagnosis:	Sprain of right foot   1

## 2023-07-11 NOTE — ED ADULT NURSE NOTE - AS PAIN REST
A full discussion of the nature of anticoagulants has been carried out. A benefit risk analysis has been presented to the patient, so that they understand the justification for choosing anticoagulation at this time. The need for frequent and regular monitoring, precise dosage adjustment and compliance is stressed. Side effects of potential bleeding are discussed. The patient should avoid any OTC items containing aspirin or ibuprofen, and should avoid great swings in general diet. Avoid alcohol consumption. Call if any signs of abnormal bleeding. Next PT/INR test in 2 weeks; telephone follow up thereafter. 0 (no pain/absence of nonverbal indicators of pain)

## 2023-07-11 NOTE — ED PROVIDER NOTE - OBJECTIVE STATEMENT
58 years old female history of hypertension, diabetes, PAD status post stents to both legs, chronic back pain and neck pain, uveitis present complaint of close head injury and syncope.  As per patient, she just got off the toilet and then started feeling lightheaded and dizzy.  She woke up on the floor and family called EMS to bring patient to ED for evaluation.  Patient also complaint right-sided facial pain and right foot pain after the fall.  Patient otherwise denies headache and chest pain prior to syncopal episode.  Denies chest pain and abdominal pain in ED now.  Further denies nausea, vomiting, diarrhea, bloody stool and hematuria.

## 2023-07-11 NOTE — ED ADULT NURSE NOTE - CHIEF COMPLAINT QUOTE
Pt BIBA from home c/o syncope with +LOC, states she hit her head on the sink when she fell. c/o right forehead pain/bump  Pt on Effient - Dr Klerman notified, no trauma alert cleared by crit

## 2023-07-11 NOTE — ED PROVIDER NOTE - WR INTERPRETED BY 3
"  1/4/2023      RE: Nadira Perez  [address]  [facility] [id number]     Dear Colleague,    Thank you for referring your patient, Nadira Perez, to the [facility]. Please see a copy of my visit note below.      Assessment & Plan     Pathological fracture of vertebra due to age-related osteoporosis with delayed healing, subsequent encounter  Evenity #9  - romosozumab-aqqg (EVENITY) injection 210 mg    Osteoporosis, unspecified osteoporosis type, unspecified pathological fracture presence    - romosozumab-aqqg (EVENITY) injection 210 mg    Prescription drug management      BMI:   Estimated body mass index is 28.82 kg/m  as calculated from the following:    Height as of this encounter: 1.702 m (5' 7\").    Weight as of this encounter: 83.5 kg (184 lb).   Weight management plan: Discussed healthy diet and exercise guidelines    See Patient Instructions    Return in about 4 weeks (around 2/1/2023), or if symptoms worsen or fail to improve.    Cortney Clark MD  [facility]    Tom Guevara is a 70 year old accompanied by her self, presenting for the following health issues:  RECHECK (Evenity 9/12)  Evenity #9    HPI     Pt is a 69 yo white female here for Evenity injections.  Weather poor outside and difficulties getting here.  Pt was a week off and determined to make injections appt today.  Pt has follow-up for her foot in a week or so.  Better in the CAM boot and less painful.    Review of Systems   Constitutional, HEENT, cardiovascular, pulmonary, gi and gu systems are negative, except as otherwise noted.     Objective    Ht 1.702 m (5' 7\")   Wt 83.5 kg (184 lb)   BMI 28.82 kg/m    Body mass index is 28.82 kg/m .  Physical Exam   NAD   nonlabored breathing    Procedure: risks and benefits discussed and patient was consented.  Chloroprep used to clean the area of the skin and ethyl chloride to " anesthetize the area.  1 injected each lateral thigh.  Band aids in place.  Well tolerated.      Again, thank you for allowing me to participate in the care of your patient.      Sincerely,    Cortney Clark MD     [facility]

## 2023-07-11 NOTE — ED CDU PROVIDER DISPOSITION NOTE - PATIENT PORTAL LINK FT
You can access the FollowMyHealth Patient Portal offered by [facility] by registering at the following website: [email or web address]. By joining [employer]’s FollowMyHealth portal, you will also be able to view your health information using other applications (apps) compatible with our system.

## 2023-07-11 NOTE — ED PROVIDER NOTE - CLINICAL SUMMARY MEDICAL DECISION MAKING FREE TEXT BOX
58-year-old female with past medical history and spinal stenosis, chronic back pain, and DM, HTN, sciatica, neuropathy, asthma, uveitis, cervical fusion, bilateral lower extremity stents, who presents to the ED for syncope while in the bathroom resulting in head injury.  Patient states that she was recently diagnosed with a UTI (on an unknown named antibiotic), was walking to the bathroom feeling somewhat dizzy, finished urinating and as she was attempting to get up off the toilet she passed out.  Pt states the next thing she remembers was "being on the ground looking up at the ceiling".  Patient states she knows she hit her face and head against the sink as all the material she left on the sink had fallen into it.  Patient denies any use of anticoagulants but does use an antiplatelet agent called prasugrel.  Patient states she is having pain to the right foot since his fall/syncope events, and pain to the right side of her face and head.  Patient states she has never syncopized in the past before.  She denies any fever/chills/vomiting/diarrhea/chest pain/shortness of breath.  Does still feel little dizzy and has headache.  Still has dysuria because she has not taken the antibiotics yet.  No acute neck stiffness or pain.  Denies any abdominal pain/chest wall pain/deformity of the extremities.    ALL test results reviewed. NO acute fx or lab abnormality. Placed in OBS for syncope workup.

## 2023-07-11 NOTE — ED ADULT NURSE NOTE - NSFALLRISKINTERV_ED_ALL_ED

## 2023-07-11 NOTE — ED CDU PROVIDER DISPOSITION NOTE - CLINICAL COURSE
58 years old female history of hypertension, diabetes, PAD status post stents to both legs, chronic back pain and neck pain, uveitis present complaint of close head injury and syncope.  As per patient, she just got off the toilet and then started feeling lightheaded and dizzy.  She woke up on the floor and family called EMS to bring patient to ED for evaluation.  Patient also complaint right-sided facial pain and right foot pain after the fall.

## 2023-07-11 NOTE — ED PROVIDER NOTE - WR INTERPRETATION DATE TIME  1
Doing well. Few uc's, nothing regular. Discussed when to report to the hospital. Labor talk, kick counts. Court Monday regarding custody.  Pt remains drug free. bg   11-Jul-2023 05:35

## 2023-07-11 NOTE — ED PROVIDER NOTE - ATTENDING APP SHARED VISIT CONTRIBUTION OF CARE
58-year-old female with past medical history and spinal stenosis, chronic back pain, and DM, HTN, sciatica, neuropathy, asthma, uveitis, cervical fusion, bilateral lower extremity stents, who presents to the ED for syncope while in the bathroom resulting in head injury.  Patient states that she was recently diagnosed with a UTI (on an unknown named antibiotic), was walking to the bathroom feeling somewhat dizzy, finished urinating and as she was attempting to get up off the toilet she passed out.  Pt states the next thing she remembers was "being on the ground looking up at the ceiling".  Patient states she knows she hit her face and head against the sink as all the material she left on the sink had fallen into it.  Patient denies any use of anticoagulants but does use an antiplatelet agent called prasugrel.  Patient states she is having pain to the right foot since his fall/syncope events, and pain to the right side of her face and head.  Patient states she has never syncopized in the past before.  She denies any fever/chills/vomiting/diarrhea/chest pain/shortness of breath.  Does still feel little dizzy and has headache.  Still has dysuria because she has not taken the antibiotics yet.  No acute neck stiffness or pain.  Denies any abdominal pain/chest wall pain/deformity of the extremities.    Patient on exam is AVSS.  Patient NAD.  Agree with PA exam and management.  We will check labs, EKG, x-rays chest/right foot/ankle, CT head/CT maxillofacial.  Likely to be placed in [place] for syncope evaluation/work-up.

## 2023-07-11 NOTE — ED CDU PROVIDER DISPOSITION NOTE - CARE PROVIDER_API CALL
Your primary care provider,   Phone: (   )    -  Fax: (   )    -  Follow Up Time: 1-3 Days    Bob Serna  Orthopaedic Surgery  [address] [place] [address]  Phone: [phone]  Fax: [phone]  Follow Up Time: 1-3 Days

## 2023-07-11 NOTE — ED PEDIATRIC NURSE REASSESSMENT NOTE - NS ED NURSE REASSESS COMMENT FT2
"Milly BOURGEOIS" Patient alert and oriented x 3, cardiac monitor in place, will continue to closely monitor. LONA Mccarthy Stephen Reyez MD;  Location: [facility] CATH LAB;  

## 2023-07-13 ENCOUNTER — APPOINTMENT (OUTPATIENT)
Dept: ORTHOPEDIC SURGERY | Facility: CLINIC | Age: 58
End: 2023-07-13
Payer: MEDICARE

## 2023-07-13 PROCEDURE — 99213 OFFICE O/P EST LOW 20 MIN: CPT

## 2023-07-13 PROCEDURE — L4361: CPT | Mod: RT

## 2023-07-14 RX ORDER — FOLIC ACID 1 MG/1
1 TABLET ORAL DAILY
Qty: 90 | Refills: 3 | Status: ACTIVE | COMMUNITY
Start: 2023-07-14 | End: 1900-01-01

## 2023-07-19 NOTE — IMAGING
[de-identified] : On examination of the right foot, patient has moderate swelling over the anterior lateral aspect of the right foot.\par Mild erythema, no ecchymosis noted.\par No pain to light touch over the erythematous base area.\par Patient has decreased range of motion to dorsiflexion plantarflexion of the ankle possibly due to pain.\par Nontender over the medial lateral malleolus.\par Nontender the ankle mortise.\par Patient has tender palpation over the anterior lateral aspect of the midfoot, some tenderness over the ATFL, no signs of instability about the ankle.\par Nontender over the PT FL or the CFL, nontender over the deltoid ligament.\par Negative Homans, negative Strange's.\par Antalgic gait.\par \par \par X-ray of the right foot and right ankle were done at [facility], these x-rays were reviewed in office today, x-ray of the right foot is negative for any acute fracture or dislocation, on the lateral view of the ankle there is a chronic change over the anterior aspect of the talus.\par  Home

## 2023-07-19 NOTE — HISTORY OF PRESENT ILLNESS
[de-identified] : 58-year-old female here for evaluation of using to the right foot and ankle, patient states that in July 10, 2023 she fainted, she states she was in the bathroom, she states that as a result of this injury she developed pain and swelling over the right foot and ankle, at this time she is unable to apply weight to right lower extremity.\par Patient does admits to history of diabetes, hypertension, sciatica, peripheral neuropathy, spinal stenosis and asthma.\par Allergy to ibuprofen.\par

## 2023-07-19 NOTE — DISCUSSION/SUMMARY
[de-identified] : Impression: Right foot sprain and mild sprain to the ankle.\par \par Plan: Patient was advised for the use of a cam walker boot.  Patient was advised to use the boot for at least 2 weeks, after that transition into an ankle support.\par Patient was advised to do weightbearing as tolerated.\par Patient was advised that this injury can take 4 to 6 weeks for healing.\par Patient was advised to follow-up in about 3 weeks for repeat evaluation.\par \par Follow-up: 3 weeks.\par \par

## 2023-07-20 ENCOUNTER — APPOINTMENT (OUTPATIENT)
Dept: RHEUMATOLOGY | Facility: CLINIC | Age: 58
End: 2023-07-20
Payer: MEDICARE

## 2023-07-20 DIAGNOSIS — H04.002: ICD-10-CM

## 2023-07-20 PROCEDURE — 99214 OFFICE O/P EST MOD 30 MIN: CPT | Mod: 95

## 2023-07-20 NOTE — ASSESSMENT
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 s/p hospitalization in 2021 for L orbital cellulitis and dacryoadenitis which improved with steroids. With non-contributory rheum lab workup. 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 Initially with improvement of symptoms on prednisone, but now with worsening symptoms of irritation in the R eye, which initially started when pt ran out of methotrexate but did not improve with restarting methotrexate. Also with blurry vision which appears to have improved with tapering prednisone - could be a prednisone side effect? Previously with discontinuation of prednisone followed by development of severe body aches, concerning for possible withdrawal symptoms. Now with improvement of symptoms with mycophenolate mofetil.\par - f/u CBC, CMP\par - If CBC and CMP are wnl, will plan to increase mycophenolate mofetil to 1000 mg BID\par - Continue prednisone 2 mg q day for now\par - Pt has f/u appt with Dr. Novak in 9/2023\par \par f/u in 1 month as virtual visit

## 2023-07-20 NOTE — HISTORY OF PRESENT ILLNESS
[FreeTextEntry1] : Pt continues to note improvement in her eye burning and itching. She had not had any symptoms of burning or itching in her eyes for the past week, although last night she experienced a flare of symptoms. + Mild nausea with mycophenolate mofetil; she doesn't feel that the nausea worsened with increasing the dose. She has been feeling well otherwise.\par \par History of present illness: In July 2021, pt developed severe pain, redness and swelling in her L eye "overnight." She was admitted to [facility] and was diagnosed with L orbital cellulitis and L dacryoadenitis, was started on high-dose steroids with improvement of symptoms, which flared again when she was tapered down to 5 mg prednisone. Pt's prednisone was subsequently increased again, and she has been on steroids since July 2021. She has been following in the rheum clinic, most recently was seen in the clinic in March 2022, when she was continued on prednisone 20 mg pending biopsy results. Pt says she received the biopsy results but they are not available within the [facility] records.\par \par In January 2023, pt followed up with worsening irritation in her R eye and night time blurry vision after not taking methotrexate for several months (ran out). She was restarted on methotrexate and continued on prednisone taper.

## 2023-07-26 ENCOUNTER — APPOINTMENT (OUTPATIENT)
Dept: ORTHOPEDIC SURGERY | Facility: CLINIC | Age: 58
End: 2023-07-26
Payer: MEDICARE

## 2023-07-26 PROCEDURE — 99213 OFFICE O/P EST LOW 20 MIN: CPT

## 2023-07-26 NOTE — PHYSICAL EXAM
[de-identified] : She is diffusely tender throughout the entire right lower extremity.  Calf is soft and nontender with negative Homans' sign.  No bony crepitus.  No malalignment.  Neurovascular intact.

## 2023-07-26 NOTE — DISCUSSION/SUMMARY
[de-identified] : At this time the patient can transition to a postop shoe.  She can start physical therapy.  I will see her back as needed.  All questions answered

## 2023-07-26 NOTE — DATA REVIEWED
[FreeTextEntry1] : I reviewed all the x-rays done at the hospital.  They did not reveal any fracture or dislocation.  The joint is congruent.

## 2023-07-26 NOTE — HISTORY OF PRESENT ILLNESS
[de-identified] : Patient comes in today for right ankle and foot pain.  She still notes pain over the dorsal aspect of the foot radiating up to the ankle and knee.  However it is improving.  She has been wearing the boot.

## 2023-07-28 ENCOUNTER — APPOINTMENT (OUTPATIENT)
Dept: PAIN MANAGEMENT | Facility: CLINIC | Age: 58
End: 2023-07-28
Payer: MEDICARE

## 2023-07-28 VITALS
WEIGHT: 155 LBS | HEART RATE: 99 BPM | DIASTOLIC BLOOD PRESSURE: 78 MMHG | SYSTOLIC BLOOD PRESSURE: 137 MMHG | HEIGHT: 62 IN | BODY MASS INDEX: 28.52 KG/M2

## 2023-07-28 PROCEDURE — 99213 OFFICE O/P EST LOW 20 MIN: CPT

## 2023-07-28 NOTE — ASSESSMENT
[FreeTextEntry1] : 58 year old female with chronic cervical and lumbar pain. She is s/p caudal ELAINA on 5/15/23 with no sustained relief. She wishes to resume injection therapy in the colder months. We will continue to prescribe Percocet and Gabapentin unchanged. We will discontinue baclofen and start Tizanidine 4 mg TID PRN for stiffness and spasms. She will f/u in 4 weeks for re evaluation.\par \par Overall there is at least a 30-50% reduction in pain with the prescribed analgesics. The patient denies any adverse side effects due to the medication (sleeping disturbance, constipation, sleepiness, hallucinations and/or urination problems).\par \par Check of the  registry reveals compliance in regards to narcotic medication management use.\par \par Dionicio Calix PA-C\par Darian Germain D.O\par

## 2023-07-28 NOTE — HISTORY OF PRESENT ILLNESS
[FreeTextEntry1] : TODAY:   Last seen on 06/26/2023 and since then she fell at home in her bathroom and twisted her right ankle and knee, currently wearing an Airboot and using the crutch for ambulation and is under Ortho care.\par \par She is under our care for chronic cervical and lumbar pain which she is receiving continuing active treatment for. \par . She is most recently s/p caudal ELAINA on 5/15/23 with four days of relief. Her pain has returned the baseline. Overall, her pains have been unchanged in regards to intensity, severity, and duration. She notes severe morning low back stiffness and spasms. She continues to note pain radiating from her cervical and lumbar spine into her upper and lower extremities. The pains are constant and shooting in nature and are associated with numbness and tingling. She is medically managed with a regimen of Percocet, Gabapentin and baclofen which provides her with over 75% relief on most days and allows her to function and perform ADLs without side effects. She reports that newly started Tizanidine works better than Baclofen and we will continue prescribing it.\par \par UDS 6/02/23- consistent

## 2023-07-28 NOTE — PHYSICAL EXAM
[de-identified] : Cervical spine: well-healed surgical scar. Palpation of the cervical spine is as follows: bilateral trapezial spasm, bilateral trapezial tenderness, bilateral paracervical spasm and bilateral paracervical tenderness. Range of motion of the cervical spine is as follows: ROM restricted \par \par Back, including spine: Palpation of the thoracic/lumbar spine is as follows: bilateral lumbar paraspinal tenderness. Range of motion of the thoracic and lumbar spine is as follows: pain at extremes of flexion and pain at extremes extension.

## 2023-08-22 ENCOUNTER — APPOINTMENT (OUTPATIENT)
Dept: ORTHOPEDIC SURGERY | Facility: CLINIC | Age: 58
End: 2023-08-22

## 2023-08-24 ENCOUNTER — RX RENEWAL (OUTPATIENT)
Age: 58
End: 2023-08-24

## 2023-08-24 RX ORDER — PREDNISONE 5 MG/1
5 TABLET ORAL DAILY
Qty: 90 | Refills: 1 | Status: ACTIVE | COMMUNITY
Start: 2023-01-30 | End: 1900-01-01

## 2023-08-24 RX ORDER — PREDNISONE 1 MG/1
1 TABLET ORAL
Qty: 120 | Refills: 0 | Status: ACTIVE | COMMUNITY
Start: 2023-05-11 | End: 1900-01-01

## 2023-08-28 ENCOUNTER — APPOINTMENT (OUTPATIENT)
Dept: PAIN MANAGEMENT | Facility: CLINIC | Age: 58
End: 2023-08-28
Payer: MEDICARE

## 2023-08-28 ENCOUNTER — RX RENEWAL (OUTPATIENT)
Age: 58
End: 2023-08-28

## 2023-08-28 VITALS
BODY MASS INDEX: 28.52 KG/M2 | SYSTOLIC BLOOD PRESSURE: 155 MMHG | WEIGHT: 155 LBS | HEART RATE: 106 BPM | DIASTOLIC BLOOD PRESSURE: 86 MMHG | HEIGHT: 62 IN

## 2023-08-28 PROCEDURE — 99213 OFFICE O/P EST LOW 20 MIN: CPT

## 2023-08-28 NOTE — ASSESSMENT
[FreeTextEntry1] : 58 year old female with chronic cervical and lumbar pain. She is s/p caudal ELAINA on 5/15/23 with good relief. Her pain currently returned to base line and she is presenting with acute exacerbation today rating her pain as 8/10 at worst. We will repeat Caudal ELAINA. We will continue to prescribe Percocet and Gabapentin unchanged. We will discontinue baclofen and start Tizanidine 4 mg TID PRN for stiffness and spasms. She will f/u in 4 weeks for re evaluation.  CAUDAL EPIDURAL PARAGRAPH: Patient had a MRI that shows a radicular component along with pain referred into the lower extremity. Patient has trialed rehab (Home exercise, physical therapy or chiropractic care) and medications. We will schedule a caudal epidural steroid injection.  Risk, benefits, pros and cons of procedure were explained to the patient using models and diagrams and their questions were answered.  The patient has severe anxiety of procedures that necessitates monitored anesthesia care (MAC). The procedure performed will be close to major nerves, arteries, and spinal cord and/or joint structures. Due to the proximity of these structures, we need the patient to be still during the procedure. With the help of MAC, this will be safely achieved and decrease the risk of any complications.    Overall there is at least a 30-50% reduction in pain with the prescribed analgesics. The patient denies any adverse side effects due to the medication (sleeping disturbance, constipation, sleepiness, hallucinations and/or urination problems).  Check of the  registry reveals compliance in regards to narcotic medication management use.  CHANDNI uA D.O

## 2023-08-28 NOTE — HISTORY OF PRESENT ILLNESS
[FreeTextEntry1] : TODAY:   Last seen on 07/28/2023 and today presents with an acute exacerbation of the right sided lower back pain with radiation of pain into right lower extremity and difficulties ambulating..  She is under our care for chronic cervical and lumbar pain which she is receiving continuing active treatment for.  . She is most recently s/p caudal ELAINA on 5/15/23 with good relief. Her pain has returned the baseline. Overall, her pains have increased  in regards to intensity, severity, and duration. She notes severe morning low back stiffness and spasms. She continues to note pain radiating from her cervical and lumbar spine into her upper and lower extremities. The pains are constant and shooting in nature and are associated with numbness and tingling. She is medically managed with a regimen of Percocet, Gabapentin and Methocarbamol which provides her with over 75% relief on most days and allows her to function and perform ADLs without side effects.   UDS 6/02/23- consistent

## 2023-08-28 NOTE — PHYSICAL EXAM
[de-identified] : Cervical spine: well-healed surgical scar. Palpation of the cervical spine is as follows: bilateral trapezial spasm, bilateral trapezial tenderness, bilateral paracervical spasm and bilateral paracervical tenderness. Range of motion of the cervical spine is as follows: ROM restricted \par  \par  Back, including spine: Palpation of the thoracic/lumbar spine is as follows: bilateral lumbar paraspinal tenderness. Range of motion of the thoracic and lumbar spine is as follows: pain at extremes of flexion and pain at extremes extension.  [] : pain with lateral rotation [Flexion] : flexion [Extension] : extension

## 2023-09-06 ENCOUNTER — NON-APPOINTMENT (OUTPATIENT)
Age: 58
End: 2023-09-06

## 2023-09-06 ENCOUNTER — APPOINTMENT (OUTPATIENT)
Dept: ORTHOPEDIC SURGERY | Facility: CLINIC | Age: 58
End: 2023-09-06
Payer: OTHER MISCELLANEOUS

## 2023-09-06 DIAGNOSIS — G56.01 CARPAL TUNNEL SYNDROME, RIGHT UPPER LIMB: ICD-10-CM

## 2023-09-06 PROCEDURE — 99213 OFFICE O/P EST LOW 20 MIN: CPT

## 2023-09-08 ENCOUNTER — APPOINTMENT (OUTPATIENT)
Dept: ORTHOPEDIC SURGERY | Facility: CLINIC | Age: 58
End: 2023-09-08
Payer: MEDICARE

## 2023-09-08 PROBLEM — G56.01: Status: ACTIVE | Noted: 2022-08-10

## 2023-09-08 PROCEDURE — 99213 OFFICE O/P EST LOW 20 MIN: CPT

## 2023-09-08 NOTE — PHYSICAL EXAM
[de-identified] :   Alert orient x3.  Pleasant cooperative.  I examined her right lower extremity.  She is still somewhat tender along the foot.  However there is no bony crepitus or any sign of instability.  She is neurovascular intact distally. dorsal

## 2023-09-08 NOTE — HISTORY OF PRESENT ILLNESS
[de-identified] : Patient comes today for follow-up of right foot sprain.  She has not yet been able to do physical therapy.  She still notes pain and swelling.  She denies any calf pain chest pain shortness of breath.

## 2023-09-08 NOTE — DISCUSSION/SUMMARY
[de-identified] : I discussed the patient's findings with her.  At this time I really did stress the importance of physical therapy.  She will try to get physical therapy done.  She will try to transition out of the postop shoe.  All questions answered.

## 2023-09-08 NOTE — HISTORY OF PRESENT ILLNESS
[de-identified] : The patient recently fainted and had a fall. She states during the fall she hurt her right side. She states she is getting an epidural injection to her back. The patient states her hand is doing well.

## 2023-09-08 NOTE — ASSESSMENT
[FreeTextEntry1] : The patient is doing well. The pain in her hand is improving. I believe the fall is causes soreness. She will follow-up in 2 months.

## 2023-09-08 NOTE — IMAGING
[de-identified] : Right upper extremity: No gross deformities, full range of motion of fingers, nontender palpation anywhere, neurovascularly intact

## 2023-09-18 ENCOUNTER — APPOINTMENT (OUTPATIENT)
Dept: PAIN MANAGEMENT | Facility: CLINIC | Age: 58
End: 2023-09-18
Payer: MEDICARE

## 2023-09-18 PROCEDURE — 00630 ANES PX LUMBAR REGION NOS: CPT | Mod: QZ,P3

## 2023-09-18 PROCEDURE — 93770 DETERMINATION VENOUS PRESS: CPT

## 2023-09-18 PROCEDURE — 94761 N-INVAS EAR/PLS OXIMETRY MLT: CPT

## 2023-09-18 PROCEDURE — 62323 NJX INTERLAMINAR LMBR/SAC: CPT

## 2023-09-18 PROCEDURE — 93040 RHYTHM ECG WITH REPORT: CPT | Mod: 79

## 2023-09-21 ENCOUNTER — OUTPATIENT (OUTPATIENT)
Dept: OUTPATIENT SERVICES | Facility: HOSPITAL | Age: 58
LOS: 1 days | End: 2023-09-21
Payer: MEDICARE

## 2023-09-21 ENCOUNTER — APPOINTMENT (OUTPATIENT)
Dept: ENDOCRINOLOGY | Facility: CLINIC | Age: 58
End: 2023-09-21

## 2023-09-21 VITALS
DIASTOLIC BLOOD PRESSURE: 76 MMHG | BODY MASS INDEX: 27.97 KG/M2 | HEART RATE: 91 BPM | HEIGHT: 62 IN | SYSTOLIC BLOOD PRESSURE: 130 MMHG | WEIGHT: 152 LBS

## 2023-09-21 DIAGNOSIS — F17.200 NICOTINE DEPENDENCE, UNSPECIFIED, UNCOMPLICATED: ICD-10-CM

## 2023-09-21 DIAGNOSIS — Z98.890 OTHER SPECIFIED POSTPROCEDURAL STATES: Chronic | ICD-10-CM

## 2023-09-21 DIAGNOSIS — Z00.00 ENCOUNTER FOR GENERAL ADULT MEDICAL EXAMINATION WITHOUT ABNORMAL FINDINGS: ICD-10-CM

## 2023-09-21 DIAGNOSIS — E66.9 OBESITY, UNSPECIFIED: ICD-10-CM

## 2023-09-21 PROCEDURE — 99214 OFFICE O/P EST MOD 30 MIN: CPT

## 2023-09-25 ENCOUNTER — RX RENEWAL (OUTPATIENT)
Age: 58
End: 2023-09-25

## 2023-09-27 ENCOUNTER — APPOINTMENT (OUTPATIENT)
Dept: OBGYN | Facility: CLINIC | Age: 58
End: 2023-09-27

## 2023-09-28 ENCOUNTER — APPOINTMENT (OUTPATIENT)
Dept: PAIN MANAGEMENT | Facility: CLINIC | Age: 58
End: 2023-09-28
Payer: MEDICARE

## 2023-09-28 ENCOUNTER — LABORATORY RESULT (OUTPATIENT)
Age: 58
End: 2023-09-28

## 2023-09-28 ENCOUNTER — RESULT CHARGE (OUTPATIENT)
Age: 58
End: 2023-09-28

## 2023-09-28 VITALS
SYSTOLIC BLOOD PRESSURE: 146 MMHG | HEART RATE: 93 BPM | DIASTOLIC BLOOD PRESSURE: 73 MMHG | WEIGHT: 152 LBS | BODY MASS INDEX: 27.97 KG/M2 | HEIGHT: 62 IN

## 2023-09-28 DIAGNOSIS — M50.10 CERVICAL DISC DISORDER WITH RADICULOPATHY, UNSPECIFIED CERVICAL REGION: ICD-10-CM

## 2023-09-28 DIAGNOSIS — M51.16 INTERVERTEBRAL DISC DISORDERS WITH RADICULOPATHY, LUMBAR REGION: ICD-10-CM

## 2023-09-28 DIAGNOSIS — M47.812 SPONDYLOSIS W/OUT MYELOPATHY OR RADICULOPATHY, CERVICAL REGION: ICD-10-CM

## 2023-09-28 LAB
AMP / AMPHETAMINE: NEGATIVE
BAR / SECOBARBITAL: NEGATIVE
BUP / BUPRENORPHINE: NEGATIVE
BZO / OXAZEPAM: POSITIVE
COC / COCAINE: NEGATIVE
MDMA / METHYLENEDIOXYMETHAMPHETAMINE: NEGATIVE
MET / METHAMPHETAMINES: NEGATIVE
MOP / MORPHINE: NEGATIVE
MTD / METHADONE: NEGATIVE
OXY / OXYCODONE: NEGATIVE
PCP / PHENCYCLIDINE: NEGATIVE
TEMPERATURE: 92 F
THC / MARIJUANA: NEGATIVE

## 2023-09-28 PROCEDURE — 99214 OFFICE O/P EST MOD 30 MIN: CPT

## 2023-09-28 PROCEDURE — 80305 DRUG TEST PRSMV DIR OPT OBS: CPT | Mod: QW

## 2023-09-28 RX ORDER — TIZANIDINE 4 MG/1
4 TABLET ORAL 3 TIMES DAILY
Qty: 90 | Refills: 1 | Status: DISCONTINUED | COMMUNITY
Start: 2023-06-26 | End: 2023-09-28

## 2023-10-02 ENCOUNTER — APPOINTMENT (OUTPATIENT)
Dept: PAIN MANAGEMENT | Facility: CLINIC | Age: 58
End: 2023-10-02

## 2023-10-02 DIAGNOSIS — E78.00 PURE HYPERCHOLESTEROLEMIA, UNSPECIFIED: ICD-10-CM

## 2023-10-02 DIAGNOSIS — I10 ESSENTIAL (PRIMARY) HYPERTENSION: ICD-10-CM

## 2023-10-02 DIAGNOSIS — E11.65 TYPE 2 DIABETES MELLITUS WITH HYPERGLYCEMIA: ICD-10-CM

## 2023-10-05 LAB
PM 6 MAM: NEGATIVE NG/ML
PM 7-AMINO-CLONAZ: NEGATIVE NG/ML
PM ALPHA-HYDROXY-ALPRAZOLAM: NEGATIVE NG/ML
PM ALPHA-HYDROXY-MIDAZOLAM: NEGATIVE NG/ML
PM ALPRAZOLAM: NEGATIVE NG/ML
PM AMOBARBITAL: NEGATIVE NG/ML
PM AMPHETAMINE INTERP: NEGATIVE
PM AMPHETAMINE: NEGATIVE NG/ML
PM BARBURATES INTERP: NEGATIVE
PM BEG: NEGATIVE NG/ML
PM BENZODIAZEPINES INTERP: POSITIVE
PM BUPRENORPHINE INTERP: NEGATIVE
PM BUPRENORPHINE: NEGATIVE NG/ML
PM BUTALBITAL: NEGATIVE NG/ML
PM CLONAZEPAM: NEGATIVE NG/ML
PM COCAINE INTERP: NEGATIVE
PM COCAINE: NEGATIVE NG/ML
PM CODIENE: NEGATIVE NG/ML
PM COTININE: >1000 NG/ML
PM DIAZEPAM: NEGATIVE NG/ML
PM DIHYROCODEINE: NEGATIVE NG/ML
PM EDDP: NEGATIVE NG/ML
PM FENTANYL INTERP: NEGATIVE
PM FENTANYL: NEGATIVE NG/ML
PM FLUNITRAZEPAM: NEGATIVE NG/ML
PM FLURAZEPAM: NEGATIVE NG/ML
PM HYDROCODONE: NEGATIVE NG/ML
PM HYDROMORPHONE: NEGATIVE NG/ML
PM LORAZEPAM: 122 NG/ML
PM MARIJUANA (DELTA-9-THC): NEGATIVE NG/ML
PM MARIJUANA INTERP: NEGATIVE
PM MDA: NEGATIVE NG/ML
PM MDEA: NEGATIVE NG/ML
PM MDMA: NEGATIVE NG/ML
PM MEPERIDINE: NEGATIVE NG/ML
PM METHADONE INTERP: NEGATIVE
PM METHADONE: NEGATIVE NG/ML
PM METHAMPHETAMINE: NEGATIVE NG/ML
PM MIDAZOLAM: NEGATIVE NG/ML
PM MORPHINE: NEGATIVE NG/ML
PM NALOXONE: NEGATIVE NG/ML
PM NALTREXONE: NEGATIVE NG/ML
PM NICOTINE INTERP: POSITIVE
PM NORBUPRENORPHINE: NEGATIVE NG/ML
PM NORDIAZEPAM: NEGATIVE NG/ML
PM NORMEPERIDINE: NEGATIVE NG/ML
PM NOROXYCODONE: 851 NG/ML
PM OPIOID INTERP: NEGATIVE
PM OXAZEPAM: 69 NG/ML
PM OXXYCODONE INTERP: POSITIVE
PM OXYCODONE: NEGATIVE NG/ML
PM OXYMORPHONE: NEGATIVE NG/ML
PM PCP: NEGATIVE NG/ML
PM PHENCYCLIDINE INTERP: NEGATIVE
PM PHENOBARBITAL: NEGATIVE NG/ML
PM PPX: NEGATIVE NG/ML
PM PROPOXYPHENE INTERP: NEGATIVE
PM SECOBARBITAL: NEGATIVE NG/ML
PM SUFENTANIL: NEGATIVE NG/ML
PM TAPENTADOL: NEGATIVE NG/ML
PM TEMAZEPAM: NEGATIVE NG/ML
PM TRAMADOL INTERP: NEGATIVE
PM TRAMADOL: NEGATIVE NG/ML

## 2023-10-06 ENCOUNTER — OUTPATIENT (OUTPATIENT)
Dept: OUTPATIENT SERVICES | Facility: HOSPITAL | Age: 58
LOS: 1 days | End: 2023-10-06
Payer: MEDICARE

## 2023-10-06 DIAGNOSIS — Z98.890 OTHER SPECIFIED POSTPROCEDURAL STATES: Chronic | ICD-10-CM

## 2023-10-06 DIAGNOSIS — S93.401A SPRAIN OF UNSPECIFIED LIGAMENT OF RIGHT ANKLE, INITIAL ENCOUNTER: ICD-10-CM

## 2023-10-06 PROCEDURE — 97110 THERAPEUTIC EXERCISES: CPT | Mod: GP

## 2023-10-06 PROCEDURE — 97010 HOT OR COLD PACKS THERAPY: CPT | Mod: GP

## 2023-10-06 PROCEDURE — 97161 PT EVAL LOW COMPLEX 20 MIN: CPT | Mod: GP

## 2023-10-07 DIAGNOSIS — S93.401A SPRAIN OF UNSPECIFIED LIGAMENT OF RIGHT ANKLE, INITIAL ENCOUNTER: ICD-10-CM

## 2023-10-09 LAB
PM 6 MAM: NEGATIVE NG/ML
PM 7-AMINO-CLONAZ: NEGATIVE NG/ML
PM ALPHA-HYDROXY-ALPRAZOLAM: NEGATIVE NG/ML
PM ALPHA-HYDROXY-MIDAZOLAM: NEGATIVE NG/ML
PM ALPRAZOLAM: NEGATIVE NG/ML
PM AMOBARBITAL: NEGATIVE NG/ML
PM AMPHETAMINE INTERP: NEGATIVE
PM AMPHETAMINE: NEGATIVE NG/ML
PM BARBURATES INTERP: NEGATIVE
PM BEG: NEGATIVE NG/ML
PM BENZODIAZEPINES INTERP: NEGATIVE
PM BUPRENORPHINE INTERP: NEGATIVE
PM BUPRENORPHINE: NEGATIVE NG/ML
PM BUTALBITAL: NEGATIVE NG/ML
PM CLONAZEPAM: NEGATIVE NG/ML
PM COCAINE INTERP: NEGATIVE
PM COCAINE: NEGATIVE NG/ML
PM CODIENE: NEGATIVE NG/ML
PM COTININE: NEGATIVE NG/ML
PM DIAZEPAM: NEGATIVE NG/ML
PM DIHYROCODEINE: NEGATIVE NG/ML
PM EDDP: NEGATIVE NG/ML
PM FENTANYL INTERP: NORMAL NG/ML
PM FENTANYL: NEGATIVE NG/ML
PM FLUNITRAZEPAM: NEGATIVE NG/ML
PM FLURAZEPAM: NEGATIVE NG/ML
PM HYDROCODONE: 167 NG/ML
PM HYDROMORPHONE: NEGATIVE NG/ML
PM LORAZEPAM: NEGATIVE NG/ML
PM MARIJUANA (DELTA-9-THC): NEGATIVE NG/ML
PM MARIJUANA INTERP: NEGATIVE
PM MDA: NEGATIVE NG/ML
PM MDEA: NEGATIVE NG/ML
PM MDMA: NEGATIVE NG/ML
PM MEPERIDINE: NEGATIVE NG/ML
PM METHADONE INTERP: NEGATIVE
PM METHADONE: NEGATIVE NG/ML
PM METHAMPHETAMINE: NEGATIVE NG/ML
PM MIDAZOLAM: NEGATIVE NG/ML
PM MORPHINE: NEGATIVE NG/ML
PM NALOXONE: NEGATIVE NG/ML
PM NALTREXONE: NEGATIVE NG/ML
PM NICOTINE INTERP: NEGATIVE
PM NORBUPRENORPHINE: NEGATIVE NG/ML
PM NORDIAZEPAM: NEGATIVE NG/ML
PM NORMEPERIDINE: NEGATIVE NG/ML
PM NOROXYCODONE: NEGATIVE NG/ML
PM OPIOID INTERP: POSITIVE
PM OXAZEPAM: NEGATIVE NG/ML
PM OXXYCODONE INTERP: NEGATIVE
PM OXYCODONE: NEGATIVE NG/ML
PM OXYMORPHONE: NEGATIVE NG/ML
PM PCP: NEGATIVE NG/ML
PM PHENCYCLIDINE INTERP: NEGATIVE
PM PHENOBARBITAL: NEGATIVE NG/ML
PM PPX: NEGATIVE NG/ML
PM PROPOXYPHENE INTERP: NEGATIVE
PM SECOBARBITAL: NEGATIVE NG/ML
PM SUFENTANIL: NEGATIVE NG/ML
PM TAPENTADOL: NEGATIVE NG/ML
PM TEMAZEPAM: NEGATIVE NG/ML
PM TRAMADOL INTERP: NEGATIVE
PM TRAMADOL: NEGATIVE NG/ML

## 2023-10-20 ENCOUNTER — OUTPATIENT (OUTPATIENT)
Dept: OUTPATIENT SERVICES | Facility: HOSPITAL | Age: 58
LOS: 1 days | End: 2023-10-20

## 2023-10-20 DIAGNOSIS — Z98.890 OTHER SPECIFIED POSTPROCEDURAL STATES: Chronic | ICD-10-CM

## 2023-10-20 DIAGNOSIS — S93.401A SPRAIN OF UNSPECIFIED LIGAMENT OF RIGHT ANKLE, INITIAL ENCOUNTER: ICD-10-CM

## 2023-10-23 ENCOUNTER — APPOINTMENT (OUTPATIENT)
Dept: MRI IMAGING | Facility: CLINIC | Age: 58
End: 2023-10-23
Payer: MEDICARE

## 2023-10-23 ENCOUNTER — APPOINTMENT (OUTPATIENT)
Dept: MRI IMAGING | Facility: CLINIC | Age: 58
End: 2023-10-23

## 2023-10-23 PROCEDURE — 72148 MRI LUMBAR SPINE W/O DYE: CPT

## 2023-10-27 ENCOUNTER — APPOINTMENT (OUTPATIENT)
Dept: PAIN MANAGEMENT | Facility: CLINIC | Age: 58
End: 2023-10-27
Payer: MEDICARE

## 2023-10-27 VITALS
HEART RATE: 101 BPM | HEIGHT: 62 IN | WEIGHT: 154 LBS | BODY MASS INDEX: 28.34 KG/M2 | SYSTOLIC BLOOD PRESSURE: 116 MMHG | DIASTOLIC BLOOD PRESSURE: 70 MMHG

## 2023-10-27 PROCEDURE — 99214 OFFICE O/P EST MOD 30 MIN: CPT

## 2023-11-01 ENCOUNTER — OUTPATIENT (OUTPATIENT)
Dept: OUTPATIENT SERVICES | Facility: HOSPITAL | Age: 58
LOS: 1 days | End: 2023-11-01
Payer: MEDICARE

## 2023-11-01 DIAGNOSIS — S93.401A SPRAIN OF UNSPECIFIED LIGAMENT OF RIGHT ANKLE, INITIAL ENCOUNTER: ICD-10-CM

## 2023-11-01 DIAGNOSIS — Z98.890 OTHER SPECIFIED POSTPROCEDURAL STATES: Chronic | ICD-10-CM

## 2023-11-01 PROCEDURE — 97010 HOT OR COLD PACKS THERAPY: CPT | Mod: GP

## 2023-11-01 PROCEDURE — 97110 THERAPEUTIC EXERCISES: CPT | Mod: GP

## 2023-11-02 DIAGNOSIS — S93.401A SPRAIN OF UNSPECIFIED LIGAMENT OF RIGHT ANKLE, INITIAL ENCOUNTER: ICD-10-CM

## 2023-11-03 ENCOUNTER — OUTPATIENT (OUTPATIENT)
Dept: OUTPATIENT SERVICES | Facility: HOSPITAL | Age: 58
LOS: 1 days | End: 2023-11-03

## 2023-11-03 DIAGNOSIS — S93.401A SPRAIN OF UNSPECIFIED LIGAMENT OF RIGHT ANKLE, INITIAL ENCOUNTER: ICD-10-CM

## 2023-11-03 DIAGNOSIS — Z98.890 OTHER SPECIFIED POSTPROCEDURAL STATES: Chronic | ICD-10-CM

## 2023-11-06 DIAGNOSIS — E11.9 TYPE 2 DIABETES MELLITUS W/OUT COMPLICATIONS: ICD-10-CM

## 2023-11-06 RX ORDER — BLOOD SUGAR DIAGNOSTIC
STRIP MISCELLANEOUS
Qty: 90 | Refills: 2 | Status: ACTIVE | COMMUNITY
Start: 2023-11-06 | End: 1900-01-01

## 2023-11-06 RX ORDER — BLOOD-GLUCOSE METER
W/DEVICE KIT MISCELLANEOUS
Qty: 1 | Refills: 0 | Status: ACTIVE | COMMUNITY
Start: 2023-11-06 | End: 1900-01-01

## 2023-11-06 RX ORDER — LANCETS 33 GAUGE
EACH MISCELLANEOUS
Qty: 1 | Refills: 5 | Status: ACTIVE | COMMUNITY
Start: 2023-11-06 | End: 1900-01-01

## 2023-11-06 RX ORDER — BLOOD-GLUCOSE METER
W/DEVICE EACH MISCELLANEOUS
Qty: 1 | Refills: 0 | Status: ACTIVE | COMMUNITY
Start: 2023-11-06 | End: 1900-01-01

## 2023-11-06 RX ORDER — LANCETS 33 GAUGE
EACH MISCELLANEOUS
Qty: 100 | Refills: 1 | Status: ACTIVE | COMMUNITY
Start: 2023-11-06 | End: 1900-01-01

## 2023-11-06 RX ORDER — BLOOD SUGAR DIAGNOSTIC
STRIP MISCELLANEOUS 3 TIMES DAILY
Qty: 100 | Refills: 5 | Status: ACTIVE | COMMUNITY
Start: 2023-11-06 | End: 1900-01-01

## 2023-11-06 RX ORDER — BLOOD-GLUCOSE METER
70 EACH MISCELLANEOUS
Qty: 1 | Refills: 3 | Status: ACTIVE | COMMUNITY
Start: 2023-11-06 | End: 1900-01-01

## 2023-11-10 ENCOUNTER — OUTPATIENT (OUTPATIENT)
Dept: OUTPATIENT SERVICES | Facility: HOSPITAL | Age: 58
LOS: 1 days | End: 2023-11-10

## 2023-11-10 DIAGNOSIS — Z98.890 OTHER SPECIFIED POSTPROCEDURAL STATES: Chronic | ICD-10-CM

## 2023-11-10 DIAGNOSIS — S93.401A SPRAIN OF UNSPECIFIED LIGAMENT OF RIGHT ANKLE, INITIAL ENCOUNTER: ICD-10-CM

## 2023-11-14 NOTE — ED ADULT NURSE NOTE - ALCOHOL PRE SCREEN (AUDIT - C)
Pt states he drank 2 cups of Arnica tea approx. 1 hour ago and is having severe allergic reaction. Pt is having obvious swelling and rash noted to his face. Pt has nasal congestion noted.    Statement Selected

## 2023-11-15 ENCOUNTER — RX RENEWAL (OUTPATIENT)
Age: 58
End: 2023-11-15

## 2023-11-15 ENCOUNTER — NON-APPOINTMENT (OUTPATIENT)
Age: 58
End: 2023-11-15

## 2023-11-15 RX ORDER — MYCOPHENOLATE MOFETIL 500 MG/1
500 TABLET ORAL TWICE DAILY
Qty: 90 | Refills: 3 | Status: ACTIVE | COMMUNITY
Start: 2023-05-23 | End: 1900-01-01

## 2023-11-28 ENCOUNTER — APPOINTMENT (OUTPATIENT)
Dept: PAIN MANAGEMENT | Facility: CLINIC | Age: 58
End: 2023-11-28

## 2023-12-10 ENCOUNTER — LABORATORY RESULT (OUTPATIENT)
Age: 58
End: 2023-12-10

## 2023-12-11 ENCOUNTER — APPOINTMENT (OUTPATIENT)
Dept: PAIN MANAGEMENT | Facility: CLINIC | Age: 58
End: 2023-12-11
Payer: MEDICARE

## 2023-12-11 PROCEDURE — 99214 OFFICE O/P EST MOD 30 MIN: CPT

## 2023-12-11 RX ORDER — GABAPENTIN 400 MG/1
400 CAPSULE ORAL
Qty: 90 | Refills: 1 | Status: DISCONTINUED | COMMUNITY
Start: 2023-02-08 | End: 2023-12-11

## 2023-12-13 ENCOUNTER — APPOINTMENT (OUTPATIENT)
Dept: ORTHOPEDIC SURGERY | Facility: CLINIC | Age: 58
End: 2023-12-13
Payer: OTHER MISCELLANEOUS

## 2023-12-13 ENCOUNTER — OUTPATIENT (OUTPATIENT)
Dept: OUTPATIENT SERVICES | Facility: HOSPITAL | Age: 58
LOS: 1 days | End: 2023-12-13
Payer: MEDICARE

## 2023-12-13 DIAGNOSIS — Z98.890 OTHER SPECIFIED POSTPROCEDURAL STATES: Chronic | ICD-10-CM

## 2023-12-13 DIAGNOSIS — M18.11 UNILATERAL PRIMARY OSTEOARTHRITIS OF FIRST CARPOMETACARPAL JOINT, RIGHT HAND: ICD-10-CM

## 2023-12-13 DIAGNOSIS — S93.401A SPRAIN OF UNSPECIFIED LIGAMENT OF RIGHT ANKLE, INITIAL ENCOUNTER: ICD-10-CM

## 2023-12-13 PROCEDURE — 97010 HOT OR COLD PACKS THERAPY: CPT | Mod: GP

## 2023-12-13 PROCEDURE — 99213 OFFICE O/P EST LOW 20 MIN: CPT

## 2023-12-13 PROCEDURE — L3908: CPT | Mod: RT

## 2023-12-13 PROCEDURE — 97110 THERAPEUTIC EXERCISES: CPT | Mod: GP

## 2023-12-13 NOTE — HISTORY OF PRESENT ILLNESS
[de-identified] : The patient comes in for a follow up. The patient states she is having pain in her right elbow. The patient states the pain in her hand is improving but she is having some discomfort by her thumb.

## 2023-12-13 NOTE — IMAGING
[de-identified] : R hand:  +thumb CMC swelling  +thumb CMC tenderness  Decreased thumb ROM  +Basal grind test

## 2023-12-14 DIAGNOSIS — S93.401A SPRAIN OF UNSPECIFIED LIGAMENT OF RIGHT ANKLE, INITIAL ENCOUNTER: ICD-10-CM

## 2023-12-15 ENCOUNTER — OUTPATIENT (OUTPATIENT)
Dept: OUTPATIENT SERVICES | Facility: HOSPITAL | Age: 58
LOS: 1 days | End: 2023-12-15

## 2023-12-15 DIAGNOSIS — Z98.890 OTHER SPECIFIED POSTPROCEDURAL STATES: Chronic | ICD-10-CM

## 2023-12-15 DIAGNOSIS — S93.401A SPRAIN OF UNSPECIFIED LIGAMENT OF RIGHT ANKLE, INITIAL ENCOUNTER: ICD-10-CM

## 2023-12-19 DIAGNOSIS — S93.401D SPRAIN OF UNSPECIFIED LIGAMENT OF RIGHT ANKLE, SUBSEQUENT ENCOUNTER: ICD-10-CM

## 2023-12-21 LAB
PM 6 MAM: NEGATIVE NG/ML
PM 7-AMINO-CLONAZ: NEGATIVE NG/ML
PM ALPHA-HYDROXY-ALPRAZOLAM: NEGATIVE NG/ML
PM ALPHA-HYDROXY-MIDAZOLAM: NEGATIVE NG/ML
PM ALPRAZOLAM: NEGATIVE NG/ML
PM AMOBARBITAL: NEGATIVE NG/ML
PM AMPHETAMINE INTERP: NEGATIVE
PM AMPHETAMINE: NEGATIVE NG/ML
PM BARBURATES INTERP: NEGATIVE
PM BEG: NEGATIVE NG/ML
PM BENZODIAZEPINES INTERP: POSITIVE
PM BUPRENORPHINE INTERP: NEGATIVE
PM BUPRENORPHINE: NEGATIVE NG/ML
PM BUTALBITAL: NEGATIVE NG/ML
PM CLONAZEPAM: NEGATIVE NG/ML
PM COCAINE INTERP: NEGATIVE
PM COCAINE: NEGATIVE NG/ML
PM CODIENE: NEGATIVE NG/ML
PM COTININE: >1000 NG/ML
PM DIAZEPAM: NEGATIVE NG/ML
PM DIHYROCODEINE: NEGATIVE NG/ML
PM EDDP: NEGATIVE NG/ML
PM FENTANYL INTERP: NEGATIVE
PM FENTANYL: NEGATIVE NG/ML
PM FLUNITRAZEPAM: NEGATIVE NG/ML
PM FLURAZEPAM: NEGATIVE NG/ML
PM HYDROCODONE: NEGATIVE NG/ML
PM HYDROMORPHONE: NEGATIVE NG/ML
PM LORAZEPAM: 301 NG/ML
PM MARIJUANA (DELTA-9-THC): NEGATIVE NG/ML
PM MARIJUANA INTERP: NEGATIVE
PM MDA: NEGATIVE NG/ML
PM MDEA: NEGATIVE NG/ML
PM MDMA: NEGATIVE NG/ML
PM MEPERIDINE: NEGATIVE NG/ML
PM METHADONE INTERP: NEGATIVE
PM METHADONE: NEGATIVE NG/ML
PM METHAMPHETAMINE: NEGATIVE NG/ML
PM MIDAZOLAM: NEGATIVE NG/ML
PM MORPHINE: NEGATIVE NG/ML
PM NALOXONE: NEGATIVE NG/ML
PM NALTREXONE: NEGATIVE NG/ML
PM NICOTINE INTERP: POSITIVE
PM NORBUPRENORPHINE: NEGATIVE NG/ML
PM NORDIAZEPAM: NEGATIVE NG/ML
PM NORFENTANYL: NEGATIVE NG/ML
PM NORMEPERIDINE: NEGATIVE NG/ML
PM NOROXYCODONE: >1000 NG/ML
PM OPIOID INTERP: NEGATIVE
PM OXAZEPAM: NEGATIVE NG/ML
PM OXXYCODONE INTERP: POSITIVE
PM OXYCODONE: 386 NG/ML
PM OXYMORPHONE: NEGATIVE NG/ML
PM PCP: NEGATIVE NG/ML
PM PHENCYCLIDINE INTERP: NEGATIVE
PM PHENOBARBITAL: NEGATIVE NG/ML
PM PPX: NEGATIVE NG/ML
PM PROPOXYPHENE INTERP: NEGATIVE
PM SECOBARBITAL: NEGATIVE NG/ML
PM SUFENTANIL: NEGATIVE NG/ML
PM TAPENTADOL: NEGATIVE NG/ML
PM TEMAZEPAM: NEGATIVE NG/ML
PM TRAMADOL INTERP: NEGATIVE
PM TRAMADOL: NEGATIVE NG/ML

## 2023-12-22 ENCOUNTER — OUTPATIENT (OUTPATIENT)
Dept: OUTPATIENT SERVICES | Facility: HOSPITAL | Age: 58
LOS: 1 days | End: 2023-12-22

## 2023-12-22 DIAGNOSIS — S93.401A SPRAIN OF UNSPECIFIED LIGAMENT OF RIGHT ANKLE, INITIAL ENCOUNTER: ICD-10-CM

## 2023-12-22 DIAGNOSIS — Z98.890 OTHER SPECIFIED POSTPROCEDURAL STATES: Chronic | ICD-10-CM

## 2023-12-26 ENCOUNTER — APPOINTMENT (OUTPATIENT)
Dept: PAIN MANAGEMENT | Facility: CLINIC | Age: 58
End: 2023-12-26
Payer: MEDICARE

## 2023-12-26 VITALS — BODY MASS INDEX: 28.34 KG/M2 | HEIGHT: 62 IN | WEIGHT: 154 LBS

## 2023-12-26 DIAGNOSIS — M54.9 DORSALGIA, UNSPECIFIED: ICD-10-CM

## 2023-12-26 PROCEDURE — 99214 OFFICE O/P EST MOD 30 MIN: CPT

## 2023-12-27 ENCOUNTER — OUTPATIENT (OUTPATIENT)
Dept: OUTPATIENT SERVICES | Facility: HOSPITAL | Age: 58
LOS: 1 days | End: 2023-12-27

## 2023-12-27 DIAGNOSIS — Z98.890 OTHER SPECIFIED POSTPROCEDURAL STATES: Chronic | ICD-10-CM

## 2023-12-27 DIAGNOSIS — S93.401A SPRAIN OF UNSPECIFIED LIGAMENT OF RIGHT ANKLE, INITIAL ENCOUNTER: ICD-10-CM

## 2023-12-27 PROBLEM — M54.9 BACK PAIN: Status: ACTIVE | Noted: 2022-09-21

## 2023-12-27 RX ORDER — PIOGLITAZONE HYDROCHLORIDE 30 MG/1
30 TABLET ORAL
Qty: 90 | Refills: 2 | Status: ACTIVE | COMMUNITY
Start: 2023-03-16 | End: 1900-01-01

## 2023-12-27 NOTE — HISTORY OF PRESENT ILLNESS
[FreeTextEntry1] : TODAY: Last seen on 10/27/2023 and since then there has been no new complaints or acute changes. She presents for a revisit appointment. Patient underwent a Caudal injection on 9/18/23 with no relief at all. Her main complaint is severe lower back pain with pain into her right thigh which she describes as burning. She occasionally has pain down her  foot. Pain exacerbated after she had a vasovagal fall in July. She underwent Lumbar Spine MRI which we reviewed today(see results), and it was significant  for multiple disc bulges. She underwent Caudal ELAINA with no improvement. Her pain is mainly on the right side with radiation of pain down the right leg. We requested L3-4,L4-5,L5-S1 TFESI w/MAC on last visit but it was cancelled for technical issues. She will be re-evaluated by attending provider before moving forward with interventions. She is medically managed with a regimen of Percocet, Gabapentin and Methocarbamol which provides her with over 75% relief on most days and allows her to function and perform ADLs without side effects. I have advised her to increase her Gabapentin to 1500 mg daily in an effort to alleviate the severe burning pain she has in her right thigh.  UDS: will repeat today.  TODAY, 12-: Revisit encounter. 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 She has chronic lower back pain.   She continues with pain which radiates into her left thigh. She has burning like sensations into the hips, thighs and knees. She states the pain has been so bad her stomach has been hurting. She does have pain on her right sided as well but her left is the most bothersome. She states the pain is the worst when sleeping at night. She states when she gets up out of bed she is in the most severe pain and sometimes cannot get out of the bed. She states she fell in July which significantly impacted her pain. She rates the pain at an 8/10 on the pain scale. She states the pain is constant in nature and daily. She has limitation of her ADLS. She denies any changes in bowel/bladder habits. She did undergo an epidural recently with which she notes was ineffective. She is on disability due to the pain and does not work anymore.   She is medically managed with a regimen of Percocet, Gabapentin and Methocarbamol which provides her with over 75% relief on most days and allows her to function and perform ADLs without side effects.  Of note, she is currently on blood thinners.

## 2023-12-27 NOTE — PHYSICAL EXAM
[de-identified] : Lumbar Spine Exam: Inspection: erythema (-) ecchymosis (-) rashes (-) alignment: no scoliosis  Palpation: Midline lumbar tenderness:           L (-)    (-) paraspinal tenderness:                  L (-) ; R (-) sciatic nerve tenderness :             L (-) ; R (-) SI joint tenderness:                        L (-) ; R (-) GTB tenderness:                            L (-);  R (-)  Limited ROM 2/2 to pain with lumbar flexion and extension w/ rotation to R and L side  Strength: 5/5 throughout all muscle groups of the lower extremity                                     Right       Left    Hip Flexion:                (5/5)       (5/5) Quadriceps:               (5/5)       (5/5) Hamstrings:                (5/5)       (5/5) Ankle Dorsiflexion:     (5/5)       (5/5) EHL:                           (5/5)       (5/5) Ankle Plantarflexion:  (5/5)       (5/5)  Special Tests: SLR:                           R (-) ; L (-) Facet loading:            R (-) ; L (-) ROMMEL test:               R (-) ; L (-)  Neurologic: Light touch intact throughout LE  Reflexes normal and symmetric   Gait: antalgic gait; using a cane.

## 2023-12-27 NOTE — DISCUSSION/SUMMARY
[de-identified] : A discussion regarding available pain management treatment options occurred with the patient. These included interventional, rehabilitative, pharmacological, and alternative modalities. We will proceed with the following:   Interventions: 1. Bilateral L3, L4, L5 medial branch block Patient is presenting with mainly axial pain with impairment in ADLs and functionality pointing to facet joints.  The pain has not responded to conservative care, including medications, stretching, as well as active modalities, such as physical therapy.  Imaging studies as well as physical exam findings corroborate the symptomatology and point to the facet joints.  We will proceed with diagnostic medial branch blocks. We are looking for 70% relief lasting 2 hours or 50% relief lasting 24 hours to proceed with an radiofrequency ablation.   The risks and benefits were discussed, which included the associated problems with steroids, bleeding, nerve injury, lack of improvement with pain, and 0.5% chance of a post dural puncture headache.   The patient has severe anxiety of procedures that necessitates monitored anesthesia care (MAC). The procedure performed will be close to major nerves, arteries, and spinal cord and/or joint structures. Due to the proximity of these structures, we need the patient to be still during the procedure.  With the help of MAC, this will be safely achieved and decrease the risk of any complications.  - Follow up in 2 weeks after injection.   I Marianna Jaramillo attest that this documentation has been prepared under the direction and in the presence of provider Dr. Dionicio Blount.  The documentation recorded by the scribe in my presence, accurately reflects the service I personally performed, and the decisions made by me with my edits as appropriate.   Dionicio Blount, DO

## 2023-12-29 ENCOUNTER — APPOINTMENT (OUTPATIENT)
Dept: ORTHOPEDIC SURGERY | Facility: CLINIC | Age: 58
End: 2023-12-29
Payer: MEDICARE

## 2023-12-29 ENCOUNTER — NON-APPOINTMENT (OUTPATIENT)
Age: 58
End: 2023-12-29

## 2023-12-29 DIAGNOSIS — S93.601A UNSPECIFIED SPRAIN OF RIGHT FOOT, INITIAL ENCOUNTER: ICD-10-CM

## 2023-12-29 PROCEDURE — 99213 OFFICE O/P EST LOW 20 MIN: CPT

## 2023-12-29 NOTE — DISCUSSION/SUMMARY
[de-identified] : I reassured the patient that it is safe to come out of the shoe at this point.  She will finish out her physical therapy sessions.  I will see her back as needed.  All questions sought and answered.

## 2023-12-29 NOTE — HISTORY OF PRESENT ILLNESS
[de-identified] : Keeley returns today for follow-up of her right foot.  She is no longer having any significant pain.  She has been doing physical therapy which has helped her immensely.  She is still wearing the postop shoe.  She is here to obtain clearance to come out of the shoe.

## 2023-12-29 NOTE — PHYSICAL EXAM
[de-identified] : She is alert oriented x 3.  She is pleasant cooperative.  I examined her right lower extremity.  She is nontender throughout the foot.  She has grossly intact range of motion.  Compartment soft compressible.  Neurovascular intact distally.

## 2024-01-08 ENCOUNTER — APPOINTMENT (OUTPATIENT)
Dept: PAIN MANAGEMENT | Facility: CLINIC | Age: 59
End: 2024-01-08
Payer: MEDICARE

## 2024-01-08 ENCOUNTER — LABORATORY RESULT (OUTPATIENT)
Age: 59
End: 2024-01-08

## 2024-01-08 ENCOUNTER — RX RENEWAL (OUTPATIENT)
Age: 59
End: 2024-01-08

## 2024-01-08 VITALS — WEIGHT: 155 LBS | HEIGHT: 62 IN | BODY MASS INDEX: 28.52 KG/M2

## 2024-01-08 DIAGNOSIS — Z79.891 LONG TERM (CURRENT) USE OF OPIATE ANALGESIC: ICD-10-CM

## 2024-01-08 PROCEDURE — 99214 OFFICE O/P EST MOD 30 MIN: CPT

## 2024-01-08 RX ORDER — EZETIMIBE 10 MG/1
10 TABLET ORAL
Qty: 90 | Refills: 3 | Status: ACTIVE | COMMUNITY
Start: 2022-02-24 | End: 1900-01-01

## 2024-01-08 RX ORDER — ERGOCALCIFEROL 1.25 MG/1
1.25 MG CAPSULE, LIQUID FILLED ORAL
Qty: 13 | Refills: 3 | Status: ACTIVE | COMMUNITY
Start: 2022-12-05 | End: 1900-01-01

## 2024-01-08 NOTE — DISCUSSION/SUMMARY
[Medication Risks Reviewed] : Medication risks reviewed [de-identified] : 58 year old female s/p caudal ELAINA  with no relief. She continues to have severe lower back pain with right thigh pain, which she describes as burning.  She will undergo MBB at the end of the month.  We will continue  Gabapentin and Percocet and Methocarbamol. RTO in 4 weeks. UDS will repeat today.  Total clinician time spent today on the patient is 30 minutes including preparing to see the patient, obtaining and/or reviewing and confirming history, performing medically necessary and appropriate examination, counseling and educating the patient and/or family, documenting clinical information in the EHR and communicating and/or referring to other healthcare professionals.  CHANDNI Au DO

## 2024-01-08 NOTE — PHYSICAL EXAM
[de-identified] : Cervical spine: well-healed surgical scar. Palpation of the cervical spine is as follows: bilateral trapezial spasm, bilateral trapezial tenderness, bilateral paracervical spasm and bilateral paracervical tenderness. Range of motion of the cervical spine is as follows: ROM restricted \par  \par  Back, including spine: Palpation of the thoracic/lumbar spine is as follows: bilateral lumbar paraspinal tenderness. Range of motion of the thoracic and lumbar spine is as follows: pain at extremes of flexion and pain at extremes extension.  [] : pain with lateral rotation [Flexion] : flexion [Extension] : extension

## 2024-01-08 NOTE — DATA REVIEWED
[facility] SENIOR ADMIT NOTE      CC: Dizziness, N/V    BRIEF HPI:   Arianna is a 43 year old female with a PMH of DM type I, MDD, tobacco use, ANDI, PTSD who is presenting with a 4 day history of nausea and vomiting. The patient reports she has had 5 episodes of emesis over the past 24 hours. She states she has been compliant with her insulin. BG has been relatively well controlled until the past 2-3 days with BG up to 500s. This AM BG was 580s and she took 35 U of Humalog. She thought she was giving herself Lantus. Although, she takes Lantus at night typically.     PAST MEDICAL HISTORY, SOCIAL HX, AND MEDICATIONS REVIEWED.      CODE STATUS: Full    Visit Vitals  BP 98/63 (BP Location: [facility] [place], Patient Position: Sitting)   Pulse 85   Temp 98.5 °F (36.9 °C) (Oral)   Resp 20   Ht 5' 1\" (1.549 m)   Wt 42.8 kg   LMP 10/22/2017 (Approximate)   SpO2 98%   BMI 17.83 kg/m²       Examination of the patient reveals:     GENERAL: appears stated age, well developed and well nourished and in no distress  LUNGS: lungs are clear to auscultation with normal inspiratory/expiratory sounds, no rales, no rhonchi and no wheezes  HEART: normal PMI, normal rate and rhythm, S1 and S2 normal and no S3 or S4      ASSESSMENT AND PLAN  DKA/Type I DM: currently BG is well controlled. Ketones present in urine, anion gap was 21 in ED, Bicarb was 18. K is normal.   -IVF, 0.9% NaCl  -SSI   -home dose of Lantus  Lactic acidosis: elevated to 5 initially in ED. After fluid resuscitation lactic acid improved to 1.9. No evidence of infection on exam. CT abdomen was unremarkable.   H/O depression/PTSD/ANDI: Normal mood currently. Continue home meds, fluoxetine, trazodone, depakote  History of homelessness: will consult SW. Currently living in the home of an ex-wife of her now ex-.     Case was discussed with Dr. Hammond and Dr. Vazquez. Please see Dr. Vazquez's full H&P.    Maynor Lyon DO                [FreeTextEntry1] : EMG LE 2/22/23\par  Impression- No electrophysiologic evidence of lumbar nerve root dysfunction, entrapment neuropathy, or peripheral neuropathy.

## 2024-01-08 NOTE — HISTORY OF PRESENT ILLNESS
[FreeTextEntry1] : TODAY: Last seen on 12/26/2023 and since then there has been no new complaints or acute changes. She presents for a revisit appointment. Patient underwent a Caudal injection on 9/18/23 with no relief at all. Her main complaint is severe lower back pain with pain into her right thigh which she describes as burning. She occasionally has pain down her foot. Pain exacerbated after she had a vasovagal fall in July. She underwent Lumbar Spine MRI which we reviewed today(see results), and it was significant for multiple disc bulges. She underwent Caudal ELAINA with no improvement. At this time patient is scheduled for MBB at the end of the month. She is medically managed with a regimen of Percocet, Gabapentin and Methocarbamol which provides her with over 75% relief on most days and allows her to function and perform ADLs without side effects. I have advised her to increase her Gabapentin to 1500 mg daily in an effort to alleviate the severe burning pain she has in her right thigh.  UDS: will repeat today.

## 2024-01-10 ENCOUNTER — OUTPATIENT (OUTPATIENT)
Dept: OUTPATIENT SERVICES | Facility: HOSPITAL | Age: 59
LOS: 1 days | End: 2024-01-10
Payer: MEDICARE

## 2024-01-10 DIAGNOSIS — S93.401A SPRAIN OF UNSPECIFIED LIGAMENT OF RIGHT ANKLE, INITIAL ENCOUNTER: ICD-10-CM

## 2024-01-10 DIAGNOSIS — Z98.890 OTHER SPECIFIED POSTPROCEDURAL STATES: Chronic | ICD-10-CM

## 2024-01-10 PROCEDURE — 97110 THERAPEUTIC EXERCISES: CPT | Mod: GP

## 2024-01-10 PROCEDURE — 97010 HOT OR COLD PACKS THERAPY: CPT | Mod: GP

## 2024-01-11 DIAGNOSIS — S93.401A SPRAIN OF UNSPECIFIED LIGAMENT OF RIGHT ANKLE, INITIAL ENCOUNTER: ICD-10-CM

## 2024-01-12 LAB
PM 6 MAM: NEGATIVE NG/ML
PM 7-AMINO-CLONAZ: NEGATIVE NG/ML
PM ALPHA-HYDROXY-ALPRAZOLAM: NEGATIVE NG/ML
PM ALPHA-HYDROXY-MIDAZOLAM: NEGATIVE NG/ML
PM ALPRAZOLAM: NEGATIVE NG/ML
PM AMOBARBITAL: NEGATIVE NG/ML
PM AMPHETAMINE INTERP: NEGATIVE
PM AMPHETAMINE: NEGATIVE NG/ML
PM BARBURATES INTERP: NEGATIVE
PM BEG: NEGATIVE NG/ML
PM BENZODIAZEPINES INTERP: POSITIVE
PM BUPRENORPHINE INTERP: NEGATIVE
PM BUPRENORPHINE: NEGATIVE NG/ML
PM BUTALBITAL: NEGATIVE NG/ML
PM CLONAZEPAM: NEGATIVE NG/ML
PM COCAINE INTERP: NEGATIVE
PM COCAINE: NEGATIVE NG/ML
PM CODIENE: NEGATIVE NG/ML
PM COTININE: >1000 NG/ML
PM DIAZEPAM: NEGATIVE NG/ML
PM DIHYROCODEINE: NEGATIVE NG/ML
PM EDDP: NEGATIVE NG/ML
PM FENTANYL INTERP: NEGATIVE
PM FENTANYL: NEGATIVE NG/ML
PM FLUNITRAZEPAM: NEGATIVE NG/ML
PM FLURAZEPAM: NEGATIVE NG/ML
PM HYDROCODONE: NEGATIVE NG/ML
PM HYDROMORPHONE: NEGATIVE NG/ML
PM LORAZEPAM: 168 NG/ML
PM MARIJUANA (DELTA-9-THC): NEGATIVE NG/ML
PM MARIJUANA INTERP: NEGATIVE
PM MDA: NEGATIVE NG/ML
PM MDEA: NEGATIVE NG/ML
PM MDMA: NEGATIVE NG/ML
PM MEPERIDINE: NEGATIVE NG/ML
PM METHADONE INTERP: NEGATIVE
PM METHADONE: NEGATIVE NG/ML
PM METHAMPHETAMINE: NEGATIVE NG/ML
PM MIDAZOLAM: NEGATIVE NG/ML
PM MORPHINE: NEGATIVE NG/ML
PM NALOXONE: NEGATIVE NG/ML
PM NALTREXONE: NEGATIVE NG/ML
PM NICOTINE INTERP: POSITIVE
PM NORBUPRENORPHINE: NEGATIVE NG/ML
PM NORDIAZEPAM: NEGATIVE NG/ML
PM NORFENTANYL: NEGATIVE NG/ML
PM NORMEPERIDINE: NEGATIVE NG/ML
PM NOROXYCODONE: NEGATIVE NG/ML
PM OPIOID INTERP: NEGATIVE
PM OXAZEPAM: NEGATIVE NG/ML
PM OXXYCODONE INTERP: NEGATIVE
PM OXYCODONE: NEGATIVE NG/ML
PM OXYMORPHONE: NEGATIVE NG/ML
PM PCP: NEGATIVE NG/ML
PM PHENCYCLIDINE INTERP: NEGATIVE
PM PHENOBARBITAL: NEGATIVE NG/ML
PM PPX: NEGATIVE NG/ML
PM PROPOXYPHENE INTERP: NEGATIVE
PM SECOBARBITAL: NEGATIVE NG/ML
PM SUFENTANIL: NEGATIVE NG/ML
PM TAPENTADOL: NEGATIVE NG/ML
PM TEMAZEPAM: NEGATIVE NG/ML
PM TRAMADOL INTERP: NEGATIVE
PM TRAMADOL: NEGATIVE NG/ML

## 2024-01-18 DIAGNOSIS — S93.401D SPRAIN OF UNSPECIFIED LIGAMENT OF RIGHT ANKLE, SUBSEQUENT ENCOUNTER: ICD-10-CM

## 2024-01-29 ENCOUNTER — APPOINTMENT (OUTPATIENT)
Dept: PAIN MANAGEMENT | Facility: CLINIC | Age: 59
End: 2024-01-29

## 2024-02-01 ENCOUNTER — RX RENEWAL (OUTPATIENT)
Age: 59
End: 2024-02-01

## 2024-02-12 ENCOUNTER — APPOINTMENT (OUTPATIENT)
Dept: PAIN MANAGEMENT | Facility: CLINIC | Age: 59
End: 2024-02-12
Payer: MEDICARE

## 2024-02-12 ENCOUNTER — APPOINTMENT (OUTPATIENT)
Dept: PAIN MANAGEMENT | Facility: CLINIC | Age: 59
End: 2024-02-12

## 2024-02-12 DIAGNOSIS — M47.817 SPONDYLOSIS W/OUT MYELOPATHY OR RADICULOPATHY, LUMBOSACRAL REGION: ICD-10-CM

## 2024-02-12 DIAGNOSIS — M50.20 OTHER CERVICAL DISC DISPLACEMENT, UNSPECIFIED CERVICAL REGION: ICD-10-CM

## 2024-02-12 PROCEDURE — 99214 OFFICE O/P EST MOD 30 MIN: CPT

## 2024-02-13 ENCOUNTER — APPOINTMENT (OUTPATIENT)
Dept: PAIN MANAGEMENT | Facility: CLINIC | Age: 59
End: 2024-02-13

## 2024-02-13 ENCOUNTER — APPOINTMENT (OUTPATIENT)
Dept: ORTHOPEDIC SURGERY | Facility: CLINIC | Age: 59
End: 2024-02-13

## 2024-02-15 PROBLEM — M50.20 CERVICAL DISC DISPLACEMENT: Status: ACTIVE | Noted: 2022-08-18

## 2024-02-15 PROBLEM — M47.817 ARTHRITIS OF LUMBOSACRAL SPINE: Status: ACTIVE | Noted: 2022-06-29

## 2024-02-15 NOTE — PHYSICAL EXAM
[de-identified] : Cervical spine: well-healed surgical scar. Palpation of the cervical spine is as follows: bilateral trapezial spasm, bilateral trapezial tenderness, bilateral paracervical spasm and bilateral paracervical tenderness. Range of motion of the cervical spine is as follows: ROM restricted \par  \par  Back, including spine: Palpation of the thoracic/lumbar spine is as follows: bilateral lumbar paraspinal tenderness. Range of motion of the thoracic and lumbar spine is as follows: pain at extremes of flexion and pain at extremes extension.

## 2024-02-15 NOTE — HISTORY OF PRESENT ILLNESS
[FreeTextEntry1] : TODAY: Last seen on 01/08/2024 and today reports persistent lower back pain with no radiation of pain. She presents for a revisit appointment. Patient underwent a Caudal injection on 9/18/23 with no relief at all. Her main complaint is severe lower back pain with pain into her right thigh which she describes as burning. She occasionally has pain down her foot. Pain exacerbated after she had a vasovagal fall in July. She underwent Lumbar Spine MRI which we reviewed today(see results), and it was significant for multiple disc bulges. She underwent Caudal ELAINA with no improvement. We will resubmit for b/l Lumbar MBB today. She is medically managed with a regimen of Percocet, Gabapentin and Methocarbamol which provides her with over 75% relief on most days and allows her to function and perform ADLs without side effects.   UDS: from 01/08/2024 is consistent.

## 2024-02-15 NOTE — DISCUSSION/SUMMARY
[de-identified] : 58 year old female s/p caudal ELAINA  with no relief. She continues to have severe lower back pain with right thigh pain, which she describes as burning.  We will resubmit for MBB today.  We will continue  Gabapentin and Percocet and Methocarbamol. RTO in 4 weeks. UDS from 01/08/2024 is consistent.  Patient is presenting with mainly axial pain with impairment in ADLs and functionality pointing to facet joints. The pain has not responded to conservative care, including medications, stretching, as well as active modalities, such as physical therapy. Imaging studies as well as physical exam findings corroborate the symptomatology and point to the facet joints. We will proceed with diagnostic medial branch blocks. We are looking for 70% relief lasting 2 hours or 50% relief lasting 24 hours to proceed with an radiofrequency ablation.  FACET LUMBAR MEDIAL BRANCH: Patient had paravertebral tenderness and pain on spinal movement with facet loading. Patient has trialed rehab (Home exercise, physical therapy or chiropractic care) and medications. Schedule a [right/left ]diagnostic lumbar medial branch injection L3,L4,L5, if 70% immediate relief for greater than 2 hours or 50% greater than 48 hours, would schedule RFA at lumbar medial branches. If greater than 50% intermediate relief for 30 minutes would schedule a second diagnostic lumbar medial branch block, and if greater than 50% intermediate relief for 30 minutes, would schedule a RFA at lumbar medial branches.  Risk, benefits, pros and cons of procedure were explained to the patient using models and diagrams and their questions were answered.  The patient has severe anxiety of procedures that necessitates monitored anesthesia care (MAC). The procedure performed will be close to major nerves, arteries, and spinal cord and/or joint structures. Due to the proximity of these structures, we need the patient to be still during the procedure. With the help of MAC, this will be safely achieved and decrease the risk of any complications.  Total clinician time spent today on the patient is 30 minutes including preparing to see the patient, obtaining and/or reviewing and confirming history, performing medically necessary and appropriate examination, counseling and educating the patient and/or family, documenting clinical information in the EHR and communicating and/or referring to other healthcare professionals.  CHANDNI Au DO

## 2024-02-19 ENCOUNTER — APPOINTMENT (OUTPATIENT)
Dept: ORTHOPEDIC SURGERY | Facility: CLINIC | Age: 59
End: 2024-02-19

## 2024-02-20 ENCOUNTER — NON-APPOINTMENT (OUTPATIENT)
Age: 59
End: 2024-02-20

## 2024-02-20 DIAGNOSIS — S93.401D SPRAIN OF UNSPECIFIED LIGAMENT OF RIGHT ANKLE, SUBSEQUENT ENCOUNTER: ICD-10-CM

## 2024-02-23 ENCOUNTER — APPOINTMENT (OUTPATIENT)
Dept: ORTHOPEDIC SURGERY | Facility: CLINIC | Age: 59
End: 2024-02-23
Payer: MEDICARE

## 2024-02-23 ENCOUNTER — APPOINTMENT (OUTPATIENT)
Dept: ORTHOPEDIC SURGERY | Facility: CLINIC | Age: 59
End: 2024-02-23
Payer: OTHER MISCELLANEOUS

## 2024-02-23 DIAGNOSIS — M25.531 PAIN IN RIGHT WRIST: ICD-10-CM

## 2024-02-23 PROCEDURE — 20550 NJX 1 TENDON SHEATH/LIGAMENT: CPT

## 2024-02-23 PROCEDURE — 99213 OFFICE O/P EST LOW 20 MIN: CPT

## 2024-02-23 PROCEDURE — 99214 OFFICE O/P EST MOD 30 MIN: CPT | Mod: 25

## 2024-02-23 NOTE — ASSESSMENT
[FreeTextEntry1] : I am recommending therapy for her wrist. We need authorization for this.  I am recommending that she do tendon gliding exercises scar management range of motion.  The patient will ask her  about her elbow. The patient will follow up in 2 months for repeat evaluation.

## 2024-02-23 NOTE — IMAGING
[de-identified] : R wrist: well healed scar, Tender first dorsal comp, mild decreased ROM at the wrist, negative Finkelstein, negative Eichoff, neurovascular intact

## 2024-02-27 DIAGNOSIS — S93.401D SPRAIN OF UNSPECIFIED LIGAMENT OF RIGHT ANKLE, SUBSEQUENT ENCOUNTER: ICD-10-CM

## 2024-03-06 ENCOUNTER — RX RENEWAL (OUTPATIENT)
Age: 59
End: 2024-03-06

## 2024-03-06 RX ORDER — PREDNISONE 1 MG/1
1 TABLET ORAL
Qty: 120 | Refills: 3 | Status: ACTIVE | COMMUNITY
Start: 2023-06-20 | End: 1900-01-01

## 2024-03-11 ENCOUNTER — APPOINTMENT (OUTPATIENT)
Dept: ORTHOPEDIC SURGERY | Facility: CLINIC | Age: 59
End: 2024-03-11
Payer: MEDICARE

## 2024-03-11 VITALS — WEIGHT: 153 LBS | BODY MASS INDEX: 28.16 KG/M2 | HEIGHT: 62 IN

## 2024-03-11 DIAGNOSIS — S93.401D SPRAIN OF UNSPECIFIED LIGAMENT OF RIGHT ANKLE, SUBSEQUENT ENCOUNTER: ICD-10-CM

## 2024-03-11 PROCEDURE — 73562 X-RAY EXAM OF KNEE 3: CPT | Mod: LT

## 2024-03-11 PROCEDURE — 20611 DRAIN/INJ JOINT/BURSA W/US: CPT | Mod: LT

## 2024-03-11 PROCEDURE — 99213 OFFICE O/P EST LOW 20 MIN: CPT | Mod: 25

## 2024-03-11 NOTE — PROCEDURE
[Large Joint Injection] : Large joint injection [Knee] : knee [Left] : of the left [___ cc    1%] : Lidocaine ~Vcc of 1%  [___ cc    4mg] : Dexamethasone (Decadron) ~Vcc of 4 mg  [Risks, benefits, alternatives discussed / Verbal consent obtained] : the risks benefits, and alternatives have been discussed, and verbal consent was obtained [All ultrasound images have been permanently captured and stored accordingly in our picture archiving and communication system] : All ultrasound images have been permanently captured and stored accordingly in our picture archiving and communication system [Visualization of the needle and placement of injection was performed without complication] : visualization of the needle and placement of injection was performed without complication

## 2024-03-11 NOTE — IMAGING
[de-identified] : Physical exam of the left knee: No effusion, no erythema, no ecchymosis.  Full extension, flexion to 120 degrees.  Tenderness to palpation over the medial joint line.  No lateral joint line tenderness to palpation.  No tenderness to palpation over the anterior aspect of the left knee or the collateral ligaments.  Stable to varus and valgus stress testing.  Equivocal Beto's testing.  Intact to light touch, she is ambulating with a cane today.

## 2024-03-11 NOTE — DATA REVIEWED
[Knee] : knee [Left] : left [FreeTextEntry1] : 3 views of the left knee were obtained here in the office today and show: No fracture or dislocation.  Well-preserved medial lateral compartments.  She has calcification over the medial and lateral menisci consistent with chondrocalcinosis.

## 2024-03-11 NOTE — HISTORY OF PRESENT ILLNESS
[de-identified] : The patient is a 59-year-old female here for an evaluation of her left knee.  She developed pain a little over 1 month ago.  No trauma to the area.  She states she cannot walk and is very painful.  She tried oxycodone which did not help her.  She has a history of diabetes and hypertension.  Last blood glucose level was 126 1 day ago.  Her last A1c was 6.4.

## 2024-03-11 NOTE — DISCUSSION/SUMMARY
[de-identified] : I reviewed the x-ray findings with the patient.  Today I recommend a cortisone injection for the left knee.  The patient agreed.  The left knee was injected with 2 cc lidocaine, 1 cc dexamethasone.  Procedure note generated.  She will do home therapy exercises for the left knee, she was given a printout from the [employer] for knee conditioning program.  Of note she is seeing me in April 2024 for her shoulder, at that point in time we will reevaluate her left knee.

## 2024-03-12 ENCOUNTER — APPOINTMENT (OUTPATIENT)
Dept: PAIN MANAGEMENT | Facility: CLINIC | Age: 59
End: 2024-03-12
Payer: MEDICARE

## 2024-03-12 ENCOUNTER — RX RENEWAL (OUTPATIENT)
Age: 59
End: 2024-03-12

## 2024-03-12 DIAGNOSIS — M47.816 SPONDYLOSIS W/OUT MYELOPATHY OR RADICULOPATHY, LUMBAR REGION: ICD-10-CM

## 2024-03-12 PROCEDURE — 99214 OFFICE O/P EST MOD 30 MIN: CPT

## 2024-03-12 RX ORDER — GABAPENTIN 600 MG/1
600 TABLET, COATED ORAL 4 TIMES DAILY
Qty: 120 | Refills: 3 | Status: ACTIVE | COMMUNITY
Start: 2023-10-27 | End: 1900-01-01

## 2024-03-12 RX ORDER — METHOCARBAMOL 750 MG/1
750 TABLET, FILM COATED ORAL 3 TIMES DAILY
Qty: 90 | Refills: 1 | Status: ACTIVE | COMMUNITY
Start: 2023-05-11 | End: 1900-01-01

## 2024-03-12 NOTE — HISTORY OF PRESENT ILLNESS
[FreeTextEntry1] : TODAY: Last seen on 01/08/2024 and today reports persistent lower back pain with no radiation of pain. She presents for a revisit appointment. Patient underwent a Caudal injection on 9/18/23 with no relief at all. Her main complaint is severe lower back pain with pain into her right thigh which she describes as burning. She occasionally has pain down her foot. Pain exacerbated after she had a vasovagal fall in July. She underwent Lumbar Spine MRI which we reviewed today(see results), and it was significant for multiple disc bulges. She underwent Caudal ELAINA with no improvement. We will resubmit for b/l Lumbar MBB today. She is medically managed with a regimen of Percocet, Gabapentin and Methocarbamol which provides her with over 75% relief on most days and allows her to function and perform ADLs without side effects.   UDS: from 01/08/2024 is consistent.  TODAY, 3-: Revisit encounter.   She continues today with ongoing axial lower back pain. Pain is associated with stiffness and spasms. Pain is made worse with rotational movements. She also notes pain with transitioning from a seated to standing position. Pain is present daily. She denies any radicular pain. She continues to use a cane to ambulate. She states she cannot sleep at night due to the pain.    Of note, she recently underwent a shot in her left hand for trigger finger with Dr. Schwartz. She did also undergo a left knee injection yesterday with Jurgen CONNER.   She is currently being maintained on Gabapentin 600 mg QID, Methocarbamol 750 mg TID and oxyCODONE-Acetaminophen  mg BID. With the use of this regimen, she affords over 50% relief of pain along with improvement in functionality throughout the day. She denies any side effects.

## 2024-03-12 NOTE — PHYSICAL EXAM
[de-identified] : Lumbar Spine Exam:  Inspection:  erythema (-)  ecchymosis (-)  rashes (-)  alignment: no scoliosis    Palpation:  Midline lumbar tenderness:             (-)  paraspinal tenderness:                  L (+) ; R (+)  sciatic nerve tenderness :             L (-) ; R (-)  PSIS tenderness:                           L (+); R (+)  SI joint tenderness:                        L (-) ; R (-)  GTB tenderness:                            L (-);  R (-)    Limited ROM 2/2 to pain with lumbar extension and rotation bilaterally  Stiffness noted on exam during extension and rotation to both R and L side    Strength: 5/5 throughout all muscle groups of the lower extremity                                      Right       Left     Hip Flexion:                (5/5)       (5/5)  Quadriceps:               (5/5)       (5/5)  Hamstrings:                (5/5)       (5/5)  Ankle Dorsiflexion:     (5/5)       (5/5)  EHL:                           (5/5)       (5/5)  Ankle Plantarflexion:  (5/5)       (5/5)    Special Tests:  SLR:                           R (-) ; L (-)  Facet loading:            R (+) ; L (+)  KEMPS test:               R (+);  L (+)  ROMMEL test:               R (-) ; L (-)    Neurologic: Light touch intact throughout LE   Reflexes normal and symmetric    Gait: non- antalgic gait, cautious / slow gait especially after initially standing up ambulates w/o assistive device

## 2024-03-12 NOTE — DISCUSSION/SUMMARY
[de-identified] : A discussion regarding available pain management treatment options occurred with the patient. These included interventional, rehabilitative, pharmacological, and alternative modalities. We will proceed with the following:   Interventional treatment options: - Plan is for patient to undergo a Bilateral L3, L4, L5 medial branch facet block. This will need to be resubmitted after 3- as it was recently denied.   Medications: 1. Continue with Gabapentin 600 mg QID. 2. Continue with Methocarbamol 750 mg TID. 3. Refilled oxyCODONE-Acetaminophen  mg BID. I advised the patient that I cannot increase the dosage/frequency of this medication.   Patient understands that I will not continue the medication regimen as currently prescribed long term. I had a long discussion with the patient regarding the chronic use of opioids. She is free to find another pain physician who may be agreeable to continue this regimen. I am happy to help facilitate in any way I can. I provided her with a list of outside pain management physicians who may be willing to take over this regimen.   ISTOP CHECKED Reference # [ [id number] ]   The R/B/A of chronic opiate therapy for non-malignant pain including, but not limited to, the risk of addiction, overdose, respiratory depression, and death was discussed and accepted by the patient. Patient was also informed that they should not consume alcohol or drive a motor vehicle under any circumstances while taking opiate pain medications.  The patient reports good pain control with their current medication regimen. They deny any intolerable side effects. There is no obvious aberrant behavior. The patient is more functional with regard to his ADLs and social activity as result of their current medication regimen.   - Follow up in 4 weeks to resubmit for injection.   I Marianna Jaramillo attest that this documentation has been prepared under the direction and in the presence of provider Dr. Dionicio Blount.  The documentation recorded by the scribe in my presence, accurately reflects the service I personally performed, and the decisions made by me with my edits as appropriate.   Dionicio Blount, DO

## 2024-03-21 NOTE — H&P PST ADULT - NSICDXPASTMEDICALHX_GEN_ALL_CORE_FT
Positive
PAST MEDICAL HISTORY:  Arthritis     Asthma     Diabetes     GERD (gastroesophageal reflux disease)     High cholesterol     History of peripheral arterial disease     HTN (hypertension)     TRAVIS on CPAP

## 2024-03-21 NOTE — PATIENT PROFILE ADULT - NSPROPTRIGHTCAREGIVER_GEN_A_NUR
Outpatient Sleep Medicine Consultation:    Name: Nabil Cheung MRN# [id number]   Age: 65 year old YOB: 1958     Date of Consultation: March 21, 2024  Consultation is requested by: Carlos Correa MD  [address] [id number] Carlos Correa  Primary care provider: Samira Lopez       Reason for Sleep Consult:     Nabil Cheung is sent by Carlos Correa for a sleep consultation regarding 1. Excessive sleepiness    Patient s Reason for visit  Nabil Cheung main reason for visit: my partner is concerned with my sleep qualitt  Patient states problem(s) started: 10 years ago  Nabil Cheung's goals for this visit: get more information         Assessment and Plan:     Summary Sleep Diagnoses:  1. Snoring  2. Witnessed apneic spells  3. Chronic fatigue  4. Nocturnal leg movements  Comorbid Diagnoses:  5. Severe persistent asthma without complication (H28)  6. Coronary artery disease involving native coronary artery of native heart with other form of angina pectoris (H24)  7. Essential (primary) hypertension  8. Uncontrolled type 2 diabetes mellitus with hyperglycemia (H)    Patient is being evaluated for Obstructive Sleep Apnea (NAVI).  Patient's risk factors for NAVI include: snoring, daytime fatigue (ESS 9), witnessed apneas and gasp arousals, enlarged neck girth (49 cm), high blood pressure, age >50, and male gender. We discussed pathophysiology of NAVI and consequences of untreated moderate to severe sleep apnea such as a higher risk of hypertension, cardiovascular disease, cardiac arrhythmias, and stroke with worse outcomes after these events, as well as poor control of hypertension and diabetes. Patient is interested in treatment and willing to undergo overnight sleep testing. Order placed today for overnight attended polysomnography evaluation. HST is inappropriate for this patient due to severe asthma and additional sleep concern of leg movements in sleep that sound suspicious of PLMS (though denies  restless leg symptoms today). We briefly potential treatment modalities (CPAP and MAD) in the event sleep apnea is identified on testing and additional information given in patient instructions. Will plan to discuss in more detail at next visit pending study results. Patient prefers in person results discussion shortly following testing instead of MyChart so will see him back about 3 weeks after PSG for results discussion. Patient to follow-up with PCP regarding elevated blood pressure.   - Comprehensive Sleep Study; Future         History of Present Illness:     Nabil Cheung is a 65 year old male with obesity, T2DM with neuropathy, retinopathy, nephropathy, HTN, CAD s/p CABG x4, BPH, chronic pain, asthma, who presents to clinic today for evaluation of suspected sleep apnea.     Past Sleep Evaluations: E-consult completed by Dr. Arana 12/1/2023 and PSG orders were placed given snoring, daytime tiredness/fatigue, witnessed apneas/gasping/choking in sleep and excessive leg movements in sleep. Appears PSG was not completed but was scheduled instead for consult today.     SLEEP-WAKE SCHEDULE:     Work/School Days: Patient goes to school/work: No   Usually gets into bed at 12:00am  Takes patient about 5 minutes to fall asleep  Has trouble falling asleep 0-1 nights per week  Wakes up in the middle of the night 3-4 times.  Wakes up due to Use the bathroom  He has trouble falling back asleep 0 times a week.   It usually takes 5 minutes or less to get back to sleep  Patient is usually up at between 8 and 9 am  Uses alarm: No    Weekends/Non-work Days/All Other Days:  Same as above    Sleep Need  Patient estimates he gets 7 to 8 hours sleep on average   Patient thinks he needs about 7 to 8 hours sleep    Nabil Cheung prefers to sleep in this position(s): Side     Naps  Patient takes a purposeful nap 5 times a week and naps are usually ~1 hour in duration  He feels better after a nap: Yes  He dozes off unintentionally 0 days  "per week  Patient has had a driving accident or near-miss due to sleepiness/drowsiness: No  He had a total [place] Sleepiness Scale of 9 (03/21/24 0975), with scores of 10 or higher indicating abnormal levels of sleepiness.     SLEEP DISRUPTIONS:    Breathing/Snoring  Patient snores:Yes   Other people complain about his snoring: No  Patient has been told he stops breathing in his sleep:Yes  Gasp/choking arousals: Yes  He has issues with the following: Morning mouth dryness;Getting up to urinate more than once    Movement:  Patient gets pain, discomfort, with an urge to move:  Yes - \"all my life I have had really tight leg muscles even since highschool and lately it's the IT band that is so tight it becomes uncomfortable it's not restless legs but lately if I lay on the side it's painful its a muscle tightness that's all\".   It happens when he is resting:  Yes  It happens more at night:  No  It improves with movement: No  Patient has been told he kicks his legs at night:  Yes - not bothersome to him but per bed partner when they are traveling and sharing a bed (typically sleep separate at home)    Behaviors in Sleep:  Nabil Cheung has experienced the following behaviors while sleeping: Sleep-talking  Denies sleepwalking, sleep eating, bruxism, dream enactment, recurring nightmares, night terrors, sleep paralysis, hypnagogic/hypnopompic hallucinations, cataplexy      Is there anything else you would like your sleep provider to know: asthma and split sternum sometimes impact my sleep    CAFFEINE AND OTHER SUBSTANCES:    Patient consumes caffeinated beverages per day:  1 to 2  Last caffeine use is usually: varies, sometimes as late as 10 pm  List of any prescribed or over the counter stimulants that patient takes: none  List of any prescribed or over the counter sleep medication patient takes: none  List of previous sleep medications that patient has tried: none  Patient drinks alcohol to help them sleep: No  Patient " drinks alcohol near bedtime: No    Family History:  Patient has a family member been diagnosed with a sleep disorder: Yes  brother has sleep apnea and uses cpap     SCALES:    EPWORTH SLEEPINESS SCALE         3/21/2024     9:26 AM    Weston Sleepiness Scale ( SCOTTY [place]o  [id number]<br>ESS - [place]/English - Final version - 21 Nov 07 - [facility] [place].)   Sitting and reading Slight chance of dozing   Watching TV Slight chance of dozing   Sitting, inactive in a public place (e.g. a theatre or a meeting) Slight chance of dozing   As a passenger in a car for an hour without a break Moderate chance of dozing   Lying down to rest in the afternoon when circumstances permit High chance of dozing   Sitting and talking to someone Would never doze   Sitting quietly after a lunch without alcohol Slight chance of dozing   In a car, while stopped for a few minutes in traffic Would never doze   Weston Score (MC) 9   Weston Score (Sleep) 9       INSOMNIA SEVERITY INDEX (DC)          3/21/2024     9:06 AM   Insomnia Severity Index (DC)   Difficulty falling asleep 0   Difficulty staying asleep 1   Problems waking up too early 0   How SATISFIED/DISSATISFIED are you with your CURRENT sleep pattern? 2   How NOTICEABLE to others do you think your sleep problem is in terms of impairing the quality of your life? 3   How WORRIED/DISTRESSED are you about your current sleep problem? 0   To what extent do you consider your sleep problem to INTERFERE with your daily functioning (e.g. daytime fatigue, mood, ability to function at work/daily chores, concentration, memory, mood, etc.) CURRENTLY? 1   DC Total Score 7       Guidelines for Scoring/Interpretation:  Total score categories:  0-7 = No clinically significant insomnia   8-14 = Subthreshold insomnia   15-21 = Clinical insomnia (moderate severity)  22-28 = Clinical insomnia (severe)  Used via courtesy of www.[place].gov with permission from George Lubin PhD., [facility]  [place]    Allergies:    Allergies   Allergen Reactions    Atorvastatin     Bactrim [Sulfamethoxazole-Trimethoprim]      Kidney issues    Dust Mites     Fluorescein Nausea and Vomiting       Medications:    Current Outpatient Medications   Medication Sig Dispense Refill    acetaminophen (TYLENOL) 325 MG tablet Take 650 mg by mouth every 6 hours as needed for mild pain      albuterol (PROAIR HFA/PROVENTIL HFA/VENTOLIN HFA) 108 (90 Base) MCG/ACT inhaler Inhale 1-2 puffs into the lungs      alpha-lipoic acid 600 MG capsule Take 600 mg by mouth daily       aspirin 81 MG EC tablet Take 81 mg by mouth every other day      coenzyme Q-10 200 MG CAPS capsule Take 1 capsule by mouth daily      FARXIGA 10 MG TABS tablet Take 1 tablet (10 mg) by mouth daily for 90 days 90 tablet 0    finasteride (PROSCAR) 5 MG tablet Take 5 mg by mouth daily      insulin aspart (NOVOLOG PEN) 100 UNIT/ML pen Inject 1-7 Units Subcutaneous 3 times daily (before meals) Correction Scale - MEDIUM INSULIN RESISTANCE DOSING     Do Not give Correction Insulin if Pre-Meal BG less than 140.   For Pre-Meal  - 189 give 1 unit.   For Pre-Meal  - 239 give 2 units.   For Pre-Meal  - 289 give 3 units.   For Pre-Meal  - 339 give 4 units.   For Pre-Meal - 399 give 5 units.   For Pre-Meal -449 give 6 units  For Pre-Meal BG greater than or equal to 450 give 7 units.   To be given with prandial insulin, and based on pre-meal blood glucose.    Notify provider if glucose greater than or equal to 350 mg/dL after administration of correction dose. If given at mealtime, administer within 30 minutes of start of meal (Patient taking differently: Inject 1-7 Units Subcutaneous 2 times daily (with meals) Correction Scale - MEDIUM INSULIN RESISTANCE DOSING     Only use at lunch and dinner time, patient fasting at AM  Do Not give Correction Insulin if Pre-Meal BG less than 140.   For Pre-Meal  - 189 give 1 unit.   For Pre-Meal  -  239 give 2 units.   For Pre-Meal  - 289 give 3 units.   For Pre-Meal  - 339 give 4 units.   For Pre-Meal - 399 give 5 units.   For Pre-Meal -449 give 6 units  For Pre-Meal BG greater than or equal to 450 give 7 units.   To be given with prandial insulin, and based on pre-meal blood glucose.    Notify provider if glucose greater than or equal to 350 mg/dL after administration of correction dose. If given at mealtime, administer within 30 minutes of start of meal)      insulin aspart (NOVOLOG PEN) 100 UNIT/ML pen Inject 1-5 Units Subcutaneous At Bedtime MEDIUM INSULIN RESISTANCE DOSING    Do Not give Bedtime Correction Insulin if BG less than  200.   For  - 249 give 1 units.   For  - 299 give 2 units.   For  - 349 give 3 units.   For  -399 give 4 units.   For BG greater than or equal to 400 give 5 units.  Notify provider if glucose greater than or equal to 350 mg/dL after administration of correction dose. If given at mealtime, administer within 30 minutes of start of meal      insulin aspart (NOVOLOG PEN) 100 UNIT/ML pen DOSE:  1 units per 15 grams of carbohydrate before breakfast, lunch, dinner.  Only chart total amount of units given.  Do not give if pre-prandial glucose is less than 60 mg/dL. If given at mealtime, administer within 30 minutes of start of mealDOSE:  1 units per 15 grams of carbohydrate.  Only chart total amount of units given.  Do not give if pre-prandial glucose is less than 60 mg/dL. If given at mealtime, administer within 30 minutes of start of meal      insulin glargine (LANTUS PEN) 100 UNIT/ML pen Inject 32 Units Subcutaneous at bedtime 15 mL 3    magnesium oxide 400 MG CAPS Take by mouth daily      metFORMIN (GLUCOPHAGE) 500 MG tablet Take 500 mg by mouth 2 times daily (with meals)      metoprolol succinate ER (TOPROL XL) 50 MG 24 hr tablet Take 50 mg by mouth daily      nitroGLYcerin (NITROSTAT) 0.4 MG sublingual tablet Place 0.4 mg under the  tongue every 5 minutes as needed for chest pain For chest pain place 1 tablet under the tongue every 5 minutes for 3 doses. If symptoms persist 5 minutes after 1st dose call 911.      rosuvastatin (CRESTOR) 40 MG tablet Take 20 mg by mouth daily Evening       SYMBICORT 80-4.5 MCG/ACT Inhaler Inhale 1 puff into the lungs two times daily      terazosin (HYTRIN) 2 MG capsule Take 1 capsule (2 mg) by mouth at bedtime for 90 days 90 capsule 0    valsartan (DIOVAN) 160 MG tablet Take 160 mg by mouth daily      vitamin C (ASCORBIC ACID) 1000 MG TABS Take 1,000 mg by mouth daily      vitamin D3 (CHOLECALCIFEROL) 2000 units (50 mcg) tablet Take 1 tablet by mouth daily         Problem List:  Patient Active Problem List    Diagnosis Date Noted    Severe persistent asthma without complication (H28) 12/01/2023     Priority: Medium    History of amputation of lesser toe of left foot (H24) 02/16/2023     Priority: Medium    Chronic kidney disease, stage 3 (H) 08/23/2021     Priority: Medium    Cellulitis and abscess of foot, except toes 08/16/2021     Priority: Medium    Diabetic ulcer of left midfoot associated with type 1 diabetes mellitus, with fat layer exposed (H) 08/16/2021     Priority: Medium    Cellulitis and abscess of foot 12/17/2020     Priority: Medium    Proliferative diabetic retinopathy of both eyes associated with diabetes mellitus due to underlying condition (H) 10/28/2020     Priority: Medium    Non-healing surgical wound, subsequent encounter 06/10/2020     Priority: Medium     Added automatically from request for surgery [id number]      Surgical wound infection 06/08/2020     Priority: Medium    S/P CABG x 4 05/19/2020     Priority: Medium     Added automatically from request for surgery [id number]      Coronary artery disease involving native coronary artery of native heart with other form of angina pectoris (H24) 05/04/2020     Priority: Medium    Epididymoorchitis 02/05/2020     Priority: Medium     Left,  2/2020      Retrograde ejaculation 12/02/2019     Priority: Medium    Erectile dysfunction associated with type 2 diabetes mellitus (H) 08/21/2019     Priority: Medium    BPH with obstruction/lower urinary tract symptoms 07/30/2019     Priority: Medium     Last Assessment & Plan:   We discussed the mechanism of the patient's symptoms from prostatic enlargement.  Discussed medical vs surgical options to alleviate his symptoms, prevent further retention, and prevent progression to possible bladder dysfunction and kidney injury. Medical options for him consist of starting an alpha blocker to relax his bladder outlet and finasteride (Proscar) to help shrink his prostate.  Discussed that finasteride takes around 6 months to achieve most of its effect.  Reviewed the side effects of both of these medications.  Surgery would very quickly resolve the obstruction, and surgical options of TURP discussed.  Patient will need cystoscopy prior to making this decision to better evaluate the prostate and bladder.     Started on tamsulosin today. Agreed to PSA screening, which has not been done since 2014 when it was 2.54ng/ml.      Diabetic ulcer of left midfoot associated with type 2 diabetes mellitus, with fat layer exposed (H) 07/23/2019     Priority: Medium    Elevated PSA, less than 10 ng/ml 07/23/2019     Priority: Medium     Last Assessment & Plan:   Physical exam significant for tender prostate so elevated PSA could be r/t prostatitis for which I'm starting long course of sulfamethoxazole-trimethoprim but will also move forward with plans for a prostate biopsy. Rectal swab collected today. Started on pyridium for comfort with voiding though I still suspect his urgency/frequency likely tied to glucosuria.     Discussed ultrasound guided prostate biopsy for further evaluation of prostate cancer.  Discussed how this procedure is performed in clinic using a local anesthesia block.  Also discussed the risks of the procedure  including infection and bleeding.  Patient agreed with this plan.      - Ordered the standard antibiotic (Cipro 750mg x 1) to be taken 2 hours prior to the biopsy  - Patient instructed to hold all aspirin-containing products for 1 week prior to biopsy  - Rectal swab done today to evaluate for resistant/ESBL bacteria.  Will adjust his pre-biopsy antibiotic if needed  - he'll keep follow up appointment at [place] with DUSTIN Terry (preferred location for patient); at that point will evaluate response to antibiotics for prostatitis and then make decision about moving forward with biopsy; discussed likely recommendation that he still proceed with biopsy.      Uncontrolled type 2 diabetes mellitus with hyperglycemia (H) 07/23/2019     Priority: Medium     Last Assessment & Plan:   Reviewed in many ways how uncontrolled diabetes mellitus contributing to urologic health problems of dysuria, ED.      Chronic right shoulder pain 10/09/2018     Priority: Medium     Last Assessment & Plan:   60 y.o. male presenting today for evaluation of right shoulder pain secondary to OA.     - Discussed conservative management of cortisone injections and physical therapy versus shoulder replacement surgery. The patient would like to proceed with physical therapy and a cortisone injection.   - WBAT RUE  - Referral to PT  - Ordered MRI for further soft tissue evaluation  - Ordered radiologic hip injection following the MRI  - Rest, ice, heat, and OTC pain medication as needed  - Follow up after your MRI      Cataracts, bilateral 09/26/2018     Priority: Medium     Last Assessment & Plan:   Mild bilateral cataracts, not yet visually significant  - Monitor      Myopia of both eyes with astigmatism and presbyopia 09/26/2018     Priority: Medium     Last Assessment & Plan:   - Discussed results of refraction with patient and new glasses RX dispensed.      Thyroid nodule 08/24/2018     Priority: Medium    Microalbuminuria 08/12/2018      Priority: Medium    Essential (primary) hypertension 06/29/2017     Priority: Medium    Diabetes mellitus without complication (H) 07/08/2014     Priority: Medium        Past Medical/Surgical History:  No past medical history on file.  Past Surgical History:   Procedure Laterality Date    AMPUTATE TOE(S) Right 8/17/2021    Procedure: left partial fifth ray amputation;  Surgeon: Mirian Cline DPM, Podiatry/Foot and Ankle Surgery;  Location: [facility] OR    GRAFT FLAP PEDICLE TRAM DELAY PROCEDURE Left 6/18/2020    Procedure: Debridement of sternal wound and left major pectoral flep;  Surgeon: MARCO Serna MD;  Location: [facility] OR    INCISION AND DRAINAGE CHEST WASHOUT, COMBINED N/A 6/12/2020    Procedure: INCISION AND DRAINAGE, WOUND, CHEST, WITH IRRIGATION and wound vac change;  Surgeon: Mark Mckinney MD;  Location: [facility] OR    REPAIR CHEST WALL N/A 6/18/2020    Procedure: Chest wall reconstruction;  Surgeon: MARCO Serna MD;  Location: [facility] OR       Social History:  Social History     Socioeconomic History    Marital status: Single     Spouse name: Not on file    Number of children: Not on file    Years of education: Not on file    Highest education level: Not on file   Occupational History    Not on file   Tobacco Use    Smoking status: Never     Passive exposure: Never    Smokeless tobacco: Never   Vaping Use    Vaping Use: Never used   Substance and Sexual Activity    Alcohol use: Yes    Drug use: No    Sexual activity: Not on file   Other Topics Concern    Not on file   Social History Narrative    6/29/17 - Pt was interested in getting help filling out [place] application online to receive medicaid. CHW gave him the list of information he needed to complete online application. He stated he had a tax extension for 2016 so he did not have tax forms that the application required. He was going to work on the application with tax information from 2015 later tonight. AM        7/27/17:    Pt wanted to  get more information about gyms that are low-cost within the [facility]. Pt's asked for a gym with a membership fee of $10-$20 (max), that also had a walking track and a hot tub/sauna. We told him that options that fit these requirements he mentioned were very limited. He then stated that he heard that the [facility] offered a $5 fee for track-usage only, however, we could not call [facility] since it was closed. We suggested to the patient to call tomorrow and also mentioned there are gyms that are low-cost nearby, however, might not have a track or hot tub/sauna. We also suggested that he could walk around a lake would also be a cheap-alternative. -HB and NV        8/28/17:    Pt was here for a med refill. We asked him about his gym membership and access based on CHW's previous encounter with him and he said he tried to walk outside more this summer, but still did not find an affordable option. He did not have need for any other services at the time.     - AA and KT         9/28/17:    Pt had questions about free/low cost (under $10/month) walking tracks.  Found a website to walk through with the patient and shared different resources. - AB and HB        7/19/18: Pt. Was not seen tonight.- ZR, KP     Social Determinants of Health     Financial Resource Strain: Low Risk  (12/1/2023)    Financial Resource Strain     Within the past 12 months, have you or your family members you live with been unable to get utilities (heat, electricity) when it was really needed?: No   Food Insecurity: Low Risk  (12/1/2023)    Food Insecurity     Within the past 12 months, did you worry that your food would run out before you got money to buy more?: No     Within the past 12 months, did the food you bought just not last and you didn t have money to get more?: No   Transportation Needs: Low Risk  (12/1/2023)    Transportation Needs     Within the past 12 months, has lack of transportation kept you from medical appointments, getting your  "medicines, non-medical meetings or appointments, work, or from getting things that you need?: No   Physical Activity: Not on file   Stress: Not on file   Social Connections: Not on file   Interpersonal Safety: Low Risk  (12/1/2023)    Interpersonal Safety     Do you feel physically and emotionally safe where you currently live?: Yes     Within the past 12 months, have you been hit, slapped, kicked or otherwise physically hurt by someone?: No     Within the past 12 months, have you been humiliated or emotionally abused in other ways by your partner or ex-partner?: No   Housing Stability: Low Risk  (12/1/2023)    Housing Stability     Do you have housing? : Yes     Are you worried about losing your housing?: No       Family History:  Family History   Problem Relation Age of Onset    Sleep Apnea Brother        Review of Systems:  In the last TWO WEEKS have you experienced any of the following symptoms?  Fevers: No  Night Sweats: No  Weight Gain: No  Pain at Night: No  Double Vision: No  Changes in Vision: No  Difficulty Breathing through Nose: No  Sore Throat in Morning: No  Dry Mouth in the Morning: No  Shortness of Breath Lying Flat: Yes  Shortness of Breath With Activity: Yes  Awakening with Shortness of Breath: No  Increased Cough: No  Heart Racing at Night: No  Swelling in Feet or Legs: No  Diarrhea at Night: No  Heartburn at Night: Yes  Urinating More than Once at Night: Yes  Losing Control of Urine at Night: No  Joint Pains at Night: No  Headaches in Morning: No  Weakness in Arms or Legs: No  Depressed Mood: No  Anxiety: No     Physical Examination:  Vitals: BP (!) 181/96   Pulse 86   Ht 1.803 m (5' 11\")   Wt 103.9 kg (229 lb)   SpO2 97%   BMI 31.94 kg/m    BMI= Body mass index is 31.94 kg/m .  General appearance: Awake, alert, cooperative. Well groomed. Sitting comfortably in chair. In no apparent distress.  HEENT: Head: Normocephalic, atraumatic. Eyes: PERRL. Conjunctiva clear. Sclera normal. Nose: " "External appearance normal.   Neck: Neck Cir (cm): 49 cm (3/21/2024  9:00 AM)  Skin: No rashes or significant lesions.   Neurologic: Alert, oriented x3. No focal neurological deficit. Gait normal.   Psychiatric: Mood euthymic. Affect congruent with full range and intensity.           Data: All pertinent previous laboratory data reviewed     Recent Labs   Lab Test 12/01/23  0937 05/27/23  1546    137   POTASSIUM 4.6 4.4   CHLORIDE 102 105   CO2 23 27   ANIONGAP 12 5   * 238*   BUN 31.0* 22   CR 1.80* 1.57*   ONEYDA 8.7* 8.2*       Recent Labs   Lab Test 05/27/23  1546   WBC 9.9   RBC 3.92*   HGB 11.1*   HCT 32.3*   MCV 82   MCH 28.3   MCHC 34.4   RDW 14.0          Recent Labs   Lab Test 12/01/23  0937 05/27/23  1546   PROTTOTAL  --  5.7*   ALBUMIN 3.9 3.2*   BILITOTAL  --  0.5   ALKPHOS  --  48   AST  --  20   ALT  --  26       TSH (mU/L)   Date Value   09/25/2017 2.79   06/29/2017 3.17       No results found for: \"UAMP\", \"UBARB\", \"BENZODIAZEUR\", \"UCANN\", \"UCOC\", \"OPIT\", \"UPCP\"    No results found for: \"IRONSAT\", \"XU02285\", \"NENA\"    pH Venous POCT (no units)   Date Value   08/15/2021 7.39       @LABRCNTIPR(phv:4,pco2v:4,po2v:4,hco3v:4,rere:4,o2per:4)@    Echocardiology: No results found for this or any previous visit (from the past 4320 hour(s)).    Chest x-ray:   XR Chest 2 Views 05/27/2023    Narrative  EXAM: XR CHEST 2 VIEWS  LOCATION: [facility]  DATE/TIME: 5/27/2023 4:22 PM CDT    INDICATION: sob  COMPARISON: 06/20/2020    Impression  IMPRESSION: No acute cardiopulmonary abnormality. Unchanged borderline enlarged cardiomediastinal silhouette with median sternotomy wires.      Chest CT: No results found for this or any previous visit from the past 365 days.      PFT: Most Recent [facility] Pulmonary Function Testing    No results found for: \"20001\"  No results found for: \"20002\"  No results found for: \"20003\"  No results found for: \"20015\"  No results found for: \"20016\"  No " "results found for: \"20027\"  No results found for: \"20028\"  No results found for: \"20029\"  No results found for: \"20079\"  No results found for: \"20080\"  No results found for: \"20081\"  No results found for: \"20335\"  No results found for: \"20105\"  No results found for: \"20053\"  No results found for: \"20054\"  No results found for: \"20055\"      Louisa Harris PA-C 3/21/2024     [facility] [place]  [address] [address]     [facility]  [address] [address]    Chart documentation was completed, in part, with [employer] voice-recognition software. Even though reviewed, some grammatical, spelling, and word errors may remain.    " no

## 2024-04-09 ENCOUNTER — APPOINTMENT (OUTPATIENT)
Dept: ORTHOPEDIC SURGERY | Facility: CLINIC | Age: 59
End: 2024-04-09
Payer: OTHER MISCELLANEOUS

## 2024-04-09 PROCEDURE — 73030 X-RAY EXAM OF SHOULDER: CPT | Mod: LT

## 2024-04-09 PROCEDURE — 99213 OFFICE O/P EST LOW 20 MIN: CPT | Mod: ACP

## 2024-04-09 NOTE — DISCUSSION/SUMMARY
[de-identified] : I reviewed the x-ray findings with the patient.  She will continue home stretching exercises for her left shoulder.  I will see her back in 2 months for further evaluation.  She is retired, 100% temporarily disabled.

## 2024-04-09 NOTE — HISTORY OF PRESENT ILLNESS
[de-identified] : The patient is a 59-year-old female here for subsequent re-evaluation of her left shoulder.  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  She noticed a prominence over the anterior aspect of her left shoulder a couple weeks ago.  She has some pain there.  She is doing home stretching exercises for the shoulder.

## 2024-04-09 NOTE — DATA REVIEWED
[Left] : left [Shoulder] : shoulder [FreeTextEntry1] : 3 views of the left shoulder were obtained in the office today and show: The surgical prosthesis in acceptable position.

## 2024-04-09 NOTE — PHYSICAL EXAM
[Left] : left shoulder [] : motor and sensory intact distally [de-identified] :   Negative Nav testing.  Good strength with rotator cuff resistance testing. [TWNoteComboBox7] : active forward flexion 160 degrees [TWNoteComboBox4] : passive forward flexion 180 degrees [de-identified] : active abduction 120 degrees [TWNoteComboBox6] : internal rotation L2

## 2024-04-11 ENCOUNTER — APPOINTMENT (OUTPATIENT)
Dept: PAIN MANAGEMENT | Facility: CLINIC | Age: 59
End: 2024-04-11
Payer: MEDICARE

## 2024-04-11 DIAGNOSIS — M47.816 SPONDYLOSIS W/OUT MYELOPATHY OR RADICULOPATHY, LUMBAR REGION: ICD-10-CM

## 2024-04-11 DIAGNOSIS — M54.16 RADICULOPATHY, LUMBAR REGION: ICD-10-CM

## 2024-04-11 PROCEDURE — 99214 OFFICE O/P EST MOD 30 MIN: CPT

## 2024-04-11 RX ORDER — OXYCODONE AND ACETAMINOPHEN 10; 325 MG/1; MG/1
10-325 TABLET ORAL TWICE DAILY
Qty: 60 | Refills: 0 | Status: ACTIVE | COMMUNITY
Start: 2022-08-04 | End: 1900-01-01

## 2024-04-12 ENCOUNTER — APPOINTMENT (OUTPATIENT)
Dept: ORTHOPEDIC SURGERY | Facility: CLINIC | Age: 59
End: 2024-04-12
Payer: OTHER MISCELLANEOUS

## 2024-04-12 ENCOUNTER — APPOINTMENT (OUTPATIENT)
Dept: ORTHOPEDIC SURGERY | Facility: CLINIC | Age: 59
End: 2024-04-12
Payer: MEDICARE

## 2024-04-12 DIAGNOSIS — M65.841 OTHER SYNOVITIS AND TENOSYNOVITIS, RIGHT HAND: ICD-10-CM

## 2024-04-12 DIAGNOSIS — G56.01 CARPAL TUNNEL SYNDROME, RIGHT UPPER LIMB: ICD-10-CM

## 2024-04-12 PROCEDURE — 99214 OFFICE O/P EST MOD 30 MIN: CPT

## 2024-04-12 PROCEDURE — 99213 OFFICE O/P EST LOW 20 MIN: CPT | Mod: 25

## 2024-04-12 PROCEDURE — 20550 NJX 1 TENDON SHEATH/LIGAMENT: CPT

## 2024-04-12 NOTE — ASU PREOP CHECKLIST - BP NONINVASIVE DIASTOLIC (MM HG)
April 12, 2024     Patient: Randall Briscoe   YOB: 2017   Date of Visit: 4/12/2024       To Whom it May Concern:    Randall Briscoe was seen in my clinic on 4/12/2024. He may return to school on 4/15/2024 .    If you have any questions or concerns, please don't hesitate to call.         Sincerely,          ANTOINE Rico        CC: No Recipients  
[phone]

## 2024-04-12 NOTE — ASSESSMENT
[FreeTextEntry1] : The patient comes in for a follow up. The patient states the injection helped her.   L hand:  Mild swelling  Tender 2nd A1 pulley  Decreased index ROM  +index triggering  My impression is that this patient has stenosing tenosynovitis of the finger, more commonly known as a trigger finger.  I recommended that the patient undergo a trigger finger injection. The risks, benefits, and alternatives were discussed with the patient in detail. This included (but was not limited to) pain, infection, subcutaneous atrophy, skin depigmentation, elevated glucose etc... The patient agreed to undergo a steroid injection. Under informed consent and sterile conditions, 0.1 cc of 1% plain lidocaine  and 0.9 cc of dexamethasone(4 milligrams per milliliter) was precisely injected into the patient's finger. A sterile Band-Aid was placed.  It is my hope that this significantly alleviates the patient's symptoms. The patient opted for a second injection for the left index finger flexor tendon sheath.  The patient tolerated the injection well. The patient will follow up in a month for repeat evaluation. If the patient is feeling better, they will cancel.

## 2024-04-12 NOTE — ASSESSMENT
[FreeTextEntry1] : The patient will continue to brace. She will follow up in 6-8 weeks for repeat evaluation.  She will also continue with therapy.  At that time she returns she is not doing better we may consider an injection into the fourth extensor compartment for the extensor tenosynovitis and we also may consider an injection for the trigger finger.

## 2024-04-12 NOTE — IMAGING
[de-identified] : Right wrist: Tender palpation over extensor compartment mild tenosynovitis, full range of motion of fingers and wrist, slightly tender palpation over A1 pulley of the small finger, neurovascular status improving

## 2024-04-12 NOTE — HISTORY OF PRESENT ILLNESS
[de-identified] : The patient comes in for a follow up. The patient started therapy on 3/29/24.  She has some pain directly in the wrist.  This is new pain.  She also says that she is having some locking of the small finger.  It is not happening regularly.

## 2024-04-15 PROBLEM — M54.16 LUMBAR RADICULOPATHY: Status: ACTIVE | Noted: 2022-08-18

## 2024-04-15 PROBLEM — M47.816 ARTHRITIS OF FACET JOINT OF LUMBAR SPINE: Status: ACTIVE | Noted: 2022-06-29

## 2024-04-15 NOTE — PHYSICAL EXAM
[de-identified] : Lumbar Spine Exam:  Inspection:  erythema (-)  ecchymosis (-)  rashes (-)  alignment: no scoliosis    Palpation:  Midline lumbar tenderness:             (-)  paraspinal tenderness:                  L (+) ; R (+)  sciatic nerve tenderness :             L (-) ; R (-)  PSIS tenderness:                           L (+); R (+)  SI joint tenderness:                        L (-) ; R (-)  GTB tenderness:                            L (-);  R (-)    Limited ROM 2/2 to pain with lumbar extension and rotation bilaterally  Stiffness noted on exam during extension and rotation to both R and L side    Strength: 5/5 throughout all muscle groups of the lower extremity                                      Right       Left     Hip Flexion:                (5/5)       (5/5)  Quadriceps:               (5/5)       (5/5)  Hamstrings:                (5/5)       (5/5)  Ankle Dorsiflexion:     (5/5)       (5/5)  EHL:                           (5/5)       (5/5)  Ankle Plantarflexion:  (5/5)       (5/5)    Special Tests:  SLR:                           R (-) ; L (-)  Facet loading:            R (+) ; L (+)  KEMPS test:               R (+);  L (+)  ROMMEL test:               R (-) ; L (-)    Neurologic: Light touch intact throughout LE   Reflexes normal and symmetric    Gait: non- antalgic gait, cautious / slow gait especially after initially standing up ambulates w/o assistive device

## 2024-04-15 NOTE — DISCUSSION/SUMMARY
[de-identified] : A discussion regarding available pain management treatment options occurred with the patient. These included interventional, rehabilitative, pharmacological, and alternative modalities. We will proceed with the following:   Interventional treatment options: - None indicated at this time.   Medications: 1. Continue with Gabapentin 600 mg QID. 2. Continue with Methocarbamol 750 mg TID. 3. Refilled oxyCODONE-Acetaminophen  mg BID. Plan for taper off over a few months   Ref # istop [id number] Patient understands that I will not continue the medication regimen as currently prescribed long term. I had a long discussion with the patient regarding the chronic use of opioids. She is free to find another pain physician who may be agreeable to continue this regimen. She is scheduled to see Dr. Rob on 5-6-2024 to discuss taking over her medication regimen. I am happy to facilitate in any way that I can.   Patient has had multiple inconsistent urine toxicology reports showing benzodiazepines. She denies taking any such substances or ever being prescribed from an outside physician. Her most recent urine was negative for oxyCODONE which was done in 1-8-2024.   ISTOP Checked and Verified.   The R/B/A of chronic opiate therapy for non-malignant pain including, but not limited to, the risk of addiction, overdose, respiratory depression, and death was discussed and accepted by the patient. Patient was also informed that they should not consume alcohol or drive a motor vehicle under any circumstances while taking opiate pain medications.  The patient reports good pain control with their current medication regimen. They deny any intolerable side effects. There is no obvious aberrant behavior. The patient is more functional with regard to his ADLs and social activity as result of their current medication regimen.   - Follow up in 4 weeks.   I Marianna Jaramillo attest that this documentation has been prepared under the direction and in the presence of provider Dr. Dionicio Blount.  The documentation recorded by the scribe in my presence, accurately reflects the service I personally performed, and the decisions made by me with my edits as appropriate.   Dionicio Blount, DO

## 2024-04-16 ENCOUNTER — APPOINTMENT (OUTPATIENT)
Dept: ORTHOPEDIC SURGERY | Facility: CLINIC | Age: 59
End: 2024-04-16

## 2024-04-30 ENCOUNTER — RX RENEWAL (OUTPATIENT)
Age: 59
End: 2024-04-30

## 2024-04-30 RX ORDER — ROSUVASTATIN CALCIUM 20 MG/1
20 TABLET, FILM COATED ORAL
Qty: 90 | Refills: 3 | Status: ACTIVE | COMMUNITY
Start: 2022-05-05 | End: 1900-01-01

## 2024-05-09 ENCOUNTER — APPOINTMENT (OUTPATIENT)
Dept: PAIN MANAGEMENT | Facility: CLINIC | Age: 59
End: 2024-05-09

## 2024-05-14 ENCOUNTER — APPOINTMENT (OUTPATIENT)
Dept: ORTHOPEDIC SURGERY | Facility: CLINIC | Age: 59
End: 2024-05-14
Payer: MEDICARE

## 2024-05-14 PROCEDURE — 99214 OFFICE O/P EST MOD 30 MIN: CPT

## 2024-05-14 PROCEDURE — A4467: CPT | Mod: LT

## 2024-05-14 NOTE — ASSESSMENT
[FreeTextEntry1] : Patient comes in for follow-up of her left index finger triggering.  She says it has not helped her.  She finds that the injection is worse.  She also has pain by the base of her thumb by her wrist.  She does not have other complaints.  L hand:  +thumb CMC swelling  +thumb CMC tenderness  Decreased thumb ROM  +Basal grind test  L hand:  Mild swelling  Tender 2nd A1 pulley  Decreased index ROM  +index triggering  We again reviewed the anatomy of the flexor sheath and pathology of trigger fingers with the use of drawings and discussion.  The patient has failed injections/nonoperative treatment.  We discussed the possibility of surgery including the use of an open trigger finger release.  We discussed that too many injections may lead to weakening of the tendon/tendon rupture and that surgery is indicated at this point.  Risks include bleeding, infection, injury to nerves, vessels, tendons, stiffness, pain, loss of range of motion, loss of function, CRPS, and risks and complications of anesthesia.  We discussed the surgical plan and post op expectations.  We also discussed the possibility of prolonged pain/scar tissue and the possible need for therapy.  The patient is amenable to the risks, had the opportunity to ask questions, all questions were answered and the patient signed consent of their own accord.  They will be contacted by my surgical scheduler.  The patient was advised of the diagnosis. The natural history of the pathology was explained in full to the patient in layman's terms. We reviewed that the forces applied to the thumb tip are magnified 12-14 times at the thumb cmc joint.  We also reviewed the progression of arthritis with early arthritis showing minimal to no xray changes.  We discussed treatment options, including activity modification(demonstrated), medicine(topical and oral), bracing, therapy, injection and eventually surgery.  We reviewed the r/b of each.  All questions were answered and the patient verbalized understanding patient was given a way thumb brace for the left hand side.  She tolerated this placement well.  She understands how to use it.

## 2024-05-19 ENCOUNTER — RX RENEWAL (OUTPATIENT)
Age: 59
End: 2024-05-19

## 2024-05-19 RX ORDER — DAPAGLIFLOZIN 10 MG/1
10 TABLET, FILM COATED ORAL
Qty: 90 | Refills: 3 | Status: ACTIVE | COMMUNITY
Start: 2022-01-19 | End: 1900-01-01

## 2024-05-19 RX ORDER — SEMAGLUTIDE 2.68 MG/ML
8 INJECTION, SOLUTION SUBCUTANEOUS
Qty: 3 | Refills: 3 | Status: ACTIVE | COMMUNITY
Start: 2023-01-10 | End: 1900-01-01

## 2024-05-20 ENCOUNTER — APPOINTMENT (OUTPATIENT)
Dept: ORTHOPEDIC SURGERY | Facility: CLINIC | Age: 59
End: 2024-05-20
Payer: MEDICARE

## 2024-05-20 ENCOUNTER — OUTPATIENT (OUTPATIENT)
Dept: OUTPATIENT SERVICES | Facility: HOSPITAL | Age: 59
LOS: 1 days | End: 2024-05-20
Payer: MEDICARE

## 2024-05-20 VITALS
OXYGEN SATURATION: 98 % | WEIGHT: 154.98 LBS | RESPIRATION RATE: 20 BRPM | SYSTOLIC BLOOD PRESSURE: 116 MMHG | HEART RATE: 64 BPM | TEMPERATURE: 98 F | HEIGHT: 62 IN | DIASTOLIC BLOOD PRESSURE: 74 MMHG

## 2024-05-20 DIAGNOSIS — Z98.890 OTHER SPECIFIED POSTPROCEDURAL STATES: Chronic | ICD-10-CM

## 2024-05-20 DIAGNOSIS — M65.322 TRIGGER FINGER, LEFT INDEX FINGER: ICD-10-CM

## 2024-05-20 DIAGNOSIS — Z01.818 ENCOUNTER FOR OTHER PREPROCEDURAL EXAMINATION: ICD-10-CM

## 2024-05-20 DIAGNOSIS — M25.562 PAIN IN LEFT KNEE: ICD-10-CM

## 2024-05-20 LAB
A1C WITH ESTIMATED AVERAGE GLUCOSE RESULT: 7.9 % — HIGH (ref 4–5.6)
ALBUMIN SERPL ELPH-MCNC: 4.4 G/DL — SIGNIFICANT CHANGE UP (ref 3.5–5.2)
ALP SERPL-CCNC: 82 U/L — SIGNIFICANT CHANGE UP (ref 30–115)
ALT FLD-CCNC: 41 U/L — SIGNIFICANT CHANGE UP (ref 0–41)
ANION GAP SERPL CALC-SCNC: 15 MMOL/L — HIGH (ref 7–14)
AST SERPL-CCNC: 23 U/L — SIGNIFICANT CHANGE UP (ref 0–41)
BASOPHILS # BLD AUTO: 0.03 K/UL — SIGNIFICANT CHANGE UP (ref 0–0.2)
BASOPHILS NFR BLD AUTO: 0.4 % — SIGNIFICANT CHANGE UP (ref 0–1)
BILIRUB SERPL-MCNC: 0.2 MG/DL — SIGNIFICANT CHANGE UP (ref 0.2–1.2)
BUN SERPL-MCNC: 8 MG/DL — LOW (ref 10–20)
CALCIUM SERPL-MCNC: 9.1 MG/DL — SIGNIFICANT CHANGE UP (ref 8.4–10.5)
CHLORIDE SERPL-SCNC: 100 MMOL/L — SIGNIFICANT CHANGE UP (ref 98–110)
CO2 SERPL-SCNC: 26 MMOL/L — SIGNIFICANT CHANGE UP (ref 17–32)
CREAT SERPL-MCNC: 0.7 MG/DL — SIGNIFICANT CHANGE UP (ref 0.7–1.5)
EGFR: 100 ML/MIN/1.73M2 — SIGNIFICANT CHANGE UP
EOSINOPHIL # BLD AUTO: 0.06 K/UL — SIGNIFICANT CHANGE UP (ref 0–0.7)
EOSINOPHIL NFR BLD AUTO: 0.8 % — SIGNIFICANT CHANGE UP (ref 0–8)
ESTIMATED AVERAGE GLUCOSE: 180 MG/DL — HIGH (ref 68–114)
GLUCOSE SERPL-MCNC: 138 MG/DL — HIGH (ref 70–99)
HCT VFR BLD CALC: 40 % — SIGNIFICANT CHANGE UP (ref 37–47)
HCV AB S/CO SERPL IA: 0.06 COI — SIGNIFICANT CHANGE UP
HCV AB SERPL-IMP: SIGNIFICANT CHANGE UP
HGB BLD-MCNC: 13.1 G/DL — SIGNIFICANT CHANGE UP (ref 12–16)
IMM GRANULOCYTES NFR BLD AUTO: 0.8 % — HIGH (ref 0.1–0.3)
LYMPHOCYTES # BLD AUTO: 2.06 K/UL — SIGNIFICANT CHANGE UP (ref 1.2–3.4)
LYMPHOCYTES # BLD AUTO: 25.9 % — SIGNIFICANT CHANGE UP (ref 20.5–51.1)
MCHC RBC-ENTMCNC: 30.8 PG — SIGNIFICANT CHANGE UP (ref 27–31)
MCHC RBC-ENTMCNC: 32.8 G/DL — SIGNIFICANT CHANGE UP (ref 32–37)
MCV RBC AUTO: 93.9 FL — SIGNIFICANT CHANGE UP (ref 81–99)
MONOCYTES # BLD AUTO: 0.46 K/UL — SIGNIFICANT CHANGE UP (ref 0.1–0.6)
MONOCYTES NFR BLD AUTO: 5.8 % — SIGNIFICANT CHANGE UP (ref 1.7–9.3)
NEUTROPHILS # BLD AUTO: 5.28 K/UL — SIGNIFICANT CHANGE UP (ref 1.4–6.5)
NEUTROPHILS NFR BLD AUTO: 66.3 % — SIGNIFICANT CHANGE UP (ref 42.2–75.2)
NRBC # BLD: 0 /100 WBCS — SIGNIFICANT CHANGE UP (ref 0–0)
PLATELET # BLD AUTO: 275 K/UL — SIGNIFICANT CHANGE UP (ref 130–400)
PMV BLD: 9.3 FL — SIGNIFICANT CHANGE UP (ref 7.4–10.4)
POTASSIUM SERPL-MCNC: 4 MMOL/L — SIGNIFICANT CHANGE UP (ref 3.5–5)
POTASSIUM SERPL-SCNC: 4 MMOL/L — SIGNIFICANT CHANGE UP (ref 3.5–5)
PROT SERPL-MCNC: 6.6 G/DL — SIGNIFICANT CHANGE UP (ref 6–8)
RBC # BLD: 4.26 M/UL — SIGNIFICANT CHANGE UP (ref 4.2–5.4)
RBC # FLD: 13.2 % — SIGNIFICANT CHANGE UP (ref 11.5–14.5)
SODIUM SERPL-SCNC: 141 MMOL/L — SIGNIFICANT CHANGE UP (ref 135–146)
WBC # BLD: 7.95 K/UL — SIGNIFICANT CHANGE UP (ref 4.8–10.8)
WBC # FLD AUTO: 7.95 K/UL — SIGNIFICANT CHANGE UP (ref 4.8–10.8)

## 2024-05-20 PROCEDURE — 86803 HEPATITIS C AB TEST: CPT

## 2024-05-20 PROCEDURE — 99214 OFFICE O/P EST MOD 30 MIN: CPT | Mod: 25

## 2024-05-20 PROCEDURE — 93010 ELECTROCARDIOGRAM REPORT: CPT

## 2024-05-20 PROCEDURE — 93005 ELECTROCARDIOGRAM TRACING: CPT

## 2024-05-20 PROCEDURE — 36415 COLL VENOUS BLD VENIPUNCTURE: CPT

## 2024-05-20 PROCEDURE — 80053 COMPREHEN METABOLIC PANEL: CPT

## 2024-05-20 PROCEDURE — 85025 COMPLETE CBC W/AUTO DIFF WBC: CPT

## 2024-05-20 PROCEDURE — 99213 OFFICE O/P EST LOW 20 MIN: CPT

## 2024-05-20 PROCEDURE — 83036 HEMOGLOBIN GLYCOSYLATED A1C: CPT

## 2024-05-20 RX ORDER — DOCUSATE SODIUM 100 MG
1 CAPSULE ORAL
Qty: 0 | Refills: 0 | DISCHARGE

## 2024-05-20 RX ORDER — POLYETHYLENE GLYCOL 3350 17 G/17G
17 POWDER, FOR SOLUTION ORAL
Qty: 0 | Refills: 0 | DISCHARGE

## 2024-05-20 RX ORDER — CARBAMAZEPINE 200 MG
1 TABLET ORAL
Qty: 0 | Refills: 0 | DISCHARGE

## 2024-05-20 RX ORDER — ACETAMINOPHEN 500 MG
1 TABLET ORAL
Qty: 0 | Refills: 0 | DISCHARGE

## 2024-05-20 RX ORDER — BACLOFEN 100 %
1 POWDER (GRAM) MISCELLANEOUS
Qty: 0 | Refills: 0 | DISCHARGE

## 2024-05-20 NOTE — H&P PST ADULT - REASON FOR ADMISSION
Patient is a 59 year old  female presenting to PAST in preparation for  LEFT HAND SECOND DIGIT TRIGGER RELEASE  on   5/30/24 under [facility]sb anesthesia by Dr. Sheila burrell Patient is a 59 year old  female presenting to PAST in preparation for  LEFT HAND SECOND DIGIT TRIGGER RELEASE  on   5/30/24 under [facility]sb anesthesia by Dr. burrell

## 2024-05-20 NOTE — H&P PST ADULT - HISTORY OF PRESENT ILLNESS
PATIENT CURRENTLY DENIES CHEST PAIN  SHORTNESS OF BREATH  PALPITATIONS,  DYSURIA, OR UPPER RESPIRATORY INFECTION IN PAST 2 WEEKS  denies travel outside the [place] in the past 30 days    Anesthesia Alert  NO--Difficult Airway  NO--History of neck surgery or radiation  NO--Limited ROM of neck  NO--History of Malignant hyperthermia  NO--No personal or family history of Pseudocholinesterase deficiency.  NO--Prior Anesthesia Complication  NO--Latex Allergy  NO--Loose teeth  NO--History of Rheumatoid Arthritis  NO--Bleeding risk  + TRAVIS  NO--Other_____    PT DENIES ANY RASHES, ABRASION, OR OPEN WOUNDS OR CUTS    AS PER THE PT, THIS IS HIS/HER COMPLETE MEDICAL AND SURGICAL HX, INCLUDING MEDICATIONS PRESCRIBED AND OVER THE COUNTER    Patient verbalized understanding of instructions and was given the opportunity to ask questions and have them answered.    pt denies any suicidal ideation or thoughts, pt states not a threat to self or others      Duke Activity Status Index (DASI)      Revised Cardiac Risk Index for Pre-Operative Risk    Trigger index finger of left hand    Encounter for other preprocedural examination    Family history of malignant neoplasm of esophagus    FH: stroke    FH: aneurysm    HTN (hypertension)    Diabetes    Asthma    TRAVIS on CPAP    Arthritis    High cholesterol    GERD (gastroesophageal reflux disease)    History of peripheral arterial disease    Dyslipidemia    S/P trigger finger release    H/O carpal tunnel repair    H/O arthroscopy of shoulder    H/O Spinal surgery    [id number]    SysAdmin_VstLnk     left second digit trigger finger  hx of trigger fingers  now for planned procedure     PATIENT CURRENTLY DENIES CHEST PAIN  SHORTNESS OF BREATH  PALPITATIONS,  DYSURIA, OR UPPER RESPIRATORY INFECTION IN PAST 2 WEEKS  denies travel outside the [place] in the past 30 days    Anesthesia Alert  NO--Difficult Airway  +History of neck surgery or NO--radiation  + Limited ROM of neck  NO--History of Malignant hyperthermia  NO--No personal or family history of Pseudocholinesterase deficiency.  NO--Prior Anesthesia Complication  NO--Latex Allergy  NO--Loose teeth  NO--History of Rheumatoid Arthritis  + Bleeding risk effient  + TRAVIS  NO--Other_____    PT DENIES ANY RASHES, ABRASION, OR OPEN WOUNDS OR CUTS    AS PER THE PT, THIS IS HIS/HER COMPLETE MEDICAL AND SURGICAL HX, INCLUDING MEDICATIONS PRESCRIBED AND OVER THE COUNTER    Patient verbalized understanding of instructions and was given the opportunity to ask questions and have them answered.    pt denies any suicidal ideation or thoughts, pt states not a threat to self or others      Duke Activity Status Index (DASI)  8 points  The higher the score (maximum 58.2), the higher the functional status.  3.73 METs    Revised Cardiac Risk Index for Pre-Operative Risk  2 points  Class III Risk  10.1 %  30-day risk of death, MI, or cardiac arrest  Trigger index finger of left hand    Encounter for other preprocedural examination    Family history of malignant neoplasm of esophagus    FH: stroke    FH: aneurysm    HTN (hypertension)    Diabetes    Asthma    TRAVIS on CPAP    Arthritis    High cholesterol    GERD (gastroesophageal reflux disease)    History of peripheral arterial disease    Dyslipidemia    S/P trigger finger release    H/O carpal tunnel repair    H/O arthroscopy of shoulder    H/O Spinal surgery    [id number]    SysAdmin_VstLnk

## 2024-05-20 NOTE — HISTORY OF PRESENT ILLNESS
[de-identified] : The patient is a 59-year-old female here for a subsequent reevaluation of her left knee.  Her knee is doing much better after the cortisone injection here on 3/12/2024.  She is doing home therapy exercises for the knee.  She is happy with the way her knee feels.

## 2024-05-21 DIAGNOSIS — Z01.818 ENCOUNTER FOR OTHER PREPROCEDURAL EXAMINATION: ICD-10-CM

## 2024-05-21 DIAGNOSIS — M65.322 TRIGGER FINGER, LEFT INDEX FINGER: ICD-10-CM

## 2024-05-29 NOTE — ASU PATIENT PROFILE, ADULT - VISION (WITH CORRECTIVE LENSES IF THE PATIENT USUALLY WEARS THEM):
ye glasses for distance/Partially impaired: cannot see medication labels or newsprint, but can see obstacles in path, and the surrounding layout; can count fingers at arm's length

## 2024-05-30 ENCOUNTER — APPOINTMENT (OUTPATIENT)
Dept: ORTHOPEDIC SURGERY | Facility: AMBULATORY SURGERY CENTER | Age: 59
End: 2024-05-30

## 2024-05-30 ENCOUNTER — TRANSCRIPTION ENCOUNTER (OUTPATIENT)
Age: 59
End: 2024-05-30

## 2024-05-30 ENCOUNTER — OUTPATIENT (OUTPATIENT)
Dept: OUTPATIENT SERVICES | Facility: HOSPITAL | Age: 59
LOS: 1 days | Discharge: HOME | End: 2024-05-30
Payer: MEDICARE

## 2024-05-30 VITALS
OXYGEN SATURATION: 100 % | HEART RATE: 95 BPM | DIASTOLIC BLOOD PRESSURE: 65 MMHG | RESPIRATION RATE: 20 BRPM | SYSTOLIC BLOOD PRESSURE: 141 MMHG

## 2024-05-30 VITALS
SYSTOLIC BLOOD PRESSURE: 137 MMHG | WEIGHT: 154.98 LBS | HEIGHT: 62 IN | OXYGEN SATURATION: 98 % | RESPIRATION RATE: 18 BRPM | HEART RATE: 96 BPM | TEMPERATURE: 98 F | DIASTOLIC BLOOD PRESSURE: 65 MMHG

## 2024-05-30 DIAGNOSIS — Z98.890 OTHER SPECIFIED POSTPROCEDURAL STATES: Chronic | ICD-10-CM

## 2024-05-30 DIAGNOSIS — M65.322 TRIGGER FINGER, LEFT INDEX FINGER: ICD-10-CM

## 2024-05-30 PROCEDURE — 26055 INCISE FINGER TENDON SHEATH: CPT | Mod: F1

## 2024-05-30 RX ORDER — HYDROMORPHONE HYDROCHLORIDE 2 MG/ML
0.5 INJECTION INTRAMUSCULAR; INTRAVENOUS; SUBCUTANEOUS
Refills: 0 | Status: DISCONTINUED | OUTPATIENT
Start: 2024-05-30 | End: 2024-05-30

## 2024-05-30 RX ORDER — DAPAGLIFLOZIN 10 MG/1
1 TABLET, FILM COATED ORAL
Qty: 0 | Refills: 0 | DISCHARGE

## 2024-05-30 RX ORDER — OXYCODONE AND ACETAMINOPHEN 5; 325 MG/1; MG/1
1 TABLET ORAL
Qty: 0 | Refills: 0 | DISCHARGE

## 2024-05-30 RX ORDER — OXYCODONE HYDROCHLORIDE 5 MG/1
5 TABLET ORAL ONCE
Refills: 0 | Status: DISCONTINUED | OUTPATIENT
Start: 2024-05-30 | End: 2024-05-30

## 2024-05-30 RX ORDER — INSULIN GLARGINE 100 [IU]/ML
30 INJECTION, SOLUTION SUBCUTANEOUS
Refills: 0 | DISCHARGE

## 2024-05-30 RX ORDER — PRASUGREL 5 MG/1
1 TABLET, FILM COATED ORAL
Qty: 0 | Refills: 0 | DISCHARGE

## 2024-05-30 RX ORDER — METHOCARBAMOL 500 MG/1
2 TABLET, FILM COATED ORAL
Refills: 0 | DISCHARGE

## 2024-05-30 RX ORDER — SODIUM CHLORIDE 9 MG/ML
1000 INJECTION, SOLUTION INTRAVENOUS
Refills: 0 | Status: DISCONTINUED | OUTPATIENT
Start: 2024-05-30 | End: 2024-05-30

## 2024-05-30 RX ORDER — ACETAMINOPHEN 500 MG
1000 TABLET ORAL ONCE
Refills: 0 | Status: DISCONTINUED | OUTPATIENT
Start: 2024-05-30 | End: 2024-05-30

## 2024-05-30 RX ORDER — ONDANSETRON 8 MG/1
4 TABLET, FILM COATED ORAL ONCE
Refills: 0 | Status: DISCONTINUED | OUTPATIENT
Start: 2024-05-30 | End: 2024-05-30

## 2024-05-30 RX ORDER — AMLODIPINE BESYLATE AND BENAZEPRIL HYDROCHLORIDE 10; 20 MG/1; MG/1
1 CAPSULE ORAL
Qty: 0 | Refills: 0 | DISCHARGE

## 2024-05-30 RX ORDER — GABAPENTIN 400 MG/1
1 CAPSULE ORAL
Refills: 0 | DISCHARGE

## 2024-05-30 RX ORDER — ERGOCALCIFEROL 1.25 MG/1
1 CAPSULE ORAL
Qty: 0 | Refills: 0 | DISCHARGE

## 2024-05-30 RX ORDER — ROSUVASTATIN CALCIUM 5 MG/1
1 TABLET ORAL
Refills: 0 | DISCHARGE

## 2024-05-30 RX ORDER — SEMAGLUTIDE 0.68 MG/ML
0.25 INJECTION, SOLUTION SUBCUTANEOUS
Qty: 0 | Refills: 0 | DISCHARGE

## 2024-05-30 RX ORDER — PIOGLITAZONE HYDROCHLORIDE 15 MG/1
1 TABLET ORAL
Refills: 0 | DISCHARGE

## 2024-05-30 RX ORDER — METOPROLOL TARTRATE 50 MG
1 TABLET ORAL
Refills: 0 | DISCHARGE

## 2024-05-30 RX ORDER — EZETIMIBE 10 MG/1
1 TABLET ORAL
Qty: 0 | Refills: 0 | DISCHARGE

## 2024-05-30 RX ORDER — PANTOPRAZOLE SODIUM 20 MG/1
1 TABLET, DELAYED RELEASE ORAL
Qty: 0 | Refills: 0 | DISCHARGE

## 2024-05-30 NOTE — CHART NOTE - NSCHARTNOTEFT_GEN_A_CORE
PACU ANESTHESIA ADMISSION NOTE      Procedure: trigger finger relsease    __x__  Patent Airway    __x__  Full return of protective reflexes    _x___  Full recovery from anesthesia / back to baseline         Mental Status:  __x__ Awake   _____ Alert   _____ Drowsy   _____ Sedated    Nausea/Vomiting:  __x__ NO  ______Yes,   See Post - Op Orders          Pain Scale (0-10):  __0___    Treatment: ____ None    ___x_ See Post - Op/PCA Orders    Post - Operative Fluids:   ____ Oral   __x__ See Post - Op Orders    Plan: Discharge:   ___x_Home       _____Floor     _____Critical Care    _____  Other:_________________    Comments:

## 2024-05-30 NOTE — PRE-ANESTHESIA EVALUATION ADULT - NSATTENDATTESTRD_GEN_ALL_CORE
The patient has been re-examined and I agree with the above assessment or I updated with my findings. Estimated Blood Loss (Cc): minimal

## 2024-06-06 DIAGNOSIS — J45.909 UNSPECIFIED ASTHMA, UNCOMPLICATED: ICD-10-CM

## 2024-06-06 DIAGNOSIS — M65.322 TRIGGER FINGER, LEFT INDEX FINGER: ICD-10-CM

## 2024-06-06 DIAGNOSIS — Z79.4 LONG TERM (CURRENT) USE OF INSULIN: ICD-10-CM

## 2024-06-06 DIAGNOSIS — F17.210 NICOTINE DEPENDENCE, CIGARETTES, UNCOMPLICATED: ICD-10-CM

## 2024-06-06 DIAGNOSIS — M19.90 UNSPECIFIED OSTEOARTHRITIS, UNSPECIFIED SITE: ICD-10-CM

## 2024-06-06 DIAGNOSIS — K21.9 GASTRO-ESOPHAGEAL REFLUX DISEASE WITHOUT ESOPHAGITIS: ICD-10-CM

## 2024-06-06 DIAGNOSIS — E78.00 PURE HYPERCHOLESTEROLEMIA, UNSPECIFIED: ICD-10-CM

## 2024-06-06 DIAGNOSIS — Z79.85 LONG-TERM (CURRENT) USE OF INJECTABLE NON-INSULIN ANTIDIABETIC DRUGS: ICD-10-CM

## 2024-06-06 DIAGNOSIS — I10 ESSENTIAL (PRIMARY) HYPERTENSION: ICD-10-CM

## 2024-06-06 DIAGNOSIS — Z99.89 DEPENDENCE ON OTHER ENABLING MACHINES AND DEVICES: ICD-10-CM

## 2024-06-06 DIAGNOSIS — Z98.1 ARTHRODESIS STATUS: ICD-10-CM

## 2024-06-06 DIAGNOSIS — E11.51 TYPE 2 DIABETES MELLITUS WITH DIABETIC PERIPHERAL ANGIOPATHY WITHOUT GANGRENE: ICD-10-CM

## 2024-06-06 DIAGNOSIS — Z88.6 ALLERGY STATUS TO ANALGESIC AGENT: ICD-10-CM

## 2024-06-06 DIAGNOSIS — Z79.84 LONG TERM (CURRENT) USE OF ORAL HYPOGLYCEMIC DRUGS: ICD-10-CM

## 2024-06-06 DIAGNOSIS — G47.33 OBSTRUCTIVE SLEEP APNEA (ADULT) (PEDIATRIC): ICD-10-CM

## 2024-06-07 ENCOUNTER — APPOINTMENT (OUTPATIENT)
Dept: ORTHOPEDIC SURGERY | Facility: CLINIC | Age: 59
End: 2024-06-07

## 2024-06-07 NOTE — ED ADULT NURSE NOTE - NSFALLRSKINDICATORS_ED_ALL_ED
DEPARTMENT OF UROLOGY  DISCHARGE INSTRUCTIONS CIRCUMCISION  Outpatient Surgery      Yaniv Bearden      Call [phone] during regular daytime business hours (8:00 am - 5:00 pm) and after 5:00 pm ask for the Urology resident with any questions or concerns.      If it is a life-threatening situation, proceed to the nearest emergency department.        Follow-up appointment:  7/11/24 with Dr. Castle     Thank you for the opportunity to care for you today.  Your health and healing are very important to us.  We hope we made you feel as comfortable as possible and are committed to your recovery and continued well-being.      The following is a brief overview of your circumcision procedure today. Some of the information contained on this summary may be confidential.  This information should be kept in your records and should be shared with your regular doctor.    Physicians:   Dr. Castle      Procedure performed: removed the foreskin from your penis  Pending results:  none     What to Expect During your Recovery and Home Care  Anesthesia Side Effects   You received anesthesia today.  You may feel sleepy, tired, or have a sore throat.   You may also feel drowsiness, dizziness, or inability to think clearly.  For your safety, do not drive, drink alcoholic beverages, take any unprescribed medication or make any important decisions for 24 hours.  A responsible adult should be with you for 24 hours.        Activity and Recovery    No heavy lifting and rest the next few days. Refrain from sexual activity and getting an erection until all sutures have been dissolved and incision has healed.     Pain Control  Unfortunately, you may experience pain after your procedure.  Adequate pain management can include alternative measures to help ease your pain and that can include over the counter Tylenol/ibuprofen which can be taken as prescribed as needed for pain. Do not take more than 4,000mg of Tylenol in a 24-hour period.       Nausea/Vomiting   Clear liquids are best tolerated at first. Start slow, advance your diet as tolerated to normal foods. Avoid spicy, greasy, heavy foods at first. Also, you may feel nauseous or like you need to vomit if you take any type of medication on an empty stomach.  Call your physician if you are unable to eat or drink and have persistent vomiting.    Signs of Bleeding   Minor bleeding or drainage may occur from the surgical site; however, excessive or consistent bleeding should be reported to your surgeon. Excessive bleeding is defined as blood that is dripping from wound, soaking your bandages, and is ketchup colored, thick with possible blood clots.  Consistent is defined as bleeding that does not stop.      Treatment/wound care:   Keep area(s) clean and dry.   It is okay to shower 24 hours after time of surgery.    Do not scrub wound(s), pat dry.    Do not submerge wound(s) in standing water until seen for follow up appointment (no tub bathing, swimming, or hot tubs).    Clean with mild soap, gentle washing, pat dry, cover with bandage as needed.  Avoid waterproof bandages.  No oils or lotions on incisions.  Do not remove the sutures on your own, they will dissolve or fall out on their own time.    Please visually inspect your wound(s) at least once daily.  If the wound(s) are in a difficult to see location, please use a mirror or have someone else assist with visual inspection.    Signs of Infection  Signs of infection can include fever, redness, heat, swelling, drainage(green/yellow), chills, burning sensation with passing of urine, or severe abdominal pain.  If you see any of these occur, please contact your doctor's office at [phone]. Any fever higher than 100.4, especially if associated with an ill feeling, abdominal pain, chills, or nausea should be reported to your surgeon.      Assist in bowel movements/urination  Increase fiber in diet  Increase water (6 to 8 glasses)  Increase walking    Urination should occur within 6 hours of anesthesia  If you have tried these methods and your bladder still feels full and you cannot use the bathroom, please go to your nearest Emergency room/contact your physician.    Additional Instructions:   Use the antibiotic ointment(bacitracin) provided to you and place on your incision daily.      no

## 2024-06-10 RX ORDER — INSULIN GLARGINE 300 U/ML
300 INJECTION, SOLUTION SUBCUTANEOUS
Qty: 3 | Refills: 3 | Status: ACTIVE | COMMUNITY
Start: 2021-11-03 | End: 1900-01-01

## 2024-06-11 ENCOUNTER — APPOINTMENT (OUTPATIENT)
Dept: ORTHOPEDIC SURGERY | Facility: CLINIC | Age: 59
End: 2024-06-11
Payer: OTHER MISCELLANEOUS

## 2024-06-11 DIAGNOSIS — M19.012 PRIMARY OSTEOARTHRITIS, LEFT SHOULDER: ICD-10-CM

## 2024-06-11 PROCEDURE — 99213 OFFICE O/P EST LOW 20 MIN: CPT | Mod: ACP

## 2024-06-11 NOTE — IMAGING
[de-identified] : Physical exam of the left shoulder: No tenderness to palpation of the left AC joint or the anterior aspect of the shoulder.  Active range of motion with forward flexion to 150 degrees, passive range of motion forward flexion to 180 degrees.  Active range of motion with abduction to 100 degrees, active range of motion with internal rotation to L4.  Good strength with rotator cuff resistance testing.  Intact to light touch.

## 2024-06-11 NOTE — HISTORY OF PRESENT ILLNESS
[de-identified] : The patient is a 59-year-old female here for a subsequent re-evaluation of her left shoulder.  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  Her shoulder is doing well but she still gets some stiffness in the shoulder.  She is doing home therapy exercises.

## 2024-06-11 NOTE — DISCUSSION/SUMMARY
[de-identified] : At this point I recommend she continues home therapy exercises.  She is retired, 100% temporarily disabled.  I will see her back in 3 months for further evaluation.

## 2024-06-12 ENCOUNTER — OUTPATIENT (OUTPATIENT)
Dept: OUTPATIENT SERVICES | Facility: HOSPITAL | Age: 59
LOS: 1 days | End: 2024-06-12
Payer: MEDICARE

## 2024-06-12 ENCOUNTER — RESULT REVIEW (OUTPATIENT)
Age: 59
End: 2024-06-12

## 2024-06-12 DIAGNOSIS — M51.16 INTERVERTEBRAL DISC DISORDERS WITH RADICULOPATHY, LUMBAR REGION: ICD-10-CM

## 2024-06-12 DIAGNOSIS — Z98.890 OTHER SPECIFIED POSTPROCEDURAL STATES: Chronic | ICD-10-CM

## 2024-06-12 DIAGNOSIS — M54.50 LOW BACK PAIN, UNSPECIFIED: ICD-10-CM

## 2024-06-12 DIAGNOSIS — Z00.8 ENCOUNTER FOR OTHER GENERAL EXAMINATION: ICD-10-CM

## 2024-06-12 PROCEDURE — 72148 MRI LUMBAR SPINE W/O DYE: CPT

## 2024-06-12 PROCEDURE — 72148 MRI LUMBAR SPINE W/O DYE: CPT | Mod: 26

## 2024-06-13 ENCOUNTER — APPOINTMENT (OUTPATIENT)
Dept: ORTHOPEDIC SURGERY | Facility: CLINIC | Age: 59
End: 2024-06-13
Payer: MEDICARE

## 2024-06-13 DIAGNOSIS — M51.16 INTERVERTEBRAL DISC DISORDERS WITH RADICULOPATHY, LUMBAR REGION: ICD-10-CM

## 2024-06-13 DIAGNOSIS — M54.50 LOW BACK PAIN, UNSPECIFIED: ICD-10-CM

## 2024-06-13 DIAGNOSIS — M65.322 TRIGGER FINGER, LEFT INDEX FINGER: ICD-10-CM

## 2024-06-13 PROCEDURE — 99024 POSTOP FOLLOW-UP VISIT: CPT

## 2024-06-13 NOTE — DISCUSSION/SUMMARY
[de-identified] : The patient's sutures were removed in the office today.  The surgical incision site is clean dry and intact and without any signs of infection. We discussed scar massage.  Encouraged gentle range of motion of the wrist and fingers.  Encouraged patient to work on making a full fist. Advised to refrain from heavy lifting/pulling/pushing until repeat evaluation. Patient understands that some residual pain, swelling, and numbness/tingling following surgery is normal and expected.  Patient understands that any numbness/tingling experienced after 6 months is likely permanent. All questions and concerns addressed to patient's satisfaction. Patient expresses full understanding of treatment plan.

## 2024-06-13 NOTE — PHYSICAL EXAM
[de-identified] : Left hand third digit: Mild hand swelling Sutures removed Healed incision No evidence of infection Mild tenderness at the surgical site Good finger ROM

## 2024-06-13 NOTE — HISTORY OF PRESENT ILLNESS
[de-identified] : 59-year-old female for first postop evaluation status post left hand third digit trigger release. Patient is doing well overall.  Denies any popping or clicking of the finger.  Denies any signs of infection.  Denies any active bleeding or discharge.  Denies any numbness or tingling.  Reports pain is well-controlled.

## 2024-06-18 ENCOUNTER — APPOINTMENT (OUTPATIENT)
Dept: ORTHOPEDIC SURGERY | Facility: CLINIC | Age: 59
End: 2024-06-18
Payer: OTHER MISCELLANEOUS

## 2024-06-18 DIAGNOSIS — M65.351 TRIGGER FINGER, RIGHT LITTLE FINGER: ICD-10-CM

## 2024-06-18 PROCEDURE — 99214 OFFICE O/P EST MOD 30 MIN: CPT | Mod: 24

## 2024-06-18 NOTE — ASSESSMENT
[FreeTextEntry1] : Patient comes in for locking of the right small finger.  She is interested in surgical intervention.  She would like the left index finger to heal first.  She does not have other complaints today.  R hand:  Mild swelling  Tender 5th A1 pulley  Decreased small finger ROM  +small finger triggering  We again reviewed the anatomy of the flexor sheath and pathology of trigger fingers with the use of drawings and discussion.  The patient has failed injections/nonoperative treatment.  We discussed the possibility of surgery including the use of an open trigger finger release.  We discussed that too many injections may lead to weakening of the tendon/tendon rupture and that surgery is indicated at this point.  Risks include bleeding, infection, injury to nerves, vessels, tendons, stiffness, pain, loss of range of motion, loss of function, CRPS, and risks and complications of anesthesia.  We discussed the surgical plan and post op expectations.  We also discussed the possibility of prolonged pain/scar tissue and the possible need for therapy.  The patient is amenable to the risks, had the opportunity to ask questions, all questions were answered and the patient signed consent of their own accord.  They will be contacted by my surgical scheduler.  We need authorization for surgery for a right small finger trigger finger release.  When she gets authorization and the left hand is healed we will move forward with surgery for this.

## 2024-07-05 ENCOUNTER — RX RENEWAL (OUTPATIENT)
Age: 59
End: 2024-07-05

## 2024-07-09 ENCOUNTER — APPOINTMENT (OUTPATIENT)
Dept: ORTHOPEDIC SURGERY | Facility: CLINIC | Age: 59
End: 2024-07-09
Payer: OTHER MISCELLANEOUS

## 2024-07-09 DIAGNOSIS — M65.351 TRIGGER FINGER, RIGHT LITTLE FINGER: ICD-10-CM

## 2024-07-09 PROCEDURE — 99214 OFFICE O/P EST MOD 30 MIN: CPT | Mod: 24

## 2024-08-20 ENCOUNTER — TRANSCRIPTION ENCOUNTER (OUTPATIENT)
Age: 59
End: 2024-08-20

## 2024-08-20 ENCOUNTER — APPOINTMENT (OUTPATIENT)
Dept: ORTHOPEDIC SURGERY | Facility: CLINIC | Age: 59
End: 2024-08-20
Payer: OTHER MISCELLANEOUS

## 2024-08-20 DIAGNOSIS — M65.351 TRIGGER FINGER, RIGHT LITTLE FINGER: ICD-10-CM

## 2024-08-20 PROCEDURE — 99214 OFFICE O/P EST MOD 30 MIN: CPT | Mod: 24

## 2024-08-20 NOTE — ASSESSMENT
[FreeTextEntry1] : Patient comes in for locking of the right small finger.  She is interested in surgical intervention.  The small finger is still bothering her.  She has a lot of pain in the hand.  She is having difficulty using the hand.  R hand:  Mild swelling  Tender 5th A1 pulley  Decreased small finger ROM  +small finger triggering  We again reviewed the anatomy of the flexor sheath and pathology of trigger fingers with the use of drawings and discussion.  The patient has failed injections/nonoperative treatment.  We discussed the possibility of surgery including the use of an open trigger finger release.  We discussed that too many injections may lead to weakening of the tendon/tendon rupture and that surgery is indicated at this point.  Risks include bleeding, infection, injury to nerves, vessels, tendons, stiffness, pain, loss of range of motion, loss of function, CRPS, and risks and complications of anesthesia.  We discussed the surgical plan and post op expectations.  We also discussed the possibility of prolonged pain/scar tissue and the possible need for therapy.  The patient is amenable to the risks, had the opportunity to ask questions, all questions were answered and the patient signed consent of their own accord.  They will be contacted by my surgical scheduler.  Patient states she has gotten authorization.  We will book her for surgery.

## 2024-08-20 NOTE — ASSESSMENT
[FreeTextEntry1] : The patient comes in with her left index finger locking and clicking.  She says has been going on for quite some time.  It bothers her.  The finger gets stuck.  She has a history of diabetes which is well-controlled.  L hand:  Mild swelling  Tender 2nd A1 pulley  Decreased index ROM  +index triggering  My impression is that this patient has stenosing tenosynovitis of the finger, more commonly known as a trigger finger.  I recommended that the patient undergo a trigger finger injection. The risks, benefits, and alternatives were discussed with the patient in detail. This included (but was not limited to) pain, infection, subcutaneous atrophy, skin depigmentation, elevated glucose etc... The patient agreed to undergo a steroid injection. Under informed consent and sterile conditions, 0.1 cc of 1% plain lidocaine  and 0.9 cc of dexamethasone(4 milligrams per milliliter) was precisely injected into the patient's finger. A sterile Band-Aid was placed.  It is my hope that this significantly alleviates the patient's symptoms. The patient opted for injection for the left index finger flexor tendon sheath.  The patient tolerated the injection well. The patient will follow up in a month for repeat evaluation. If the patient is feeling better, they will cancel. 
Attending and PA/NP shared services statement (NON-critical care):

## 2024-08-23 ENCOUNTER — OUTPATIENT (OUTPATIENT)
Dept: OUTPATIENT SERVICES | Facility: HOSPITAL | Age: 59
LOS: 1 days | End: 2024-08-23
Payer: COMMERCIAL

## 2024-08-23 VITALS
RESPIRATION RATE: 16 BRPM | WEIGHT: 162.92 LBS | SYSTOLIC BLOOD PRESSURE: 116 MMHG | HEIGHT: 62 IN | OXYGEN SATURATION: 100 % | DIASTOLIC BLOOD PRESSURE: 71 MMHG | HEART RATE: 79 BPM | TEMPERATURE: 98 F

## 2024-08-23 DIAGNOSIS — M65.351 TRIGGER FINGER, RIGHT LITTLE FINGER: ICD-10-CM

## 2024-08-23 DIAGNOSIS — Z98.890 OTHER SPECIFIED POSTPROCEDURAL STATES: Chronic | ICD-10-CM

## 2024-08-23 DIAGNOSIS — Z01.818 ENCOUNTER FOR OTHER PREPROCEDURAL EXAMINATION: ICD-10-CM

## 2024-08-23 LAB
A1C WITH ESTIMATED AVERAGE GLUCOSE RESULT: 7.5 % — HIGH (ref 4–5.6)
ALBUMIN SERPL ELPH-MCNC: 4.3 G/DL — SIGNIFICANT CHANGE UP (ref 3.5–5.2)
ALP SERPL-CCNC: 82 U/L — SIGNIFICANT CHANGE UP (ref 30–115)
ALT FLD-CCNC: 31 U/L — SIGNIFICANT CHANGE UP (ref 0–41)
ANION GAP SERPL CALC-SCNC: 11 MMOL/L — SIGNIFICANT CHANGE UP (ref 7–14)
AST SERPL-CCNC: 18 U/L — SIGNIFICANT CHANGE UP (ref 0–41)
BASOPHILS # BLD AUTO: 0.04 K/UL — SIGNIFICANT CHANGE UP (ref 0–0.2)
BASOPHILS NFR BLD AUTO: 0.7 % — SIGNIFICANT CHANGE UP (ref 0–1)
BILIRUB SERPL-MCNC: <0.2 MG/DL — SIGNIFICANT CHANGE UP (ref 0.2–1.2)
BUN SERPL-MCNC: 10 MG/DL — SIGNIFICANT CHANGE UP (ref 10–20)
CALCIUM SERPL-MCNC: 8.9 MG/DL — SIGNIFICANT CHANGE UP (ref 8.4–10.5)
CHLORIDE SERPL-SCNC: 104 MMOL/L — SIGNIFICANT CHANGE UP (ref 98–110)
CO2 SERPL-SCNC: 26 MMOL/L — SIGNIFICANT CHANGE UP (ref 17–32)
CREAT SERPL-MCNC: 0.7 MG/DL — SIGNIFICANT CHANGE UP (ref 0.7–1.5)
EGFR: 100 ML/MIN/1.73M2 — SIGNIFICANT CHANGE UP
EOSINOPHIL # BLD AUTO: 0.11 K/UL — SIGNIFICANT CHANGE UP (ref 0–0.7)
EOSINOPHIL NFR BLD AUTO: 1.8 % — SIGNIFICANT CHANGE UP (ref 0–8)
ESTIMATED AVERAGE GLUCOSE: 169 MG/DL — HIGH (ref 68–114)
GLUCOSE SERPL-MCNC: 114 MG/DL — HIGH (ref 70–99)
HCT VFR BLD CALC: 42.1 % — SIGNIFICANT CHANGE UP (ref 37–47)
HGB BLD-MCNC: 13.6 G/DL — SIGNIFICANT CHANGE UP (ref 12–16)
IMM GRANULOCYTES NFR BLD AUTO: 0.3 % — SIGNIFICANT CHANGE UP (ref 0.1–0.3)
LYMPHOCYTES # BLD AUTO: 2.46 K/UL — SIGNIFICANT CHANGE UP (ref 1.2–3.4)
LYMPHOCYTES # BLD AUTO: 40.7 % — SIGNIFICANT CHANGE UP (ref 20.5–51.1)
MCHC RBC-ENTMCNC: 31.6 PG — HIGH (ref 27–31)
MCHC RBC-ENTMCNC: 32.3 G/DL — SIGNIFICANT CHANGE UP (ref 32–37)
MCV RBC AUTO: 97.7 FL — SIGNIFICANT CHANGE UP (ref 81–99)
MONOCYTES # BLD AUTO: 0.38 K/UL — SIGNIFICANT CHANGE UP (ref 0.1–0.6)
MONOCYTES NFR BLD AUTO: 6.3 % — SIGNIFICANT CHANGE UP (ref 1.7–9.3)
NEUTROPHILS # BLD AUTO: 3.03 K/UL — SIGNIFICANT CHANGE UP (ref 1.4–6.5)
NEUTROPHILS NFR BLD AUTO: 50.2 % — SIGNIFICANT CHANGE UP (ref 42.2–75.2)
NRBC # BLD: 0 /100 WBCS — SIGNIFICANT CHANGE UP (ref 0–0)
PLATELET # BLD AUTO: 273 K/UL — SIGNIFICANT CHANGE UP (ref 130–400)
PMV BLD: 8.9 FL — SIGNIFICANT CHANGE UP (ref 7.4–10.4)
POTASSIUM SERPL-MCNC: 4.2 MMOL/L — SIGNIFICANT CHANGE UP (ref 3.5–5)
POTASSIUM SERPL-SCNC: 4.2 MMOL/L — SIGNIFICANT CHANGE UP (ref 3.5–5)
PROT SERPL-MCNC: 6.4 G/DL — SIGNIFICANT CHANGE UP (ref 6–8)
RBC # BLD: 4.31 M/UL — SIGNIFICANT CHANGE UP (ref 4.2–5.4)
RBC # FLD: 13.7 % — SIGNIFICANT CHANGE UP (ref 11.5–14.5)
SODIUM SERPL-SCNC: 141 MMOL/L — SIGNIFICANT CHANGE UP (ref 135–146)
WBC # BLD: 6.04 K/UL — SIGNIFICANT CHANGE UP (ref 4.8–10.8)
WBC # FLD AUTO: 6.04 K/UL — SIGNIFICANT CHANGE UP (ref 4.8–10.8)

## 2024-08-23 PROCEDURE — 93010 ELECTROCARDIOGRAM REPORT: CPT

## 2024-08-23 PROCEDURE — 99214 OFFICE O/P EST MOD 30 MIN: CPT | Mod: 25

## 2024-08-23 PROCEDURE — 93005 ELECTROCARDIOGRAM TRACING: CPT

## 2024-08-23 PROCEDURE — 36415 COLL VENOUS BLD VENIPUNCTURE: CPT

## 2024-08-23 PROCEDURE — 80053 COMPREHEN METABOLIC PANEL: CPT

## 2024-08-23 PROCEDURE — 85025 COMPLETE CBC W/AUTO DIFF WBC: CPT

## 2024-08-23 PROCEDURE — 83036 HEMOGLOBIN GLYCOSYLATED A1C: CPT

## 2024-08-23 NOTE — H&P PST ADULT - MUSCULOSKELETAL
ROM intact/decreased ROM/decreased ROM due to pain/normal gait/strength 5/5 bilateral upper extremities/decreased strength details…

## 2024-08-23 NOTE — H&P PST ADULT - NSICDXPASTMEDICALHX_GEN_ALL_CORE_FT
PAST MEDICAL HISTORY:  Arthritis OA    Asthma LAST EPISODE SEVERAL YRS    Diabetes     Dyslipidemia     GERD (gastroesophageal reflux disease)     History of peripheral arterial disease     HTN (hypertension)     TRAVIS on CPAP     Smoker

## 2024-08-23 NOTE — H&P PST ADULT - REASON FOR ADMISSION
Case Type: OP  Suite: [address]  Proceduralist: Neetu Schwartz  Confirmed Surgery Date Time: 09-  PAST Date Time: 08- - 9:15  Procedure: RIGHT HAND FIFTH DIGIT TRIGGER RELEASE  Laterality: Right  Length of Procedure: 45 Minutes  Anesthesia Type: Local Standby

## 2024-08-23 NOTE — H&P PST ADULT - HISTORY OF PRESENT ILLNESS
Patient is a 60 yo female who presents to pretesting for the above surgery due to right hand fifth digit trigger.   Patient denies any cp, sob, palpitations, fever, cough, URI, abdominal pains, N/V, UTI, Rashes or open wounds.  As per patient exercise tolerance of 1-2 fos walks with out sob  Patient denies any s/s covid 19 and reports no contact with known positive people.   Anesthesia Alert  NO--Difficult Airway  NO--History of neck surgery or radiation  NO--Limited ROM of neck  NO--History of Malignant hyperthermia  NO--Personal or family history of Pseudocholinesterase deficiency  NO--Prior Anesthesia Complication  NO--Latex Allergy  NO--Loose teeth  NO--History of Rheumatoid Arthritis  YES--TRAVIS, CPAP in use. Advised to bring it on the DOS   NO--Risk of bleedings   Pt instructed to stop vitamins/supplements/herbal medications for one week prior to surgery  As per patient this is the complete medical, surgical history and medications.  Duke Activity Status Index (DASI) from [employer]  on 8/23/2024  ** All calculations should be rechecked by clinician prior to use **  RESULT SUMMARY:  9.95 points  The higher the score (maximum 58.2), the higher the functional status.  3.97 METs  INPUTS:  Take care of self —> 2.75 = Yes  Walk indoors —> 1.75 = Yes  Walk 1&ndash;2 blocks on level ground —> 2.75 = Yes  Climb a flight of stairs or walk up a hill —> 0 = No  Run a short distance —> 0 = No  Do light work around the house —> 2.7 = Yes  Do moderate work around the house —> 0 = No  Do heavy work around the house —> 0 = No  Do yardwork —> 0 = No  Have sexual relations —> 0 = No  Participate in moderate recreational activities —> 0 = No  Participate in strenuous sports —> 0 = No  Revised Cardiac Risk Index for Pre-Operative Risk from [employer]  on 8/23/2024  ** All calculations should be rechecked by clinician prior to use **  RESULT SUMMARY:  0 points  Class I Risk  3.9 %  30-day risk of death, MI, or cardiac arrest<br><br>From [employer] 2017. These numbers are higher than those from the original study (William 1999). See Evidence for details.  INPUTS:  Elevated-risk surgery —> 0 = No  History of ischemic heart disease —> 0 = No  History of congestive heart failure —> 0 = No  History of cerebrovascular disease —> 0 = No  Pre-operative treatment with insulin —> 0 = No  Pre-operative creatinine >2 mg/dL / 176.8 µmol/L —> 0 = No   Patient is a 58 yo female who presents to pretesting for the above surgery due to right hand fifth digit trigger.   Patient denies any cp, sob, palpitations, fever, cough, URI, abdominal pains, N/V, UTI, Rashes or open wounds.  As per patient exercise tolerance of 1-2 fos walks with out sob  Patient denies any s/s covid 19 and reports no contact with known positive people.   Anesthesia Alert  NO--Difficult Airway  NO--History of neck surgery or radiation  NO--Limited ROM of neck  NO--History of Malignant hyperthermia  NO--Personal or family history of Pseudocholinesterase deficiency  NO--Prior Anesthesia Complication  NO--Latex Allergy  NO--Loose teeth  NO--History of Rheumatoid Arthritis  YES--TRAVIS, CPAP in use. Advised to bring it on the DOS   NO--Risk of bleedings   Pt instructed to stop vitamins/supplements/herbal medications for one week prior to surgery  As per patient this is the complete medical, surgical history and medications.  Duke Activity Status Index (DASI) from [employer]  on 8/23/2024  ** All calculations should be rechecked by clinician prior to use **  RESULT SUMMARY:  9.95 points  The higher the score (maximum 58.2), the higher the functional status.  3.97 METs  INPUTS:  Take care of self —> 2.75 = Yes  Walk indoors —> 1.75 = Yes  Walk 1&ndash;2 blocks on level ground —> 2.75 = Yes  Climb a flight of stairs or walk up a hill —> 0 = No  Run a short distance —> 0 = No  Do light work around the house —> 2.7 = Yes  Do moderate work around the house —> 0 = No  Do heavy work around the house —> 0 = No  Do yard work —> 0 = No  Have sexual relations —> 0 = No  Participate in moderate recreational activities —> 0 = No  Participate in strenuous sports —> 0 = No  Revised Cardiac Risk Index for Pre-Operative Risk from [employer]  on 8/23/2024  ** All calculations should be rechecked by clinician prior to use **  RESULT SUMMARY:  0 points  Class I Risk  3.9 %  30-day risk of death, MI, or cardiac arrest<br><br>From [employer] 2017. These numbers are higher than those from the original study (William 1999). See Evidence for details.  INPUTS:  Elevated-risk surgery —> 0 = No  History of ischemic heart disease —> 0 = No  History of congestive heart failure —> 0 = No  History of cerebrovascular disease —> 0 = No  Pre-operative treatment with insulin —> 0 = No  Pre-operative creatinine >2 mg/dL / 176.8 µmol/L —> 0 = No  Opioid Risk Assessment Tool (Female)       Family history of substance abuse            Alcohol (1)            Illegal Drugs (2)            Prescription drugs (4)       Personal history of substance abuse            Alcohol (3)            Illegal Drugs (4)            Prescription drugs (5)       Age between 16-45 (1)       History of preadolescent sexual abuse (3)       Psychological disease (ADD, ADHD, OCD, Bipolar Disorder, Schizophrenia, Depression) (2)    Scoring Totals:  Low Risk (0-3)  Moderate Risk (4-7)  High Risk (>/=8)

## 2024-08-23 NOTE — H&P PST ADULT - GASTROINTESTINAL
PT LAYING IN BED WITH EYES CLOSED, APPEARS TO BE SLEEPING, APPEARS COMFORTABLE
AND IN NO DISTRESS. RESPIRATIONS REGULAR AND UNLABORED. ITEMS REMAIN WITHIN
REACH, CALL BELL REMAINS WITHIN REACH. BED REMAINS LOCKED AND IN LOW POSITION
WITH BEDRAILS UP X2. WILL CONTINUE TO MONITOR. normal/soft/nontender/nondistended/normal active bowel sounds

## 2024-08-24 DIAGNOSIS — Z01.818 ENCOUNTER FOR OTHER PREPROCEDURAL EXAMINATION: ICD-10-CM

## 2024-08-24 DIAGNOSIS — M65.351 TRIGGER FINGER, RIGHT LITTLE FINGER: ICD-10-CM

## 2024-09-04 NOTE — ASU PATIENT PROFILE, ADULT - NSTOBACCO TYPE_GEN_A_CORE_RD
"Chief Complaint   Patient presents with   • Facial Injury     ran nose into back of child's head, bruising on nose, nose bled a few times, tender xtoday         HPI:      States that he accidentally ran into the back of another student's head with his nose while at school today.     He had a mild nosebleed, which resolved.    Now just c/o dull, achy \"soreness\" over bridge of nose.   No LOC.   No vision changes.         Past medical history was unremarkable and not pertinent to current issue  Social hx - lives with parents.   Goes to local [employer] school  Family hx was reviewed - no pertinent past family hx    Review of Systems   Constitutional:  neg for fever.   HENT: no ear tugging  Eyes: no d/c  Respiratory: no cough, congestion, SOB  Cardiovascular: denies palpations, chest pain   Gastrointestinal: denies diarrhea . No blood in stool.     Skin: no itching or rash  Neurological: behavior nl       OBJECTIVE  Pulse 91   Temp 36.7 °C (98 °F) (Temporal)   Resp 20   Wt 20.4 kg (45 lb)   SpO2 99%   HEENT - PERRLA, EOMI  No head trauma.   Nose - no swelling, bruising or TTP.   No epistaxis.   Neuro - alert and oriented x3. CN 2-12 grossly intact.  Lungs - CTA.    Heart - regular rate      Musculoskeletal - No lower extremity edema noted.           A/P:       Nasal contusion  No sx fracture  Motrin, prn    Follow up in one week if no improvement, sooner if symptoms worsen.       " Cigarettes

## 2024-09-05 ENCOUNTER — APPOINTMENT (OUTPATIENT)
Dept: ORTHOPEDIC SURGERY | Facility: AMBULATORY SURGERY CENTER | Age: 59
End: 2024-09-05

## 2024-09-05 ENCOUNTER — TRANSCRIPTION ENCOUNTER (OUTPATIENT)
Age: 59
End: 2024-09-05

## 2024-09-05 ENCOUNTER — OUTPATIENT (OUTPATIENT)
Dept: OUTPATIENT SERVICES | Facility: HOSPITAL | Age: 59
LOS: 1 days | Discharge: HOME | End: 2024-09-05
Payer: COMMERCIAL

## 2024-09-05 VITALS
OXYGEN SATURATION: 97 % | RESPIRATION RATE: 22 BRPM | DIASTOLIC BLOOD PRESSURE: 69 MMHG | HEART RATE: 84 BPM | SYSTOLIC BLOOD PRESSURE: 132 MMHG | TEMPERATURE: 98 F

## 2024-09-05 VITALS
SYSTOLIC BLOOD PRESSURE: 128 MMHG | RESPIRATION RATE: 18 BRPM | HEIGHT: 62 IN | DIASTOLIC BLOOD PRESSURE: 56 MMHG | HEART RATE: 82 BPM | TEMPERATURE: 98 F | WEIGHT: 162.92 LBS | OXYGEN SATURATION: 100 %

## 2024-09-05 DIAGNOSIS — Z98.890 OTHER SPECIFIED POSTPROCEDURAL STATES: Chronic | ICD-10-CM

## 2024-09-05 DIAGNOSIS — M65.351 TRIGGER FINGER, RIGHT LITTLE FINGER: ICD-10-CM

## 2024-09-05 LAB — GLUCOSE BLDC GLUCOMTR-MCNC: 140 MG/DL — HIGH (ref 70–99)

## 2024-09-05 PROCEDURE — 26055 INCISE FINGER TENDON SHEATH: CPT | Mod: F9

## 2024-09-05 PROCEDURE — 82962 GLUCOSE BLOOD TEST: CPT

## 2024-09-05 RX ORDER — HYDROMORPHONE HYDROCHLORIDE 2 MG/1
0.5 TABLET ORAL
Refills: 0 | Status: DISCONTINUED | OUTPATIENT
Start: 2024-09-05 | End: 2024-09-05

## 2024-09-05 RX ORDER — SEMAGLUTIDE 1 MG/.5ML
1 INJECTION, SOLUTION SUBCUTANEOUS
Refills: 0 | DISCHARGE

## 2024-09-05 RX ORDER — ACETAMINOPHEN 325 MG/1
1000 TABLET ORAL ONCE
Refills: 0 | Status: DISCONTINUED | OUTPATIENT
Start: 2024-09-05 | End: 2024-09-05

## 2024-09-05 RX ORDER — PREDNISOLONE SODIUM PHOSPHATE 10 MG/5ML
1 SOLUTION ORAL
Refills: 0 | DISCHARGE

## 2024-09-05 RX ORDER — MYCOPHENOLATE MOFETIL 250 MG/1
2 CAPSULE ORAL
Refills: 0 | DISCHARGE

## 2024-09-05 RX ORDER — ROSUVASTATIN CALCIUM 10 MG/1
1 TABLET ORAL
Refills: 0 | DISCHARGE

## 2024-09-05 RX ORDER — OXYCODONE HYDROCHLORIDE 5 MG/1
5 TABLET ORAL ONCE
Refills: 0 | Status: DISCONTINUED | OUTPATIENT
Start: 2024-09-05 | End: 2024-09-05

## 2024-09-05 RX ORDER — ONDANSETRON 2 MG/ML
4 INJECTION, SOLUTION INTRAMUSCULAR; INTRAVENOUS ONCE
Refills: 0 | Status: DISCONTINUED | OUTPATIENT
Start: 2024-09-05 | End: 2024-09-05

## 2024-09-05 NOTE — ASU DISCHARGE PLAN (ADULT/PEDIATRIC) - ASU DC SPECIAL INSTRUCTIONSFT
DIET:    Resume normal diet  No alcoholic  beverages for 24 hours or if on prescribed narcotic pain medications.    MEDICATION:    Resume your preoperative oral medications.  Check with your physician before starting aspirin, Coumadin, or other blood thinners.  Prescriptions given to you - take as directed.      ACTIVITY:    Rest today and limit your activities for 24 hours.  Do not drive or operate machinery for 24 hours - if you received anesthesia.  When taking pain medication, be careful as you walk or climb stairs.  It is not advisable to drive while taking prescribed pain medication.    SPECIAL INSTRUCTIONS:    __x___ Elevate operative site above heart level or as directed.  _____ Apply ice to operative site as directed.  _____ Use  sling as directed.  __x___ Exercise fingers.    DRESSING CARE:    __x___ You may change the dressing 4 days. Keep wound covered with band-aids.  _____ Do not change the dressing until your doctor see you.  __x___ You can loosen or rewrap the dressing.  __x___  Keep dressing clean and dry.  __x___ You may shower in _1____ day(s) with the extremity covered by a plastic bag.  _____ OK to wash hand , including showers, in _____ day(s).    ADDITIONAL  INFORMATION:    Post operative visit should be scheduled for next week.  If you are not aware of visit please contact office.  If you have any questions or concerns call office at      Notify your doctor if you develop   Fever  Excessive Swelling  Chills   Drainage   Pain not controlled by medication  Persistent numbness in hand or fingers    If an Emergency arises call 911 and/or go to the Emergency Room Roseanna Charles    1982  [phone]    I have reviewed the e-Visit questionnaire and patient's answers, my assessment and plan are as follows:    HPI  Patient reports having coughing congestion nose headaches and sneezing with vertigo for about 1 to 4 days she has had a fever greater than 100.4 during this time.  She has been tested for coverage in the past and was found negative.  Patient reports needing a work excuse due to her illness.    Review of Systems - Respiratory ROS: positive for - cough and sputum changes      Diagnoses and all orders for this visit:    Upper respiratory tract infection, unspecified type  -     ciprofloxacin (CIPRO) 500 MG tablet; Take 1 tablet by mouth 2 (Two) Times a Day for 10 days.  -     guaiFENesin-codeine (GUAIFENESIN AC) 100-10 MG/5ML liquid; Take 5 mL by mouth 3 (Three) Times a Day As Needed for Cough for up to 10 days.        Any medications prescribed have been sent electronically to   [facility] [address] [place] [address], [place] - [address] - [phone]  - [phone]   [address]  [facility] [place] [address]  Phone: [phone] Fax: [phone]        Doug Recinos Sr, MD  10/10/20  14:55 EDT

## 2024-09-05 NOTE — ASU DISCHARGE PLAN (ADULT/PEDIATRIC) - NS MD DC FALL RISK RISK
For information on Fall & Injury Prevention, visit: [email or web address] OR  [email or web address] OR  https://www.cdc.gov/steadi/patient.html

## 2024-09-05 NOTE — PRE-ANESTHESIA EVALUATION ADULT - NSANTHPMHFT_GEN_ALL_CORE
NIDDM  smokes 1/4 ppd  h/o PAD s/p stents in b/l LE on Prasugruel  h/o spinal stenosis, back pain on prednisone 2-4mg daily  METS>4 without CP/palpitations/sob, ambulates with cane  Denies orthopnea

## 2024-09-05 NOTE — CHART NOTE - NSCHARTNOTEFT_GEN_A_CORE
PACU ANESTHESIA ADMISSION NOTE      Procedure: Release, A1 pulley      Post op diagnosis:  Trigger little finger of right hand        ____  Intubated  TV:______       Rate: ______      FiO2: ______    _x___  Patent Airway    _x___  Full return of protective reflexes    _x___  Full recovery from anesthesia / back to baseline status    Vitals  SPO2:-96  HR:-87  RR:-11  B.P:-122/65  TEMP:-98    Mental Status:  _x___ Awake   ___x_ Alert   _____ Drowsy   _____ Sedated    Nausea/Vomiting:  _x___  NO       ______Yes,   See Post - Op Orders         Pain Scale (0-10):  __0___    Treatment: _x___ None    __x__ See Post - Op/PCA Orders    Post - Operative Fluids:   ___ Oral   ____x See Post - Op Orders    Plan: Discharge:   _x___Home       _____Floor     _____Critical Care    _____  Other:_________________    Comments:  Report endorsed to RN in pacu  Vitals stable  No anesthesia issues or complications noted.  Discharge to patient to / home when criteria met.

## 2024-09-09 DIAGNOSIS — M65.351 TRIGGER FINGER, RIGHT LITTLE FINGER: ICD-10-CM

## 2024-09-09 DIAGNOSIS — E78.5 HYPERLIPIDEMIA, UNSPECIFIED: ICD-10-CM

## 2024-09-09 DIAGNOSIS — K21.9 GASTRO-ESOPHAGEAL REFLUX DISEASE WITHOUT ESOPHAGITIS: ICD-10-CM

## 2024-09-09 DIAGNOSIS — Z79.85 LONG-TERM (CURRENT) USE OF INJECTABLE NON-INSULIN ANTIDIABETIC DRUGS: ICD-10-CM

## 2024-09-09 DIAGNOSIS — G47.33 OBSTRUCTIVE SLEEP APNEA (ADULT) (PEDIATRIC): ICD-10-CM

## 2024-09-09 DIAGNOSIS — E11.9 TYPE 2 DIABETES MELLITUS WITHOUT COMPLICATIONS: ICD-10-CM

## 2024-09-09 DIAGNOSIS — Z79.84 LONG TERM (CURRENT) USE OF ORAL HYPOGLYCEMIC DRUGS: ICD-10-CM

## 2024-09-09 DIAGNOSIS — Z79.891 LONG TERM (CURRENT) USE OF OPIATE ANALGESIC: ICD-10-CM

## 2024-09-09 DIAGNOSIS — F17.210 NICOTINE DEPENDENCE, CIGARETTES, UNCOMPLICATED: ICD-10-CM

## 2024-09-09 DIAGNOSIS — I10 ESSENTIAL (PRIMARY) HYPERTENSION: ICD-10-CM

## 2024-09-09 DIAGNOSIS — Z79.52 LONG TERM (CURRENT) USE OF SYSTEMIC STEROIDS: ICD-10-CM

## 2024-09-09 DIAGNOSIS — Z79.02 LONG TERM (CURRENT) USE OF ANTITHROMBOTICS/ANTIPLATELETS: ICD-10-CM

## 2024-09-10 ENCOUNTER — APPOINTMENT (OUTPATIENT)
Dept: ORTHOPEDIC SURGERY | Facility: CLINIC | Age: 59
End: 2024-09-10
Payer: OTHER MISCELLANEOUS

## 2024-09-10 DIAGNOSIS — M19.012 PRIMARY OSTEOARTHRITIS, LEFT SHOULDER: ICD-10-CM

## 2024-09-10 PROBLEM — F17.200 NICOTINE DEPENDENCE, UNSPECIFIED, UNCOMPLICATED: Chronic | Status: ACTIVE | Noted: 2024-08-23

## 2024-09-10 PROCEDURE — 99213 OFFICE O/P EST LOW 20 MIN: CPT | Mod: ACP

## 2024-09-10 NOTE — DISCUSSION/SUMMARY
[de-identified] : At this point I recommend she continues home therapy exercises.  She is retired, 100% temporarily disabled.  I will see her back in 2 months for further evaluation.

## 2024-09-10 NOTE — HISTORY OF PRESENT ILLNESS
[de-identified] : The patient is a 59-year-old female here for a subsequent re-evaluation of her left shoulder.  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  She relates today that her shoulder is doing well.  She is doing home therapy exercises on a daily basis for her left shoulder.  She is no longer experiencing stiffness in the shoulder.

## 2024-09-10 NOTE — IMAGING
[de-identified] : Physical exam left shoulder: Active range of motion forward flexion to 150 degrees, passive range of motion forward flexion 180 degrees.  Active range of motion abduction 120 degrees, active range of motion internal rotation L3.  Good strength with rotator cuff resistance testing.  Intact to light touch.

## 2024-09-17 ENCOUNTER — APPOINTMENT (OUTPATIENT)
Dept: ORTHOPEDIC SURGERY | Facility: CLINIC | Age: 59
End: 2024-09-17
Payer: OTHER MISCELLANEOUS

## 2024-09-17 PROCEDURE — 99024 POSTOP FOLLOW-UP VISIT: CPT

## 2024-09-17 RX ORDER — CEFADROXIL 500 MG/1
500 CAPSULE ORAL TWICE DAILY
Qty: 14 | Refills: 0 | Status: ACTIVE | COMMUNITY
Start: 2024-09-17 | End: 1900-01-01

## 2024-09-17 NOTE — WORK
[Total (100%)] : total (100%) [Cannot return to work because ________] : cannot return to work because [unfilled] [I provided the services listed above] :  I provided the services listed above. [FreeTextEntry1] : fair [FreeTextEntry3] : lg

## 2024-09-24 ENCOUNTER — APPOINTMENT (OUTPATIENT)
Dept: ORTHOPEDIC SURGERY | Facility: CLINIC | Age: 59
End: 2024-09-24
Payer: OTHER MISCELLANEOUS

## 2024-09-24 DIAGNOSIS — M65.351 TRIGGER FINGER, RIGHT LITTLE FINGER: ICD-10-CM

## 2024-09-24 PROCEDURE — 99024 POSTOP FOLLOW-UP VISIT: CPT

## 2024-09-24 NOTE — ASSESSMENT
[FreeTextEntry1] : Patient comes in for follow-up of her right small finger trigger finger release.  She is doing a lot better than prior.  Her pain is a lot better.  She is been taking the antibiotics.  She only has 1 pill left.  She is working on range of motion.  Right hand: No evidence of infection, incision site healed, near full range of motion of the finger, neurovascular intact  Status post right small finger trigger finger release.  Patient is doing well.  She will do scar massage.  She will work on range of motion.  She would benefit from therapy as it is her dominant hand.  She will follow-up back in 2 to 3 months.

## 2024-09-24 NOTE — WORK
[Partial] : partial [Cannot return to work because ________] : cannot return to work because [unfilled] [I provided the services listed above] :  I provided the services listed above. [FreeTextEntry1] : Fair [FreeTextEntry3] : lg

## 2024-10-07 NOTE — SWALLOW BEDSIDE ASSESSMENT ADULT - NS SPL SWALLOW CLINIC TRIAL FT
OUTPATIENT PROGRESS NOTE      CHIEF COMPLAINT:  Chief Complaint   Patient presents with    Office Visit       HPI:  The patient presented to the office for red [place] on right eye. Noticed red [place] for a bout a week now. He reports symptoms of soreness, redness, sensitivity to light and fb sensation.     Dl  gave us verbal permission to discuss their medical condition in the presence of the following individual(s) in the room: n/a    VISUAL ACUITY:  Corrected/uncorrected:   Visual Acuity (Snellen - Linear)         Right Left    Dist cc 20/30 -2 20/20 -1    Dist ph cc 20/25 +2       Correction: Glasses            SOCIAL HISTORY:  Social History     Socioeconomic History    Marital status: Single     Spouse name: Not on file    Number of children: Not on file    Years of education: Not on file    Highest education level: Not on file   Occupational History    Not on file   Tobacco Use    Smoking status: Never    Smokeless tobacco: Never   Substance and Sexual Activity    Alcohol use: No     Alcohol/week: 0.0 standard drinks of alcohol    Drug use: Not on file    Sexual activity: Not on file   Other Topics Concern    Not on file   Social History Narrative    Not on file     Social Determinants of Health     Financial Resource Strain: Not on file   Food Insecurity: Not on file   Transportation Needs: Not on file   Physical Activity: Not on file   Stress: Not on file   Social Connections: Not on file   Interpersonal Safety: Not At Risk (4/23/2021)    Interpersonal Safety     Social Determinants: Intimate Partner Violence Past Fear: Not on file     Social Determinants: Intimate Partner Violence Current Fear: Not on file     ***    I reviewed testing done by technician found in [employer].    OPHTHALMIC EXAMINATION:  Base Eye Exam       Visual Acuity (Snellen - Linear)         Right Left    Dist cc 20/30 -2 20/20 -1    Dist ph cc 20/25 +2       Correction: Glasses              Tonometry (I Care, 4:57 PM)         Right Left     Pressure 18.5 18.4              Pupils         Pupils Dark Light Shape React APD    Right PERRL 4 3 Round Brisk Negative    Left PERRL 4 3 Round Brisk Negative              Visual Fields (Counting fingers)         Left Right     Full Full              Extraocular Movement         Right Left     Full Full              Neuro/Psych       Mood/Affect: Normal                      ASSESSMENT:   No diagnosis found.    PLAN:  No orders of the defined types were placed in this encounter.      FOLLOW UP:  No follow-ups on file.     +improved toleration of thin liquids, however pt prefers to continue nectar thick liquids at this time, +toleration of nectar and mech soft solids w/o overt s/s of penetration/aspiration

## 2024-11-20 NOTE — H&P PST ADULT - RESPIRATORY
[facility] Medicine Services  PROGRESS NOTE    Patient Name: Philomena Davis  : 1962  MRN: [id number]    Date of Admission: 11/10/2024  Primary Care Physician: Unique Brandt DO    Subjective   Subjective     CC:  F/u weakness, numbness in legs    HPI:  Patient resting in bed.  Says she is feeling better and that her neuropathy has improved.  Patient does note she has autoimmune primary biliary cholangitis and states that RA runs in her family.  We discussed plan for follow-up with rheumatology, as well as neurology and cardiology.  Patient is waiting on decision from insurance on her appeal for rehab.  Patient lives alone.      Objective   Objective     Vital Signs:   Temp:  [98 °F (36.7 °C)-98.6 °F (37 °C)] 98 °F (36.7 °C)  Heart Rate:  [78-87] 78  Resp:  [15-19] 15  BP: ()/(42-77) 123/65  Flow (L/min) (Oxygen Therapy):  [1] 1     Physical Exam:  Constitutional: No acute distress, awake, alert  HENT: NCAT, mucous membranes moist  Respiratory: Clear to auscultation bilaterally, respiratory effort normal   Cardiovascular: RRR, + 2/6 murmur, no rubs or gallops  Gastrointestinal: Positive bowel sounds, soft, nontender, nondistended  Musculoskeletal: Trace bilateral ankle edema  Psychiatric: Appropriate affect, cooperative  Neurologic: Oriented x 3, moves all extremities, speech clear  Skin: No rashes, scattered ecchymoses      Results Reviewed:  LAB RESULTS:      Lab 24  1210 24  0600 24  0757   WBC 13.25* 9.99 11.04*   HEMOGLOBIN 10.6* 10.6* 10.5*   HEMATOCRIT 32.6* 31.8* 31.8*   PLATELETS 266 224 219   .9* 109.7* 109.7*   PROCALCITONIN 0.19  --   --          Lab 24  1210 24  1801 24  0600 24  0757   SODIUM 140  --  137 138   POTASSIUM 3.8 4.9 3.6 4.0   CHLORIDE 103  --  103 104   CO2 27.0  --  24.0 26.0   ANION GAP 10.0  --  10.0 8.0   BUN 10  --  6* 7*   CREATININE 0.69  --  0.58 0.62   EGFR 98.3  --  102.5 100.8   GLUCOSE  106*  --  84 85   CALCIUM 9.1  --  8.4* 8.4*         Lab 11/14/24  0600 11/13/24  0757   TOTAL PROTEIN 6.5 6.1   ALBUMIN 2.8* 2.9*   GLOBULIN 3.7 3.2   ALT (SGPT) 17 19   AST (SGOT) 66* 80*   BILIRUBIN 1.2 1.3*   ALK PHOS 126* 129*                     Brief Urine Lab Results  (Last result in the past 365 days)        Color   Clarity   Blood   Leuk Est   Nitrite   Protein   CREAT   Urine HCG        11/10/24 0958 Yellow   Clear   Negative   Negative   Negative   Negative                   Microbiology Results Abnormal       None            No radiology results from the last 24 hrs    Results for orders placed during the hospital encounter of 11/10/24    Adult Transthoracic Echo Complete W/ Cont if Necessary Per Protocol    Interpretation Summary  •  Left ventricular systolic function is normal. Estimated left ventricular EF = 65%  •  The cardiac valves are anatomically and functionally normal.      Current medications:  Scheduled Meds:budesonide-formoterol, 2 puff, Inhalation, BID - RT  carvedilol, 12.5 mg, Oral, BID With Meals  desvenlafaxine, 50 mg, Oral, Daily  folic acid, 1 mg, Oral, Daily  gabapentin, 200 mg, Oral, Q12H  latanoprost, 1 drop, Both Eyes, Nightly  melatonin, 5 mg, Oral, Nightly  midodrine, 15 mg, Oral, BID AC  nystatin, 1 Application, Topical, Q12H  pantoprazole, 40 mg, Oral, Daily  rosuvastatin, 5 mg, Oral, Daily  sodium chloride, 10 mL, Intravenous, Q12H  thiamine, 100 mg, Oral, Daily  ursodiol, 300 mg, Oral, BID  zolpidem, 5 mg, Oral, Nightly      Continuous Infusions:   PRN Meds:.•  acetaminophen **OR** acetaminophen **OR** acetaminophen  •  senna-docusate sodium **AND** polyethylene glycol **AND** bisacodyl **AND** bisacodyl  •  calcium carbonate  •  Calcium Replacement - Follow Nurse / BPA Driven Protocol  •  ipratropium-albuterol  •  Magnesium Standard Dose Replacement - Follow Nurse / BPA Driven Protocol  •  metoprolol tartrate  •  nitroglycerin  •  ondansetron  •  Phosphorus Replacement -  Follow Nurse / BPA Driven Protocol  •  Potassium Replacement - Follow Nurse / BPA Driven Protocol  •  sodium chloride  •  sodium chloride  •  sodium chloride    Assessment & Plan   Assessment & Plan     Active Hospital Problems    Diagnosis  POA   • **Lower extremity weakness [R29.898]  Yes   • Orthostasis [I95.1]  Yes      Resolved Hospital Problems   No resolved problems to display.        Brief Hospital Course to date:  Philomena Davis is a 62 y.o. female  with history of alcohol dependence, cirrhosis, HTN, peripheral neuropathy who was admitted for paresthesias in her extremities.  Neurology was consulted. PT/OT recommended inpatient rehab.      This patient's problems and plans were partially entered by my partner and updated as appropriate by me 11/20/24.       BL LE weakness  BL paraesthesias/facial numbness, resolved   ETOH use  Peripheral neuropathy due to long standing ETOH use  Dysautonomia  -MRI brain unremarkable  -MRI lumbar spine with L5-S1 disc protrusion  -MRI C/T spine reviewed, shows C4/5 facet arthropathy  -EMG/NCV mild sensory neuropathy, no evidence of demyelinating neuropathy  -Continue thiamine, folic acid  -Neurology followed and signed off 11/15  -Symptoms improving   -PT/OT; awaiting rehab, inpt rehab denied and now family appealing      Marked orthostasis  Dysautonomia  -orthostatis secondary to dysautonomia  -added midodrine, increased dose but BID dosing  -mobilize, PT/OT  -AM cortisol low, s/p cosyntropin with expected response, not suggestive of adrenal insufficiency   -Neurology discussed nonpharmacologic therapy, including increased salt and water intake, 6 small meals daily, slow position changes, REX hose, BP monitoring  -Outpatient follow-up with Neurology, CANDY Galloway     Possible vasculitis  CT with celiac artery stranding  -markedly elevated CRP/ESR, leukocytosis  -ANCA panel negative  -WILMA panel negative  -RF negative  -needs outpatient referral to rheumatology, no  abdominal symptoms, notes h/o primary biliary cholangitis and a family h/o RA     Leukocytosis  -WBCs 13.25  -CRP 3.88 on admission, likely inflammatory, afebrile, procal negative  -continue to monitor  -AM CBC    Atrial tachycardia, atrial flutter  -cardiology consulted  -presented with tachycardia, HR to 150  -on BB  -ECHO unremarkable, EF normal  -bursts of tachycardia here, evaluated by cardiology  -continue carvedilol, dosing may be limited by hypotension/orthostasis  -cardiology deferring AC due to cirrhosis for now  -heart monitor x 2 weeks at DC (e-patch)  -follow up with Dr. Hess in 1 month     Alcohol dependence  Cirrhosis  Hepatocellular jaundice, resolved  Mild lactic acidosis, resolved  -bili improved, wnl  -LA secondary to liver disease, resolved     Subclinical hypothyroidism  -elevated TSH 9.67, FT4 wnl  -needs follow up in 6 weeks with PCP     Stable for discharge pending placement.     Expected Discharge Location and Transportation: [place]  Expected Discharge pending family appeal, insurance denied acute rehab  Expected Discharge Date: 11/21/2024; Expected Discharge Time:      VTE Prophylaxis:  Mechanical VTE prophylaxis orders are present.         AM-PAC 6 Clicks Score (PT): 18 (11/19/24 2000)    CODE STATUS:   Code Status and Medical Interventions: CPR (Attempt to Resuscitate); Full Support   Ordered at: 11/10/24 3425     Level Of Support Discussed With:    Patient    Next of Kin (If No Surrogate)     Code Status (Patient has no pulse and is not breathing):    CPR (Attempt to Resuscitate)     Medical Interventions (Patient has pulse or is breathing):    Full Support       Tasha Cornell, APRN  11/20/24       detailed exam

## 2024-12-17 ENCOUNTER — APPOINTMENT (OUTPATIENT)
Dept: ORTHOPEDIC SURGERY | Facility: CLINIC | Age: 59
End: 2024-12-17
Payer: OTHER MISCELLANEOUS

## 2024-12-17 DIAGNOSIS — M18.11 UNILATERAL PRIMARY OSTEOARTHRITIS OF FIRST CARPOMETACARPAL JOINT, RIGHT HAND: ICD-10-CM

## 2024-12-17 DIAGNOSIS — M65.351 TRIGGER FINGER, RIGHT LITTLE FINGER: ICD-10-CM

## 2024-12-17 PROCEDURE — 99213 OFFICE O/P EST LOW 20 MIN: CPT

## 2024-12-23 NOTE — H&P ADULT - ATTENDING COMMENTS
Results given to patient at time of UC visit.  57 yr old female presented with severe low back pain.  VITAL SIGNS (Last 24 hrs):  T(C): 37.1 (04-16-22 @ 05:00), Max: 37.7 (04-15-22 @ 17:54)  HR: 95 (04-16-22 @ 05:00) (95 - 107)  BP: 156/73 (04-16-22 @ 05:00) (156/73 - 183/92)  RR: 18 (04-16-22 @ 02:00) (18 - 20)  SpO2: 100% (04-16-22 @ 02:00) (100% - 100%)  Wt(kg): --  Daily     Daily     I&O's Summary                        13.0   6.07  )-----------( 359      ( 16 Apr 2022 06:54 )             39.5   04-16    144  |  106  |  8<L>  ----------------------------<  115<H>  3.1<L>   |  25  |  0.6<L>    Ca    9.2      16 Apr 2022 06:54  Mg     1.7     04-16    TPro  6.2  /  Alb  4.2  /  TBili  0.3  /  DBili  x   /  AST  17  /  ALT  24  /  AlkPhos  89  04-16  o/e  aaox3  chest--b/l air entry  [id number]  abd-- soft, bs+  st leg raising test is negative  Assessment and plan:  # Ac back pain-- sec to lumbar foraminal stenosis  MRI No conus/nerve root compression or signal abnormality.    Mild multilevel lumbar spondylosis and facet arthropathies, worse at L4-5   with mild-moderate spinal stenosis and mild bilateral foraminal stenosis.   Patient to be seen by PT and then neurosurgery--if no improvement with pain meds  # Hypokalemia-- replace po  # hypomagnesemia-- replace IV  # Uncontrolled DM on insulin-- hba1c is 8.5  # hx of orbital lt dacroadenitis--on po prednisone.  # PAD s/p stent b/l lower ext

## 2025-01-03 ENCOUNTER — EMERGENCY (EMERGENCY)
Facility: HOSPITAL | Age: 60
LOS: 0 days | Discharge: HOME | End: 2025-01-03
Attending: EMERGENCY MEDICINE
Payer: MEDICARE

## 2025-01-03 VITALS
TEMPERATURE: 98 F | HEART RATE: 89 BPM | SYSTOLIC BLOOD PRESSURE: 169 MMHG | RESPIRATION RATE: 20 BRPM | DIASTOLIC BLOOD PRESSURE: 80 MMHG | WEIGHT: 160.06 LBS | OXYGEN SATURATION: 98 % | HEIGHT: 62 IN

## 2025-01-03 DIAGNOSIS — Z98.890 OTHER SPECIFIED POSTPROCEDURAL STATES: Chronic | ICD-10-CM

## 2025-01-03 DIAGNOSIS — Z88.6 ALLERGY STATUS TO ANALGESIC AGENT: ICD-10-CM

## 2025-01-03 DIAGNOSIS — L03.213 PERIORBITAL CELLULITIS: ICD-10-CM

## 2025-01-03 LAB
ALBUMIN SERPL ELPH-MCNC: 4.4 G/DL — SIGNIFICANT CHANGE UP (ref 3.5–5.2)
ALP SERPL-CCNC: 86 U/L — SIGNIFICANT CHANGE UP (ref 30–115)
ALT FLD-CCNC: 18 U/L — SIGNIFICANT CHANGE UP (ref 0–41)
ANION GAP SERPL CALC-SCNC: 13 MMOL/L — SIGNIFICANT CHANGE UP (ref 7–14)
AST SERPL-CCNC: 18 U/L — SIGNIFICANT CHANGE UP (ref 0–41)
BASOPHILS # BLD AUTO: 0.02 K/UL — SIGNIFICANT CHANGE UP (ref 0–0.2)
BASOPHILS NFR BLD AUTO: 0.3 % — SIGNIFICANT CHANGE UP (ref 0–1)
BILIRUB SERPL-MCNC: 0.2 MG/DL — SIGNIFICANT CHANGE UP (ref 0.2–1.2)
BUN SERPL-MCNC: 6 MG/DL — LOW (ref 10–20)
CALCIUM SERPL-MCNC: 9.2 MG/DL — SIGNIFICANT CHANGE UP (ref 8.4–10.5)
CHLORIDE SERPL-SCNC: 103 MMOL/L — SIGNIFICANT CHANGE UP (ref 98–110)
CO2 SERPL-SCNC: 26 MMOL/L — SIGNIFICANT CHANGE UP (ref 17–32)
CREAT SERPL-MCNC: 0.7 MG/DL — SIGNIFICANT CHANGE UP (ref 0.7–1.5)
EGFR: 100 ML/MIN/1.73M2 — SIGNIFICANT CHANGE UP
EOSINOPHIL # BLD AUTO: 0.09 K/UL — SIGNIFICANT CHANGE UP (ref 0–0.7)
EOSINOPHIL NFR BLD AUTO: 1.3 % — SIGNIFICANT CHANGE UP (ref 0–8)
GLUCOSE SERPL-MCNC: 107 MG/DL — HIGH (ref 70–99)
HCT VFR BLD CALC: 44.8 % — SIGNIFICANT CHANGE UP (ref 37–47)
HGB BLD-MCNC: 14.1 G/DL — SIGNIFICANT CHANGE UP (ref 12–16)
IMM GRANULOCYTES NFR BLD AUTO: 0.3 % — SIGNIFICANT CHANGE UP (ref 0.1–0.3)
LYMPHOCYTES # BLD AUTO: 1.96 K/UL — SIGNIFICANT CHANGE UP (ref 1.2–3.4)
LYMPHOCYTES # BLD AUTO: 29 % — SIGNIFICANT CHANGE UP (ref 20.5–51.1)
MCHC RBC-ENTMCNC: 31.1 PG — HIGH (ref 27–31)
MCHC RBC-ENTMCNC: 31.5 G/DL — LOW (ref 32–37)
MCV RBC AUTO: 98.7 FL — SIGNIFICANT CHANGE UP (ref 81–99)
MONOCYTES # BLD AUTO: 0.56 K/UL — SIGNIFICANT CHANGE UP (ref 0.1–0.6)
MONOCYTES NFR BLD AUTO: 8.3 % — SIGNIFICANT CHANGE UP (ref 1.7–9.3)
NEUTROPHILS # BLD AUTO: 4.12 K/UL — SIGNIFICANT CHANGE UP (ref 1.4–6.5)
NEUTROPHILS NFR BLD AUTO: 60.8 % — SIGNIFICANT CHANGE UP (ref 42.2–75.2)
NRBC # BLD: 0 /100 WBCS — SIGNIFICANT CHANGE UP (ref 0–0)
PLATELET # BLD AUTO: 330 K/UL — SIGNIFICANT CHANGE UP (ref 130–400)
PMV BLD: 9.7 FL — SIGNIFICANT CHANGE UP (ref 7.4–10.4)
POTASSIUM SERPL-MCNC: 3.7 MMOL/L — SIGNIFICANT CHANGE UP (ref 3.5–5)
POTASSIUM SERPL-SCNC: 3.7 MMOL/L — SIGNIFICANT CHANGE UP (ref 3.5–5)
PROT SERPL-MCNC: 6.5 G/DL — SIGNIFICANT CHANGE UP (ref 6–8)
RBC # BLD: 4.54 M/UL — SIGNIFICANT CHANGE UP (ref 4.2–5.4)
RBC # FLD: 13.8 % — SIGNIFICANT CHANGE UP (ref 11.5–14.5)
SODIUM SERPL-SCNC: 142 MMOL/L — SIGNIFICANT CHANGE UP (ref 135–146)
WBC # BLD: 6.77 K/UL — SIGNIFICANT CHANGE UP (ref 4.8–10.8)
WBC # FLD AUTO: 6.77 K/UL — SIGNIFICANT CHANGE UP (ref 4.8–10.8)

## 2025-01-03 PROCEDURE — 85025 COMPLETE CBC W/AUTO DIFF WBC: CPT

## 2025-01-03 PROCEDURE — 70481 CT ORBIT/EAR/FOSSA W/DYE: CPT | Mod: 26,MC

## 2025-01-03 PROCEDURE — 36415 COLL VENOUS BLD VENIPUNCTURE: CPT

## 2025-01-03 PROCEDURE — 96375 TX/PRO/DX INJ NEW DRUG ADDON: CPT | Mod: XU

## 2025-01-03 PROCEDURE — 70481 CT ORBIT/EAR/FOSSA W/DYE: CPT | Mod: MC

## 2025-01-03 PROCEDURE — 99284 EMERGENCY DEPT VISIT MOD MDM: CPT | Mod: 25

## 2025-01-03 PROCEDURE — 80053 COMPREHEN METABOLIC PANEL: CPT

## 2025-01-03 PROCEDURE — 99285 EMERGENCY DEPT VISIT HI MDM: CPT

## 2025-01-03 PROCEDURE — 96374 THER/PROPH/DIAG INJ IV PUSH: CPT | Mod: XU

## 2025-01-03 PROCEDURE — 87040 BLOOD CULTURE FOR BACTERIA: CPT

## 2025-01-03 RX ORDER — AMPICILLIN AND SULBACTAM 1; .5 G/1; G/1
3 INJECTION, POWDER, FOR SOLUTION INTRAVENOUS ONCE
Refills: 0 | Status: COMPLETED | OUTPATIENT
Start: 2025-01-03 | End: 2025-01-03

## 2025-01-03 RX ORDER — VANCOMYCIN HCL 900 MCG/MG
1000 POWDER (GRAM) MISCELLANEOUS ONCE
Refills: 0 | Status: COMPLETED | OUTPATIENT
Start: 2025-01-03 | End: 2025-01-03

## 2025-01-03 RX ORDER — FLUORESCEIN SODIUM 100 %
1 POWDER (GRAM) MISCELLANEOUS ONCE
Refills: 0 | Status: COMPLETED | OUTPATIENT
Start: 2025-01-03 | End: 2025-01-03

## 2025-01-03 RX ORDER — TETRACAINE HYDROCHLORIDE 5 MG/ML
1 SOLUTION OPHTHALMIC ONCE
Refills: 0 | Status: COMPLETED | OUTPATIENT
Start: 2025-01-03 | End: 2025-01-03

## 2025-01-03 RX ORDER — AMOXICILLIN/POTASSIUM CLAV 250-125 MG
875 TABLET ORAL
Qty: 14 | Refills: 0
Start: 2025-01-03 | End: 2025-01-09

## 2025-01-03 RX ORDER — POLYMYXIN B SULFATE, TRIMETHOPRIM SULFATE 10000; 1 [USP'U]/ML; MG/ML
2 SOLUTION/ DROPS OPHTHALMIC
Qty: 1 | Refills: 0
Start: 2025-01-03 | End: 2025-01-07

## 2025-01-03 RX ADMIN — Medication 250 MILLIGRAM(S): at 14:19

## 2025-01-03 RX ADMIN — AMPICILLIN AND SULBACTAM 200 GRAM(S): 1; .5 INJECTION, POWDER, FOR SOLUTION INTRAVENOUS at 13:42

## 2025-01-03 NOTE — ED PROVIDER NOTE - CLINICAL SUMMARY MEDICAL DECISION MAKING FREE TEXT BOX
concern for orbital cellulitis given history - no evidence on CT or systemic symptoms - f/u with ophtho

## 2025-01-03 NOTE — ED PROVIDER NOTE - PHYSICAL EXAMINATION
Vital Signs: I have reviewed the initial vital signs.  Constitutional: well-nourished, appears stated age, no acute distress  Cardiovascular: regular rate, regular rhythm, well-perfused extremities  Respiratory: unlabored respiratory effort  ENT: L eye chemosis, EOM limited by pain, edema of upper > lower eyelid, mild warmth and erythema  Musculoskeletal: supple neck, no lower extremity edema  Integumentary: warm, dry  Neurologic: awake, alert, cranial nerves II-XII grossly intact, extremities’ motor and sensory functions grossly intact  Psychiatric: appropriate mood, appropriate affect Vital Signs: I have reviewed the initial vital signs.  Constitutional: well-nourished, appears stated age, no acute distress  Cardiovascular: regular rate, regular rhythm, well-perfused extremities  Respiratory: unlabored respiratory effort  ENT: L eye chemosis, EOM mildly limited by pain, edema of upper > lower eyelid, mild warmth and erythema; neg saul sign on flourescein stain  Musculoskeletal: supple neck, no lower extremity edema  Integumentary: warm, dry  Neurologic: awake, alert, cranial nerves II-XII grossly intact, extremities’ motor and sensory functions grossly intact  Psychiatric: appropriate mood, appropriate affect

## 2025-01-03 NOTE — ED PROVIDER NOTE - PATIENT PORTAL LINK FT
You can access the FollowMyHealth Patient Portal offered by [facility] by registering at the following website: [email or web address]. By joining [employer]’s FollowMyHealth portal, you will also be able to view your health information using other applications (apps) compatible with our system.

## 2025-01-03 NOTE — ED PROVIDER NOTE - OBJECTIVE STATEMENT
Pt with hx of orbital cellulitis comes in for 5 days of L eye redness and swelling.  Notes light sensitivity and some pain with movement, similar to prior presentation.  No fever.

## 2025-01-03 NOTE — ED PROVIDER NOTE - CARE PROVIDER_API CALL
[facility]  Ophthalmology  [address], [address]  [place] [address]  Phone: [phone]  Fax: [phone]  Established Patient  Follow Up Time: Urgent

## 2025-01-03 NOTE — ED ADULT NURSE NOTE - SUICIDE SCREENING QUESTION 3
Price (Do Not Change): 0.00 Detail Level: Simple Instructions: This plan will send the code FBSD to the PM system.  DO NOT or CHANGE the price. No

## 2025-01-06 ENCOUNTER — APPOINTMENT (OUTPATIENT)
Dept: ORTHOPEDIC SURGERY | Facility: CLINIC | Age: 60
End: 2025-01-06

## 2025-01-08 LAB
CULTURE RESULTS: SIGNIFICANT CHANGE UP
SPECIMEN SOURCE: SIGNIFICANT CHANGE UP

## 2025-01-13 DIAGNOSIS — H04.002: ICD-10-CM

## 2025-01-24 ENCOUNTER — RX RENEWAL (OUTPATIENT)
Age: 60
End: 2025-01-24

## 2025-01-30 ENCOUNTER — RX RENEWAL (OUTPATIENT)
Age: 60
End: 2025-01-30

## 2025-02-24 ENCOUNTER — NON-APPOINTMENT (OUTPATIENT)
Age: 60
End: 2025-02-24

## 2025-02-26 ENCOUNTER — APPOINTMENT (OUTPATIENT)
Dept: RHEUMATOLOGY | Facility: CLINIC | Age: 60
End: 2025-02-26
Payer: MEDICARE

## 2025-02-26 ENCOUNTER — RX RENEWAL (OUTPATIENT)
Age: 60
End: 2025-02-26

## 2025-02-26 VITALS
SYSTOLIC BLOOD PRESSURE: 128 MMHG | DIASTOLIC BLOOD PRESSURE: 70 MMHG | TEMPERATURE: 98 F | OXYGEN SATURATION: 96 % | HEIGHT: 62 IN | BODY MASS INDEX: 30.55 KG/M2 | HEART RATE: 87 BPM | WEIGHT: 166 LBS

## 2025-02-26 DIAGNOSIS — H04.002: ICD-10-CM

## 2025-02-26 PROCEDURE — 99214 OFFICE O/P EST MOD 30 MIN: CPT

## 2025-02-26 RX ORDER — LEFLUNOMIDE 20 MG/1
20 TABLET, FILM COATED ORAL
Qty: 30 | Refills: 3 | Status: ACTIVE | COMMUNITY
Start: 2025-02-26 | End: 1900-01-01

## 2025-02-27 ENCOUNTER — OUTPATIENT (OUTPATIENT)
Dept: OUTPATIENT SERVICES | Facility: HOSPITAL | Age: 60
LOS: 1 days | End: 2025-02-27
Payer: MEDICARE

## 2025-02-27 ENCOUNTER — APPOINTMENT (OUTPATIENT)
Dept: ENDOCRINOLOGY | Facility: CLINIC | Age: 60
End: 2025-02-27

## 2025-02-27 VITALS
HEART RATE: 95 BPM | BODY MASS INDEX: 30.36 KG/M2 | WEIGHT: 166 LBS | DIASTOLIC BLOOD PRESSURE: 74 MMHG | SYSTOLIC BLOOD PRESSURE: 136 MMHG

## 2025-02-27 DIAGNOSIS — Z98.890 OTHER SPECIFIED POSTPROCEDURAL STATES: Chronic | ICD-10-CM

## 2025-02-27 DIAGNOSIS — Z00.00 ENCOUNTER FOR GENERAL ADULT MEDICAL EXAMINATION WITHOUT ABNORMAL FINDINGS: ICD-10-CM

## 2025-02-27 DIAGNOSIS — E11.9 TYPE 2 DIABETES MELLITUS W/OUT COMPLICATIONS: ICD-10-CM

## 2025-02-27 DIAGNOSIS — E66.9 OBESITY, UNSPECIFIED: ICD-10-CM

## 2025-02-27 PROCEDURE — 99214 OFFICE O/P EST MOD 30 MIN: CPT

## 2025-03-04 DIAGNOSIS — E11.65 TYPE 2 DIABETES MELLITUS WITH HYPERGLYCEMIA: ICD-10-CM

## 2025-03-04 DIAGNOSIS — E78.00 PURE HYPERCHOLESTEROLEMIA, UNSPECIFIED: ICD-10-CM

## 2025-03-18 ENCOUNTER — APPOINTMENT (OUTPATIENT)
Dept: ORTHOPEDIC SURGERY | Facility: CLINIC | Age: 60
End: 2025-03-18
Payer: OTHER MISCELLANEOUS

## 2025-03-18 DIAGNOSIS — M25.531 PAIN IN RIGHT WRIST: ICD-10-CM

## 2025-03-18 PROCEDURE — 73110 X-RAY EXAM OF WRIST: CPT | Mod: RT

## 2025-03-18 PROCEDURE — 99213 OFFICE O/P EST LOW 20 MIN: CPT

## 2025-03-24 ENCOUNTER — APPOINTMENT (OUTPATIENT)
Dept: ORTHOPEDIC SURGERY | Facility: CLINIC | Age: 60
End: 2025-03-24

## 2025-03-26 NOTE — BRIEF OPERATIVE NOTE - COMMENTS
03/26/25 0918   NIV Type   NIV Started/Stopped On   Equipment Type V 60   Mode Bilevel   Mask Type Full face mask   Mask Size Medium   Assessment   Pulse 72   Respirations 20   SpO2 96 %   Settings/Measurements   PIP Observed 24 cm H20   CPAP/EPAP 8 cmH2O   Vt (Measured) 304 mL   Rate Ordered 20   Insp Rise Time (%) 3 %   FiO2  90 %   I Time/ I Time % 0.8 s   Minute Volume (L/min) 7.3 Liters   Mask Leak (lpm) 24 lpm   Patient's Home Machine No   Alarm Settings   Alarms On Y   Low Pressure (cmH2O) 15 cmH2O   High Pressure (cmH2O) 30 cmH2O   Apnea (secs) 20 secs   RR Low (bpm) 20   RR High (bpm) 40 br/min        Globus PEEK cage with Ingrid, with anchors placed.

## 2025-03-27 ENCOUNTER — NON-APPOINTMENT (OUTPATIENT)
Age: 60
End: 2025-03-27

## 2025-04-21 ENCOUNTER — APPOINTMENT (OUTPATIENT)
Dept: ORTHOPEDIC SURGERY | Facility: CLINIC | Age: 60
End: 2025-04-21
Payer: OTHER MISCELLANEOUS

## 2025-04-21 DIAGNOSIS — M19.012 PRIMARY OSTEOARTHRITIS, LEFT SHOULDER: ICD-10-CM

## 2025-04-21 PROCEDURE — 99213 OFFICE O/P EST LOW 20 MIN: CPT | Mod: ACP

## 2025-04-29 ENCOUNTER — APPOINTMENT (OUTPATIENT)
Dept: ORTHOPEDIC SURGERY | Facility: CLINIC | Age: 60
End: 2025-04-29
Payer: OTHER MISCELLANEOUS

## 2025-04-29 DIAGNOSIS — M77.11 LATERAL EPICONDYLITIS, RIGHT ELBOW: ICD-10-CM

## 2025-04-29 PROCEDURE — 99213 OFFICE O/P EST LOW 20 MIN: CPT

## 2025-05-16 NOTE — PATIENT PROFILE ADULT - OVER THE PAST TWO WEEKS, HAVE YOU FELT LITTLE INTEREST OR PLEASURE IN DOING THINGS?
- Chronic, with associated pain   - Pt reports that she was discharged yesterday and her  medications not sent to the pharmacy. Pt requested refill on her oxycodone. Per PDMP review Pt refill med on 4/14/25. She was admitted on 4/21-5/15/25. Informed Pt that she should have 7 more days after discharge. Pt reports that she discussed with PCP that the BID dosage is ineffective and they (PCP and Pt) agreed that she can do TID. Informed Pt that per last PCP encounter, no change was made to med.  - Advised Pt to make an appointment with PCP to discuss concern. Unfortunately no refill will be sent today.  - This writer also advised Pt that she needs a TCM visit schedule ahead of time so that the provider can review hospital encounter prior to visit for proper transition care management.  - Will order Tylenol for 4 days for pain  Orders:    acetaminophen (TYLENOL) 500 mg tablet; Take 2 tablets (1,000 mg total) by mouth 3 (three) times a day for 4 days     no

## 2025-05-18 ENCOUNTER — EMERGENCY (EMERGENCY)
Facility: HOSPITAL | Age: 60
LOS: 0 days | Discharge: HOME | End: 2025-05-18
Attending: EMERGENCY MEDICINE
Payer: MEDICARE

## 2025-05-18 VITALS
HEIGHT: 62 IN | WEIGHT: 158.07 LBS | HEART RATE: 94 BPM | SYSTOLIC BLOOD PRESSURE: 148 MMHG | DIASTOLIC BLOOD PRESSURE: 77 MMHG | TEMPERATURE: 98 F | OXYGEN SATURATION: 99 % | RESPIRATION RATE: 19 BRPM

## 2025-05-18 DIAGNOSIS — E78.00 PURE HYPERCHOLESTEROLEMIA, UNSPECIFIED: ICD-10-CM

## 2025-05-18 DIAGNOSIS — Z98.890 OTHER SPECIFIED POSTPROCEDURAL STATES: Chronic | ICD-10-CM

## 2025-05-18 DIAGNOSIS — L03.116 CELLULITIS OF LEFT LOWER LIMB: ICD-10-CM

## 2025-05-18 DIAGNOSIS — E11.51 TYPE 2 DIABETES MELLITUS WITH DIABETIC PERIPHERAL ANGIOPATHY WITHOUT GANGRENE: ICD-10-CM

## 2025-05-18 DIAGNOSIS — Z88.6 ALLERGY STATUS TO ANALGESIC AGENT: ICD-10-CM

## 2025-05-18 LAB
ALBUMIN SERPL ELPH-MCNC: 4.2 G/DL — SIGNIFICANT CHANGE UP (ref 3.5–5.2)
ALP SERPL-CCNC: 92 U/L — SIGNIFICANT CHANGE UP (ref 30–115)
ALT FLD-CCNC: 25 U/L — SIGNIFICANT CHANGE UP (ref 0–41)
ANION GAP SERPL CALC-SCNC: 13 MMOL/L — SIGNIFICANT CHANGE UP (ref 7–14)
APTT BLD: 25.4 SEC — LOW (ref 27–39.2)
AST SERPL-CCNC: 35 U/L — SIGNIFICANT CHANGE UP (ref 0–41)
BASOPHILS # BLD AUTO: 0.04 K/UL — SIGNIFICANT CHANGE UP (ref 0–0.2)
BASOPHILS NFR BLD AUTO: 0.8 % — SIGNIFICANT CHANGE UP (ref 0–1)
BILIRUB SERPL-MCNC: 0.3 MG/DL — SIGNIFICANT CHANGE UP (ref 0.2–1.2)
BUN SERPL-MCNC: 5 MG/DL — LOW (ref 10–20)
CALCIUM SERPL-MCNC: 9.1 MG/DL — SIGNIFICANT CHANGE UP (ref 8.4–10.5)
CHLORIDE SERPL-SCNC: 106 MMOL/L — SIGNIFICANT CHANGE UP (ref 98–110)
CO2 SERPL-SCNC: 23 MMOL/L — SIGNIFICANT CHANGE UP (ref 17–32)
CREAT SERPL-MCNC: 0.7 MG/DL — SIGNIFICANT CHANGE UP (ref 0.7–1.5)
EGFR: 99 ML/MIN/1.73M2 — SIGNIFICANT CHANGE UP
EGFR: 99 ML/MIN/1.73M2 — SIGNIFICANT CHANGE UP
EOSINOPHIL # BLD AUTO: 0.12 K/UL — SIGNIFICANT CHANGE UP (ref 0–0.7)
EOSINOPHIL NFR BLD AUTO: 2.5 % — SIGNIFICANT CHANGE UP (ref 0–8)
GLUCOSE SERPL-MCNC: 153 MG/DL — HIGH (ref 70–99)
HCT VFR BLD CALC: 43.7 % — SIGNIFICANT CHANGE UP (ref 37–47)
HGB BLD-MCNC: 14.4 G/DL — SIGNIFICANT CHANGE UP (ref 12–16)
IMM GRANULOCYTES NFR BLD AUTO: 0.2 % — SIGNIFICANT CHANGE UP (ref 0.1–0.3)
INR BLD: 0.88 RATIO — SIGNIFICANT CHANGE UP (ref 0.65–1.3)
LACTATE SERPL-SCNC: 2 MMOL/L — SIGNIFICANT CHANGE UP (ref 0.7–2)
LYMPHOCYTES # BLD AUTO: 1.49 K/UL — SIGNIFICANT CHANGE UP (ref 1.2–3.4)
LYMPHOCYTES # BLD AUTO: 30.9 % — SIGNIFICANT CHANGE UP (ref 20.5–51.1)
MCHC RBC-ENTMCNC: 31.5 PG — HIGH (ref 27–31)
MCHC RBC-ENTMCNC: 33 G/DL — SIGNIFICANT CHANGE UP (ref 32–37)
MCV RBC AUTO: 95.6 FL — SIGNIFICANT CHANGE UP (ref 81–99)
MONOCYTES # BLD AUTO: 0.57 K/UL — SIGNIFICANT CHANGE UP (ref 0.1–0.6)
MONOCYTES NFR BLD AUTO: 11.8 % — HIGH (ref 1.7–9.3)
NEUTROPHILS # BLD AUTO: 2.59 K/UL — SIGNIFICANT CHANGE UP (ref 1.4–6.5)
NEUTROPHILS NFR BLD AUTO: 53.8 % — SIGNIFICANT CHANGE UP (ref 42.2–75.2)
NRBC BLD AUTO-RTO: 0 /100 WBCS — SIGNIFICANT CHANGE UP (ref 0–0)
PLATELET # BLD AUTO: 275 K/UL — SIGNIFICANT CHANGE UP (ref 130–400)
PMV BLD: 9.3 FL — SIGNIFICANT CHANGE UP (ref 7.4–10.4)
POTASSIUM SERPL-MCNC: 4.8 MMOL/L — SIGNIFICANT CHANGE UP (ref 3.5–5)
POTASSIUM SERPL-SCNC: 4.8 MMOL/L — SIGNIFICANT CHANGE UP (ref 3.5–5)
PROT SERPL-MCNC: 7 G/DL — SIGNIFICANT CHANGE UP (ref 6–8)
PROTHROM AB SERPL-ACNC: 10.3 SEC — SIGNIFICANT CHANGE UP (ref 9.95–12.87)
RBC # BLD: 4.57 M/UL — SIGNIFICANT CHANGE UP (ref 4.2–5.4)
RBC # FLD: 14.6 % — HIGH (ref 11.5–14.5)
SODIUM SERPL-SCNC: 142 MMOL/L — SIGNIFICANT CHANGE UP (ref 135–146)
WBC # BLD: 4.82 K/UL — SIGNIFICANT CHANGE UP (ref 4.8–10.8)
WBC # FLD AUTO: 4.82 K/UL — SIGNIFICANT CHANGE UP (ref 4.8–10.8)

## 2025-05-18 PROCEDURE — 87040 BLOOD CULTURE FOR BACTERIA: CPT

## 2025-05-18 PROCEDURE — 93970 EXTREMITY STUDY: CPT

## 2025-05-18 PROCEDURE — 96374 THER/PROPH/DIAG INJ IV PUSH: CPT

## 2025-05-18 PROCEDURE — 73590 X-RAY EXAM OF LOWER LEG: CPT | Mod: LT

## 2025-05-18 PROCEDURE — 80053 COMPREHEN METABOLIC PANEL: CPT

## 2025-05-18 PROCEDURE — 96375 TX/PRO/DX INJ NEW DRUG ADDON: CPT

## 2025-05-18 PROCEDURE — 83605 ASSAY OF LACTIC ACID: CPT

## 2025-05-18 PROCEDURE — 36415 COLL VENOUS BLD VENIPUNCTURE: CPT

## 2025-05-18 PROCEDURE — 85025 COMPLETE CBC W/AUTO DIFF WBC: CPT

## 2025-05-18 PROCEDURE — 85610 PROTHROMBIN TIME: CPT

## 2025-05-18 PROCEDURE — 93970 EXTREMITY STUDY: CPT | Mod: 26

## 2025-05-18 PROCEDURE — 99285 EMERGENCY DEPT VISIT HI MDM: CPT

## 2025-05-18 PROCEDURE — 73590 X-RAY EXAM OF LOWER LEG: CPT | Mod: 26,LT

## 2025-05-18 PROCEDURE — 85730 THROMBOPLASTIN TIME PARTIAL: CPT

## 2025-05-18 PROCEDURE — 99284 EMERGENCY DEPT VISIT MOD MDM: CPT | Mod: 25

## 2025-05-18 RX ORDER — DOXYCYCLINE HYCLATE 100 MG
1 TABLET ORAL
Qty: 20 | Refills: 0
Start: 2025-05-18 | End: 2025-05-27

## 2025-05-18 RX ORDER — CEFAZOLIN SODIUM IN 0.9 % NACL 3 G/100 ML
2000 INTRAVENOUS SOLUTION, PIGGYBACK (ML) INTRAVENOUS ONCE
Refills: 0 | Status: COMPLETED | OUTPATIENT
Start: 2025-05-18 | End: 2025-05-18

## 2025-05-18 RX ADMIN — Medication 4 MILLIGRAM(S): at 14:25

## 2025-05-18 RX ADMIN — Medication 100 MILLIGRAM(S): at 14:25

## 2025-05-18 NOTE — ED PROVIDER NOTE - OBJECTIVE STATEMENT
60-year-old female with history of diabetes, spinal stenosis, sciatica, high cholesterol, PAD, chronic back/neck pain presents to the ED status post left lower extremity biopsy last week.  Patient began having some redness around biopsy site and saw her PMD on Monday and was started on Keflex.  Patient continues to have some redness to the leg.  No fever, chills or drainage.

## 2025-05-18 NOTE — ED PROVIDER NOTE - NSICDXPASTMEDICALHX_GEN_ALL_CORE_FT
Name band; PAST MEDICAL HISTORY:  Arthritis OA    Asthma LAST EPISODE SEVERAL YRS    Diabetes     Dyslipidemia     GERD (gastroesophageal reflux disease)     History of peripheral arterial disease     HTN (hypertension)     TRAVIS on CPAP

## 2025-05-18 NOTE — ED PROVIDER NOTE - CONSIDERATION OF ADMISSION OBSERVATION
Consideration of Admission/Observation admit for failure outpt tx. Pt however with reassuring labs, only been taking kelfex bid for 3 days

## 2025-05-18 NOTE — ED PROVIDER NOTE - PHYSICAL EXAMINATION
Vital Signs: I have reviewed the initial vital signs.  Constitutional: NAD, well-nourished, appears stated age, no acute distress.  HEENT: Airway patent, moist MM, no erythema/swelling/deformity of oral structures. EOMI, PERRLA.  CV: regular rate, regular rhythm, well-perfused extremities, 2+ b/l DP and radial pulses equal.  Lungs: BCTA, no increased WOB.  ABD: NT, ND, no guarding or rebound, no pulsatile mass, no hernias.   MSK: Neck supple, nontender, nl ROM, no stepoff. Chest nontender. Ext nontender, nl rom, no deformity.   INTEG: LLE redness/swelling/tenderness around biopsy site; foot is warm, nl dp pulse.  NEURO: A&Ox3, normal strength, nl sensation throughout, normal speech.   PSYCH: Calm, cooperative, normal affect and interaction.

## 2025-05-18 NOTE — ED PROVIDER NOTE - PATIENT PORTAL LINK FT
You can access the FollowMyHealth Patient Portal offered by [facility] by registering at the following website: [email or web address]. By joining [email or web address]’s FollowMyHealth portal, you will also be able to view your health information using other applications (apps) compatible with our system.

## 2025-05-18 NOTE — ED PROVIDER NOTE - CLINICAL SUMMARY MEDICAL DECISION MAKING FREE TEXT BOX
Agree with above history and exam.  Patient here with redness swelling around biopsy site.  Patient has been taking Keflex only twice daily will switch to doxycycline labs reassuring x-ray unremarkable stable for discharge

## 2025-05-18 NOTE — ED ADULT TRIAGE NOTE - CHIEF COMPLAINT QUOTE
co left lle wound after biposy has already been on po Anitbiotic for 3 days and states getting worse and more painful

## 2025-05-18 NOTE — ED PROVIDER NOTE - TOBACCO USE
Unknown if ever smoked
pain scale explained to parents/FLACC (Less than 3yr old or unable to communicate)

## 2025-05-24 LAB
CULTURE RESULTS: SIGNIFICANT CHANGE UP
CULTURE RESULTS: SIGNIFICANT CHANGE UP
SPECIMEN SOURCE: SIGNIFICANT CHANGE UP
SPECIMEN SOURCE: SIGNIFICANT CHANGE UP

## 2025-05-26 NOTE — ED PROVIDER NOTE - NS ED MD DISPO ADMITTING SERVICE
Patient observed sleeping comfortably for majority of q15 minute monitoring throughout the night, without demonstrating any signs or symptoms of distress. No acute behaviors overnight. Unlabored even breaths noted. Will remain on safety precautions and continual q15 minute monitoring.   MED

## 2025-05-31 ENCOUNTER — NON-APPOINTMENT (OUTPATIENT)
Age: 60
End: 2025-05-31

## 2025-06-02 ENCOUNTER — APPOINTMENT (OUTPATIENT)
Dept: RHEUMATOLOGY | Facility: CLINIC | Age: 60
End: 2025-06-02
Payer: MEDICARE

## 2025-06-02 VITALS
DIASTOLIC BLOOD PRESSURE: 80 MMHG | SYSTOLIC BLOOD PRESSURE: 124 MMHG | TEMPERATURE: 97.9 F | BODY MASS INDEX: 29.08 KG/M2 | WEIGHT: 158 LBS | HEIGHT: 62 IN | HEART RATE: 92 BPM | OXYGEN SATURATION: 97 %

## 2025-06-02 DIAGNOSIS — Z79.52 LONG TERM (CURRENT) USE OF SYSTEMIC STEROIDS: ICD-10-CM

## 2025-06-02 PROCEDURE — 99214 OFFICE O/P EST MOD 30 MIN: CPT

## 2025-06-02 PROCEDURE — G2211 COMPLEX E/M VISIT ADD ON: CPT

## 2025-06-09 RX ORDER — RITUXIMAB-PVVR 100 MG/10ML
100 INJECTION, SOLUTION INTRAVENOUS
Qty: 20 | Refills: 0 | Status: ACTIVE | COMMUNITY
Start: 2025-06-09 | End: 1900-01-01

## 2025-06-10 ENCOUNTER — APPOINTMENT (OUTPATIENT)
Dept: RHEUMATOLOGY | Facility: CLINIC | Age: 60
End: 2025-06-10

## 2025-06-10 ENCOUNTER — APPOINTMENT (OUTPATIENT)
Dept: ORTHOPEDIC SURGERY | Facility: CLINIC | Age: 60
End: 2025-06-10
Payer: OTHER MISCELLANEOUS

## 2025-06-10 PROCEDURE — 99213 OFFICE O/P EST LOW 20 MIN: CPT

## 2025-06-11 NOTE — ED PROVIDER NOTE - ATTENDING APP SHARED VISIT CONTRIBUTION OF CARE
General Sunscreen Counseling: I recommended  SPF 30+ or sunprotective clothing. Detail Level: Generalized 59 yo F with PMH of DM, HTN presents to the ED for R Shoulder pain for the past week.  Patient states she has spinal stenosis and she often gets this pain.  Its been intermittent for the past week radiates down her right arm through to her hand.  Worse with inspiration and movement.  She has not taken anything for pain.  Patient states she has an appointment with pain management on Monday.  No chest pain or shortness of breath no nausea vomiting abdominal pain.    Const: NAD  Eyes: PERRL, no conjunctival injection  HENT:  Neck supple without meningismus   CV: RRR, Warm, well-perfused extremities  RESP: CTA B/L, no tachypnea   GI: soft, non-tender, non-distended  MSK: No gross deformities appreciated  Skin: Warm, dry. No rashes  Neuro: Alert, CNs II-XII grossly intact. Sensation and motor function of extremities grossly intact.  Psych: Appropriate mood and affect.    will try robaxin

## 2025-06-12 ENCOUNTER — RX RENEWAL (OUTPATIENT)
Age: 60
End: 2025-06-12

## 2025-06-16 ENCOUNTER — NON-APPOINTMENT (OUTPATIENT)
Age: 60
End: 2025-06-16

## 2025-06-17 ENCOUNTER — APPOINTMENT (OUTPATIENT)
Dept: CARDIOLOGY | Facility: CLINIC | Age: 60
End: 2025-06-17
Payer: MEDICARE

## 2025-06-17 VITALS
BODY MASS INDEX: 28.89 KG/M2 | DIASTOLIC BLOOD PRESSURE: 70 MMHG | OXYGEN SATURATION: 98 % | HEIGHT: 62 IN | RESPIRATION RATE: 15 BRPM | SYSTOLIC BLOOD PRESSURE: 126 MMHG | WEIGHT: 157 LBS | HEART RATE: 81 BPM

## 2025-06-17 PROCEDURE — G2211 COMPLEX E/M VISIT ADD ON: CPT

## 2025-06-17 PROCEDURE — 93000 ELECTROCARDIOGRAM COMPLETE: CPT

## 2025-06-17 PROCEDURE — 99204 OFFICE O/P NEW MOD 45 MIN: CPT

## 2025-06-17 RX ORDER — METOPROLOL SUCCINATE 100 MG/1
100 TABLET, EXTENDED RELEASE ORAL
Refills: 0 | Status: ACTIVE | COMMUNITY

## 2025-06-17 RX ORDER — AMLODIPINE BESYLATE 10 MG/1
10 TABLET ORAL
Refills: 0 | Status: ACTIVE | COMMUNITY

## 2025-06-18 PROBLEM — R01.1 MURMUR: Status: ACTIVE | Noted: 2025-06-17

## 2025-06-18 PROBLEM — R06.02 SHORTNESS OF BREATH ON EXERTION: Status: ACTIVE | Noted: 2025-06-18

## 2025-07-11 ENCOUNTER — APPOINTMENT (OUTPATIENT)
Dept: ORTHOPEDIC SURGERY | Facility: CLINIC | Age: 60
End: 2025-07-11
Payer: OTHER MISCELLANEOUS

## 2025-07-11 PROCEDURE — 99213 OFFICE O/P EST LOW 20 MIN: CPT | Mod: ACP

## 2025-07-29 ENCOUNTER — APPOINTMENT (OUTPATIENT)
Dept: ORTHOPEDIC SURGERY | Facility: CLINIC | Age: 60
End: 2025-07-29
Payer: OTHER MISCELLANEOUS

## 2025-07-29 DIAGNOSIS — M25.531 PAIN IN RIGHT WRIST: ICD-10-CM

## 2025-07-29 PROCEDURE — 99213 OFFICE O/P EST LOW 20 MIN: CPT

## 2025-09-08 ENCOUNTER — APPOINTMENT (OUTPATIENT)
Dept: CARDIOLOGY | Facility: CLINIC | Age: 60
End: 2025-09-08
Payer: MEDICARE

## 2025-09-08 PROCEDURE — 93306 TTE W/DOPPLER COMPLETE: CPT

## 2025-09-08 PROCEDURE — 93000 ELECTROCARDIOGRAM COMPLETE: CPT

## 2025-09-08 PROCEDURE — 99214 OFFICE O/P EST MOD 30 MIN: CPT

## 2025-09-09 ENCOUNTER — APPOINTMENT (OUTPATIENT)
Dept: RHEUMATOLOGY | Facility: CLINIC | Age: 60
End: 2025-09-09
Payer: MEDICARE

## 2025-09-09 VITALS
HEART RATE: 107 BPM | SYSTOLIC BLOOD PRESSURE: 152 MMHG | WEIGHT: 156 LBS | DIASTOLIC BLOOD PRESSURE: 80 MMHG | TEMPERATURE: 100.1 F | HEIGHT: 62 IN | OXYGEN SATURATION: 96 % | BODY MASS INDEX: 28.71 KG/M2

## 2025-09-09 DIAGNOSIS — H04.002: ICD-10-CM

## 2025-09-09 PROCEDURE — 99214 OFFICE O/P EST MOD 30 MIN: CPT

## 2025-09-09 RX ORDER — METHOTREXATE 2.5 MG/1
2.5 TABLET ORAL
Qty: 36 | Refills: 1 | Status: ACTIVE | COMMUNITY
Start: 2025-09-09 | End: 1900-01-01